# Patient Record
Sex: FEMALE | Race: WHITE | NOT HISPANIC OR LATINO | Employment: OTHER | URBAN - METROPOLITAN AREA
[De-identification: names, ages, dates, MRNs, and addresses within clinical notes are randomized per-mention and may not be internally consistent; named-entity substitution may affect disease eponyms.]

---

## 2023-04-04 LAB
ANION GAP IN SER/PLAS: 10 MMOL/L (ref 10–20)
CALCIUM (MG/DL) IN SER/PLAS: 9.8 MG/DL (ref 8.6–10.3)
CARBON DIOXIDE, TOTAL (MMOL/L) IN SER/PLAS: 32 MMOL/L (ref 21–32)
CHLORIDE (MMOL/L) IN SER/PLAS: 104 MMOL/L (ref 98–107)
CHOLESTEROL (MG/DL) IN SER/PLAS: 195 MG/DL (ref 0–199)
CHOLESTEROL IN HDL (MG/DL) IN SER/PLAS: 61.6 MG/DL
CHOLESTEROL/HDL RATIO: 3.2
CREATININE (MG/DL) IN SER/PLAS: 0.79 MG/DL (ref 0.5–1.05)
ERYTHROCYTE DISTRIBUTION WIDTH (RATIO) BY AUTOMATED COUNT: 13 % (ref 11.5–14.5)
ERYTHROCYTE MEAN CORPUSCULAR HEMOGLOBIN CONCENTRATION (G/DL) BY AUTOMATED: 31 G/DL (ref 32–36)
ERYTHROCYTE MEAN CORPUSCULAR VOLUME (FL) BY AUTOMATED COUNT: 95 FL (ref 80–100)
ERYTHROCYTES (10*6/UL) IN BLOOD BY AUTOMATED COUNT: 5.11 X10E12/L (ref 4–5.2)
GFR FEMALE: 76 ML/MIN/1.73M2
GLUCOSE (MG/DL) IN SER/PLAS: 99 MG/DL (ref 74–99)
HEMATOCRIT (%) IN BLOOD BY AUTOMATED COUNT: 48.7 % (ref 36–46)
HEMOGLOBIN (G/DL) IN BLOOD: 15.1 G/DL (ref 12–16)
LDL: 92 MG/DL (ref 0–99)
LEUKOCYTES (10*3/UL) IN BLOOD BY AUTOMATED COUNT: 7.5 X10E9/L (ref 4.4–11.3)
NON HDL CHOLESTEROL: 133 MG/DL
NRBC (PER 100 WBCS) BY AUTOMATED COUNT: 0 /100 WBC (ref 0–0)
PLATELETS (10*3/UL) IN BLOOD AUTOMATED COUNT: 209 X10E9/L (ref 150–450)
POTASSIUM (MMOL/L) IN SER/PLAS: 3.9 MMOL/L (ref 3.5–5.3)
SODIUM (MMOL/L) IN SER/PLAS: 142 MMOL/L (ref 136–145)
THYROTROPIN (MIU/L) IN SER/PLAS BY DETECTION LIMIT <= 0.05 MIU/L: 1.55 MIU/L (ref 0.44–3.98)
TRIGLYCERIDE (MG/DL) IN SER/PLAS: 206 MG/DL (ref 0–149)
UREA NITROGEN (MG/DL) IN SER/PLAS: 18 MG/DL (ref 6–23)
VLDL: 41 MG/DL (ref 0–40)

## 2024-02-29 ENCOUNTER — LAB (OUTPATIENT)
Dept: LAB | Facility: LAB | Age: 79
End: 2024-02-29
Payer: MEDICARE

## 2024-02-29 DIAGNOSIS — E04.9 NONTOXIC GOITER, UNSPECIFIED: Primary | ICD-10-CM

## 2024-02-29 DIAGNOSIS — I87.2 VENOUS INSUFFICIENCY (CHRONIC) (PERIPHERAL): ICD-10-CM

## 2024-02-29 DIAGNOSIS — E78.5 HYPERLIPIDEMIA, UNSPECIFIED: ICD-10-CM

## 2024-02-29 LAB
ANION GAP SERPL CALC-SCNC: 14 MMOL/L (ref 10–20)
BUN SERPL-MCNC: 16 MG/DL (ref 6–23)
CALCIUM SERPL-MCNC: 9.6 MG/DL (ref 8.6–10.3)
CHLORIDE SERPL-SCNC: 105 MMOL/L (ref 98–107)
CHOLEST SERPL-MCNC: 175 MG/DL (ref 0–199)
CHOLESTEROL/HDL RATIO: 3.1
CO2 SERPL-SCNC: 28 MMOL/L (ref 21–32)
CREAT SERPL-MCNC: 0.82 MG/DL (ref 0.5–1.05)
EGFRCR SERPLBLD CKD-EPI 2021: 73 ML/MIN/1.73M*2
GLUCOSE SERPL-MCNC: 104 MG/DL (ref 74–99)
HDLC SERPL-MCNC: 56.4 MG/DL
LDLC SERPL CALC-MCNC: 73 MG/DL
NON HDL CHOLESTEROL: 119 MG/DL (ref 0–149)
POTASSIUM SERPL-SCNC: 4.5 MMOL/L (ref 3.5–5.3)
SODIUM SERPL-SCNC: 142 MMOL/L (ref 136–145)
TRIGL SERPL-MCNC: 230 MG/DL (ref 0–149)
TSH SERPL-ACNC: 2.45 MIU/L (ref 0.44–3.98)
VLDL: 46 MG/DL (ref 0–40)

## 2024-02-29 PROCEDURE — 36415 COLL VENOUS BLD VENIPUNCTURE: CPT

## 2024-02-29 PROCEDURE — 80048 BASIC METABOLIC PNL TOTAL CA: CPT

## 2024-02-29 PROCEDURE — 84443 ASSAY THYROID STIM HORMONE: CPT

## 2024-02-29 PROCEDURE — 80061 LIPID PANEL: CPT

## 2024-03-14 DIAGNOSIS — M25.561 ACUTE PAIN OF BOTH KNEES: ICD-10-CM

## 2024-03-14 DIAGNOSIS — M25.562 ACUTE PAIN OF BOTH KNEES: ICD-10-CM

## 2024-03-18 ENCOUNTER — OFFICE VISIT (OUTPATIENT)
Dept: ORTHOPEDIC SURGERY | Facility: CLINIC | Age: 79
End: 2024-03-18
Payer: MEDICARE

## 2024-03-18 ENCOUNTER — HOSPITAL ENCOUNTER (OUTPATIENT)
Dept: RADIOLOGY | Facility: CLINIC | Age: 79
Discharge: HOME | End: 2024-03-18
Payer: MEDICARE

## 2024-03-18 DIAGNOSIS — M25.562 ACUTE PAIN OF BOTH KNEES: ICD-10-CM

## 2024-03-18 DIAGNOSIS — M25.562 LEFT KNEE PAIN, UNSPECIFIED CHRONICITY: ICD-10-CM

## 2024-03-18 DIAGNOSIS — M25.561 ACUTE PAIN OF BOTH KNEES: ICD-10-CM

## 2024-03-18 PROCEDURE — 73564 X-RAY EXAM KNEE 4 OR MORE: CPT | Mod: BILATERAL PROCEDURE | Performed by: RADIOLOGY

## 2024-03-18 PROCEDURE — 1159F MED LIST DOCD IN RCRD: CPT | Performed by: ORTHOPAEDIC SURGERY

## 2024-03-18 PROCEDURE — 73564 X-RAY EXAM KNEE 4 OR MORE: CPT | Mod: 50

## 2024-03-18 PROCEDURE — 99213 OFFICE O/P EST LOW 20 MIN: CPT | Performed by: ORTHOPAEDIC SURGERY

## 2024-03-18 PROCEDURE — 1157F ADVNC CARE PLAN IN RCRD: CPT | Performed by: ORTHOPAEDIC SURGERY

## 2024-03-18 RX ORDER — MELOXICAM 15 MG/1
15 TABLET ORAL DAILY
Qty: 30 TABLET | Refills: 11 | Status: SHIPPED | OUTPATIENT
Start: 2024-03-18 | End: 2025-03-18

## 2024-03-18 NOTE — PROGRESS NOTES
History of Present Illness:   Patient with bilateral knee pain status post total knee arthroplasty.  She is endorsing some anterior right knee pain.  Denies any hip groin pain or any numbness or tingling.  Denies any recent falls or infections.    Review of Systems   GENERAL: Negative for malaise, significant weight loss, fever  MUSCULOSKELETAL: see HPI  NEURO:  Negative    Physical Examination:  Right knee:  Full range of motion, no effusion  No laxity varus valgus stress  Mild tenderness over patellar and quadricep tendon  Normal neurovascular exam distally    Imaging:  Cemented total knee arthroplasty appropriate position alignment    Assessment:   Patient status post right TKA with some anterior knee pain    Plan:  We discussed concern for overload of the extensor mechanism which may be somewhat based on body habitus.  We discussed rest ice, anti-inflammatories, lidocaine patches recommended formal physical therapy.

## 2024-08-18 ENCOUNTER — LAB REQUISITION (OUTPATIENT)
Dept: LAB | Facility: HOSPITAL | Age: 79
End: 2024-08-18
Payer: MEDICARE

## 2024-08-19 LAB
LABORATORY COMMENT REPORT: NORMAL
PATH REPORT.GROSS SPEC: NORMAL
PATH REPORT.TOTAL CANCER: NORMAL

## 2024-09-03 NOTE — PROGRESS NOTES
Gynecologic Oncology Initial Consultation  [facility]    Patient ID: Rohini Beaver, 79 y.o.  Referring Physician: Harley Araujo MD ([facility])  Primary Care Provider: Jose Enrique Wooten MD      Reason for Consultation: Peritoneal carcinomatosis  Subjective    HPI  79 y.o. who presents as referral from Dr. Harley Araujo at [facility] for peritoneal carcinomatosis. She first developed abdominal pain and constipation in July. Associated symptoms include unintentional weight loss of 8 lb. Seen by GI and treated empirically for diverticulitis without improvement in symptoms. Presented to the ER where CT showed a possible 3 cm pelvic mass, endometrial thickening and moderate volume ascites and omental cake. Tumor markers were notable for CA-125 (8/16/24) elevated to 1625; CEA (8/16/24) 17.6. Paracentesis was completed with 1 L removed. 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 Referred for further management.     Here in a wheelchair today with significant and rapid decline in physical conditioning. Requires assistance with most ADL and currently resides in a assisted living. Denies nausea, emesis though with constant abdominal pain and bloating. Last paracentesis for about 1L was on 8/28/24. Treatment with percocet q4 PRN tried but having trouble getting the medicine when needed. Constipation ongoing with straining reported. She has been drinking prune juice and PRN colace tried. Denies vaginal bleeding.     A comprehensive review of systems was performed and otherwise negative.    Objective      Past Medical History:   Diagnosis Date    Bilateral primary osteoarthritis of knee     HLD (hyperlipidemia)     Lumbosacral radiculopathy     Meralgia paraesthetica     Physical deconditioning        Past Surgical History:   Procedure Laterality Date    ARTHROPLASTY Left     Left thumb carpometacarpal arthroplasty with ligament reconstruction and tendon interposition  "   BREAST BIOPSY  1999    Benign    CHOLECYSTECTOMY      COLONOSCOPY      CORNEAL TRANSPLANT      DILATION AND CURETTAGE OF UTERUS      LUMBAR FUSION      SHOULDER ARTHROSCOPY Left     SKIN LESION EXCISION      TOTAL KNEE ARTHROPLASTY Left 2019    TOTAL KNEE ARTHROPLASTY Right 2017    TRIGGER FINGER RELEASE Right        Family History   Problem Relation Name Age of Onset    Colon cancer Mother      Lung cancer Father      Other (Mesothelioma) Brother      Brain cancer Mother's Brother     Otherwise, denies history of endometrial, ovarian, breast, prostate, or colorectal cancers.    OBGYN Hx:  -   - Menopause in 50s; denies HRT use  - Has previously used OCPs for ~2 years    Screening:  - Last Pap: Many years ago, no history of abnormal paps  - Last Mammogram: Age 75, history of benign breast cyst, otherwise no abnormal findings  - Last Colonoscopy: 2019, no history of abnormal colonoscopies    Social Hx:  Rohini Beaver  reports that she has never smoked. She has never used smokeless tobacco.  She  reports that she does not currently use alcohol.   She  reports no history of drug use.  Lives at a SNF but had been at home with her sister. Here with sister and family friend, Pastor.  Retired, worked in food management.     Physical Exam  BSA: 2.18 meters squared  /82 (BP Location: Right arm, Patient Position: Sitting, BP Cuff Size: Adult)   Pulse 98   Temp 36 °C (96.8 °F) (Temporal)   Resp 16   Ht (S) 1.62 m (5' 3.78\")   Wt 106 kg (234 lb 9.1 oz)   SpO2 95%   BMI 40.54 kg/m²   General:   alert and oriented, in no acute distress, sitting in wheelchair   Heart: regular rate and rhythm, S1, S2 normal, no murmur, click, rub or gallop   Lungs: Coarse breath sounds bilaterally   Abdomen: Soft, mildly distended, diffuse tenderness to palpation   Vulva: normal   Vagina: normal mucosa, no blood in the vault   Cervix: no lesions, nulliparous appearance, and limited by patient comfort, but no active bleeding " appreciated   Uterus: normal size, anteverted, mobile   Adnexa: negative for mass   Rectal: normal tone, no masses or tenderness   Lymph Nodes:  Cervical, supraclavicular, and axillary nodes normal.   Extremities: warm, well-perfused without cyanosis, clubbing or edema   Skin: Normal     Pathology:  Ascites Cytology (8/15/2024 at [facility])  Positive for non-small cell carcinoma, consistent with adenocarcinoma. Positive for Castillo-ep4, p16, GATA3. Focal and weakly positive for estrogen receptor. Negative for calretinin, TTF-1    Imaging:  CT Abdomen/Pelvis WO Contrast (8/8/2024 at [facility], Images uploaded to PACS)  Stable subcentimeter hypodensity within left lateral segment of liver. Prior cholecystectomy. Unremarkable spleen, pancreas, adrenals. Punctate nonobstructing right nephrolithiasis. Moderate hiatal hernia. Diverticulosis. No evidence of bowel obstruction. Moderate volume abdominopelvic ascites. Extensive nodularity throughout the omentum consistent with carcinomatosis. Spinal fusion L4-S1. No suspicious lytic or blastic osseous abnormalities.       Performance Status:  Symptomatic; in bed <50% of the day    Assessment/Plan    79 y.o. with peritoneal carcinomatosis, elevated CA-125, and adenocarcinoma on ascitic fluid cytology concerning for gynecologic malignancy.     Oncology History Overview Note   8/15/24 paracentesis 50 mL   CA-125 (8/16/24) elevated to 1625; CEA 17.6   8/31/24 paracentesis cytology adenocarcinoma     Peritoneal carcinomatosis (Multi)   9/4/2024 Initial Diagnosis    Peritoneal carcinomatosis (Multi)       Diagnoses and all orders for this visit:  Peritoneal carcinomatosis (Multi)  - We reviewed her imaging and cytology results from [facility]  - We discussed concern for a malignancy from the ovary/fallopian tube/peritoneum, but tissue diagnosis is required to differentiate site of origin   - Reviewed abnormal tumor markers  and CEA  - IR referral for biopsy  of omental cake   - Virtual visit to follow to review results and discuss treatment   - Discussed anticipated treatment with neoadjuvant chemotherapy, consider KRISTY-7 regimen if GYN origin    Malignant ascites (CMS-HCC)  - Family to call if paracentesis is needed prior to 9/19    Constipation   - Senna PRN     Cancer related pain   - Continue percocet q4, consider transitioning to oxycodone if no relief on follow up     Medical frailty   - Encourage PT/OT at     Seen and discussed with Dr. Villalba.    Anh Cox MD  Gynecologic Oncology Fellow    I saw and evaluated the patient. I personally obtained the key and critical portions of the history and physical exam or was physically present for key and critical portions performed by the resident/fellow. I reviewed the resident/fellow's documentation and discussed the patient with the resident/fellow. I agree with the resident/fellow's medical decision making as documented in the note.    Sofia Villalba MD MPH

## 2024-09-04 ENCOUNTER — OFFICE VISIT (OUTPATIENT)
Dept: GYNECOLOGIC ONCOLOGY | Facility: CLINIC | Age: 79
End: 2024-09-04
Payer: MEDICARE

## 2024-09-04 VITALS
HEIGHT: 64 IN | SYSTOLIC BLOOD PRESSURE: 138 MMHG | OXYGEN SATURATION: 95 % | TEMPERATURE: 96.8 F | WEIGHT: 234.57 LBS | RESPIRATION RATE: 16 BRPM | DIASTOLIC BLOOD PRESSURE: 82 MMHG | HEART RATE: 98 BPM | BODY MASS INDEX: 40.05 KG/M2

## 2024-09-04 DIAGNOSIS — R18.0 MALIGNANT ASCITES (CMS-HCC): Primary | ICD-10-CM

## 2024-09-04 DIAGNOSIS — C56.9: Primary | ICD-10-CM

## 2024-09-04 DIAGNOSIS — R79.1 ABNORMAL COAGULATION PROFILE: ICD-10-CM

## 2024-09-04 DIAGNOSIS — G89.3 CANCER ASSOCIATED PAIN: ICD-10-CM

## 2024-09-04 DIAGNOSIS — C78.6 PERITONEAL CARCINOMATOSIS (MULTI): ICD-10-CM

## 2024-09-04 DIAGNOSIS — R54 FRAILTY: ICD-10-CM

## 2024-09-04 DIAGNOSIS — T40.2X5A CONSTIPATION DUE TO OPIOID THERAPY: ICD-10-CM

## 2024-09-04 DIAGNOSIS — R97.8 OTHER ABNORMAL TUMOR MARKERS: ICD-10-CM

## 2024-09-04 DIAGNOSIS — K59.03 CONSTIPATION DUE TO OPIOID THERAPY: ICD-10-CM

## 2024-09-04 PROBLEM — K44.9 HIATAL HERNIA: Status: ACTIVE | Noted: 2024-09-04

## 2024-09-04 PROBLEM — K21.9 GASTROESOPHAGEAL REFLUX DISEASE: Status: ACTIVE | Noted: 2024-09-04

## 2024-09-04 PROCEDURE — 99215 OFFICE O/P EST HI 40 MIN: CPT | Mod: GC | Performed by: STUDENT IN AN ORGANIZED HEALTH CARE EDUCATION/TRAINING PROGRAM

## 2024-09-04 RX ORDER — OXYCODONE AND ACETAMINOPHEN 5; 325 MG/1; MG/1
1 TABLET ORAL EVERY 4 HOURS PRN
COMMUNITY

## 2024-09-04 ASSESSMENT — PATIENT HEALTH QUESTIONNAIRE - PHQ9
SUM OF ALL RESPONSES TO PHQ9 QUESTIONS 1 AND 2: 0
1. LITTLE INTEREST OR PLEASURE IN DOING THINGS: NOT AT ALL
2. FEELING DOWN, DEPRESSED OR HOPELESS: NOT AT ALL

## 2024-09-04 ASSESSMENT — COLUMBIA-SUICIDE SEVERITY RATING SCALE - C-SSRS
2. HAVE YOU ACTUALLY HAD ANY THOUGHTS OF KILLING YOURSELF?: NO
1. IN THE PAST MONTH, HAVE YOU WISHED YOU WERE DEAD OR WISHED YOU COULD GO TO SLEEP AND NOT WAKE UP?: NO
6. HAVE YOU EVER DONE ANYTHING, STARTED TO DO ANYTHING, OR PREPARED TO DO ANYTHING TO END YOUR LIFE?: NO

## 2024-09-04 ASSESSMENT — ENCOUNTER SYMPTOMS
OCCASIONAL FEELINGS OF UNSTEADINESS: 0
DEPRESSION: 0
LOSS OF SENSATION IN FEET: 0

## 2024-09-04 ASSESSMENT — PAIN SCALES - GENERAL: PAINLEVEL: 0-NO PAIN

## 2024-09-04 NOTE — LETTER
No history of CKD  Creatinine baseline around 0.6-0.7, developed during admission likely secondary to bactrim use, increased to 2.4 and trended down to 1.0  US kidney showed renal US showed minimally dilated central renal collecting system on the left and mildly elevated resistive indices, findings which may be seen in setting of medical renal disease    - Currently resolved  - Daily Renal Function Panel  - Check Vanc levels before each dose  - Need strict I&Os  - Hold off on serological work up for now,  get basic pending labs today   - No Indication for RRT at this time, K+ acceptable, No Uremia symptoms.  - Avoid Hypotension.  - Renally dose all meds  - Please avoid nephrotoxins, including NSAIDs, aminoglycosides, IV contrast (unless absolutely necessary), gadolinium, fleets and other phosphorous-based laxatives. Caution with antibiotics   September 7, 2024     Harley Araujo MD  [address]  [facility] [address]    Patient: Rohini Beaver   YOB: 1945   Date of Visit: 9/4/2024       Dear Dr. Harley Araujo MD:    Thank you for referring Rohini Beaver to me for evaluation. Below are my notes for this consultation.  If you have questions, please do not hesitate to call me. I look forward to following your patient along with you.       Sincerely,     Sofia Villalba MD MPH      CC: No Recipients  ______________________________________________________________________________________      Gynecologic Oncology Initial Consultation  [facility]    Patient ID: Rohini Beaver, 79 y.o.  Referring Physician: Harley Araujo MD ([facility])  Primary Care Provider: Jose Enrique Wooten MD      Reason for Consultation: Peritoneal carcinomatosis  Subjective   HPI  79 y.o. who presents as referral from Dr. Harley Araujo at [facility] for peritoneal carcinomatosis. She first developed abdominal pain and constipation in July. Associated symptoms include unintentional weight loss of 8 lb. Seen by GI and treated empirically for diverticulitis without improvement in symptoms. Presented to the ER where CT showed a possible 3 cm pelvic mass, endometrial thickening and moderate volume ascites and omental cake. Tumor markers were notable for CA-125 (8/16/24) elevated to 1625; CEA (8/16/24) 17.6. Paracentesis was completed with 1 L removed. 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 Referred for further management.     Here in a wheelchair today with significant and rapid decline in physical conditioning. Requires assistance with most ADL and currently resides in a assisted living. Denies nausea, emesis though with constant abdominal pain and bloating. Last paracentesis for about 1L was on 8/28/24. Treatment with percocet q4 PRN tried but having trouble getting the  medicine when needed. Constipation ongoing with straining reported. She has been drinking prune juice and PRN colace tried. Denies vaginal bleeding.     A comprehensive review of systems was performed and otherwise negative.    Objective     Past Medical History:   Diagnosis Date   • Bilateral primary osteoarthritis of knee    • HLD (hyperlipidemia)    • Lumbosacral radiculopathy    • Meralgia paraesthetica    • Physical deconditioning        Past Surgical History:   Procedure Laterality Date   • ARTHROPLASTY Left     Left thumb carpometacarpal arthroplasty with ligament reconstruction and tendon interposition   • BREAST BIOPSY      Benign   • CHOLECYSTECTOMY     • COLONOSCOPY     • CORNEAL TRANSPLANT     • DILATION AND CURETTAGE OF UTERUS     • LUMBAR FUSION     • SHOULDER ARTHROSCOPY Left    • SKIN LESION EXCISION     • TOTAL KNEE ARTHROPLASTY Left 2019   • TOTAL KNEE ARTHROPLASTY Right 2017   • TRIGGER FINGER RELEASE Right        Family History   Problem Relation Name Age of Onset   • Colon cancer Mother     • Lung cancer Father     • Other (Mesothelioma) Brother     • Brain cancer Mother's Brother     Otherwise, denies history of endometrial, ovarian, breast, prostate, or colorectal cancers.    OBGYN Hx:  -   - Menopause in 50s; denies HRT use  - Has previously used OCPs for ~2 years    Screening:  - Last Pap: Many years ago, no history of abnormal paps  - Last Mammogram: Age 75, history of benign breast cyst, otherwise no abnormal findings  - Last Colonoscopy: 2019, no history of abnormal colonoscopies    Social Hx:  Rohini Beaver  reports that she has never smoked. She has never used smokeless tobacco.  She  reports that she does not currently use alcohol.   She  reports no history of drug use.  Lives at a SNF but had been at home with her sister. Here with sister and family friend, Pastor.  Retired, worked in food management.     Physical Exam  BSA: 2.18 meters squared  /82 (BP Location: Right  "arm, Patient Position: Sitting, BP Cuff Size: Adult)   Pulse 98   Temp 36 °C (96.8 °F) (Temporal)   Resp 16   Ht (S) 1.62 m (5' 3.78\")   Wt 106 kg (234 lb 9.1 oz)   SpO2 95%   BMI 40.54 kg/m²   General:   alert and oriented, in no acute distress, sitting in wheelchair   Heart: regular rate and rhythm, S1, S2 normal, no murmur, click, rub or gallop   Lungs: Coarse breath sounds bilaterally   Abdomen: Soft, mildly distended, diffuse tenderness to palpation   Vulva: normal   Vagina: normal mucosa, no blood in the vault   Cervix: no lesions, nulliparous appearance, and limited by patient comfort, but no active bleeding appreciated   Uterus: normal size, anteverted, mobile   Adnexa: negative for mass   Rectal: normal tone, no masses or tenderness   Lymph Nodes:  Cervical, supraclavicular, and axillary nodes normal.   Extremities: warm, well-perfused without cyanosis, clubbing or edema   Skin: Normal     Pathology:  Ascites Cytology (8/15/2024 at [facility])  Positive for non-small cell carcinoma, consistent with adenocarcinoma. Positive for Castillo-ep4, p16, GATA3. Focal and weakly positive for estrogen receptor. Negative for calretinin, TTF-1    Imaging:  CT Abdomen/Pelvis WO Contrast (8/8/2024 at [facility], Images uploaded to PACS)  Stable subcentimeter hypodensity within left lateral segment of liver. Prior cholecystectomy. Unremarkable spleen, pancreas, adrenals. Punctate nonobstructing right nephrolithiasis. Moderate hiatal hernia. Diverticulosis. No evidence of bowel obstruction. Moderate volume abdominopelvic ascites. Extensive nodularity throughout the omentum consistent with carcinomatosis. Spinal fusion L4-S1. No suspicious lytic or blastic osseous abnormalities.       Performance Status:  Symptomatic; in bed <50% of the day    Assessment/Plan   79 y.o. with peritoneal carcinomatosis, elevated CA-125, and adenocarcinoma on ascitic fluid cytology concerning for gynecologic malignancy. "     Oncology History Overview Note   8/15/24 paracentesis 50 mL   CA-125 (8/16/24) elevated to 1625; CEA 17.6   8/31/24 paracentesis cytology adenocarcinoma     Peritoneal carcinomatosis (Multi)   9/4/2024 Initial Diagnosis    Peritoneal carcinomatosis (Multi)       Diagnoses and all orders for this visit:  Peritoneal carcinomatosis (Multi)  - We reviewed her imaging and cytology results from [facility]  - We discussed concern for a malignancy from the ovary/fallopian tube/peritoneum, but tissue diagnosis is required to differentiate site of origin   - Reviewed abnormal tumor markers  and CEA  - IR referral for biopsy of omental cake   - Virtual visit to follow to review results and discuss treatment   - Discussed anticipated treatment with neoadjuvant chemotherapy, consider KRISTY-7 regimen if GYN origin    Malignant ascites (CMS-HCC)  - Family to call if paracentesis is needed prior to 9/19    Constipation   - Senna PRN     Cancer related pain   - Continue percocet q4, consider transitioning to oxycodone if no relief on follow up     Medical frailty   - Encourage PT/OT at     Seen and discussed with Dr. Villalba.    Anh Cox MD  Gynecologic Oncology Fellow    I saw and evaluated the patient. I personally obtained the key and critical portions of the history and physical exam or was physically present for key and critical portions performed by the resident/fellow. I reviewed the resident/fellow's documentation and discussed the patient with the resident/fellow. I agree with the resident/fellow's medical decision making as documented in the note.    Sofia Villalba MD MPH

## 2024-09-11 ENCOUNTER — APPOINTMENT (OUTPATIENT)
Dept: GYNECOLOGIC ONCOLOGY | Facility: CLINIC | Age: 79
End: 2024-09-11
Payer: MEDICARE

## 2024-09-11 DIAGNOSIS — C78.6 PERITONEAL CARCINOMATOSIS (MULTI): Primary | ICD-10-CM

## 2024-09-16 ENCOUNTER — LAB REQUISITION (OUTPATIENT)
Dept: LAB | Facility: HOSPITAL | Age: 79
End: 2024-09-16
Payer: MEDICARE

## 2024-09-16 LAB
LAB AP ASR DISCLAIMER: NORMAL
LABORATORY COMMENT REPORT: NORMAL
PATH REPORT.FINAL DX SPEC: NORMAL
PATH REPORT.GROSS SPEC: NORMAL
PATH REPORT.TOTAL CANCER: NORMAL

## 2024-09-16 PROCEDURE — 88321 CONSLTJ&REPRT SLD PREP ELSWR: CPT | Performed by: PATHOLOGY

## 2024-09-19 ENCOUNTER — HOSPITAL ENCOUNTER (OUTPATIENT)
Dept: RADIOLOGY | Facility: HOSPITAL | Age: 79
Discharge: HOME | End: 2024-09-19
Payer: MEDICARE

## 2024-09-19 VITALS
HEART RATE: 98 BPM | SYSTOLIC BLOOD PRESSURE: 126 MMHG | DIASTOLIC BLOOD PRESSURE: 55 MMHG | TEMPERATURE: 97.2 F | RESPIRATION RATE: 26 BRPM | OXYGEN SATURATION: 97 %

## 2024-09-19 VITALS
DIASTOLIC BLOOD PRESSURE: 45 MMHG | OXYGEN SATURATION: 94 % | RESPIRATION RATE: 18 BRPM | HEART RATE: 93 BPM | SYSTOLIC BLOOD PRESSURE: 114 MMHG

## 2024-09-19 DIAGNOSIS — R18.0 MALIGNANT ASCITES (CMS-HCC): ICD-10-CM

## 2024-09-19 DIAGNOSIS — C78.6 PERITONEAL CARCINOMATOSIS (MULTI): ICD-10-CM

## 2024-09-19 PROCEDURE — 76942 ECHO GUIDE FOR BIOPSY: CPT

## 2024-09-19 PROCEDURE — 7100000009 HC PHASE TWO TIME - INITIAL BASE CHARGE

## 2024-09-19 PROCEDURE — 7100000010 HC PHASE TWO TIME - EACH INCREMENTAL 1 MINUTE

## 2024-09-19 PROCEDURE — 49083 ABD PARACENTESIS W/IMAGING: CPT

## 2024-09-19 PROCEDURE — 2720000007 HC OR 272 NO HCPCS

## 2024-09-19 PROCEDURE — 2500000001 HC RX 250 WO HCPCS SELF ADMINISTERED DRUGS (ALT 637 FOR MEDICARE OP)

## 2024-09-19 RX ORDER — OXYCODONE AND ACETAMINOPHEN 5; 325 MG/1; MG/1
1 TABLET ORAL ONCE
Status: COMPLETED | OUTPATIENT
Start: 2024-09-19 | End: 2024-09-19

## 2024-09-19 ASSESSMENT — PAIN SCALES - GENERAL
PAINLEVEL_OUTOF10: 0 - NO PAIN
PAINLEVEL_OUTOF10: 8
PAINLEVEL_OUTOF10: 0 - NO PAIN
PAINLEVEL_OUTOF10: 0 - NO PAIN
PAINLEVEL_OUTOF10: 8

## 2024-09-19 ASSESSMENT — PAIN - FUNCTIONAL ASSESSMENT: PAIN_FUNCTIONAL_ASSESSMENT: 0-10

## 2024-09-19 NOTE — POST-PROCEDURE NOTE
Interventional Radiology Brief Postprocedure Note    Attending: Dr. Peri Lawrence MD    Assistant: Dr. Charley Lemon DO    Diagnosis: omental soft tissue mass c/f peritoneal carcinomatosis and ascites     Description of procedure:   Biopsy: A total of 1 pass were made into the _ under ultrasound guidance using a 16 Gauge BARD needle passed through a 15 gauge coaxial system. Scanning after each pass demonstrated no bleeding.  Gelfoam was inserted into the site. Please refer to radiology report for further details.     1 core specimen was sent to pathology.     Subsequently a paracentesis was performed under ultrasound guidance with removal of 1L of fluid.     Anesthesia: Conscious sedation    Complications:  No chest pain, no shortness of breath    Estimated Blood Loss: minimal    Medications (Filter: Administrations occurring from 1036 to 1114 on 09/19/24) As of 09/19/24 1114      None              See detailed result report with images in PACS.    The patient tolerated the procedure well without incident or complication and is in stable condition.

## 2024-09-19 NOTE — PRE-PROCEDURE NOTE
Interventional Radiology Preprocedure Note    Indication for procedure: omental soft tissue mass c/f peritoneal carcinomatosis and ascites     Relevant review of systems: No chest pain, no shortness of breath    Relevant Labs:   Lab Results   Component Value Date    CREATININE 0.82 02/29/2024    EGFR 73 02/29/2024       Planned Sedation/Anesthesia: Conscious sedation    Airway assessment: normal    Directed physical examination:    ..General: Patient is awake, alert, oriented and in no acute distress, resting comfortably in bed  Heart: regular rate   Lungs: normal respiratory effort  Psych: normal affect     Mallampati: I (soft palate, uvula, fauces, and tonsillar pillars visible)    ASA Score: ASA 2 - Patient with mild systemic disease with no functional limitations    Benefits, risks and alternatives of procedure and planned sedation have been discussed with the patient and/or their representative. All questions answered and they agree to proceed.

## 2024-09-19 NOTE — POST-PROCEDURE NOTE
INTERVENTIONAL RADIOLOGY ADVANCED PRACTICE PROCEDURE  [facility]    A time out was performed and Right Hemiabdomen was examined with US and appropriate entry point was confirmed and marked.   The patient was prepped and draped in a sterile manner, 1% lidocaine was used to anesthesize the skin and subcutaneous tissue.   A 5F Centesis needle was then introduced through the skin into the peritoneal space, the centesis catheter was then threaded without difficulty.   1950 ml of serosanguineous fluid was removed without difficulty. The catheter was then removed.   No immediate complications were noted during and immediately following the procedure.

## 2024-09-25 ENCOUNTER — TELEPHONE (OUTPATIENT)
Dept: GYNECOLOGIC ONCOLOGY | Facility: HOSPITAL | Age: 79
End: 2024-09-25
Payer: MEDICARE

## 2024-09-25 NOTE — TELEPHONE ENCOUNTER
Patients sister called to update that she took patient to [facility] ER today due to abdominal pain   Sister states patient has been admitted for observation stay.   Message routed to Dr. Villalba to update.

## 2024-09-26 LAB
LAB AP ASR DISCLAIMER: NORMAL
LAB AP BLOCK FOR ADDITIONAL STUDIES: NORMAL
LABORATORY COMMENT REPORT: NORMAL
PATH REPORT.COMMENTS IMP SPEC: NORMAL
PATH REPORT.FINAL DX SPEC: NORMAL
PATH REPORT.GROSS SPEC: NORMAL
PATH REPORT.RELEVANT HX SPEC: NORMAL
PATH REPORT.TOTAL CANCER: NORMAL

## 2024-09-30 ENCOUNTER — TELEPHONE (OUTPATIENT)
Dept: GYNECOLOGIC ONCOLOGY | Facility: HOSPITAL | Age: 79
End: 2024-09-30
Payer: MEDICARE

## 2024-09-30 DIAGNOSIS — C56.9 MALIGNANT NEOPLASM OF OVARY, UNSPECIFIED LATERALITY (MULTI): Primary | ICD-10-CM

## 2024-09-30 RX ORDER — OXYCODONE AND ACETAMINOPHEN 5; 325 MG/1; MG/1
1 TABLET ORAL EVERY 4 HOURS PRN
Qty: 20 TABLET | Refills: 0 | Status: ON HOLD | OUTPATIENT
Start: 2024-09-30

## 2024-09-30 NOTE — TELEPHONE ENCOUNTER
The patient called and said that she is having abdominal pain that is rating 8/10 with out pain medication. She is requesting a refill on the pain medication. I sent her request to her physician.

## 2024-10-01 ENCOUNTER — APPOINTMENT (OUTPATIENT)
Dept: RADIOLOGY | Facility: HOSPITAL | Age: 79
DRG: 754 | End: 2024-10-01
Payer: MEDICARE

## 2024-10-01 ENCOUNTER — APPOINTMENT (OUTPATIENT)
Dept: CARDIOLOGY | Facility: HOSPITAL | Age: 79
DRG: 754 | End: 2024-10-01
Payer: MEDICARE

## 2024-10-01 ENCOUNTER — HOSPITAL ENCOUNTER (INPATIENT)
Facility: HOSPITAL | Age: 79
End: 2024-10-01
Attending: EMERGENCY MEDICINE | Admitting: INTERNAL MEDICINE
Payer: MEDICARE

## 2024-10-01 DIAGNOSIS — E87.1 HYPONATREMIA: Primary | ICD-10-CM

## 2024-10-01 DIAGNOSIS — R18.0 MALIGNANT ASCITES (CMS-HCC): ICD-10-CM

## 2024-10-01 DIAGNOSIS — R18.8 ABDOMINAL FLUID COLLECTION: ICD-10-CM

## 2024-10-01 DIAGNOSIS — J90 PLEURAL EFFUSION: ICD-10-CM

## 2024-10-01 LAB
ALBUMIN SERPL BCP-MCNC: 2.4 G/DL (ref 3.4–5)
ALP SERPL-CCNC: 231 U/L (ref 33–136)
ALT SERPL W P-5'-P-CCNC: 8 U/L (ref 7–45)
ANION GAP SERPL CALC-SCNC: 13 MMOL/L (ref 10–20)
APPEARANCE UR: CLEAR
APTT PPP: 24 SECONDS (ref 27–38)
AST SERPL W P-5'-P-CCNC: 23 U/L (ref 9–39)
BASOPHILS # BLD AUTO: 0.09 X10*3/UL (ref 0–0.1)
BASOPHILS NFR BLD AUTO: 0.3 %
BILIRUB SERPL-MCNC: 0.3 MG/DL (ref 0–1.2)
BILIRUB UR STRIP.AUTO-MCNC: NEGATIVE MG/DL
BNP SERPL-MCNC: 74 PG/ML (ref 0–99)
BUN SERPL-MCNC: 18 MG/DL (ref 6–23)
CALCIUM SERPL-MCNC: 8.5 MG/DL (ref 8.6–10.3)
CARDIAC TROPONIN I PNL SERPL HS: 7 NG/L (ref 0–13)
CHLORIDE SERPL-SCNC: 94 MMOL/L (ref 98–107)
CO2 SERPL-SCNC: 24 MMOL/L (ref 21–32)
COLOR UR: ABNORMAL
CREAT SERPL-MCNC: 0.61 MG/DL (ref 0.5–1.05)
EGFRCR SERPLBLD CKD-EPI 2021: >90 ML/MIN/1.73M*2
EOSINOPHIL # BLD AUTO: 0.14 X10*3/UL (ref 0–0.4)
EOSINOPHIL NFR BLD AUTO: 0.5 %
ERYTHROCYTE [DISTWIDTH] IN BLOOD BY AUTOMATED COUNT: 15.7 % (ref 11.5–14.5)
GLUCOSE BLD MANUAL STRIP-MCNC: 102 MG/DL (ref 74–99)
GLUCOSE SERPL-MCNC: 107 MG/DL (ref 74–99)
GLUCOSE UR STRIP.AUTO-MCNC: NORMAL MG/DL
HCT VFR BLD AUTO: 34.1 % (ref 36–46)
HGB BLD-MCNC: 11 G/DL (ref 12–16)
HOLD SPECIMEN: NORMAL
IMM GRANULOCYTES # BLD AUTO: 0.3 X10*3/UL (ref 0–0.5)
IMM GRANULOCYTES NFR BLD AUTO: 1 % (ref 0–0.9)
INR PPP: 1.2 (ref 0.9–1.1)
KETONES UR STRIP.AUTO-MCNC: NEGATIVE MG/DL
LACTATE SERPL-SCNC: 1.4 MMOL/L (ref 0.4–2)
LEUKOCYTE ESTERASE UR QL STRIP.AUTO: NEGATIVE
LYMPHOCYTES # BLD AUTO: 1.23 X10*3/UL (ref 0.8–3)
LYMPHOCYTES NFR BLD AUTO: 4.3 %
MAGNESIUM SERPL-MCNC: 1.68 MG/DL (ref 1.6–2.4)
MCH RBC QN AUTO: 26.5 PG (ref 26–34)
MCHC RBC AUTO-ENTMCNC: 32.3 G/DL (ref 32–36)
MCV RBC AUTO: 82 FL (ref 80–100)
MONOCYTES # BLD AUTO: 1.38 X10*3/UL (ref 0.05–0.8)
MONOCYTES NFR BLD AUTO: 4.8 %
MUCOUS THREADS #/AREA URNS AUTO: NORMAL /LPF
NEUTROPHILS # BLD AUTO: 25.74 X10*3/UL (ref 1.6–5.5)
NEUTROPHILS NFR BLD AUTO: 89.1 %
NITRITE UR QL STRIP.AUTO: NEGATIVE
NRBC BLD-RTO: 0 /100 WBCS (ref 0–0)
OSMOLALITY SERPL: 264 MOSM/KG (ref 280–300)
PH UR STRIP.AUTO: 6 [PH]
PLATELET # BLD AUTO: 446 X10*3/UL (ref 150–450)
POTASSIUM SERPL-SCNC: 5.2 MMOL/L (ref 3.5–5.3)
PROT SERPL-MCNC: 5.7 G/DL (ref 6.4–8.2)
PROT UR STRIP.AUTO-MCNC: ABNORMAL MG/DL
PROTHROMBIN TIME: 13 SECONDS (ref 9.8–12.8)
RBC # BLD AUTO: 4.15 X10*6/UL (ref 4–5.2)
RBC # UR STRIP.AUTO: NEGATIVE /UL
RBC #/AREA URNS AUTO: NORMAL /HPF
SARS-COV-2 RNA RESP QL NAA+PROBE: NOT DETECTED
SODIUM SERPL-SCNC: 126 MMOL/L (ref 136–145)
SP GR UR STRIP.AUTO: >1.05
SQUAMOUS #/AREA URNS AUTO: NORMAL /HPF
UROBILINOGEN UR STRIP.AUTO-MCNC: NORMAL MG/DL
WBC # BLD AUTO: 28.9 X10*3/UL (ref 4.4–11.3)
WBC #/AREA URNS AUTO: NORMAL /HPF

## 2024-10-01 PROCEDURE — 1200000002 HC GENERAL ROOM WITH TELEMETRY DAILY

## 2024-10-01 PROCEDURE — 84484 ASSAY OF TROPONIN QUANT: CPT | Performed by: PHYSICIAN ASSISTANT

## 2024-10-01 PROCEDURE — 76942 ECHO GUIDE FOR BIOPSY: CPT | Performed by: RADIOLOGY

## 2024-10-01 PROCEDURE — 85730 THROMBOPLASTIN TIME PARTIAL: CPT | Performed by: PHYSICIAN ASSISTANT

## 2024-10-01 PROCEDURE — 83930 ASSAY OF BLOOD OSMOLALITY: CPT | Mod: PARLAB | Performed by: NURSE PRACTITIONER

## 2024-10-01 PROCEDURE — 36415 COLL VENOUS BLD VENIPUNCTURE: CPT | Performed by: PHYSICIAN ASSISTANT

## 2024-10-01 PROCEDURE — 85610 PROTHROMBIN TIME: CPT | Performed by: PHYSICIAN ASSISTANT

## 2024-10-01 PROCEDURE — 10160 PNXR ASPIR ABSC HMTMA BULLA: CPT | Performed by: RADIOLOGY

## 2024-10-01 PROCEDURE — 96365 THER/PROPH/DIAG IV INF INIT: CPT

## 2024-10-01 PROCEDURE — 83735 ASSAY OF MAGNESIUM: CPT | Performed by: PHYSICIAN ASSISTANT

## 2024-10-01 PROCEDURE — 82947 ASSAY GLUCOSE BLOOD QUANT: CPT

## 2024-10-01 PROCEDURE — 0F903ZZ DRAINAGE OF LIVER, PERCUTANEOUS APPROACH: ICD-10-PCS | Performed by: RADIOLOGY

## 2024-10-01 PROCEDURE — 2500000004 HC RX 250 GENERAL PHARMACY W/ HCPCS (ALT 636 FOR OP/ED): Performed by: RADIOLOGY

## 2024-10-01 PROCEDURE — 71260 CT THORAX DX C+: CPT | Performed by: RADIOLOGY

## 2024-10-01 PROCEDURE — 2500000004 HC RX 250 GENERAL PHARMACY W/ HCPCS (ALT 636 FOR OP/ED): Performed by: PHYSICIAN ASSISTANT

## 2024-10-01 PROCEDURE — C1729 CATH, DRAINAGE: HCPCS

## 2024-10-01 PROCEDURE — 87015 SPECIMEN INFECT AGNT CONCNTJ: CPT | Mod: PARLAB | Performed by: RADIOLOGY

## 2024-10-01 PROCEDURE — 83880 ASSAY OF NATRIURETIC PEPTIDE: CPT | Performed by: PHYSICIAN ASSISTANT

## 2024-10-01 PROCEDURE — 99285 EMERGENCY DEPT VISIT HI MDM: CPT | Mod: 25

## 2024-10-01 PROCEDURE — 74177 CT ABD & PELVIS W/CONTRAST: CPT

## 2024-10-01 PROCEDURE — 76942 ECHO GUIDE FOR BIOPSY: CPT

## 2024-10-01 PROCEDURE — 2500000001 HC RX 250 WO HCPCS SELF ADMINISTERED DRUGS (ALT 637 FOR MEDICARE OP): Performed by: NURSE PRACTITIONER

## 2024-10-01 PROCEDURE — 99223 1ST HOSP IP/OBS HIGH 75: CPT | Performed by: NURSE PRACTITIONER

## 2024-10-01 PROCEDURE — 93005 ELECTROCARDIOGRAM TRACING: CPT

## 2024-10-01 PROCEDURE — 87635 SARS-COV-2 COVID-19 AMP PRB: CPT | Performed by: PHYSICIAN ASSISTANT

## 2024-10-01 PROCEDURE — 83605 ASSAY OF LACTIC ACID: CPT | Performed by: PHYSICIAN ASSISTANT

## 2024-10-01 PROCEDURE — 2500000005 HC RX 250 GENERAL PHARMACY W/O HCPCS: Performed by: RADIOLOGY

## 2024-10-01 PROCEDURE — 85025 COMPLETE CBC W/AUTO DIFF WBC: CPT | Performed by: PHYSICIAN ASSISTANT

## 2024-10-01 PROCEDURE — 2720000007 HC OR 272 NO HCPCS

## 2024-10-01 PROCEDURE — 2500000004 HC RX 250 GENERAL PHARMACY W/ HCPCS (ALT 636 FOR OP/ED): Performed by: NURSE PRACTITIONER

## 2024-10-01 PROCEDURE — 71045 X-RAY EXAM CHEST 1 VIEW: CPT

## 2024-10-01 PROCEDURE — 87040 BLOOD CULTURE FOR BACTERIA: CPT | Mod: PARLAB | Performed by: PHYSICIAN ASSISTANT

## 2024-10-01 PROCEDURE — 87116 MYCOBACTERIA CULTURE: CPT | Mod: PARLAB | Performed by: RADIOLOGY

## 2024-10-01 PROCEDURE — 2500000004 HC RX 250 GENERAL PHARMACY W/ HCPCS (ALT 636 FOR OP/ED): Performed by: EMERGENCY MEDICINE

## 2024-10-01 PROCEDURE — 74177 CT ABD & PELVIS W/CONTRAST: CPT | Performed by: RADIOLOGY

## 2024-10-01 PROCEDURE — 71045 X-RAY EXAM CHEST 1 VIEW: CPT | Performed by: RADIOLOGY

## 2024-10-01 PROCEDURE — 81001 URINALYSIS AUTO W/SCOPE: CPT | Performed by: PHYSICIAN ASSISTANT

## 2024-10-01 PROCEDURE — 96375 TX/PRO/DX INJ NEW DRUG ADDON: CPT

## 2024-10-01 PROCEDURE — 2550000001 HC RX 255 CONTRASTS: Performed by: EMERGENCY MEDICINE

## 2024-10-01 PROCEDURE — 84075 ASSAY ALKALINE PHOSPHATASE: CPT | Performed by: PHYSICIAN ASSISTANT

## 2024-10-01 PROCEDURE — 2500000002 HC RX 250 W HCPCS SELF ADMINISTERED DRUGS (ALT 637 FOR MEDICARE OP, ALT 636 FOR OP/ED): Performed by: NURSE PRACTITIONER

## 2024-10-01 RX ORDER — OXYCODONE AND ACETAMINOPHEN 5; 325 MG/1; MG/1
1 TABLET ORAL EVERY 4 HOURS PRN
Status: DISCONTINUED | OUTPATIENT
Start: 2024-10-01 | End: 2024-10-01

## 2024-10-01 RX ORDER — PANTOPRAZOLE SODIUM 40 MG/1
40 TABLET, DELAYED RELEASE ORAL DAILY
Status: DISPENSED | OUTPATIENT
Start: 2024-10-01

## 2024-10-01 RX ORDER — DULOXETIN HYDROCHLORIDE 30 MG/1
30 CAPSULE, DELAYED RELEASE ORAL EVERY MORNING
Status: DISPENSED | OUTPATIENT
Start: 2024-10-02

## 2024-10-01 RX ORDER — PREDNISOLONE ACETATE 10 MG/ML
1 SUSPENSION/ DROPS OPHTHALMIC EVERY OTHER DAY
Status: DISPENSED | OUTPATIENT
Start: 2024-10-02

## 2024-10-01 RX ORDER — POLYETHYLENE GLYCOL 3350 17 G/17G
17 POWDER, FOR SOLUTION ORAL DAILY
Status: DISPENSED | OUTPATIENT
Start: 2024-10-01

## 2024-10-01 RX ORDER — CALCIUM CARBONATE/VITAMIN D3 600MG-5MCG
1 TABLET ORAL DAILY
Status: DISCONTINUED | OUTPATIENT
Start: 2024-10-01 | End: 2024-10-01 | Stop reason: RX

## 2024-10-01 RX ORDER — ASCORBIC ACID 250 MG
500 TABLET ORAL DAILY
Status: DISPENSED | OUTPATIENT
Start: 2024-10-01

## 2024-10-01 RX ORDER — ACETAMINOPHEN 325 MG/1
650 TABLET ORAL EVERY 6 HOURS PRN
Status: ACTIVE | OUTPATIENT
Start: 2024-10-01

## 2024-10-01 RX ORDER — CALCIUM CARBONATE/VITAMIN D3 600MG-5MCG
1 TABLET ORAL DAILY
Status: ON HOLD | COMMUNITY

## 2024-10-01 RX ORDER — MORPHINE SULFATE 4 MG/ML
4 INJECTION, SOLUTION INTRAMUSCULAR; INTRAVENOUS ONCE
Status: COMPLETED | OUTPATIENT
Start: 2024-10-01 | End: 2024-10-01

## 2024-10-01 RX ORDER — POLYETHYLENE GLYCOL 3350 17 G/17G
17 POWDER, FOR SOLUTION ORAL DAILY
Status: ON HOLD | COMMUNITY

## 2024-10-01 RX ORDER — ONDANSETRON HYDROCHLORIDE 2 MG/ML
4 INJECTION, SOLUTION INTRAVENOUS EVERY 6 HOURS PRN
Status: DISPENSED | OUTPATIENT
Start: 2024-10-01

## 2024-10-01 RX ORDER — SIMVASTATIN 20 MG/1
20 TABLET, FILM COATED ORAL NIGHTLY
Status: DISPENSED | OUTPATIENT
Start: 2024-10-01

## 2024-10-01 RX ORDER — OXYCODONE AND ACETAMINOPHEN 5; 325 MG/1; MG/1
2 TABLET ORAL EVERY 4 HOURS PRN
Status: DISCONTINUED | OUTPATIENT
Start: 2024-10-01 | End: 2024-10-01

## 2024-10-01 RX ORDER — ACETAMINOPHEN 650 MG/1
650 SUPPOSITORY RECTAL EVERY 6 HOURS PRN
Status: ACTIVE | OUTPATIENT
Start: 2024-10-01

## 2024-10-01 RX ORDER — SIMVASTATIN 20 MG/1
20 TABLET, FILM COATED ORAL NIGHTLY
Status: ON HOLD | COMMUNITY

## 2024-10-01 RX ORDER — SODIUM CHLORIDE 9 MG/ML
INJECTION, SOLUTION INTRAVENOUS CONTINUOUS PRN
Status: COMPLETED | OUTPATIENT
Start: 2024-10-01 | End: 2024-10-01

## 2024-10-01 RX ORDER — ONDANSETRON HYDROCHLORIDE 8 MG/1
8 TABLET, FILM COATED ORAL EVERY 8 HOURS PRN
Status: ON HOLD | COMMUNITY
Start: 2024-08-21

## 2024-10-01 RX ORDER — VANCOMYCIN HYDROCHLORIDE 1 G/20ML
INJECTION, POWDER, LYOPHILIZED, FOR SOLUTION INTRAVENOUS DAILY PRN
Status: DISPENSED | OUTPATIENT
Start: 2024-10-01

## 2024-10-01 RX ORDER — LIDOCAINE HYDROCHLORIDE 10 MG/ML
INJECTION, SOLUTION EPIDURAL; INFILTRATION; INTRACAUDAL; PERINEURAL
Status: COMPLETED | OUTPATIENT
Start: 2024-10-01 | End: 2024-10-01

## 2024-10-01 RX ORDER — ASCORBIC ACID 500 MG
500 TABLET ORAL DAILY
Status: ON HOLD | COMMUNITY

## 2024-10-01 RX ORDER — DOCUSATE SODIUM 100 MG/1
200 CAPSULE, LIQUID FILLED ORAL DAILY
Status: DISPENSED | OUTPATIENT
Start: 2024-10-01

## 2024-10-01 RX ORDER — PANTOPRAZOLE SODIUM 40 MG/1
40 TABLET, DELAYED RELEASE ORAL
Status: ON HOLD | COMMUNITY

## 2024-10-01 RX ORDER — PREDNISOLONE ACETATE 10 MG/ML
1 SUSPENSION/ DROPS OPHTHALMIC EVERY OTHER DAY
Status: ON HOLD | COMMUNITY

## 2024-10-01 RX ORDER — DULOXETIN HYDROCHLORIDE 30 MG/1
30 CAPSULE, DELAYED RELEASE ORAL EVERY MORNING
Status: ON HOLD | COMMUNITY

## 2024-10-01 RX ORDER — ONDANSETRON HYDROCHLORIDE 2 MG/ML
4 INJECTION, SOLUTION INTRAVENOUS ONCE
Status: COMPLETED | OUTPATIENT
Start: 2024-10-01 | End: 2024-10-01

## 2024-10-01 RX ORDER — FERROUS SULFATE, DRIED 160(50) MG
1 TABLET, EXTENDED RELEASE ORAL DAILY
Status: DISPENSED | OUTPATIENT
Start: 2024-10-01

## 2024-10-01 RX ORDER — MORPHINE SULFATE 4 MG/ML
4 INJECTION, SOLUTION INTRAMUSCULAR; INTRAVENOUS EVERY 4 HOURS PRN
Status: DISPENSED | OUTPATIENT
Start: 2024-10-01

## 2024-10-01 RX ORDER — DOCUSATE SODIUM 100 MG/1
200 CAPSULE, LIQUID FILLED ORAL DAILY
Status: ON HOLD | COMMUNITY

## 2024-10-01 RX ORDER — ENOXAPARIN SODIUM 100 MG/ML
40 INJECTION SUBCUTANEOUS 2 TIMES DAILY
Status: DISPENSED | OUTPATIENT
Start: 2024-10-01

## 2024-10-01 RX ORDER — MULTIVIT-MIN/IRON FUM/FOLIC AC 7.5 MG-4
1 TABLET ORAL DAILY
Status: ON HOLD | COMMUNITY

## 2024-10-01 RX ORDER — OXYCODONE AND ACETAMINOPHEN 5; 325 MG/1; MG/1
1 TABLET ORAL EVERY 4 HOURS PRN
Status: DISPENSED | OUTPATIENT
Start: 2024-10-01

## 2024-10-01 RX ORDER — ONDANSETRON HYDROCHLORIDE 4 MG/2ML
4 INJECTION, SOLUTION INTRAMUSCULAR; INTRAVENOUS EVERY 6 HOURS PRN
Status: DISCONTINUED | OUTPATIENT
Start: 2024-10-01 | End: 2024-10-01

## 2024-10-01 RX ORDER — ACETAMINOPHEN 160 MG/5ML
650 SOLUTION ORAL EVERY 6 HOURS PRN
Status: ACTIVE | OUTPATIENT
Start: 2024-10-01

## 2024-10-01 SDOH — SOCIAL STABILITY: SOCIAL INSECURITY
WITHIN THE LAST YEAR, HAVE YOU BEEN RAPED OR FORCED TO HAVE ANY KIND OF SEXUAL ACTIVITY BY YOUR PARTNER OR EX-PARTNER?: NO

## 2024-10-01 SDOH — SOCIAL STABILITY: SOCIAL INSECURITY: WITHIN THE LAST YEAR, HAVE YOU BEEN AFRAID OF YOUR PARTNER OR EX-PARTNER?: NO

## 2024-10-01 SDOH — ECONOMIC STABILITY: HOUSING INSECURITY: AT ANY TIME IN THE PAST 12 MONTHS, WERE YOU HOMELESS OR LIVING IN A SHELTER (INCLUDING NOW)?: NO

## 2024-10-01 SDOH — SOCIAL STABILITY: SOCIAL INSECURITY: WITHIN THE LAST YEAR, HAVE YOU BEEN HUMILIATED OR EMOTIONALLY ABUSED IN OTHER WAYS BY YOUR PARTNER OR EX-PARTNER?: NO

## 2024-10-01 SDOH — SOCIAL STABILITY: SOCIAL INSECURITY: DOES ANYONE TRY TO KEEP YOU FROM HAVING/CONTACTING OTHER FRIENDS OR DOING THINGS OUTSIDE YOUR HOME?: NO

## 2024-10-01 SDOH — SOCIAL STABILITY: SOCIAL INSECURITY: HAVE YOU HAD ANY THOUGHTS OF HARMING ANYONE ELSE?: NO

## 2024-10-01 SDOH — SOCIAL STABILITY: SOCIAL INSECURITY: DO YOU FEEL UNSAFE GOING BACK TO THE PLACE WHERE YOU ARE LIVING?: NO

## 2024-10-01 SDOH — HEALTH STABILITY: MENTAL HEALTH: HOW OFTEN DO YOU HAVE 6 OR MORE DRINKS ON ONE OCCASION?: NEVER

## 2024-10-01 SDOH — ECONOMIC STABILITY: INCOME INSECURITY: IN THE PAST 12 MONTHS HAS THE ELECTRIC, GAS, OIL, OR WATER COMPANY THREATENED TO SHUT OFF SERVICES IN YOUR HOME?: NO

## 2024-10-01 SDOH — SOCIAL STABILITY: SOCIAL INSECURITY
WITHIN THE LAST YEAR, HAVE YOU BEEN KICKED, HIT, SLAPPED, OR OTHERWISE PHYSICALLY HURT BY YOUR PARTNER OR EX-PARTNER?: NO

## 2024-10-01 SDOH — SOCIAL STABILITY: SOCIAL INSECURITY: ARE YOU OR HAVE YOU BEEN THREATENED OR ABUSED PHYSICALLY, EMOTIONALLY, OR SEXUALLY BY ANYONE?: NO

## 2024-10-01 SDOH — SOCIAL STABILITY: SOCIAL INSECURITY: ABUSE: ADULT

## 2024-10-01 SDOH — HEALTH STABILITY: PHYSICAL HEALTH: ON AVERAGE, HOW MANY DAYS PER WEEK DO YOU ENGAGE IN MODERATE TO STRENUOUS EXERCISE (LIKE A BRISK WALK)?: 0 DAYS

## 2024-10-01 SDOH — ECONOMIC STABILITY: INCOME INSECURITY: IN THE PAST 12 MONTHS, HAS THE ELECTRIC, GAS, OIL, OR WATER COMPANY THREATENED TO SHUT OFF SERVICE IN YOUR HOME?: NO

## 2024-10-01 SDOH — ECONOMIC STABILITY: FOOD INSECURITY: WITHIN THE PAST 12 MONTHS, THE FOOD YOU BOUGHT JUST DIDN'T LAST AND YOU DIDN'T HAVE MONEY TO GET MORE.: NEVER TRUE

## 2024-10-01 SDOH — HEALTH STABILITY: PHYSICAL HEALTH: ON AVERAGE, HOW MANY MINUTES DO YOU ENGAGE IN EXERCISE AT THIS LEVEL?: 0 MIN

## 2024-10-01 SDOH — ECONOMIC STABILITY: HOUSING INSECURITY: IN THE LAST 12 MONTHS, WAS THERE A TIME WHEN YOU WERE NOT ABLE TO PAY THE MORTGAGE OR RENT ON TIME?: NO

## 2024-10-01 SDOH — ECONOMIC STABILITY: TRANSPORTATION INSECURITY
IN THE PAST 12 MONTHS, HAS THE LACK OF TRANSPORTATION KEPT YOU FROM MEDICAL APPOINTMENTS OR FROM GETTING MEDICATIONS?: NO

## 2024-10-01 SDOH — SOCIAL STABILITY: SOCIAL INSECURITY
WITHIN THE LAST YEAR, HAVE TO BEEN RAPED OR FORCED TO HAVE ANY KIND OF SEXUAL ACTIVITY BY YOUR PARTNER OR EX-PARTNER?: NO

## 2024-10-01 SDOH — SOCIAL STABILITY: SOCIAL INSECURITY: WERE YOU ABLE TO COMPLETE ALL THE BEHAVIORAL HEALTH SCREENINGS?: YES

## 2024-10-01 SDOH — HEALTH STABILITY: MENTAL HEALTH: HOW OFTEN DO YOU HAVE A DRINK CONTAINING ALCOHOL?: NEVER

## 2024-10-01 SDOH — HEALTH STABILITY: MENTAL HEALTH: HOW OFTEN DO YOU HAVE SIX OR MORE DRINKS ON ONE OCCASION?: NEVER

## 2024-10-01 SDOH — ECONOMIC STABILITY: FOOD INSECURITY: WITHIN THE PAST 12 MONTHS, YOU WORRIED THAT YOUR FOOD WOULD RUN OUT BEFORE YOU GOT MONEY TO BUY MORE.: NEVER TRUE

## 2024-10-01 SDOH — ECONOMIC STABILITY: INCOME INSECURITY: IN THE LAST 12 MONTHS, WAS THERE A TIME WHEN YOU WERE NOT ABLE TO PAY THE MORTGAGE OR RENT ON TIME?: NO

## 2024-10-01 SDOH — ECONOMIC STABILITY: FOOD INSECURITY: WITHIN THE PAST 12 MONTHS, YOU WORRIED THAT YOUR FOOD WOULD RUN OUT BEFORE YOU GOT THE MONEY TO BUY MORE.: NEVER TRUE

## 2024-10-01 SDOH — SOCIAL STABILITY: SOCIAL INSECURITY: HAS ANYONE EVER THREATENED TO HURT YOUR FAMILY OR YOUR PETS?: NO

## 2024-10-01 SDOH — ECONOMIC STABILITY: INCOME INSECURITY: HOW HARD IS IT FOR YOU TO PAY FOR THE VERY BASICS LIKE FOOD, HOUSING, MEDICAL CARE, AND HEATING?: NOT HARD AT ALL

## 2024-10-01 SDOH — ECONOMIC STABILITY: TRANSPORTATION INSECURITY: IN THE PAST 12 MONTHS, HAS LACK OF TRANSPORTATION KEPT YOU FROM MEDICAL APPOINTMENTS OR FROM GETTING MEDICATIONS?: NO

## 2024-10-01 SDOH — ECONOMIC STABILITY: TRANSPORTATION INSECURITY
IN THE PAST 12 MONTHS, HAS LACK OF TRANSPORTATION KEPT YOU FROM MEETINGS, WORK, OR FROM GETTING THINGS NEEDED FOR DAILY LIVING?: NO

## 2024-10-01 SDOH — HEALTH STABILITY: MENTAL HEALTH: HOW MANY STANDARD DRINKS CONTAINING ALCOHOL DO YOU HAVE ON A TYPICAL DAY?: PATIENT DOES NOT DRINK

## 2024-10-01 SDOH — SOCIAL STABILITY: SOCIAL INSECURITY: HAVE YOU HAD THOUGHTS OF HARMING ANYONE ELSE?: NO

## 2024-10-01 SDOH — ECONOMIC STABILITY: HOUSING INSECURITY: IN THE PAST 12 MONTHS, HOW MANY TIMES HAVE YOU MOVED WHERE YOU WERE LIVING?: 0

## 2024-10-01 SDOH — ECONOMIC STABILITY: FOOD INSECURITY: HOW HARD IS IT FOR YOU TO PAY FOR THE VERY BASICS LIKE FOOD, HOUSING, MEDICAL CARE, AND HEATING?: NOT HARD AT ALL

## 2024-10-01 SDOH — SOCIAL STABILITY: SOCIAL INSECURITY: ARE THERE ANY APPARENT SIGNS OF INJURIES/BEHAVIORS THAT COULD BE RELATED TO ABUSE/NEGLECT?: NO

## 2024-10-01 SDOH — HEALTH STABILITY: MENTAL HEALTH: HOW MANY DRINKS CONTAINING ALCOHOL DO YOU HAVE ON A TYPICAL DAY WHEN YOU ARE DRINKING?: PATIENT DOES NOT DRINK

## 2024-10-01 SDOH — SOCIAL STABILITY: SOCIAL INSECURITY: DO YOU FEEL ANYONE HAS EXPLOITED OR TAKEN ADVANTAGE OF YOU FINANCIALLY OR OF YOUR PERSONAL PROPERTY?: NO

## 2024-10-01 ASSESSMENT — COGNITIVE AND FUNCTIONAL STATUS - GENERAL
DAILY ACTIVITIY SCORE: 12
PERSONAL GROOMING: A LOT
TOILETING: A LOT
WALKING IN HOSPITAL ROOM: A LOT
WALKING IN HOSPITAL ROOM: A LOT
MOBILITY SCORE: 12
HELP NEEDED FOR BATHING: A LOT
DAILY ACTIVITIY SCORE: 12
STANDING UP FROM CHAIR USING ARMS: A LOT
CLIMB 3 TO 5 STEPS WITH RAILING: A LOT
TOILETING: A LOT
MOVING TO AND FROM BED TO CHAIR: A LOT
TURNING FROM BACK TO SIDE WHILE IN FLAT BAD: A LOT
TURNING FROM BACK TO SIDE WHILE IN FLAT BAD: A LOT
CLIMB 3 TO 5 STEPS WITH RAILING: A LOT
MOVING TO AND FROM BED TO CHAIR: A LOT
MOVING FROM LYING ON BACK TO SITTING ON SIDE OF FLAT BED WITH BEDRAILS: A LOT
EATING MEALS: A LOT
PATIENT BASELINE BEDBOUND: NO
EATING MEALS: A LOT
DRESSING REGULAR UPPER BODY CLOTHING: A LOT
STANDING UP FROM CHAIR USING ARMS: A LOT
DRESSING REGULAR LOWER BODY CLOTHING: A LOT
PERSONAL GROOMING: A LOT
HELP NEEDED FOR BATHING: A LOT
MOVING FROM LYING ON BACK TO SITTING ON SIDE OF FLAT BED WITH BEDRAILS: A LOT
MOBILITY SCORE: 12
DRESSING REGULAR LOWER BODY CLOTHING: A LOT
DRESSING REGULAR UPPER BODY CLOTHING: A LOT

## 2024-10-01 ASSESSMENT — PAIN SCALES - GENERAL
PAINLEVEL_OUTOF10: 5 - MODERATE PAIN
PAINLEVEL_OUTOF10: 0 - NO PAIN
PAINLEVEL_OUTOF10: 8
PAINLEVEL_OUTOF10: 10 - WORST POSSIBLE PAIN
PAINLEVEL_OUTOF10: 3
PAINLEVEL_OUTOF10: 0 - NO PAIN

## 2024-10-01 ASSESSMENT — PATIENT HEALTH QUESTIONNAIRE - PHQ9
1. LITTLE INTEREST OR PLEASURE IN DOING THINGS: NOT AT ALL
SUM OF ALL RESPONSES TO PHQ9 QUESTIONS 1 & 2: 0
2. FEELING DOWN, DEPRESSED OR HOPELESS: NOT AT ALL

## 2024-10-01 ASSESSMENT — COLUMBIA-SUICIDE SEVERITY RATING SCALE - C-SSRS
1. IN THE PAST MONTH, HAVE YOU WISHED YOU WERE DEAD OR WISHED YOU COULD GO TO SLEEP AND NOT WAKE UP?: NO
2. HAVE YOU ACTUALLY HAD ANY THOUGHTS OF KILLING YOURSELF?: NO
6. HAVE YOU EVER DONE ANYTHING, STARTED TO DO ANYTHING, OR PREPARED TO DO ANYTHING TO END YOUR LIFE?: NO

## 2024-10-01 ASSESSMENT — ACTIVITIES OF DAILY LIVING (ADL)
GROOMING: NEEDS ASSISTANCE
ADEQUATE_TO_COMPLETE_ADL: YES
FEEDING YOURSELF: INDEPENDENT
PATIENT'S MEMORY ADEQUATE TO SAFELY COMPLETE DAILY ACTIVITIES?: YES
HEARING - RIGHT EAR: FUNCTIONAL
TOILETING: NEEDS ASSISTANCE
DRESSING YOURSELF: NEEDS ASSISTANCE
WALKS IN HOME: NEEDS ASSISTANCE
ASSISTIVE_DEVICE: EYEGLASSES
BATHING: NEEDS ASSISTANCE
HEARING - LEFT EAR: FUNCTIONAL
JUDGMENT_ADEQUATE_SAFELY_COMPLETE_DAILY_ACTIVITIES: YES
LACK_OF_TRANSPORTATION: NO

## 2024-10-01 ASSESSMENT — PAIN DESCRIPTION - LOCATION
LOCATION: GENERALIZED
LOCATION: GENERALIZED

## 2024-10-01 ASSESSMENT — LIFESTYLE VARIABLES
AUDIT-C TOTAL SCORE: 0
SKIP TO QUESTIONS 9-10: 1
HAVE PEOPLE ANNOYED YOU BY CRITICIZING YOUR DRINKING: NO
TOTAL SCORE: 0
EVER FELT BAD OR GUILTY ABOUT YOUR DRINKING: NO
HAVE YOU EVER FELT YOU SHOULD CUT DOWN ON YOUR DRINKING: NO
EVER HAD A DRINK FIRST THING IN THE MORNING TO STEADY YOUR NERVES TO GET RID OF A HANGOVER: NO

## 2024-10-01 ASSESSMENT — PAIN - FUNCTIONAL ASSESSMENT
PAIN_FUNCTIONAL_ASSESSMENT: 0-10
PAIN_FUNCTIONAL_ASSESSMENT: 0-10

## 2024-10-01 NOTE — ED PROVIDER NOTES
Limitations to History: none  External Records Reviewed  Independent Historians: self  Social determinants affecting care: none    HPI  Rohini Beaver is a 79 y.o. female since emergency department due to generalized weakness.  She reports that she was just recently diagnosed with ovarian cancer.  She reports that she was in a skilled nursing facility for rehab.  She recently got discharged to home.  She reports that she does have a walker at home as well as a lift chair but she still struggling at home.  She reports that she feels too weak to be at home and wants to go back to a skilled nursing facility if possible.  She is also had increased swelling to her abdomen and her lower extremities.  She has had paracentesis 3 times.  She has not had any fever or chills. She denies any urinary symptoms. Denies chest pains or shortness of breath.  Denies any upper or lower extremity numbness or tingling.  She has no further complaints.    PM  Past Medical History:   Diagnosis Date    Bilateral primary osteoarthritis of knee     HLD (hyperlipidemia)     Lumbosacral radiculopathy     Meralgia paraesthetica     Physical deconditioning     reviewed by myself.    Meds  Current Outpatient Medications   Medication Instructions    ascorbic acid (VITAMIN C) 500 mg, oral, Daily    calcium carbonate-vitamin D3 (Calcium 600 + D,3,) 600 mg-5 mcg (200 unit) tablet 1 tablet, oral, Daily    docusate sodium (COLACE) 200 mg, oral, Daily    DULoxetine (CYMBALTA) 30 mg, oral, Every morning    meloxicam (MOBIC) 15 mg, oral, Daily    multivitamin with minerals tablet 1 tablet, oral, Daily    ondansetron (ZOFRAN) 8 mg, oral, Every 8 hours PRN    oxyCODONE-acetaminophen (Percocet) 5-325 mg tablet 1 tablet, oral, Every 4 hours PRN    pantoprazole (PROTONIX) 40 mg, oral, Daily RT    polyethylene glycol (GLYCOLAX, MIRALAX) 17 g, oral, Daily    prednisoLONE acetate (Pred-Forte) 1 % ophthalmic suspension 1 drop, Both Eyes, Every other day    Zocor  20 mg, oral, Nightly       Allergies  No Known Allergies reviewed by myself.    SHx  Social History     Tobacco Use    Smoking status: Never    Smokeless tobacco: Never   Substance Use Topics    Alcohol use: Not Currently    Drug use: Never    reviewed by myself.      ------------------------------------------------------------------------------------------------------------------------------------------    /58   Pulse 100   Temp 36.2 °C (97.2 °F) (Temporal)   Resp (!) 22   Wt 104 kg (230 lb)   SpO2 94%   BMI 39.75 kg/m²     Physical Exam  Vitals and nursing note reviewed.   Constitutional:       General: She is not in acute distress.     Appearance: Normal appearance. She is obese. She is not ill-appearing or toxic-appearing.   HENT:      Head: Normocephalic.      Nose: Nose normal.      Mouth/Throat:      Mouth: Mucous membranes are moist.   Eyes:      Extraocular Movements: Extraocular movements intact.      Conjunctiva/sclera: Conjunctivae normal.   Cardiovascular:      Rate and Rhythm: Normal rate and regular rhythm.   Pulmonary:      Effort: Pulmonary effort is normal.      Breath sounds: Normal breath sounds.   Abdominal:      General: Abdomen is flat. There is distension.      Palpations: Abdomen is soft.      Tenderness: There is no abdominal tenderness.   Musculoskeletal:         General: Normal range of motion.      Cervical back: Neck supple.      Right lower leg: 3+ Pitting Edema present.      Left lower leg: 3+ Pitting Edema present.   Skin:     General: Skin is warm and dry.   Neurological:      General: No focal deficit present.      Mental Status: She is alert and oriented to person, place, and time.   Psychiatric:         Attention and Perception: Attention normal.         Mood and Affect: Mood normal.          ------------------------------------------------------------------------------------------------------------------------------------------  Labs  Labs Reviewed   CBC WITH AUTO  DIFFERENTIAL - Abnormal       Result Value    WBC 28.9 (*)     nRBC 0.0      RBC 4.15      Hemoglobin 11.0 (*)     Hematocrit 34.1 (*)     MCV 82      MCH 26.5      MCHC 32.3      RDW 15.7 (*)     Platelets 446      Neutrophils % 89.1      Immature Granulocytes %, Automated 1.0 (*)     Lymphocytes % 4.3      Monocytes % 4.8      Eosinophils % 0.5      Basophils % 0.3      Neutrophils Absolute 25.74 (*)     Immature Granulocytes Absolute, Automated 0.30      Lymphocytes Absolute 1.23      Monocytes Absolute 1.38 (*)     Eosinophils Absolute 0.14      Basophils Absolute 0.09     COMPREHENSIVE METABOLIC PANEL - Abnormal    Glucose 107 (*)     Sodium 126 (*)     Potassium 5.2      Chloride 94 (*)     Bicarbonate 24      Anion Gap 13      Urea Nitrogen 18      Creatinine 0.61      eGFR >90      Calcium 8.5 (*)     Albumin 2.4 (*)     Alkaline Phosphatase 231 (*)     Total Protein 5.7 (*)     AST 23      Bilirubin, Total 0.3      ALT 8     URINALYSIS WITH REFLEX CULTURE AND MICROSCOPIC - Abnormal    Color, Urine Light-Yellow      Appearance, Urine Clear      Specific Gravity, Urine >1.050 (*)     pH, Urine 6.0      Protein, Urine 20 (TRACE)      Glucose, Urine Normal      Blood, Urine NEGATIVE      Ketones, Urine NEGATIVE      Bilirubin, Urine NEGATIVE      Urobilinogen, Urine Normal      Nitrite, Urine NEGATIVE      Leukocyte Esterase, Urine NEGATIVE     MAGNESIUM - Normal    Magnesium 1.68     TROPONIN I, HIGH SENSITIVITY - Normal    Troponin I, High Sensitivity 7      Narrative:     Less than 99th percentile of normal range cutoff-  Female and children under 18 years old <14 ng/L; Male <21 ng/L: Negative  Repeat testing should be performed if clinically indicated.     Female and children under 18 years old 14-50 ng/L; Male 21-50 ng/L:  Consistent with possible cardiac damage and possible increased clinical   risk. Serial measurements may help to assess extent of myocardial damage.     >50 ng/L: Consistent with cardiac  damage, increased clinical risk and  myocardial infarction. Serial measurements may help assess extent of   myocardial damage.      NOTE: Children less than 1 year old may have higher baseline troponin   levels and results should be interpreted in conjunction with the overall   clinical context.     NOTE: Troponin I testing is performed using a different   testing methodology at [facility] than at other   [facility]. Direct result comparisons should only   be made within the same method.   B-TYPE NATRIURETIC PEPTIDE - Normal    BNP 74      Narrative:        <100 pg/mL - Heart failure unlikely  100-299 pg/mL - Intermediate probability of acute heart                  failure exacerbation. Correlate with clinical                  context and patient history.    >=300 pg/mL - Heart Failure likely. Correlate with clinical                  context and patient history.    BNP testing is performed using different testing methodology at [facility] than at other [facility]. Direct result comparisons should only be made within the same method.      SARS-COV-2 PCR - Normal    Coronavirus 2019, PCR Not Detected      Narrative:     This assay has received FDA Emergency Use Authorization (EUA) and is only authorized for the duration of time that circumstances exist to justify the authorization of the emergency use of in vitro diagnostic tests for the detection of SARS-CoV-2 virus and/or diagnosis of COVID-19 infection under section 564(b)(1) of the Act, 21 U.S.C. 360bbb-3(b)(1). This assay is an in vitro diagnostic nucleic acid amplification test for the qualitative detection of SARS-CoV-2 from nasopharyngeal specimens and has been validated for use at [facility]. Negative results do not preclude COVID-19 infections and should not be used as the sole basis for diagnosis, treatment, or other management decisions.     LACTATE - Normal    Lactate 1.4      Narrative:      Venipuncture immediately after or during the administration of Metamizole may lead to falsely low results. Testing should be performed immediately prior to Metamizole dosing.   BLOOD CULTURE   BLOOD CULTURE   URINALYSIS WITH REFLEX CULTURE AND MICROSCOPIC    Narrative:     The following orders were created for panel order Urinalysis with Reflex Culture and Microscopic.  Procedure                               Abnormality         Status                     ---------                               -----------         ------                     Urinalysis with Reflex C...[[id number]]  Abnormal            Final result               Extra Urine Gray Tube[[id number]]                            In process                   Please view results for these tests on the individual orders.   EXTRA URINE GRAY TUBE   PROTIME-INR   APTT   URINALYSIS MICROSCOPIC WITH REFLEX CULTURE    WBC, Urine 1-5      RBC, Urine 1-2      Squamous Epithelial Cells, Urine 1-9 (SPARSE)      Mucus, Urine FEW          Imaging  CT chest abdomen pelvis w IV contrast   Final Result   CHEST:   1.  Moderate to large bilateral pleural effusions with adjacent   presumed compressive atelectasis.   2. Prominent main pulmonary artery which may indicate pulmonary   hypertension.   3. Mildly prominent mediastinal lymph nodes measuring up to 10 mm in   short axis concerning for metastatic disease.   4. Moderate hiatal hernia with partial intrathoracic stomach. The   peritoneal fat adjacent to the stomach within the hernia demonstrates   evidence of probable carcinomatosis and ascites.        ABDOMEN-PELVIS:   1.  Redemonstration of large volume ascites with regions of   significant anterior omental caking and peritoneal carcinomatosis   commensurate with patient's provided diagnosis of ovarian cancer.   2. Regions of wall thickening of the colon extending from the splenic   flexure to the sigmoid which may indicate a nonspecific colitis.   3. Since the previous  examination on 08/08/2024, there are now   extensive regions of scalloping of the right hepatic lobe   peripherally with the appearance of a large subcapsular collection   along the right hepatic margin as above. The sterility of this   collection cannot be assessed via CT and clinical correlation is   advised for exclusion superimposed infection within this region.             Signed by: Oscar Aldrich 10/1/2024 12:14 PM   Dictation workstation:   [id number]      XR chest 1 view   Final Result   Limited study. Hiatal hernia. No acute cardiopulmonary disease.        Signed by: Samuel Jaramillo 10/1/2024 9:22 AM   Dictation workstation:   [id number]      Consult to Interventional Radiology    (Results Pending)        ED Course  Diagnoses as of 10/01/24 1318   Hyponatremia   Abdominal fluid collection   Malignant ascites (CMS-HCC)   Pleural effusion        Medical Decision Making: She did not appear ill or toxic.  Vital signs reviewed and stable.  Comprehensive workup was initiated.      Differential diagnoses considered: Fluid overload, ascites, EUSEBIO, UTI, electrolyte abnormalities, others    Medications given: IV vancomycin, IV Zosyn    EKG interpreted by myself and ED attending: Normal sinus rhythm.  Reticular rate 99 bpm.  No acute ST elevations or depressions.    I reviewed the labs from today.  She is a leukocytosis at 28.9.  Hemoglobin 11 with a Mattock at 34.1.  Platelets are normal.  The leukocytosis is new. Sepsis suspected at 0940. Lactic, blood cultures, and IV antibiotics ordered.  Glucose 107.  Sodium 126 which is new.  BUN and creatinine normal.  BNP normal.  Troponin negative.  Magnesium negative.  COVID-negative.  Lactic negative.  Urinalysis showing no evidence of UTI.  Chest x-ray showing limited due to body habitus.  CT of the chest showing moderate to large bilateral lateral pleural effusions with compressive atelectasis.  Pulmonary hypertension as well as lymphadenopathy noted.  CT of the abdomen  pelvis showing large volume ascites, nonspecific colitis, and new fluid collection at the liver.  Patient updated about workup and admission and she is agreeable.    Sepsis reperfusion performed at 1245.  She is mentating appropriately.  BP and heart rate stable.  Will continue to monitor.      I consulted her PCP.  I spoke with Dr. Rhodes who will admit.  House RANJITH notified. Consult placed to interventional radiology.  Discussed and evaluated with ED attending who is agreeable to patient plan of care.    Diagnosis: Liver abscess, ascites, hyponatremia, pleural effusions  Plan: admit     Andrew Lorenzo PA-C  10/01/24 1632

## 2024-10-01 NOTE — PROGRESS NOTES
Social work consult placed for SNF/rehab. SW reviewed pt's chart, no SW needs foreseen at this time. SW signing off; available upon request.

## 2024-10-01 NOTE — PROCEDURES
Interventional Radiology Brief Postprocedure Note    Attending: Lynette Vargas MD    Assistant:   Staff Role   Francisco Morales, MARIAH Radiology Nurse   Lynette Vargas MD Radiologist   Kaylin [facility] Technologist       Diagnosis:   1. Hyponatremia        2. Abdominal fluid collection        3. Malignant ascites (CMS-HCC)        4. Pleural effusion            Description of procedure: perihepatic fluid aspiration yielded think serous fluid. Sample sent to lab. No drain left in place.     Timeout:  Yes    Procedure Area: Procedure Area     Anesthesia:   none    Complications: None    Estimated Blood Loss: minimal    Medications (Filter: Administrations occurring from 1640 to 1640 on 10/01/24) As of 10/01/24 1640      None          No specimens collected      See detailed result report with images in PACS.    The patient tolerated the procedure well without incident or complication and is in stable condition.

## 2024-10-01 NOTE — PROGRESS NOTES
Pharmacy Medication History Review    Rohini Beaver is a 79 y.o. female admitted for No Principal Problem: There is no principal problem currently on the Problem List. Please update the Problem List and refresh.. Pharmacy reviewed the patient's [id number] medications and allergies for accuracy.    The list below reflectives the updated PTA list. Please review each medication in order reconciliation for additional clarification and justification.  Prior to Admission medications    Medication Sig Start Date End Date Authorizing Provider   ascorbic acid (Vitamin C) 500 mg tablet Take 1 tablet (500 mg) by mouth once daily.   Historical Provider, MD   calcium carbonate-vitamin D3 (Calcium 600 + D,3,) 600 mg-5 mcg (200 unit) tablet Take 1 tablet by mouth once daily.   Historical Provider, MD   docusate sodium (Colace) 100 mg capsule Take 2 capsules (200 mg) by mouth once daily.   Historical Provider, MD   DULoxetine (Cymbalta) 30 mg DR capsule Take 1 capsule (30 mg) by mouth once daily in the morning.   Historical Provider, MD   multivitamin with minerals tablet Take 1 tablet by mouth once daily.   Historical Provider, MD   ondansetron (Zofran) 8 mg tablet Take 1 tablet (8 mg) by mouth every 8 hours if needed for nausea or vomiting. 8/21/24  Historical Provider, MD   oxyCODONE-acetaminophen (Percocet) 5-325 mg tablet Take 1 tablet by mouth every 4 hours if needed for severe pain (7 - 10).   Sofia Villalba MD MPH   pantoprazole (ProtoNix) 40 mg EC tablet Take 1 tablet (40 mg) by mouth once daily.   Historical Provider, MD   polyethylene glycol (Glycolax, Miralax) 17 gram packet Take 17 g by mouth once daily.   Historical Provider, MD   prednisoLONE acetate (Pred-Forte) 1 % ophthalmic suspension Administer 1 drop into both eyes every other day.   Historical Provider, MD   Zocor 20 mg tablet Take 1 tablet (20 mg) by mouth once daily at bedtime.   Historical Provider, MD        The list below reflectives the  updated allergy list. Please review each documented allergy for additional clarification and justification.  Allergies  Reviewed by Nory Cantu RN on 9/19/2024   No Known Allergies         Below are additional concerns with the patient's PTA list.      Yamile Cohen

## 2024-10-01 NOTE — H&P
History Of Present Illness  Rohini Beaver is a 79-year-old female with past medical history of recently diagnosed ovarian cancer with peritoneal carcinomatosis s/p paracentesis x 4 (last one done 9/19/24), obesity, hyperlipidemia, osteoarthritis, and skin cancer s/p Mohs procedure.  Patient presents to the hospital with weakness and inability to care for herself.  She reports that she was at [facility] skilled nursing [facility], however had a brief hospitalization at [facility] and upon discharge decided to go home rather than go back to [facility].  She lives with her 86-year-old sister and has a couple friends who assist with appointments, however limited support system.  Sister unable to care for due to age.  Today she was in urgent reclining chair and was able to get up.  ROS additionally positive for cold sweats, distended and tender abdomen, edema, pressure injury to coccyx/gluteal fold, and decreased activity tolerance.  Denies history of heart disease.  She reports that she is scheduled for outpatient appointment with her oncologist tomorrow to determine ongoing plan.     ER course: Hemodynamically stable, afebrile, SpO2 90s on room air.  WBC 28.9, Hgb 11.0/Hct34.1 (Hgb 15.1 4/4/2023), platelets 446. Glucose 107, Sodium 126, Chloride 94, calcium 8.5, albumin 2.4, alkaline phosphatase 231.  Lactate 1.4.  BNP 74.  High-sensitivity troponin 7.  UA unremarkable. CT chest showed moderate to large bilateral pleural effusion with adjacent presumed compressive atelectasis, prominent main pulmonary artery which may indicate pulmonary hypertension, mildly prominent mediastinal lymph nodes measuring up to 10 mm in short axis concerning for metastatic disease.  Moderate hiatal hernia with partial intrathoracic stomach and the peritoneal fat adjacent to the stomach within the hernia demonstrates evidence of probable carcinomatosis and ascites.  Redemonstration of large volume ascites with regions of  significant anterior omental caking and peritoneal carcinomatosis commensurate with patient's provided diagnosis of ovarian cancer. Regions of wall thickening of the colon extending from the splenic flexure to the sigmoid.  Extensive region of scalloping of the right hepatic lobe peripherally with appearance of large subcapsular collection along the right hepatic margin. Blood culture in process    Past medical history: As above  Past surgical history: Cholecystectomy, lumbar laminectomy, left shoulder arthroscopy, right total knee replacement, corneal transplant  Social history: No history of smoking, alcohol abuse, illicit drug use.  Lives with her elderly sister who is 86 years old.  Limited support system.   Family history: Mother-cancer (unknown type)    Past Medical History  Past Medical History:   Diagnosis Date    Bilateral primary osteoarthritis of knee     HLD (hyperlipidemia)     Lumbosacral radiculopathy     Meralgia paraesthetica     Physical deconditioning        Surgical History  Past Surgical History:   Procedure Laterality Date    ARTHROPLASTY Left     Left thumb carpometacarpal arthroplasty with ligament reconstruction and tendon interposition    BREAST BIOPSY  1999    Benign    CHOLECYSTECTOMY      COLONOSCOPY      CORNEAL TRANSPLANT      DILATION AND CURETTAGE OF UTERUS      LUMBAR FUSION      SHOULDER ARTHROSCOPY Left     SKIN LESION EXCISION      TOTAL KNEE ARTHROPLASTY Left 2019    TOTAL KNEE ARTHROPLASTY Right 2017    TRIGGER FINGER RELEASE Right         Social History  She reports that she has never smoked. She has never used smokeless tobacco. She reports that she does not currently use alcohol. She reports that she does not use drugs.    Family History  Family History   Problem Relation Name Age of Onset    Colon cancer Mother      Lung cancer Father      Other (Mesothelioma) Brother      Brain cancer Mother's Brother          Allergies  Patient has no known allergies.    Review of  Systems    10 point ROS negative except as noted above in HPI     Physical Exam  Vitals reviewed.   Constitutional:       General: She is awake. She is not in acute distress.     Appearance: She is obese. She is ill-appearing. She is not toxic-appearing.   HENT:      Head: Normocephalic and atraumatic.      Nose: Nose normal.      Mouth/Throat:      Mouth: Mucous membranes are moist.      Pharynx: Oropharynx is clear.   Eyes:      Conjunctiva/sclera: Conjunctivae normal.   Cardiovascular:      Rate and Rhythm: Normal rate and regular rhythm.      Pulses: Normal pulses.      Heart sounds: No murmur heard.  Pulmonary:      Effort: Pulmonary effort is normal. No respiratory distress.      Breath sounds: Decreased air movement present. Decreased breath sounds present.      Comments: Posterior bilateral breath sounds are diminished  Abdominal:      General: There is distension.      Palpations: Abdomen is soft.      Tenderness: There is abdominal tenderness. There is no guarding.      Comments: Bowel sounds hypoactive, abdomen distended, tender to palpation, dullness noted to the sides.   Musculoskeletal:         General: No swelling, deformity or signs of injury. Normal range of motion.      Cervical back: Neck supple.      Right lower leg: Edema present.      Left lower leg: Edema present.      Comments: Generalized edema/anasarca   Skin:     General: Skin is warm and dry.      Capillary Refill: Capillary refill takes less than 2 seconds.      Findings: No ecchymosis or wound.      Comments: Wound on coccyx, exam deferred  due to patient discomfort    Neurological:      General: No focal deficit present.      Mental Status: She is alert and oriented to person, place, and time.   Psychiatric:         Mood and Affect: Mood normal.         Behavior: Behavior is cooperative.              Last Recorded Vitals  Blood pressure 117/58, pulse 100, temperature 36.2 °C (97.2 °F), temperature source Temporal, resp. rate (!) 22,  weight 104 kg (230 lb), SpO2 94%.    Relevant Results      Results for orders placed or performed during the hospital encounter of 10/01/24 (from the past 24 hour(s))   CBC and Auto Differential   Result Value Ref Range    WBC 28.9 (H) 4.4 - 11.3 x10*3/uL    nRBC 0.0 0.0 - 0.0 /100 WBCs    RBC 4.15 4.00 - 5.20 x10*6/uL    Hemoglobin 11.0 (L) 12.0 - 16.0 g/dL    Hematocrit 34.1 (L) 36.0 - 46.0 %    MCV 82 80 - 100 fL    MCH 26.5 26.0 - 34.0 pg    MCHC 32.3 32.0 - 36.0 g/dL    RDW 15.7 (H) 11.5 - 14.5 %    Platelets 446 150 - 450 x10*3/uL    Neutrophils % 89.1 40.0 - 80.0 %    Immature Granulocytes %, Automated 1.0 (H) 0.0 - 0.9 %    Lymphocytes % 4.3 13.0 - 44.0 %    Monocytes % 4.8 2.0 - 10.0 %    Eosinophils % 0.5 0.0 - 6.0 %    Basophils % 0.3 0.0 - 2.0 %    Neutrophils Absolute 25.74 (H) 1.60 - 5.50 x10*3/uL    Immature Granulocytes Absolute, Automated 0.30 0.00 - 0.50 x10*3/uL    Lymphocytes Absolute 1.23 0.80 - 3.00 x10*3/uL    Monocytes Absolute 1.38 (H) 0.05 - 0.80 x10*3/uL    Eosinophils Absolute 0.14 0.00 - 0.40 x10*3/uL    Basophils Absolute 0.09 0.00 - 0.10 x10*3/uL   Comprehensive metabolic panel   Result Value Ref Range    Glucose 107 (H) 74 - 99 mg/dL    Sodium 126 (L) 136 - 145 mmol/L    Potassium 5.2 3.5 - 5.3 mmol/L    Chloride 94 (L) 98 - 107 mmol/L    Bicarbonate 24 21 - 32 mmol/L    Anion Gap 13 10 - 20 mmol/L    Urea Nitrogen 18 6 - 23 mg/dL    Creatinine 0.61 0.50 - 1.05 mg/dL    eGFR >90 >60 mL/min/1.73m*2    Calcium 8.5 (L) 8.6 - 10.3 mg/dL    Albumin 2.4 (L) 3.4 - 5.0 g/dL    Alkaline Phosphatase 231 (H) 33 - 136 U/L    Total Protein 5.7 (L) 6.4 - 8.2 g/dL    AST 23 9 - 39 U/L    Bilirubin, Total 0.3 0.0 - 1.2 mg/dL    ALT 8 7 - 45 U/L   Magnesium   Result Value Ref Range    Magnesium 1.68 1.60 - 2.40 mg/dL   Troponin I, High Sensitivity   Result Value Ref Range    Troponin I, High Sensitivity 7 0 - 13 ng/L   B-Type Natriuretic Peptide   Result Value Ref Range    BNP 74 0 - 99 pg/mL    Sars-CoV-2 PCR   Result Value Ref Range    Coronavirus 2019, PCR Not Detected Not Detected   Lactate   Result Value Ref Range    Lactate 1.4 0.4 - 2.0 mmol/L   Urinalysis with Reflex Culture and Microscopic   Result Value Ref Range    Color, Urine Light-Yellow Light-Yellow, Yellow, Dark-Yellow    Appearance, Urine Clear Clear    Specific Gravity, Urine >1.050 (N) 1.005 - 1.035    pH, Urine 6.0 5.0, 5.5, 6.0, 6.5, 7.0, 7.5, 8.0    Protein, Urine 20 (TRACE) NEGATIVE, 10 (TRACE), 20 (TRACE) mg/dL    Glucose, Urine Normal Normal mg/dL    Blood, Urine NEGATIVE NEGATIVE    Ketones, Urine NEGATIVE NEGATIVE mg/dL    Bilirubin, Urine NEGATIVE NEGATIVE    Urobilinogen, Urine Normal Normal mg/dL    Nitrite, Urine NEGATIVE NEGATIVE    Leukocyte Esterase, Urine NEGATIVE NEGATIVE   Urinalysis Microscopic   Result Value Ref Range    WBC, Urine 1-5 1-5, NONE /HPF    RBC, Urine 1-2 NONE, 1-2, 3-5 /HPF    Squamous Epithelial Cells, Urine 1-9 (SPARSE) Reference range not established. /HPF    Mucus, Urine FEW Reference range not established. /LPF   Protime-INR   Result Value Ref Range    Protime 13.0 (H) 9.8 - 12.8 seconds    INR 1.2 (H) 0.9 - 1.1   APTT   Result Value Ref Range    aPTT 24 (L) 27 - 38 seconds     CT chest abdomen pelvis w IV contrast    Result Date: 10/1/2024  Interpreted By:  Oscar Aldrich, STUDY: CT CHEST ABDOMEN PELVIS W IV CONTRAST;  10/1/2024 11:13 am   INDICATION: Signs/Symptoms:leukocytosis, ascites, recent ovarian cancer diagnosis.   COMPARISON: CT abdomen pelvis 08/08/2024   ACCESSION NUMBER(S): [id number]   ORDERING CLINICIAN: ASHLEY FAIRBANKS   TECHNIQUE: CT of the chest, abdomen, and pelvis was performed.  Contiguous axial images were obtained at 3 mm slice thickness through the chest, abdomen and pelvis. Coronal and sagittal reconstructions at 3 mm slice thickness were performed. ml of contrast  were administered intravenously without immediate complication.   FINDINGS: CHEST:   LUNG/PLEURA/LARGE  AIRWAYS: No endobronchial lesion is seen.   Moderate to large bilateral pleural effusions with adjacent consolidative opacification of the bilateral lower lobes suggestive of compressive atelectasis. No pneumothorax. Mild asymmetric scattered reticular changes suggestive of chronic lung findings.     VESSELS: The thoracic aorta is unremarkable with respect course, caliber, and contour.   Prominent main pulmonary artery measuring up to 3.2 cm in anterior-posterior dimension measured on sagittal plane which may indicate sequela of pulmonary hypertension.   No significant coronary atherosclerotic calcifications are seen.   HEART: Heart size within normal limits. No pericardial effusion.   MEDIASTINUM AND OLIVE: Mildly prominent mediastinal lymph nodes measuring up to 10 mm in short axis   Moderate hiatal hernia with partial intrathoracic stomach and ascites and region of nodularity of the peritoneal fat within hiatal hernia suggestive carcinomatosis.   CHEST WALL AND LOWER NECK: No acute osseous abnormality. Multilevel presumed degenerative changes throughout the imaged spine. No acute soft tissue abnormality.   ABDOMEN:   LIVER: There is been interval scalloping of the right hepatic margins with new regions of irregularity along the right hepatic capsule diffusely which is new since comparison imaging from 08/08/2024 there is a new elongated subcapsular collection measuring up to approximately 11.4 x 2.1 cm, difficult to measure given curvilinear projection extending over the right hepatic lobe margin (series 202, images 40-70). Similar appearing subcentimeter left hepatic lobe hypodense lesion.   BILE DUCTS: No obvious new intrahepatic biliary dilatation. Mild prominence of the common bile duct, nonspecific in a cholecystectomy patient.   GALLBLADDER: Absent.   PANCREAS: Unremarkable.   SPLEEN: Unchanged.   ADRENAL GLANDS: Unchanged.   KIDNEYS AND URETERS: Similar appearing subcentimeter right renal lower pole  calculus. No hydronephrosis. No obstructing urolithiasis.   PELVIS:   BLADDER: Unremarkable.   REPRODUCTIVE ORGANS: Uterus appears grossly unchanged.   BOWEL: Moderate hiatal hernia with wall thickening of the stomach in the hernia. No new pathologic distention of bowel. Wall thickening of the colon within the splenic flexure descending colon and sigmoid colon, nonspecific.     VESSELS: No evidence of abdominal aortic aneurysm.   PERITONEUM/RETROPERITONEUM/LYMPH NODES: Large volume ascites with extensive regions of anterior omental caking and peritoneal carcinomatosis. No obvious free intraperitoneal gas. Given the presence of extensive omental caking and peritoneal carcinomatosis, superimposed peritoneal enhancement 4 other causes cannot be excluded.   BONE AND SOFT TISSUE: No acute osseous abnormality. Osseous structures appear stable. No acute abnormality of the abdominal wall soft tissues.       CHEST: 1.  Moderate to large bilateral pleural effusions with adjacent presumed compressive atelectasis. 2. Prominent main pulmonary artery which may indicate pulmonary hypertension. 3. Mildly prominent mediastinal lymph nodes measuring up to 10 mm in short axis concerning for metastatic disease. 4. Moderate hiatal hernia with partial intrathoracic stomach. The peritoneal fat adjacent to the stomach within the hernia demonstrates evidence of probable carcinomatosis and ascites.   ABDOMEN-PELVIS: 1.  Redemonstration of large volume ascites with regions of significant anterior omental caking and peritoneal carcinomatosis commensurate with patient's provided diagnosis of ovarian cancer. 2. Regions of wall thickening of the colon extending from the splenic flexure to the sigmoid which may indicate a nonspecific colitis. 3. Since the previous examination on 08/08/2024, there are now extensive regions of scalloping of the right hepatic lobe peripherally with the appearance of a large subcapsular collection along the right  hepatic margin as above. The sterility of this collection cannot be assessed via CT and clinical correlation is advised for exclusion superimposed infection within this region.     Signed by: Oscar Aldrich 10/1/2024 12:14 PM Dictation workstation:   [id number]    XR chest 1 view    Result Date: 10/1/2024  Interpreted By:  Samuel Jaramillo, STUDY: XR CHEST 1 VIEW; 10/1/2024 8:44 am   INDICATION: Signs/Symptoms:weakness, edema in legs and abdomen   COMPARISON: April 2023.   ACCESSION NUMBER(S): [id number]   ORDERING CLINICIAN: ASHLEY FAIRBANKS   FINDINGS: The study is limited due to rotation and poor inspiratory effort, with resultant crowding of the pulmonary vasculature. The cardiac silhouette is within normal limits for the technique. Moderate size hiatal hernia is again seen. There is no pneumothorax, confluent infiltrates or significant effusion. Degenerative changes involve the spine and shoulders; metallic anchors again noted over the left humeral head related to previous rotator cuff repair.       Limited study. Hiatal hernia. No acute cardiopulmonary disease.   Signed by: Samuel Jaramillo 10/1/2024 9:22 AM Dictation workstation:   [id number]    US guided abdominal paracentesis    Result Date: 9/20/2024  Interpreted By:  Peri Lawrence,  Vidal Whitehead STUDY: US GUIDED ABDOMINAL PARACENTESIS; US GUIDED PERCUTANEOUS ABDOMINAL RETROPERITONEUM BIOPSY;  9/19/2024 11:13 am   INDICATION: Signs/Symptoms:ascites; Signs/Symptoms:ascites, evaluate ovarian cancer.   COMPARISON: CT abdomen and pelvis 08/08/2024   ACCESSION NUMBER(S): [id number]; [id number]   ORDERING CLINICIAN: JEANETTE ARIAS   TECHNIQUE: INTERVENTIONALIST(S): Dr. Peri Lawrence MD   CONSENT: The patient was informed of the nature of the proposed procedure. The purposes, alternatives, risks, and benefits were explained and discussed. All questions were answered and consent was obtained.   SEDATION: 1% local lidocaine was used for anesthetic.    MEDICATION/CONTRAST: No additional.   TIME OUT:   A time out was performed immediately prior to procedure start with the interventional team, correctly identifying the patient name, date of birth, MRN, procedure, anatomy (including marking of site and side), patient position, procedure consent form, relevant laboratory and imaging test results, antibiotic administration, safety precautions, and procedure-specific equipment needs.   COMPLICATIONS: No immediate adverse events identified.   FINDINGS: The patient was placed in the supine position. Limited sonographic images of the lower abdominal wall were obtained for purposes of needle guidance, which demonstrated omental soft tissue mass which was seen on prior CT 08/08/2024. The area of concern was prepped and draped under sterile technique.   1% lidocaine was injected subcutaneously. Additional lidocaine was administered into deeper tissues surrounding the targeted area for biopsy using a 22G spinal needle. A small incision was made. Using the same access site a 18 gauge core biopsy needle was passed via a 17 gauge coaxial introducer needle to obtain a total of 1 core sample.   Postprocedure images demonstrate no evidence for hemorrhage. The patient tolerated the procedure well and there were no immediate complications. 1 core specimen was sent to pathology.     Subsequently the right lower quadrant was visualized under ultrasound which demonstrated moderate volume ascites seen on prior CT 08/08/2024. 1% lidocaine was injected subcutaneously at this site. A 19 gauge Yueh was used to target the fluid collection under ultrasound guidance. Once the site was accessed, the inner needle was removed from the catheter. The Yueh catheter was connected to drainage container. Estimated 1000 cc of serosanguineous colored fluid was removed. Post paracentesis imaging demonstrated decreased ascitic fluid. The Yueh catheter was removed. The access site was bandaged.       1.  Status post ultrasound guided core needle biopsy of omental mass seen on CT 08/08/2024. 1 core specimens sent to pathology. 2. Successful paracentesis under ultrasound guidance with removal of 1 L of serosanguineous fluid.     I was present for and/or performed the critical portions of the procedure and immediately available throughout the entire procedure.   I personally reviewed the image(s) / study and interpretation. I agree with the findings as stated.   Performed and dictated at [facility].   MACRO: None   Signed by: Peri Lawrence 9/20/2024 10:06 AM Dictation workstation:   [id number]    US guided percutaneous abdominal retroperitoneum biopsy    Result Date: 9/20/2024  Interpreted By:  Peri Lawrence and Kamau Nyokabi STUDY: US GUIDED ABDOMINAL PARACENTESIS; US GUIDED PERCUTANEOUS ABDOMINAL RETROPERITONEUM BIOPSY;  9/19/2024 11:13 am   INDICATION: Signs/Symptoms:ascites; Signs/Symptoms:ascites, evaluate ovarian cancer.   COMPARISON: CT abdomen and pelvis 08/08/2024   ACCESSION NUMBER(S): [id number]; [id number]   ORDERING CLINICIAN: JEANETTE ARIAS   TECHNIQUE: INTERVENTIONALIST(S): Dr. Peri Lawrence MD   CONSENT: The patient was informed of the nature of the proposed procedure. The purposes, alternatives, risks, and benefits were explained and discussed. All questions were answered and consent was obtained.   SEDATION: 1% local lidocaine was used for anesthetic.   MEDICATION/CONTRAST: No additional.   TIME OUT:   A time out was performed immediately prior to procedure start with the interventional team, correctly identifying the patient name, date of birth, MRN, procedure, anatomy (including marking of site and side), patient position, procedure consent form, relevant laboratory and imaging test results, antibiotic administration, safety precautions, and procedure-specific equipment needs.   COMPLICATIONS: No immediate adverse events identified.    FINDINGS: The patient was placed in the supine position. Limited sonographic images of the lower abdominal wall were obtained for purposes of needle guidance, which demonstrated omental soft tissue mass which was seen on prior CT 08/08/2024. The area of concern was prepped and draped under sterile technique.   1% lidocaine was injected subcutaneously. Additional lidocaine was administered into deeper tissues surrounding the targeted area for biopsy using a 22G spinal needle. A small incision was made. Using the same access site a 18 gauge core biopsy needle was passed via a 17 gauge coaxial introducer needle to obtain a total of 1 core sample.   Postprocedure images demonstrate no evidence for hemorrhage. The patient tolerated the procedure well and there were no immediate complications. 1 core specimen was sent to pathology.     Subsequently the right lower quadrant was visualized under ultrasound which demonstrated moderate volume ascites seen on prior CT 08/08/2024. 1% lidocaine was injected subcutaneously at this site. A 19 gauge Yueh was used to target the fluid collection under ultrasound guidance. Once the site was accessed, the inner needle was removed from the catheter. The Yueh catheter was connected to drainage container. Estimated 1000 cc of serosanguineous colored fluid was removed. Post paracentesis imaging demonstrated decreased ascitic fluid. The Yueh catheter was removed. The access site was bandaged.       1. Status post ultrasound guided core needle biopsy of omental mass seen on CT 08/08/2024. 1 core specimens sent to pathology. 2. Successful paracentesis under ultrasound guidance with removal of 1 L of serosanguineous fluid.     I was present for and/or performed the critical portions of the procedure and immediately available throughout the entire procedure.   I personally reviewed the image(s) / study and interpretation. I agree with the findings as stated.   Performed and dictated at  [facility].   MACRO: None   Signed by: Peri Lawrence 9/20/2024 10:06 AM Dictation workstation:   [id number]        Assessment/Plan   Assessment & Plan  Hyponatremia      79-year-old female with past medical history of recently diagnosed ovarian cancer with peritoneal carcinomatosis s/p paracentesis x 4 (last one done 9/19/24), obesity, hyperlipidemia, osteoarthritis, and skin cancer s/p Mohs procedure.  Patient presents to the hospital with weakness and inability to care for herself.  Patient found to be hyponatremic and with evidence on CT imaging of possible abscess of the liver and ascites secondary to malignancy.  Hospitalized for further evaluation management..    #Ovarian cancer with peritoneal carcinomatosis  #Ascites  #Bilateral pleural effusions  # Suspected liver abscess  #Abdominal pain    Telemetry monitoring  Consult to IR for paracentesis and possible drainage of liver abscess  Consult to pulmonology for bilateral pleural effusions  Antiemetics as needed  Analgesics as needed  Patient needs to be followed up with by her gynecological oncologist to determine optimal plan going forward for treatment of her ovarian cancer    #Hyponatremia  #Generalized edema/anasarca  #Hypoalbuminemia  Patient is fluid overloaded and has significant third spacing, suspect hypervolemia hyponatremia.  Will check urine electrolytes, urine osmolality, and serum osmolality  Fluid restriction of 1500 ml for the time being.  Consider starting diuretics, defer to attending  Repeat labs in a.m.    #debility  PT/OT  Social work for discharge planning    Chronic issues:  #Obesity  #Hyperlipidemia  #Osteoarthritis    Continue with patient's home medications as appropriate    #DVT prophylaxis  SCDs  Lovenox subcutaneous       I spent 75 minutes in the professional and overall care of this patient.      Miky Kumar, APRN-CNP

## 2024-10-01 NOTE — Clinical Note
Dr. Lawrence at bedside scanning, not enough fluid noted to safely perform paracentesis. Patient to return to 9th floor

## 2024-10-01 NOTE — PROGRESS NOTES
Vancomycin Dosing by Pharmacy- INITIAL    Rohini Beaver is a 79 y.o. year old female who Pharmacy has been consulted for vancomycin dosing for other intra abdominal infection with abscess present . Based on the patient's indication and renal status this patient will be dosed based on a goal AUC of 400-600.     Renal function is currently stable.    Visit Vitals  /58   Pulse 100   Temp 36.2 °C (97.2 °F) (Temporal)   Resp (!) 22        Lab Results   Component Value Date    CREATININE 0.61 10/01/2024    CREATININE 0.82 2024    CREATININE 0.79 2023    CREATININE 0.87 2022    CREATININE 0.84 2021    CREATININE 0.87 2019        Patient weight is as follows:   Vitals:    10/01/24 0828   Weight: 104 kg (230 lb)       Cultures:  No results found for the encounter in last 14 days.        No intake/output data recorded.  I/O during current shift:  I/O this shift:  In: 600 [IV Piggyback:600]  Out: -     Temp (24hrs), Av.2 °C (97.2 °F), Min:36.2 °C (97.2 °F), Max:36.2 °C (97.2 °F)         Assessment/Plan     Patient has already been given a loading dose of 1500 mg.  Will initiate vancomycin maintenance,  1250 mg every 12 hours to start tomorrow after initial lab following loading dose.     This dosing regimen is predicted by [employer] to result in the following pharmacokinetic parameters:  Loading dose: 1500 mg on 10/1/24 @1352  Regimen: 1250 mg IV every 12 hours.  Start time: 0800 on 10/01/2024  Exposure target: AUC24 (range)400-600 mg/L.hr   OFZ07-87: 474 mg/L.hr  AUC24,ss: 538 mg/L.hr  Probability of AUC24 > 400: 80 %  Ctrough,ss: 17.4 mg/L  Probability of Ctrough,ss > 20: 38 %    Follow-up level will be ordered on 10/2 at AM labs unless clinically indicated sooner.  Will continue to monitor renal function daily while on vancomycin and order serum creatinine at least every 48 hours if not already ordered.  Follow for continued vancomycin needs, clinical response, and signs/symptoms  of toxicity.       Sherri Greene, PharmD

## 2024-10-01 NOTE — Clinical Note
Catheter inserted, decision per MD darrius to aspirate instead of place drain due to drainage characteristic. Serosanguinous fluid aspiragted

## 2024-10-01 NOTE — CARE PLAN
The patient's goals for the shift include pain control and rest    The clinical goals for the shift include rest and pain control    Problem: Skin  Goal: Decreased wound size/increased tissue granulation at next dressing change  Outcome: Progressing  Goal: Participates in plan/prevention/treatment measures  Outcome: Progressing  Flowsheets (Taken 10/1/2024 1848)  Participates in plan/prevention/treatment measures:   Elevate heels   Discuss with provider PT/OT consult  Goal: Prevent/manage excess moisture  Outcome: Progressing  Flowsheets (Taken 10/1/2024 1848)  Prevent/manage excess moisture:   Cleanse incontinence/protect with barrier cream   Moisturize dry skin  Goal: Prevent/minimize sheer/friction injuries  Outcome: Progressing  Flowsheets (Taken 10/1/2024 1848)  Prevent/minimize sheer/friction injuries:   Turn/reposition every 2 hours/use positioning/transfer devices   Use pull sheet  Goal: Promote/optimize nutrition  Outcome: Progressing  Flowsheets (Taken 10/1/2024 1848)  Promote/optimize nutrition:   Offer water/supplements/favorite foods   Consume > 50% meals/supplements  Goal: Promote skin healing  Outcome: Progressing     Problem: Pain  Goal: Takes deep breaths with improved pain control throughout the shift  Outcome: Progressing  Goal: Turns in bed with improved pain control throughout the shift  Outcome: Progressing  Goal: Walks with improved pain control throughout the shift  Outcome: Progressing  Goal: Performs ADL's with improved pain control throughout shift  Outcome: Progressing  Goal: Participates in PT with improved pain control throughout the shift  Outcome: Progressing  Goal: Free from opioid side effects throughout the shift  Outcome: Progressing  Goal: Free from acute confusion related to pain meds throughout the shift  Outcome: Progressing

## 2024-10-01 NOTE — ED TRIAGE NOTES
Patient arrives from home via EMS, states she was recently dx with ovarian cancer in August. She returned home from [facility] one week ago and is living with her sister. Patient states she is getting weaker and unable to get up would like to return to SNF.

## 2024-10-02 ENCOUNTER — TELEPHONE (OUTPATIENT)
Dept: GYNECOLOGIC ONCOLOGY | Facility: HOSPITAL | Age: 79
End: 2024-10-02
Payer: MEDICARE

## 2024-10-02 ENCOUNTER — TELEMEDICINE (OUTPATIENT)
Dept: GYNECOLOGIC ONCOLOGY | Facility: CLINIC | Age: 79
End: 2024-10-02
Payer: MEDICARE

## 2024-10-02 DIAGNOSIS — C54.1 ENDOMETRIAL CANCER (MULTI): Primary | ICD-10-CM

## 2024-10-02 PROBLEM — C56.9 OVARIAN CANCER (MULTI): Status: ACTIVE | Noted: 2024-10-02

## 2024-10-02 PROBLEM — C78.6 PERITONEAL CARCINOMATOSIS (MULTI): Status: RESOLVED | Noted: 2024-09-04 | Resolved: 2024-10-02

## 2024-10-02 PROBLEM — C56.9 OVARIAN CANCER (MULTI): Status: RESOLVED | Noted: 2024-10-02 | Resolved: 2024-10-02

## 2024-10-02 LAB
ALBUMIN SERPL BCP-MCNC: 2.3 G/DL (ref 3.4–5)
ALP SERPL-CCNC: 202 U/L (ref 33–136)
ALT SERPL W P-5'-P-CCNC: 9 U/L (ref 7–45)
ANION GAP SERPL CALC-SCNC: 13 MMOL/L (ref 10–20)
AST SERPL W P-5'-P-CCNC: 25 U/L (ref 9–39)
BILIRUB SERPL-MCNC: 0.4 MG/DL (ref 0–1.2)
BUN SERPL-MCNC: 19 MG/DL (ref 6–23)
CALCIUM SERPL-MCNC: 8.3 MG/DL (ref 8.6–10.3)
CHLORIDE SERPL-SCNC: 94 MMOL/L (ref 98–107)
CO2 SERPL-SCNC: 25 MMOL/L (ref 21–32)
CREAT SERPL-MCNC: 0.73 MG/DL (ref 0.5–1.05)
EGFRCR SERPLBLD CKD-EPI 2021: 84 ML/MIN/1.73M*2
ERYTHROCYTE [DISTWIDTH] IN BLOOD BY AUTOMATED COUNT: 15.8 % (ref 11.5–14.5)
GLUCOSE SERPL-MCNC: 103 MG/DL (ref 74–99)
HCT VFR BLD AUTO: 33.6 % (ref 36–46)
HGB BLD-MCNC: 10.5 G/DL (ref 12–16)
HOLD SPECIMEN: NORMAL
MCH RBC QN AUTO: 26.4 PG (ref 26–34)
MCHC RBC AUTO-ENTMCNC: 31.3 G/DL (ref 32–36)
MCV RBC AUTO: 84 FL (ref 80–100)
NRBC BLD-RTO: 0 /100 WBCS (ref 0–0)
PLATELET # BLD AUTO: 419 X10*3/UL (ref 150–450)
POTASSIUM SERPL-SCNC: 4.8 MMOL/L (ref 3.5–5.3)
PROT SERPL-MCNC: 5.5 G/DL (ref 6.4–8.2)
RBC # BLD AUTO: 3.98 X10*6/UL (ref 4–5.2)
SODIUM SERPL-SCNC: 127 MMOL/L (ref 136–145)
VANCOMYCIN SERPL-MCNC: 8 UG/ML (ref 5–20)
WBC # BLD AUTO: 25.1 X10*3/UL (ref 4.4–11.3)

## 2024-10-02 PROCEDURE — 97530 THERAPEUTIC ACTIVITIES: CPT | Mod: GP

## 2024-10-02 PROCEDURE — 2500000005 HC RX 250 GENERAL PHARMACY W/O HCPCS: Performed by: NURSE PRACTITIONER

## 2024-10-02 PROCEDURE — 2500000004 HC RX 250 GENERAL PHARMACY W/ HCPCS (ALT 636 FOR OP/ED): Performed by: NURSE PRACTITIONER

## 2024-10-02 PROCEDURE — 2500000002 HC RX 250 W HCPCS SELF ADMINISTERED DRUGS (ALT 637 FOR MEDICARE OP, ALT 636 FOR OP/ED): Performed by: NURSE PRACTITIONER

## 2024-10-02 PROCEDURE — 97166 OT EVAL MOD COMPLEX 45 MIN: CPT | Mod: GO

## 2024-10-02 PROCEDURE — 1200000002 HC GENERAL ROOM WITH TELEMETRY DAILY

## 2024-10-02 PROCEDURE — 2500000001 HC RX 250 WO HCPCS SELF ADMINISTERED DRUGS (ALT 637 FOR MEDICARE OP): Performed by: NURSE PRACTITIONER

## 2024-10-02 PROCEDURE — 84075 ASSAY ALKALINE PHOSPHATASE: CPT | Performed by: NURSE PRACTITIONER

## 2024-10-02 PROCEDURE — 85027 COMPLETE CBC AUTOMATED: CPT | Performed by: NURSE PRACTITIONER

## 2024-10-02 PROCEDURE — 36415 COLL VENOUS BLD VENIPUNCTURE: CPT | Performed by: NURSE PRACTITIONER

## 2024-10-02 PROCEDURE — 80202 ASSAY OF VANCOMYCIN: CPT | Performed by: NURSE PRACTITIONER

## 2024-10-02 PROCEDURE — 97161 PT EVAL LOW COMPLEX 20 MIN: CPT | Mod: GP

## 2024-10-02 RX ORDER — VANCOMYCIN HYDROCHLORIDE 750 MG/150ML
750 INJECTION, SOLUTION INTRAVENOUS EVERY 12 HOURS
Status: DISPENSED | OUTPATIENT
Start: 2024-10-02

## 2024-10-02 ASSESSMENT — PAIN SCALES - GENERAL
PAINLEVEL_OUTOF10: 6
PAINLEVEL_OUTOF10: 2
PAINLEVEL_OUTOF10: 6
PAINLEVEL_OUTOF10: 8
PAINLEVEL_OUTOF10: 2
PAINLEVEL_OUTOF10: 0 - NO PAIN
PAINLEVEL_OUTOF10: 5 - MODERATE PAIN
PAINLEVEL_OUTOF10: 5 - MODERATE PAIN

## 2024-10-02 ASSESSMENT — PAIN - FUNCTIONAL ASSESSMENT
PAIN_FUNCTIONAL_ASSESSMENT: 0-10

## 2024-10-02 ASSESSMENT — COGNITIVE AND FUNCTIONAL STATUS - GENERAL
DRESSING REGULAR LOWER BODY CLOTHING: TOTAL
WALKING IN HOSPITAL ROOM: A LOT
HELP NEEDED FOR BATHING: A LOT
TURNING FROM BACK TO SIDE WHILE IN FLAT BAD: A LOT
HELP NEEDED FOR BATHING: A LOT
CLIMB 3 TO 5 STEPS WITH RAILING: A LOT
PERSONAL GROOMING: A LITTLE
TOILETING: A LOT
PERSONAL GROOMING: A LOT
WALKING IN HOSPITAL ROOM: A LOT
DRESSING REGULAR LOWER BODY CLOTHING: A LOT
DRESSING REGULAR UPPER BODY CLOTHING: A LOT
STANDING UP FROM CHAIR USING ARMS: A LOT
TOILETING: A LOT
STANDING UP FROM CHAIR USING ARMS: A LOT
MOBILITY SCORE: 14
TURNING FROM BACK TO SIDE WHILE IN FLAT BAD: A LOT
MOBILITY SCORE: 12
TOILETING: TOTAL
HELP NEEDED FOR BATHING: A LOT
MOVING FROM LYING ON BACK TO SITTING ON SIDE OF FLAT BED WITH BEDRAILS: A LITTLE
DAILY ACTIVITIY SCORE: 13
EATING MEALS: A LOT
MOVING TO AND FROM BED TO CHAIR: A LOT
DAILY ACTIVITIY SCORE: 12
EATING MEALS: A LOT
DRESSING REGULAR UPPER BODY CLOTHING: A LOT
DAILY ACTIVITIY SCORE: 12
TURNING FROM BACK TO SIDE WHILE IN FLAT BAD: A LITTLE
WALKING IN HOSPITAL ROOM: A LOT
DRESSING REGULAR UPPER BODY CLOTHING: A LOT
DRESSING REGULAR LOWER BODY CLOTHING: A LOT
MOVING FROM LYING ON BACK TO SITTING ON SIDE OF FLAT BED WITH BEDRAILS: A LOT
MOVING TO AND FROM BED TO CHAIR: A LOT
CLIMB 3 TO 5 STEPS WITH RAILING: A LOT
PERSONAL GROOMING: A LOT
STANDING UP FROM CHAIR USING ARMS: A LOT
MOBILITY SCORE: 14
MOVING TO AND FROM BED TO CHAIR: A LOT
CLIMB 3 TO 5 STEPS WITH RAILING: A LOT

## 2024-10-02 ASSESSMENT — PAIN DESCRIPTION - DESCRIPTORS: DESCRIPTORS: ACHING

## 2024-10-02 ASSESSMENT — PAIN DESCRIPTION - LOCATION
LOCATION: GENERALIZED
LOCATION: GENERALIZED

## 2024-10-02 NOTE — PROGRESS NOTES
Occupational Therapy    Evaluation    Patient Name: Rohini Beaver  MRN: [id number]  Today's Date: 10/2/2024  Time Calculation  Start Time: 1051  Stop Time: 1122  Time Calculation (min): 31 min  832/832-A    Assessment  IP OT Assessment  OT Assessment: This hospitalization 10/1/1024 due to #Ovarian cancer with peritoneal carcinomatosis  #Ascites  #Bilateral pleural effusions  # Suspected liver abscess  #Abdominal pain     #Hyponatremia  #Generalized edema/anasarca  #Hypoalbuminemia.  Patient would benefit from further OT to address ADL's and functional transfers/mobility due to high risk for fall and to regain optimal function.  Prognosis: Good  End of Session Communication: Bedside nurse  End of Session Patient Position: Alarm on, Bed, 3 rail up; call-light within reach    Plan:  Treatment Interventions: ADL retraining, Functional transfer training, Patient/family training, Equipment evaluation/education, Compensatory technique education, Endurance training (energy conservation / diaphragmatic breathing tehcniques training)  OT Frequency: 3 times per week  OT Discharge Recommendations: Moderate intensity level of continued care  OT - OK to Discharge: Yes (to next level of care when medically cleared by physician/medical team)    Subjective   Current Problem:  1. Hyponatremia  AFB Culture/Smear    AFB Culture/Smear      2. Abdominal fluid collection  AFB Culture/Smear    AFB Culture/Smear      3. Malignant ascites (CMS-HCC)  AFB Culture/Smear    AFB Culture/Smear      4. Pleural effusion  AFB Culture/Smear    AFB Culture/Smear        General:  General  Reason for Referral: ADL, safety assessment  Referred By: Miky Kumar, APRN-CNP  Past Medical History Relevant to Rehab: ecently diagnosed ovarian cancer with peritoneal carcinomatosis s/p paracentesis x 4 (last one done 9/19/24), obesity, hyperlipidemia, osteoarthritis, skin cancer s/p Mohs procedure  Co-Treatment: PT (co-eval to maximize safety when assessing  mobility)  Prior to Session Communication: Bedside nurse who confirmed that patient is medically stable to participate in this OT session  Patient Position Received: Bed, 3 rail up, Alarm on  General Comment: Patient seen bedside; cooperative, tearful at beginning of session thus provided emotional support    Precautions:  Medical Precautions: Fall precautions  Precautions Comment: UE IV    Pain:  Pain Assessment  Pain Assessment: 0-10  0-10 (Numeric) Pain Score: 5 - Moderate pain  Pain Type: Acute pain  Pain Location: Abdomen    Objective   Cognition:  Overall Cognitive Status: Within Functional Limits (oriented x3 except year)     Home Living:  Type of Home: Apartment  Lives With:  (sister)  Home Adaptive Equipment: Cane, Walker rolling or standard, Wheelchair-manual  Home Layout: Two level (however first floor set-up)  Home Access: Level entry  Bathroom Shower/Tub: Tub/shower unit  Bathroom Toilet: Standard  Bathroom Equipment: Tub transfer bench, Raised toilet seat with rails, Bedside commode  Home Living Comments: sleeps in regular bed with use of 'strap' to assist     Prior Function:  Level of [place]: Needs assistance with ADLs, Needs assistance with homemaking (sister assist since June/July)  Ambulatory Assistance: Independent (wheeled walker)  Hand Dominance: Right  Prior Function Comments: sister drives, shops; 2  history of falls    Current ADL:  LE Dressing Assistance: Total (anticipate)  ADL Comments: To further address in OT sessions using adaptive equipment/assistive techniques to regain optimal level of function    Activity Tolerance:  Endurance:  (easily fatigues)    Bed Mobility/Transfers:   Bed Mobility  Bed Mobility: Yes (expressed fear of falling thus provided encouragement/emotional support)  Bed Mobility 1  Bed Mobility 1: Supine to sitting  Level of Assistance 1: Minimum assistance, Moderate verbal cues  Bed Mobility Comments 1: HOB elevated  Bed Mobility 2  Bed Mobility  2: Sitting to  supine  Level of Assistance 2: Moderate assistance, +2, Moderate verbal cues  Transfers  Transfer: Yes  Transfer 1  Transfer From 1: Sit to, Stand to  Transfer to 1: Bed  Transfer Device 1: Walker  Transfer Level of Assistance 1: Moderate assistance, +2, Maximum verbal cues  Trials/Comments 1: height elevated    Ambulation/Gait Training:  Functional Mobility  Functional Mobility Performed: Yes  Functional Mobility 1  Device 1: Rolling walker  Assistance 1: Moderate assistance, Maximum verbal cues (of 2 staff)  Comments 1: to perform 3 sidesteps along hospital bed    Sitting Balance:  Static Sitting Balance  Static Sitting-Level of Assistance: Contact guard    Standing Balance:  Static Standing Balance  Static Standing-Level of Assistance: Moderate assistance (fair)  Static Standing-Comment/Number of Minutes: fair (-)    Sensation:  Sensation Comment: reported intermittent numbness/tingling at right 5th digit    Extremities: RUE   RUE : Within Functional Limits (AROM except shoulder flex ~85 degrees.  Strength:  shoulder 3+/5 at limited range, elbow 4/5, hand  3+/5.) and LUE   LUE: Within Functional Limits (AROM throughout. Strength: shoulder 4/5, elbow 4/5, hand  3+/5)    Outcome Measures: [facility] Daily Activity  Putting on and taking off regular lower body clothing: Total  Bathing (including washing, rinsing, drying): A lot  Putting on and taking off regular upper body clothing: A lot  Toileting, which includes using toilet, bedpan or urinal: Total  Taking care of personal grooming such as brushing teeth: A little  Eating Meals: None  Daily Activity - Total Score: 13     EDUCATION:  Education Documentation  Body Mechanics, taught by Jimmy Mcallister OT at 10/2/2024  4:22 PM.  Learner: Patient  Readiness: Acceptance  Method: Explanation  Response: Demonstrated Understanding, Needs Reinforcement  Comment: required step-by-step instructions with cues needed to promote safety    Mobility Training, taught by  Jimmy Mcallister, OT at 10/2/2024  4:22 PM.  Learner: Patient  Readiness: Acceptance  Method: Explanation  Response: Demonstrated Understanding, Needs Reinforcement  Comment: required step-by-step instructions with cues needed to promote safety    Goals:   Encounter Problems       Encounter Problems (Active)       OT Goals       Patient will complete upper and lower body bathing/dressing; toileting with minimal assist using adaptive equipment as needed  (Progressing)       Start:  10/02/24    Expected End:  10/16/24            Patient will perform bed mobility and functional transfers safely with minimal assist : bed, chair, commode using DME as needed  (Progressing)       Start:  10/02/24    Expected End:  10/16/24            Patient will tolerate standing for 5 mins. and show overall fair (+) standing balance during ADL's and functional transfers/mobility  (Progressing)       Start:  10/02/24    Expected End:  10/16/24            Patient will apply energy conservation/diaphragmatic breathing techniques to ADL's and functional transfers with minimal cues  (Progressing)       Start:  10/02/24    Expected End:  10/16/24

## 2024-10-02 NOTE — CARE PLAN
The patient's goals for the shift include rest and pain control    The clinical goals for the shift include remain hemodynamically stable      Problem: Skin  Goal: Decreased wound size/increased tissue granulation at next dressing change  Outcome: Progressing  Flowsheets (Taken 10/2/2024 1555)  Decreased wound size/increased tissue granulation at next dressing change: Promote sleep for wound healing  Goal: Participates in plan/prevention/treatment measures  Outcome: Progressing  Flowsheets (Taken 10/2/2024 1555)  Participates in plan/prevention/treatment measures: Elevate heels  Goal: Prevent/manage excess moisture  Outcome: Progressing  Flowsheets (Taken 10/2/2024 1555)  Prevent/manage excess moisture:   Cleanse incontinence/protect with barrier cream   Moisturize dry skin  Goal: Prevent/minimize sheer/friction injuries  Outcome: Progressing  Flowsheets (Taken 10/2/2024 1555)  Prevent/minimize sheer/friction injuries: Turn/reposition every 2 hours/use positioning/transfer devices  Goal: Promote/optimize nutrition  Outcome: Progressing  Flowsheets (Taken 10/2/2024 1555)  Promote/optimize nutrition:   Offer water/supplements/favorite foods   Consume > 50% meals/supplements  Goal: Promote skin healing  Outcome: Progressing     Problem: Pain  Goal: Takes deep breaths with improved pain control throughout the shift  Outcome: Progressing  Goal: Turns in bed with improved pain control throughout the shift  Outcome: Progressing  Goal: Walks with improved pain control throughout the shift  Outcome: Progressing  Goal: Performs ADL's with improved pain control throughout shift  Outcome: Progressing  Goal: Participates in PT with improved pain control throughout the shift  Outcome: Progressing  Goal: Free from opioid side effects throughout the shift  Outcome: Progressing  Goal: Free from acute confusion related to pain meds throughout the shift  Outcome: Progressing

## 2024-10-02 NOTE — PROGRESS NOTES
Physical Therapy    Physical Therapy Evaluation    Patient Name: Rohini Beaver  MRN: [id number]  Today's Date: 10/2/2024   Time Calculation  Start Time: 1052  Stop Time: 1124  Time Calculation (min): 32 min  832/832-A    Assessment/Plan   PT Assessment  PT Assessment Results: Decreased strength, Decreased endurance, Impaired balance, Decreased mobility  End of Session Communication: Bedside nurse  End of Session Patient Position: Bed, 2 rail up, Alarm on  IP OR SWING BED PT PLAN  Inpatient or Swing Bed: Inpatient  PT Plan  Treatment/Interventions: Bed mobility, Transfer training, Gait training  PT Plan: Ongoing PT  PT Frequency: 4 times per week  PT Discharge Recommendations: Moderate intensity level of continued care  PT - OK to Discharge: Yes    Subjective     Current Problem:  1. Hyponatremia  AFB Culture/Smear    AFB Culture/Smear      2. Abdominal fluid collection  AFB Culture/Smear    AFB Culture/Smear      3. Malignant ascites (CMS-HCC)  AFB Culture/Smear    AFB Culture/Smear      4. Pleural effusion  AFB Culture/Smear    AFB Culture/Smear        Patient Active Problem List   Diagnosis    BMI 40.0-44.9, adult (Multi)    Hiatal hernia    Gastroesophageal reflux disease    Malignant ascites (CMS-HCC)    Hyponatremia    Endometrial cancer (Multi)     General Visit Information:  General  Reason for Referral: PT Eval and Treat  Referred By: Miky Kumar, APRN-CNP  Past Medical History Relevant to Rehab: 79-year-old female with past medical history of recently diagnosed ovarian cancer with peritoneal carcinomatosis s/p paracentesis x 4 (last one done 9/19/24), obesity, hyperlipidemia, osteoarthritis, and skin cancer s/p Mohs procedure.  Patient presents to the hospital with weakness and inability to care for herself.  She reports that she was at [facility] nursing [facility] and [facility], however had a brief hospitalization at [facility] and upon discharge decided to go home rather than go back to [facility]  [facility].  Co-Treatment: OT  Co-Treatment Reason: maximize safety and functional mobility  Prior to Session Communication: Bedside nurse  Patient Position Received: Alarm on, Bed, 3 rail up (Agreeable to PT)    Home Living:  Home Living  Home Living Comments: Pt lives with her 86 year old sister in a 1st floor apt with 0 ESTEFANY. Bathroom has a tub/shower with transfer bench and a raised toilet seat. Pt also has a bedside commode next to her bed. Laundry is on the same floor.    Prior Level of Function:  Prior Function Per Pt/Caregiver Report  Level of [place]: Independent with ADLs and functional transfers, Independent with homemaking with ambulation (Pt amb intermittently with RW, owns a SPC and transport WC, sister does IADLs and driving)  Prior Function Comments: Pt reports having a fall 6 weeks ago when losing her balance while ambulating at home.    Precautions:  Precautions  Precautions Comment: Fall precautions, fear of falling    Objective     Pain:  Pain Assessment  Pain Assessment:  (5/10 abdominal pain, premedicated, repositioned for comfort s/p session)    Cognition:  Cognition  Overall Cognitive Status: Within Functional Limits (Pt did not know the year)    General Assessments:  Sensation  Light Touch: No apparent deficits  Strength  Strength Comments: B LE ROM WFL, B LE strength 3-/5 to 3+/5  Dynamic Standing Balance  Dynamic Standing-Comments: Poor with RW and mod A x 2    Functional Assessments:  Bed Mobility  Bed Mobility:  (supine to sitting: min A x 1 to raise trunk to upright position, sit to supine: mod A x 2)  Transfers  Transfer:  (STS from EOB: mod A x 2, with max VCs for technique and encouragement)  Ambulation/Gait Training  Ambulation/Gait Training Performed:  (Pt was able to side step x 3' using RW with mod A x 2 with assist for balance, RW management, and required encouragement due to increased fear of falling)    Outcome Measures:  [facility] Basic Mobility  Turning from your back to your  side while in a flat bed without using bedrails: A little  Moving from lying on your back to sitting on the side of a flat bed without using bedrails: A little  Moving to and from bed to chair (including a wheelchair): A lot  Standing up from a chair using your arms (e.g. wheelchair or bedside chair): A lot  To walk in hospital room: A lot  Climbing 3-5 steps with railing: A lot  Basic Mobility - Total Score: 14    Goals:  Encounter Problems       Encounter Problems (Active)       PT Problem       STG - Pt will transition supine <> sitting with min A x 1  (Progressing)       Start:  10/02/24    Expected End:  10/16/24            STG - Pt will transfer STS with mod A x 1  (Progressing)       Start:  10/02/24    Expected End:  10/16/24            STG - Pt will amb 25' using RW with mod A x 1  (Progressing)       Start:  10/02/24    Expected End:  10/16/24                 Education Documentation  No documentation found.  Education Comments  No comments found.

## 2024-10-02 NOTE — PROGRESS NOTES
10/02/24 1107   Discharge Planning   Living Arrangements Family members   Support Systems Family members   Assistance Needed Independent with increased assistance at home, frequent falls   Type of Residence Private residence   Do you have animals or pets at home? Yes   Type of Animals or Pets dog   Home or Post Acute Services Post acute facilities (Rehab/SNF/etc)   Type of Post Acute Facility Services Skilled nursing   Expected Discharge Disposition SNF   Does the patient need discharge transport arranged? Yes   RoundTrip coordination needed? Yes   Has discharge transport been arranged? No   Patient Choice   Provider Choice list and CMS website (https://medicare.gov/care-compare#search) for post-acute Quality and Resource Measure Data were provided and reviewed with: Patient   Patient / Family choosing to utilize agency / facility established prior to hospitalization Yes     Care transitions assessment completed via bedside with patient and family. TCC introduced self and explained role. Demographics verified. Patient from home with sister. Sister's home address [address] [place] [address] [address] [place]  Independent PTA, recent increase in assistance at home. Denies use of assistive devices. Denies SW needs at this time. Patient anticipates SNF at discharge and was looking at a few facilities in [facility]. Will provided SNF list. Patient states was supposed to have an appointment with Oncologist today to talk about potential treatment options. Dispo pending hospital course, await PT/OT evals. TCC to continue to follow for discharge planning needs.      PCP: Dr. Jose Enrique Wooten  Last seen: May 2024  Pharmacy: [facility]   Insurance: Medicare, [facility]  Dispo: PT/OT pending, anticipate SNF. Pt active w [facility], ref sent to update.     PRISCILA PLAESNCIA RN TCC

## 2024-10-02 NOTE — PROGRESS NOTES
Vancomycin Dosing by Pharmacy- FOLLOW UP    Rohini Beaver is a 79 y.o. year old female who Pharmacy has been consulted for vancomycin dosing for other abdominal infection . Based on the patient's indication and renal status this patient is being dosed based on a goal AUC of 400-600.     Renal function is currently stable.    Current vancomycin dose: 1250 mg given every 12 hours    Estimated vancomycin AUC on current dose: 703 mg/L.hr     Visit Vitals  /60 (BP Location: Left arm, Patient Position: Sitting)   Pulse 82   Temp 36 °C (96.8 °F) (Temporal)   Resp 16        Lab Results   Component Value Date    CREATININE 0.73 10/02/2024    CREATININE 0.61 10/01/2024    CREATININE 0.82 2024    CREATININE 0.79 2023    CREATININE 0.87 2022    CREATININE 0.84 2021    CREATININE 0.87 2019        Patient weight is as follows:   Vitals:    10/01/24 1805   Weight: 104 kg (230 lb)       Cultures:  No results found for the encounter in last 14 days.       I/O last 3 completed shifts:  In: 913.2 (8.8 mL/kg) [P.O.:240; I.V.:23.2 (0.2 mL/kg); IV Piggyback:650]  Out: - (0 mL/kg)   Weight: 104.3 kg   I/O during current shift:  No intake/output data recorded.    Temp (24hrs), Av.6 °C (96.1 °F), Min:34.8 °C (94.6 °F), Max:36.3 °C (97.3 °F)      Assessment/Plan    Day #2 of vancomycin therapy for abdominal infection. Above goal AUC. Orders placed for new vancomcyin regimen of 750mg every 12 hours to begin at .    This dosing regimen is predicted by [email or web address] to result in the following pharmacokinetic parameters:  Regimen: 750 mg IV every 12 hours.  Exposure target: AUC24 (range)400-600 mg/L.hr   PYO09-25: 386 mg/L.hr  AUC24,ss: 428 mg/L.hr  Probability of AUC24 > 400: 64 %  Ctrough,ss: 14.2 mg/L  Probability of Ctrough,ss > 20: 6 %    The next level will be obtained on 10/4/24 with morning labs. May be obtained sooner if clinically indicated.   Will continue to monitor renal function daily  while on vancomycin and order serum creatinine at least every 48 hours if not already ordered.  Follow for continued vancomycin needs, clinical response, and signs/symptoms of toxicity.     Marta Rockwell, PharmD, BCPS   10/2/2024 9:17 AM

## 2024-10-02 NOTE — CARE PLAN
The patient's goals for the shift include pain control and rest    The clinical goals for the shift include rest and pain control      Problem: Skin  Goal: Decreased wound size/increased tissue granulation at next dressing change  Outcome: Progressing  Flowsheets (Taken 10/2/2024 0406)  Decreased wound size/increased tissue granulation at next dressing change: Promote sleep for wound healing  Goal: Participates in plan/prevention/treatment measures  Outcome: Progressing  Goal: Prevent/manage excess moisture  Outcome: Progressing  Goal: Prevent/minimize sheer/friction injuries  Outcome: Progressing  Goal: Promote/optimize nutrition  Outcome: Progressing  Goal: Promote skin healing  Outcome: Progressing     Problem: Pain  Goal: Takes deep breaths with improved pain control throughout the shift  Outcome: Progressing  Goal: Turns in bed with improved pain control throughout the shift  Outcome: Progressing  Goal: Walks with improved pain control throughout the shift  Outcome: Progressing  Goal: Performs ADL's with improved pain control throughout shift  Outcome: Progressing  Goal: Participates in PT with improved pain control throughout the shift  Outcome: Progressing  Goal: Free from opioid side effects throughout the shift  Outcome: Progressing  Goal: Free from acute confusion related to pain meds throughout the shift  Outcome: Progressing     Over the shift, the patient did not make progress toward the following goals. Barriers to progression include

## 2024-10-02 NOTE — PROGRESS NOTES
Patient ID: Rohini Beaver is a 79 y.o. female.  Referring Physician: No referring provider defined for this encounter.  Primary Care Provider: Jose Enrique Wooten MD    Virtual or Telephone Consent    An interactive audio and video telecommunication system which permits real time communications between the patient (at the originating site) and provider (at the distant site) was utilized to provide this telehealth service.   Verbal consent was requested and obtained from Rohini Beaver designated proxy on this date, 10/02/24 for a telehealth visit.     Subjective    She is here for results review. Family friend Pastor and sister randy are present. Rohini is not present for the call. She has requested that visit be carried out with Pastor.  Recently underwent an IR guided omental biopsy. Pathology consistent with high grade carcinoma, stain suggest endometrial primary. She is currently admitted to [facility] for weakness and is unable to care for herself. She was at [facility] but returned home with her sister who is [address] Ad[address] CT with concern for metastatic disease with chest lymphadenopathy, loculated fluid cephalad to the liver concern for abscess. She is s/p IR guided aspiration with culture pending. She is continued on broad spectrum antibiotics. Her pain is fairly well controlled. Difficulty with constipation on narcotic pain medication. Miralax PRN tried with minimal relief.     Objective    BSA: There is no height or weight on file to calculate BSA.  There were no vitals taken for this visit.     Physical Exam    Imaging  IMPRESSION:  CHEST:  1. Moderate to large bilateral pleural effusions with adjacent  presumed compressive atelectasis.  2. Prominent main pulmonary artery which may indicate pulmonary  hypertension.  3. Mildly prominent mediastinal lymph nodes measuring up to 10 mm in  short axis concerning for metastatic disease.  4. Moderate hiatal hernia with partial  intrathoracic stomach. The  peritoneal fat adjacent to the stomach within the hernia demonstrates  evidence of probable carcinomatosis and ascites.    ABDOMEN-PELVIS:  1. Redemonstration of large volume ascites with regions of  significant anterior omental caking and peritoneal carcinomatosis  commensurate with patient's provided diagnosis of ovarian cancer.  2. Regions of wall thickening of the colon extending from the splenic  flexure to the sigmoid which may indicate a nonspecific colitis.  3. Since the previous examination on 08/08/2024, there are now  extensive regions of scalloping of the right hepatic lobe  peripherally with the appearance of a large subcapsular collection  along the right hepatic margin as above. The sterility of this  collection cannot be assessed via CT and clinical correlation is  advised for exclusion superimposed infection within this region.    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   Co morbidities: HLD, obesity, OA, skin cancer    Oncology History Overview Note   8/15/24 paracentesis 50 mL   CA-125 (8/16/24) elevated to 1625; CEA 17.6   8/31/24 paracentesis cytology adenocarcinoma + ER, GATA3 and negative TTF-1 and calretinin nonspecific for primary site   9/19/24 Omental biopsy high grade carcinoma, consistent with mullerian origin PAX8, ER and p16 positive negative WT1 suggest endometrial primary      Endometrial cancer (Multi)   10/2/2024 Initial Diagnosis    Endometrial cancer (Multi)          Diagnoses and all orders for this visit:  Endometrial cancer (Multi)  - Discussed the significance of histologic subtype, grade and stage  - Discussed management options including medical, non-surgical, and surgical options.   - Discussed recommendation for chemotherapy with carboplatin, paclitaxel and pembrolizumab; will start with carboplatin and pembrolizumab  and add paclitaxel if tolerated well   - Discussed side effect profiles of carbo/taxol and immunotherapies.   - Consent to be signed on follow up  - Discussed goals of treatment and concern regarding performance status, will revisit on follow up   - MMR requested    Sofia Villalba MD MPH    I spent 30 minutes virtually or in a telephone with this patient and/or family. More than 50% of the time was spent in counseling and/or coordination of care.

## 2024-10-02 NOTE — TELEPHONE ENCOUNTER
Patients friend Pastor called to update that patient is currently admitted to [facility]   Patient scheduled for appointment with Dr. Villalba today.       Dr. Villalba updated and states she will change todays appointment to a phone appointment.

## 2024-10-02 NOTE — CONSULTS
Inpatient consult to Pulmonology  Consult performed by: Elsy Ortiz MD  Consult ordered by: Miky Kumar, APRN-CNP  Reason for consult: Bilateral pleural effusions        History Of Present Illness  Rohini Beaver is a 79 y.o. 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  Her weakness has been gradually progressive over the past few weeks.  Yesterday, she endorsed feeling warm and as well as some right lower quadrant pain.  She reports some trouble breathing which improves after paracenteses.  Her first paracentesis was in early August, shortly after her ovarian cancer diagnosis.  Day of consult, patient is breathing on room air.  Vital stable.  CT chest showed bilateral large pleural effusions.     Past Medical History  She has a past medical history of Bilateral primary osteoarthritis of knee, HLD (hyperlipidemia), Lumbosacral radiculopathy, Meralgia paraesthetica, Obesity, and Physical deconditioning.    Surgical History  She has a past surgical history that includes Total knee arthroplasty (Left, 2019); Cholecystectomy; Corneal transplant; Lumbar fusion; Dilation and curettage of uterus; Shoulder arthroscopy (Left); Colonoscopy; Skin lesion excision; Trigger finger release (Right); Arthroplasty (Left); Breast biopsy (1999); and Total knee arthroplasty (Right, 2017).     Social History  She reports that she has never smoked. She has never used smokeless tobacco. She reports that she does not currently use alcohol. She reports that she does not use drugs.    Family History  Family History   Problem Relation Name Age of Onset    Colon cancer Mother      Lung cancer Father      Other (Mesothelioma) Brother      Brain cancer Mother's Brother          Allergies  Patient has no known allergies.    Review of Systems  A complete 10 point review of systems was completed and is otherwise negative unless stated.         Physical Exam  Physical Exam:  General:  Pleasant and cooperative. No apparent distress.  HEENT:  Normocephalic, atraumatic, mucus membranes moist.   Neck:  Trachea midline.  Chest:  Clear to auscultation bilaterally.   CV:  Regular rate and rhythm.  Positive S1/S2.   Abdomen: Abdomen grossly distended, slight pain to palpation  Extremities: Bilateral lower extremity edema  Neurological:  AAOx3. No focal deficits.  Skin:  Warm and dry.         Last Recorded Vitals  /64   Pulse 92   Temp 36.8 °C (98.2 °F)   Resp 18   Wt 104 kg (230 lb)   SpO2 91%        Assessment/Plan         Bilateral pleural effusion  Hepatic abscess versus cyst  Abdominal ascites  Ovarian carcinoma w/ peritoneal carcinomatosis carcinomatosis  HLD  Plan:  Patient has bilateral pleural effusion in the context of malignant ascites/ovarian cancer  I explained to the patient the pleural effusion most likely related to recurrent ascites, patient is requiring so far 5 steps malignant ascites in the last month most likely beneficial approaches intra-abdominal Pleurx catheter  Abdominal Pleurx catheter would be the most effective way of preventing pleural effusions and ascites.  Pleural effusions are secondary to ascites, both of which are due to patient's underlying cancer  Continue with goals of care discussions prior to interventional radiology consult  Follow hepatic fluid analysis  Continue antibiotics    This is a preliminary note, please await attending attestation for a finalized plan.    Dr. Elsy Ortiz  Internal Medicine PGY-3  Please message me with any questions

## 2024-10-02 NOTE — DISCHARGE SUMMARY
Discharge Diagnosis  Hyponatremia    Issues Requiring Follow-Up  Patient fully evaluated 10/2, awake, resting in bed. Patient with Moderate hiatal hernia with partial intrathoracic stomach and the peritoneal fat adjacent to the stomach within the hernia demonstrates evidence of probable carcinomatosis and ascites, Patient tolerated paracentesis on 10/01, awaiting culture results.No s/s or c/o acute difficulties at this time. Medications and labs reviewed.  Plan discussed with interdisciplinary team, per pulmonology - Plan:  Patient has bilateral pleural effusion in the context of malignant ascites/ovarian cancer I explained to the patient the pleural effusion most likely related to recurrent ascites, patient is requiring so far 5 steps malignant ascites in the last month most likely beneficial approaches intra-abdominal Pleurx catheter Abdominal Pleurx catheter would be the most effective way of preventing pleural effusions and ascites.  Pleural effusions are secondary to ascites, both of which are due to patient's underlying cancer Continue with goals of care discussions prior to interventional radiology consult Follow hepatic fluid analysis Continue IV antibiotics Zosyn, continue current and repeat labs in the AM. Patient still requiring frequent cardiac and SPO2 monitoring. Discharge planning discussed with patient and care team. Therapy evaluations ordered-  [facility], anticipate SNF/HHC at discharge. Patient aware and agreeable to current plan, continue plan as above. I spent 50 minutes in the professional and overall care of this patient.    Discharge Meds     Medication List      ASK your doctor about these medications     ascorbic acid 500 mg tablet; Commonly known as: Vitamin C   Calcium 600 + D(3) 600 mg-5 mcg (200 unit) tablet; Generic drug: calcium   carbonate-vitamin D3   docusate sodium 100 mg capsule; Commonly known as: Colace; Ask about:   Which instructions should I use?   DULoxetine 30 mg   capsule; Commonly known as: Cymbalta   multivitamin with minerals tablet   ondansetron 8 mg tablet; Commonly known as: Zofran   oxyCODONE-acetaminophen 5-325 mg tablet; Commonly known as: Percocet;   Take 1 tablet by mouth every 4 hours if needed for severe pain (7 - 10).   pantoprazole 40 mg EC tablet; Commonly known as: ProtoNix   polyethylene glycol 17 gram packet; Commonly known as: Glycolax, Miralax   prednisoLONE acetate 1 % ophthalmic suspension; Commonly known as:   Pred-Forte   Zocor 20 mg tablet; Generic drug: simvastatin       Test Results Pending At Discharge  Pending Labs       Order Current Status    AFB Culture/Smear In process    Blood Culture Preliminary result    Blood Culture Preliminary result            Hospital Course         Jd Rhodes MD   Physician  Internal Medicine     H&P      Addendum     Date of Service: 10/1/2024  2:54 PM     Addendum       Expand All Collapse All    History Of Present Illness  Rohini Beaver is a 79-year-old female with past medical history of recently diagnosed ovarian cancer with peritoneal carcinomatosis s/p paracentesis x 4 (last one done 9/19/24), obesity, hyperlipidemia, osteoarthritis, and skin cancer s/p Mohs procedure.  Patient presents to the hospital with weakness and inability to care for herself.  She reports that she was at [facility] skilled nursing facility, however had a brief hospitalization at [facility] and upon discharge decided to go home rather than go back to [facility].  She lives with her 86-year-old sister and has a couple friends who assist with appointments, however limited support system.  Sister unable to care for due to age.  Today she was in urgent reclining chair and was able to get up.  ROS additionally positive for cold sweats, distended and tender abdomen, edema, pressure injury to coccyx/gluteal fold, and decreased activity tolerance.  Denies history of heart disease.  She reports that she is scheduled for  outpatient appointment with her oncologist tomorrow to determine ongoing plan.      ER course: Hemodynamically stable, afebrile, SpO2 90s on room air.  WBC 28.9, Hgb 11.0/Hct34.1 (Hgb 15.1 4/4/2023), platelets 446. Glucose 107, Sodium 126, Chloride 94, calcium 8.5, albumin 2.4, alkaline phosphatase 231.  Lactate 1.4.  BNP 74.  High-sensitivity troponin 7.  UA unremarkable. CT chest showed moderate to large bilateral pleural effusion with adjacent presumed compressive atelectasis, prominent main pulmonary artery which may indicate pulmonary hypertension, mildly prominent mediastinal lymph nodes measuring up to 10 mm in short axis concerning for metastatic disease.  Moderate hiatal hernia with partial intrathoracic stomach and the peritoneal fat adjacent to the stomach within the hernia demonstrates evidence of probable carcinomatosis and ascites.  Redemonstration of large volume ascites with regions of significant anterior omental caking and peritoneal carcinomatosis commensurate with patient's provided diagnosis of ovarian cancer. Regions of wall thickening of the colon extending from the splenic flexure to the sigmoid.  Extensive region of scalloping of the right hepatic lobe peripherally with appearance of large subcapsular collection along the right hepatic margin. Blood culture in process     Past medical history: As above  Past surgical history: Cholecystectomy, lumbar laminectomy, left shoulder arthroscopy, right total knee replacement, corneal transplant  Social history: No history of smoking, alcohol abuse, illicit drug use.  Lives with her elderly sister who is 86 years old.  Limited support system.   Family history: Mother-cancer (unknown type)     Past Medical History  Medical History        Past Medical History:   Diagnosis Date    Bilateral primary osteoarthritis of knee      HLD (hyperlipidemia)      Lumbosacral radiculopathy      Meralgia paraesthetica      Physical deconditioning               Surgical History  Surgical History         Past Surgical History:   Procedure Laterality Date    ARTHROPLASTY Left       Left thumb carpometacarpal arthroplasty with ligament reconstruction and tendon interposition    BREAST BIOPSY   1999     Benign    CHOLECYSTECTOMY        COLONOSCOPY        CORNEAL TRANSPLANT        DILATION AND CURETTAGE OF UTERUS        LUMBAR FUSION        SHOULDER ARTHROSCOPY Left      SKIN LESION EXCISION        TOTAL KNEE ARTHROPLASTY Left 2019    TOTAL KNEE ARTHROPLASTY Right 2017    TRIGGER FINGER RELEASE Right              Social History  She reports that she has never smoked. She has never used smokeless tobacco. She reports that she does not currently use alcohol. She reports that she does not use drugs.     Family History  Family History          Family History   Problem Relation Name Age of Onset    Colon cancer Mother        Lung cancer Father        Other (Mesothelioma) Brother        Brain cancer Mother's Brother                Allergies  Patient has no known allergies.     Review of Systems     10 point ROS negative except as noted above in HPI      Physical Exam  Vitals reviewed.   Constitutional:       General: She is awake. She is not in acute distress.     Appearance: She is obese. She is ill-appearing. She is not toxic-appearing.   HENT:      Head: Normocephalic and atraumatic.      Nose: Nose normal.      Mouth/Throat:      Mouth: Mucous membranes are moist.      Pharynx: Oropharynx is clear.   Eyes:      Conjunctiva/sclera: Conjunctivae normal.   Cardiovascular:      Rate and Rhythm: Normal rate and regular rhythm.      Pulses: Normal pulses.      Heart sounds: No murmur heard.  Pulmonary:      Effort: Pulmonary effort is normal. No respiratory distress.      Breath sounds: Decreased air movement present. Decreased breath sounds present.      Comments: Posterior bilateral breath sounds are diminished  Abdominal:      General: There is distension.      Palpations: Abdomen  is soft.      Tenderness: There is abdominal tenderness. There is no guarding.      Comments: Bowel sounds hypoactive, abdomen distended, tender to palpation, dullness noted to the sides.   Musculoskeletal:         General: No swelling, deformity or signs of injury. Normal range of motion.      Cervical back: Neck supple.      Right lower leg: Edema present.      Left lower leg: Edema present.      Comments: Generalized edema/anasarca   Skin:     General: Skin is warm and dry.      Capillary Refill: Capillary refill takes less than 2 seconds.      Findings: No ecchymosis or wound.      Comments: Wound on coccyx, exam deferred  due to patient discomfort    Neurological:      General: No focal deficit present.      Mental Status: She is alert and oriented to person, place, and time.   Psychiatric:         Mood and Affect: Mood normal.         Behavior: Behavior is cooperative.                  Last Recorded Vitals  Blood pressure 117/58, pulse 100, temperature 36.2 °C (97.2 °F), temperature source Temporal, resp. rate (!) 22, weight 104 kg (230 lb), SpO2 94%.     Relevant Results              Results for orders placed or performed during the hospital encounter of 10/01/24 (from the past 24 hour(s))   CBC and Auto Differential   Result Value Ref Range     WBC 28.9 (H) 4.4 - 11.3 x10*3/uL     nRBC 0.0 0.0 - 0.0 /100 WBCs     RBC 4.15 4.00 - 5.20 x10*6/uL     Hemoglobin 11.0 (L) 12.0 - 16.0 g/dL     Hematocrit 34.1 (L) 36.0 - 46.0 %     MCV 82 80 - 100 fL     MCH 26.5 26.0 - 34.0 pg     MCHC 32.3 32.0 - 36.0 g/dL     RDW 15.7 (H) 11.5 - 14.5 %     Platelets 446 150 - 450 x10*3/uL     Neutrophils % 89.1 40.0 - 80.0 %     Immature Granulocytes %, Automated 1.0 (H) 0.0 - 0.9 %     Lymphocytes % 4.3 13.0 - 44.0 %     Monocytes % 4.8 2.0 - 10.0 %     Eosinophils % 0.5 0.0 - 6.0 %     Basophils % 0.3 0.0 - 2.0 %     Neutrophils Absolute 25.74 (H) 1.60 - 5.50 x10*3/uL     Immature Granulocytes Absolute, Automated 0.30 0.00 -  0.50 x10*3/uL     Lymphocytes Absolute 1.23 0.80 - 3.00 x10*3/uL     Monocytes Absolute 1.38 (H) 0.05 - 0.80 x10*3/uL     Eosinophils Absolute 0.14 0.00 - 0.40 x10*3/uL     Basophils Absolute 0.09 0.00 - 0.10 x10*3/uL   Comprehensive metabolic panel   Result Value Ref Range     Glucose 107 (H) 74 - 99 mg/dL     Sodium 126 (L) 136 - 145 mmol/L     Potassium 5.2 3.5 - 5.3 mmol/L     Chloride 94 (L) 98 - 107 mmol/L     Bicarbonate 24 21 - 32 mmol/L     Anion Gap 13 10 - 20 mmol/L     Urea Nitrogen 18 6 - 23 mg/dL     Creatinine 0.61 0.50 - 1.05 mg/dL     eGFR >90 >60 mL/min/1.73m*2     Calcium 8.5 (L) 8.6 - 10.3 mg/dL     Albumin 2.4 (L) 3.4 - 5.0 g/dL     Alkaline Phosphatase 231 (H) 33 - 136 U/L     Total Protein 5.7 (L) 6.4 - 8.2 g/dL     AST 23 9 - 39 U/L     Bilirubin, Total 0.3 0.0 - 1.2 mg/dL     ALT 8 7 - 45 U/L   Magnesium   Result Value Ref Range     Magnesium 1.68 1.60 - 2.40 mg/dL   Troponin I, High Sensitivity   Result Value Ref Range     Troponin I, High Sensitivity 7 0 - 13 ng/L   B-Type Natriuretic Peptide   Result Value Ref Range     BNP 74 0 - 99 pg/mL   Sars-CoV-2 PCR   Result Value Ref Range     Coronavirus 2019, PCR Not Detected Not Detected   Lactate   Result Value Ref Range     Lactate 1.4 0.4 - 2.0 mmol/L   Urinalysis with Reflex Culture and Microscopic   Result Value Ref Range     Color, Urine Light-Yellow Light-Yellow, Yellow, Dark-Yellow     Appearance, Urine Clear Clear     Specific Gravity, Urine >1.050 (N) 1.005 - 1.035     pH, Urine 6.0 5.0, 5.5, 6.0, 6.5, 7.0, 7.5, 8.0     Protein, Urine 20 (TRACE) NEGATIVE, 10 (TRACE), 20 (TRACE) mg/dL     Glucose, Urine Normal Normal mg/dL     Blood, Urine NEGATIVE NEGATIVE     Ketones, Urine NEGATIVE NEGATIVE mg/dL     Bilirubin, Urine NEGATIVE NEGATIVE     Urobilinogen, Urine Normal Normal mg/dL     Nitrite, Urine NEGATIVE NEGATIVE     Leukocyte Esterase, Urine NEGATIVE NEGATIVE   Urinalysis Microscopic   Result Value Ref Range     WBC, Urine 1-5  1-5, NONE /HPF     RBC, Urine 1-2 NONE, 1-2, 3-5 /HPF     Squamous Epithelial Cells, Urine 1-9 (SPARSE) Reference range not established. /HPF     Mucus, Urine FEW Reference range not established. /LPF   Protime-INR   Result Value Ref Range     Protime 13.0 (H) 9.8 - 12.8 seconds     INR 1.2 (H) 0.9 - 1.1   APTT   Result Value Ref Range     aPTT 24 (L) 27 - 38 seconds      CT chest abdomen pelvis w IV contrast     Result Date: 10/1/2024  Interpreted By:  Oscar Aldrich, STUDY: CT CHEST ABDOMEN PELVIS W IV CONTRAST;  10/1/2024 11:13 am   INDICATION: Signs/Symptoms:leukocytosis, ascites, recent ovarian cancer diagnosis.   COMPARISON: CT abdomen pelvis 08/08/2024   ACCESSION NUMBER(S): [id number]   ORDERING CLINICIAN: ASHLEY FAIRBANKS   TECHNIQUE: CT of the chest, abdomen, and pelvis was performed.  Contiguous axial images were obtained at 3 mm slice thickness through the chest, abdomen and pelvis. Coronal and sagittal reconstructions at 3 mm slice thickness were performed. ml of contrast  were administered intravenously without immediate complication.   FINDINGS: CHEST:   LUNG/PLEURA/LARGE AIRWAYS: No endobronchial lesion is seen.   Moderate to large bilateral pleural effusions with adjacent consolidative opacification of the bilateral lower lobes suggestive of compressive atelectasis. No pneumothorax. Mild asymmetric scattered reticular changes suggestive of chronic lung findings.     VESSELS: The thoracic aorta is unremarkable with respect course, caliber, and contour.   Prominent main pulmonary artery measuring up to 3.2 cm in anterior-posterior dimension measured on sagittal plane which may indicate sequela of pulmonary hypertension.   No significant coronary atherosclerotic calcifications are seen.   HEART: Heart size within normal limits. No pericardial effusion.   MEDIASTINUM AND OLIVE: Mildly prominent mediastinal lymph nodes measuring up to 10 mm in short axis   Moderate hiatal hernia with partial intrathoracic  stomach and ascites and region of nodularity of the peritoneal fat within hiatal hernia suggestive carcinomatosis.   CHEST WALL AND LOWER NECK: No acute osseous abnormality. Multilevel presumed degenerative changes throughout the imaged spine. No acute soft tissue abnormality.   ABDOMEN:   LIVER: There is been interval scalloping of the right hepatic margins with new regions of irregularity along the right hepatic capsule diffusely which is new since comparison imaging from 08/08/2024 there is a new elongated subcapsular collection measuring up to approximately 11.4 x 2.1 cm, difficult to measure given curvilinear projection extending over the right hepatic lobe margin (series 202, images 40-70). Similar appearing subcentimeter left hepatic lobe hypodense lesion.   BILE DUCTS: No obvious new intrahepatic biliary dilatation. Mild prominence of the common bile duct, nonspecific in a cholecystectomy patient.   GALLBLADDER: Absent.   PANCREAS: Unremarkable.   SPLEEN: Unchanged.   ADRENAL GLANDS: Unchanged.   KIDNEYS AND URETERS: Similar appearing subcentimeter right renal lower pole calculus. No hydronephrosis. No obstructing urolithiasis.   PELVIS:   BLADDER: Unremarkable.   REPRODUCTIVE ORGANS: Uterus appears grossly unchanged.   BOWEL: Moderate hiatal hernia with wall thickening of the stomach in the hernia. No new pathologic distention of bowel. Wall thickening of the colon within the splenic flexure descending colon and sigmoid colon, nonspecific.     VESSELS: No evidence of abdominal aortic aneurysm.   PERITONEUM/RETROPERITONEUM/LYMPH NODES: Large volume ascites with extensive regions of anterior omental caking and peritoneal carcinomatosis. No obvious free intraperitoneal gas. Given the presence of extensive omental caking and peritoneal carcinomatosis, superimposed peritoneal enhancement 4 other causes cannot be excluded.   BONE AND SOFT TISSUE: No acute osseous abnormality. Osseous structures appear stable. No  acute abnormality of the abdominal wall soft tissues.        CHEST: 1.  Moderate to large bilateral pleural effusions with adjacent presumed compressive atelectasis. 2. Prominent main pulmonary artery which may indicate pulmonary hypertension. 3. Mildly prominent mediastinal lymph nodes measuring up to 10 mm in short axis concerning for metastatic disease. 4. Moderate hiatal hernia with partial intrathoracic stomach. The peritoneal fat adjacent to the stomach within the hernia demonstrates evidence of probable carcinomatosis and ascites.   ABDOMEN-PELVIS: 1.  Redemonstration of large volume ascites with regions of significant anterior omental caking and peritoneal carcinomatosis commensurate with patient's provided diagnosis of ovarian cancer. 2. Regions of wall thickening of the colon extending from the splenic flexure to the sigmoid which may indicate a nonspecific colitis. 3. Since the previous examination on 08/08/2024, there are now extensive regions of scalloping of the right hepatic lobe peripherally with the appearance of a large subcapsular collection along the right hepatic margin as above. The sterility of this collection cannot be assessed via CT and clinical correlation is advised for exclusion superimposed infection within this region.     Signed by: Oscar Aldrich 10/1/2024 12:14 PM Dictation workstation:   [id number]     XR chest 1 view     Result Date: 10/1/2024  Interpreted By:  Samuel Jaramillo, STUDY: XR CHEST 1 VIEW; 10/1/2024 8:44 am   INDICATION: Signs/Symptoms:weakness, edema in legs and abdomen   COMPARISON: April 2023.   ACCESSION NUMBER(S): [id number]   ORDERING CLINICIAN: ASHLEY FAIRBANKS   FINDINGS: The study is limited due to rotation and poor inspiratory effort, with resultant crowding of the pulmonary vasculature. The cardiac silhouette is within normal limits for the technique. Moderate size hiatal hernia is again seen. There is no pneumothorax, confluent infiltrates or significant  effusion. Degenerative changes involve the spine and shoulders; metallic anchors again noted over the left humeral head related to previous rotator cuff repair.        Limited study. Hiatal hernia. No acute cardiopulmonary disease.   Signed by: Samuel Jaramillo 10/1/2024 9:22 AM Dictation workstation:   [id number]     US guided abdominal paracentesis     Result Date: 9/20/2024  Interpreted By:  Peri Lawrence  and Ion Whitehead STUDY: US GUIDED ABDOMINAL PARACENTESIS; US GUIDED PERCUTANEOUS ABDOMINAL RETROPERITONEUM BIOPSY;  9/19/2024 11:13 am   INDICATION: Signs/Symptoms:ascites; Signs/Symptoms:ascites, evaluate ovarian cancer.   COMPARISON: CT abdomen and pelvis 08/08/2024   ACCESSION NUMBER(S): [id number]; [id number]   ORDERING CLINICIAN: JEANETTE ARIAS   TECHNIQUE: INTERVENTIONALIST(S): Dr. Peri Lawrence MD   CONSENT: The patient was informed of the nature of the proposed procedure. The purposes, alternatives, risks, and benefits were explained and discussed. All questions were answered and consent was obtained.   SEDATION: 1% local lidocaine was used for anesthetic.   MEDICATION/CONTRAST: No additional.   TIME OUT:   A time out was performed immediately prior to procedure start with the interventional team, correctly identifying the patient name, date of birth, MRN, procedure, anatomy (including marking of site and side), patient position, procedure consent form, relevant laboratory and imaging test results, antibiotic administration, safety precautions, and procedure-specific equipment needs.   COMPLICATIONS: No immediate adverse events identified.   FINDINGS: The patient was placed in the supine position. Limited sonographic images of the lower abdominal wall were obtained for purposes of needle guidance, which demonstrated omental soft tissue mass which was seen on prior CT 08/08/2024. The area of concern was prepped and draped under sterile technique.   1% lidocaine was injected subcutaneously.  Additional lidocaine was administered into deeper tissues surrounding the targeted area for biopsy using a 22G spinal needle. A small incision was made. Using the same access site a 18 gauge core biopsy needle was passed via a 17 gauge coaxial introducer needle to obtain a total of 1 core sample.   Postprocedure images demonstrate no evidence for hemorrhage. The patient tolerated the procedure well and there were no immediate complications. 1 core specimen was sent to pathology.     Subsequently the right lower quadrant was visualized under ultrasound which demonstrated moderate volume ascites seen on prior CT 08/08/2024. 1% lidocaine was injected subcutaneously at this site. A 19 gauge Yueh was used to target the fluid collection under ultrasound guidance. Once the site was accessed, the inner needle was removed from the catheter. The Yueh catheter was connected to drainage container. Estimated 1000 cc of serosanguineous colored fluid was removed. Post paracentesis imaging demonstrated decreased ascitic fluid. The Yueh catheter was removed. The access site was bandaged.        1. Status post ultrasound guided core needle biopsy of omental mass seen on CT 08/08/2024. 1 core specimens sent to pathology. 2. Successful paracentesis under ultrasound guidance with removal of 1 L of serosanguineous fluid.     I was present for and/or performed the critical portions of the procedure and immediately available throughout the entire procedure.   I personally reviewed the image(s) / study and interpretation. I agree with the findings as stated.   Performed and dictated at [facility].   MACRO: None   Signed by: Peri Lawrence 9/20/2024 10:06 AM Dictation workstation:   [id number]     US guided percutaneous abdominal retroperitoneum biopsy     Result Date: 9/20/2024  Interpreted By:  Peri Lawrence and Kamau Nyokabi STUDY: US GUIDED ABDOMINAL PARACENTESIS; US GUIDED PERCUTANEOUS  ABDOMINAL RETROPERITONEUM BIOPSY;  9/19/2024 11:13 am   INDICATION: Signs/Symptoms:ascites; Signs/Symptoms:ascites, evaluate ovarian cancer.   COMPARISON: CT abdomen and pelvis 08/08/2024   ACCESSION NUMBER(S): [id number]; [id number]   ORDERING CLINICIAN: JEANETTE ARIAS   TECHNIQUE: INTERVENTIONALIST(S): Dr. Peri Lawrence MD   CONSENT: The patient was informed of the nature of the proposed procedure. The purposes, alternatives, risks, and benefits were explained and discussed. All questions were answered and consent was obtained.   SEDATION: 1% local lidocaine was used for anesthetic.   MEDICATION/CONTRAST: No additional.   TIME OUT:   A time out was performed immediately prior to procedure start with the interventional team, correctly identifying the patient name, date of birth, MRN, procedure, anatomy (including marking of site and side), patient position, procedure consent form, relevant laboratory and imaging test results, antibiotic administration, safety precautions, and procedure-specific equipment needs.   COMPLICATIONS: No immediate adverse events identified.   FINDINGS: The patient was placed in the supine position. Limited sonographic images of the lower abdominal wall were obtained for purposes of needle guidance, which demonstrated omental soft tissue mass which was seen on prior CT 08/08/2024. The area of concern was prepped and draped under sterile technique.   1% lidocaine was injected subcutaneously. Additional lidocaine was administered into deeper tissues surrounding the targeted area for biopsy using a 22G spinal needle. A small incision was made. Using the same access site a 18 gauge core biopsy needle was passed via a 17 gauge coaxial introducer needle to obtain a total of 1 core sample.   Postprocedure images demonstrate no evidence for hemorrhage. The patient tolerated the procedure well and there were no immediate complications. 1 core specimen was sent to pathology.     Subsequently  the right lower quadrant was visualized under ultrasound which demonstrated moderate volume ascites seen on prior CT 08/08/2024. 1% lidocaine was injected subcutaneously at this site. A 19 gauge Yueh was used to target the fluid collection under ultrasound guidance. Once the site was accessed, the inner needle was removed from the catheter. The Yueh catheter was connected to drainage container. Estimated 1000 cc of serosanguineous colored fluid was removed. Post paracentesis imaging demonstrated decreased ascitic fluid. The Yueh catheter was removed. The access site was bandaged.        1. Status post ultrasound guided core needle biopsy of omental mass seen on CT 08/08/2024. 1 core specimens sent to pathology. 2. Successful paracentesis under ultrasound guidance with removal of 1 L of serosanguineous fluid.     I was present for and/or performed the critical portions of the procedure and immediately available throughout the entire procedure.   I personally reviewed the image(s) / study and interpretation. I agree with the findings as stated.   Performed and dictated at [facility].   MACRO: None   Signed by: Peri Lawrence 9/20/2024 10:06 AM Dictation workstation:   [id number]            Assessment/Plan        Assessment & Plan  Hyponatremia        79-year-old female with past medical history of recently diagnosed ovarian cancer with peritoneal carcinomatosis s/p paracentesis x 4 (last one done 9/19/24), obesity, hyperlipidemia, osteoarthritis, and skin cancer s/p Mohs procedure.  Patient presents to the hospital with weakness and inability to care for herself.  Patient found to be hyponatremic and with evidence on CT imaging of possible abscess of the liver and ascites secondary to malignancy.  Hospitalized for further evaluation management..     #Ovarian cancer with peritoneal carcinomatosis  #Ascites  #Bilateral pleural effusions  # Suspected liver abscess  #Abdominal  pain     Telemetry monitoring  Consult to IR for paracentesis and possible drainage of liver abscess  Consult to pulmonology for bilateral pleural effusions  Antiemetics as needed  Analgesics as needed  Patient needs to be followed up with by her gynecological oncologist to determine optimal plan going forward for treatment of her ovarian cancer     #Hyponatremia  #Generalized edema/anasarca  #Hypoalbuminemia  Patient is fluid overloaded and has significant third spacing, suspect hypervolemia hyponatremia.  Will check urine electrolytes, urine osmolality, and serum osmolality  Fluid restriction of 1500 ml for the time being.  Consider starting diuretics, defer to attending  Repeat labs in a.m.     #debility  PT/OT  Social work for discharge planning     Chronic issues:  #Obesity  #Hyperlipidemia  #Osteoarthritis     Continue with patient's home medications as appropriate     #DVT prophylaxis  SCDs  Lovenox subcutaneous     I spent 75 minutes in the professional and overall care of this patient.                Revision History         Pertinent Physical Exam At Time of Discharge  Physical Exam  Patient fully evaluated 10/2, awake, resting in bed. Patient with Moderate hiatal hernia with partial intrathoracic stomach and the peritoneal fat adjacent to the stomach within the hernia demonstrates evidence of probable carcinomatosis and ascites, Patient tolerated paracentesis on 10/01, awaiting culture results.No s/s or c/o acute difficulties at this time. Medications and labs reviewed.  Plan discussed with interdisciplinary team, per pulmonology - Plan:  Patient has bilateral pleural effusion in the context of malignant ascites/ovarian cancer I explained to the patient the pleural effusion most likely related to recurrent ascites, patient is requiring so far 5 steps malignant ascites in the last month most likely beneficial approaches intra-abdominal Pleurx catheter Abdominal Pleurx catheter would be the most effective  way of preventing pleural effusions and ascites.  Pleural effusions are secondary to ascites, both of which are due to patient's underlying cancer Continue with goals of care discussions prior to interventional radiology consult Follow hepatic fluid analysis Continue IV antibiotics Zosyn, continue current and repeat labs in the AM. Patient still requiring frequent cardiac and SPO2 monitoring. Discharge planning discussed with patient and care team. Therapy evaluations ordered-  [facility], anticipate SNF/HHC at discharge. Patient aware and agreeable to current plan, continue plan as above. I spent 50 minutes in the professional and overall care of this patient.  Outpatient Follow-Up  No future appointments.      Kim Angulo

## 2024-10-03 ENCOUNTER — APPOINTMENT (OUTPATIENT)
Dept: RADIOLOGY | Facility: HOSPITAL | Age: 79
DRG: 754 | End: 2024-10-03
Payer: MEDICARE

## 2024-10-03 LAB
ACID FAST STN SPEC: NORMAL
ALBUMIN SERPL BCP-MCNC: 2.2 G/DL (ref 3.4–5)
ALBUMIN SERPL BCP-MCNC: 2.3 G/DL (ref 3.4–5)
ALP SERPL-CCNC: 169 U/L (ref 33–136)
ALP SERPL-CCNC: 182 U/L (ref 33–136)
ALT SERPL W P-5'-P-CCNC: 10 U/L (ref 7–45)
ALT SERPL W P-5'-P-CCNC: 8 U/L (ref 7–45)
ANION GAP SERPL CALC-SCNC: 13 MMOL/L (ref 10–20)
ANION GAP SERPL CALC-SCNC: 13 MMOL/L (ref 10–20)
AST SERPL W P-5'-P-CCNC: 23 U/L (ref 9–39)
AST SERPL W P-5'-P-CCNC: 23 U/L (ref 9–39)
ATRIAL RATE: 99 BPM
BILIRUB SERPL-MCNC: 0.3 MG/DL (ref 0–1.2)
BILIRUB SERPL-MCNC: 0.3 MG/DL (ref 0–1.2)
BUN SERPL-MCNC: 18 MG/DL (ref 6–23)
BUN SERPL-MCNC: 20 MG/DL (ref 6–23)
CALCIUM SERPL-MCNC: 7.9 MG/DL (ref 8.6–10.3)
CALCIUM SERPL-MCNC: 8.1 MG/DL (ref 8.6–10.3)
CHLORIDE SERPL-SCNC: 94 MMOL/L (ref 98–107)
CHLORIDE SERPL-SCNC: 94 MMOL/L (ref 98–107)
CO2 SERPL-SCNC: 24 MMOL/L (ref 21–32)
CO2 SERPL-SCNC: 24 MMOL/L (ref 21–32)
CREAT SERPL-MCNC: 0.7 MG/DL (ref 0.5–1.05)
CREAT SERPL-MCNC: 0.72 MG/DL (ref 0.5–1.05)
EGFRCR SERPLBLD CKD-EPI 2021: 85 ML/MIN/1.73M*2
EGFRCR SERPLBLD CKD-EPI 2021: 88 ML/MIN/1.73M*2
ERYTHROCYTE [DISTWIDTH] IN BLOOD BY AUTOMATED COUNT: 15.9 % (ref 11.5–14.5)
GLUCOSE SERPL-MCNC: 102 MG/DL (ref 74–99)
GLUCOSE SERPL-MCNC: 125 MG/DL (ref 74–99)
HCT VFR BLD AUTO: 32.8 % (ref 36–46)
HGB BLD-MCNC: 9.8 G/DL (ref 12–16)
MCH RBC QN AUTO: 25.1 PG (ref 26–34)
MCHC RBC AUTO-ENTMCNC: 29.9 G/DL (ref 32–36)
MCV RBC AUTO: 84 FL (ref 80–100)
MYCOBACTERIUM SPEC CULT: NORMAL
NRBC BLD-RTO: 0 /100 WBCS (ref 0–0)
P AXIS: 6 DEGREES
P OFFSET: 205 MS
P ONSET: 153 MS
PLATELET # BLD AUTO: 392 X10*3/UL (ref 150–450)
POTASSIUM SERPL-SCNC: 4.6 MMOL/L (ref 3.5–5.3)
POTASSIUM SERPL-SCNC: 4.8 MMOL/L (ref 3.5–5.3)
PR INTERVAL: 132 MS
PROT SERPL-MCNC: 5.2 G/DL (ref 6.4–8.2)
PROT SERPL-MCNC: 5.7 G/DL (ref 6.4–8.2)
Q ONSET: 219 MS
QRS COUNT: 16 BEATS
QRS DURATION: 64 MS
QT INTERVAL: 344 MS
QTC CALCULATION(BAZETT): 441 MS
QTC FREDERICIA: 406 MS
R AXIS: 7 DEGREES
RBC # BLD AUTO: 3.9 X10*6/UL (ref 4–5.2)
SODIUM SERPL-SCNC: 126 MMOL/L (ref 136–145)
SODIUM SERPL-SCNC: 126 MMOL/L (ref 136–145)
T AXIS: 21 DEGREES
T OFFSET: 391 MS
TEST COMMENT - SURGICAL SENDOUT REQUEST: NORMAL
VENTRICULAR RATE: 99 BPM
WBC # BLD AUTO: 22.8 X10*3/UL (ref 4.4–11.3)

## 2024-10-03 PROCEDURE — 71045 X-RAY EXAM CHEST 1 VIEW: CPT | Performed by: RADIOLOGY

## 2024-10-03 PROCEDURE — 36415 COLL VENOUS BLD VENIPUNCTURE: CPT | Performed by: INTERNAL MEDICINE

## 2024-10-03 PROCEDURE — 80053 COMPREHEN METABOLIC PANEL: CPT | Performed by: INTERNAL MEDICINE

## 2024-10-03 PROCEDURE — 84132 ASSAY OF SERUM POTASSIUM: CPT | Performed by: INTERNAL MEDICINE

## 2024-10-03 PROCEDURE — 84075 ASSAY ALKALINE PHOSPHATASE: CPT | Performed by: INTERNAL MEDICINE

## 2024-10-03 PROCEDURE — 71045 X-RAY EXAM CHEST 1 VIEW: CPT

## 2024-10-03 PROCEDURE — 85027 COMPLETE CBC AUTOMATED: CPT | Performed by: INTERNAL MEDICINE

## 2024-10-03 PROCEDURE — 1200000002 HC GENERAL ROOM WITH TELEMETRY DAILY

## 2024-10-03 PROCEDURE — 2500000004 HC RX 250 GENERAL PHARMACY W/ HCPCS (ALT 636 FOR OP/ED): Performed by: NURSE PRACTITIONER

## 2024-10-03 PROCEDURE — 2500000002 HC RX 250 W HCPCS SELF ADMINISTERED DRUGS (ALT 637 FOR MEDICARE OP, ALT 636 FOR OP/ED): Performed by: NURSE PRACTITIONER

## 2024-10-03 PROCEDURE — 99223 1ST HOSP IP/OBS HIGH 75: CPT | Performed by: INTERNAL MEDICINE

## 2024-10-03 PROCEDURE — 2500000001 HC RX 250 WO HCPCS SELF ADMINISTERED DRUGS (ALT 637 FOR MEDICARE OP): Performed by: NURSE PRACTITIONER

## 2024-10-03 ASSESSMENT — PAIN DESCRIPTION - LOCATION
LOCATION: ABDOMEN

## 2024-10-03 ASSESSMENT — COGNITIVE AND FUNCTIONAL STATUS - GENERAL
PERSONAL GROOMING: A LOT
WALKING IN HOSPITAL ROOM: A LOT
MOVING TO AND FROM BED TO CHAIR: A LOT
TURNING FROM BACK TO SIDE WHILE IN FLAT BAD: A LOT
HELP NEEDED FOR BATHING: A LOT
TOILETING: A LOT
DRESSING REGULAR LOWER BODY CLOTHING: A LOT
DRESSING REGULAR UPPER BODY CLOTHING: A LOT
EATING MEALS: A LOT
CLIMB 3 TO 5 STEPS WITH RAILING: A LOT
MOBILITY SCORE: 14
STANDING UP FROM CHAIR USING ARMS: A LOT
DAILY ACTIVITIY SCORE: 12

## 2024-10-03 ASSESSMENT — PAIN - FUNCTIONAL ASSESSMENT
PAIN_FUNCTIONAL_ASSESSMENT: 0-10

## 2024-10-03 ASSESSMENT — PAIN SCALES - GENERAL
PAINLEVEL_OUTOF10: 6
PAINLEVEL_OUTOF10: 4
PAINLEVEL_OUTOF10: 5 - MODERATE PAIN
PAINLEVEL_OUTOF10: 4
PAINLEVEL_OUTOF10: 0 - NO PAIN
PAINLEVEL_OUTOF10: 6
PAINLEVEL_OUTOF10: 7
PAINLEVEL_OUTOF10: 10 - WORST POSSIBLE PAIN

## 2024-10-03 NOTE — PROGRESS NOTES
Rohini Beaver is a 79 y.o. female on day 2 of admission presenting with Hyponatremia.      Subjective    Patient fully evaluated 10/3, awake, resting in bed. Patient with Moderate hiatal hernia with partial intrathoracic stomach and the peritoneal fat adjacent to the stomach within the hernia demonstrates evidence of probable carcinomatosis and ascites, Patient tolerated paracentesis on 10/01 well,order placed for repeat paracentesis therapeutic non diagnostic with IR.continue current and repeat labs in the AM. Patient aware and agreeable to current plan, continue plan as above. I spent 50 minutes in the professional and overall care of this patient.   Objective     Last Recorded Vitals  BP 99/50 (BP Location: Left arm, Patient Position: Lying)   Pulse 94   Temp 36.7 °C (98.1 °F) (Temporal)   Resp 18   Wt 104 kg (230 lb)   SpO2 93%   Intake/Output last 3 Shifts:    Intake/Output Summary (Last 24 hours) at 10/3/2024 1636  Last data filed at 10/3/2024 1545  Gross per 24 hour   Intake 980 ml   Output 1730 ml   Net -750 ml       Admission Weight  Weight: 104 kg (230 lb) (10/01/24 0828)    Daily Weight  10/01/24 : 104 kg (230 lb)    Image Results  ECG 12 lead  Normal sinus rhythm  Low voltage QRS  Cannot rule out Anterior infarct , age undetermined  Abnormal ECG  No previous ECGs available      Physical Exam    Relevant Results               Assessment/Plan   This patient currently has cardiac telemetry ordered; if you would like to modify or discontinue the telemetry order, click here to go to the orders activity to modify/discontinue the order.          Jd Rhodes MD   Physician  Internal Medicine     H&P      Addendum     Date of Service: 10/1/2024  2:54 PM     Addendum       Expand All Collapse All    History Of Present Illness  Rohini Beaver is a 79-year-old female with past medical history of recently diagnosed ovarian cancer with peritoneal carcinomatosis s/p paracentesis x 4 (last one done  9/19/24), obesity, hyperlipidemia, osteoarthritis, and skin cancer s/p Mohs procedure.  Patient presents to the hospital with weakness and inability to care for herself.  She reports that she was at [facility] skilled nursing facility, however had a brief hospitalization at [facility] and upon discharge decided to go home rather than go back to [facility].  She lives with her 86-year-old sister and has a couple friends who assist with appointments, however limited support system.  Sister unable to care for due to age.  Today she was in urgent reclining chair and was able to get up.  ROS additionally positive for cold sweats, distended and tender abdomen, edema, pressure injury to coccyx/gluteal fold, and decreased activity tolerance.  Denies history of heart disease.  She reports that she is scheduled for outpatient appointment with her oncologist tomorrow to determine ongoing plan.      ER course: Hemodynamically stable, afebrile, SpO2 90s on room air.  WBC 28.9, Hgb 11.0/Hct34.1 (Hgb 15.1 4/4/2023), platelets 446. Glucose 107, Sodium 126, Chloride 94, calcium 8.5, albumin 2.4, alkaline phosphatase 231.  Lactate 1.4.  BNP 74.  High-sensitivity troponin 7.  UA unremarkable. CT chest showed moderate to large bilateral pleural effusion with adjacent presumed compressive atelectasis, prominent main pulmonary artery which may indicate pulmonary hypertension, mildly prominent mediastinal lymph nodes measuring up to 10 mm in short axis concerning for metastatic disease.  Moderate hiatal hernia with partial intrathoracic stomach and the peritoneal fat adjacent to the stomach within the hernia demonstrates evidence of probable carcinomatosis and ascites.  Redemonstration of large volume ascites with regions of significant anterior omental caking and peritoneal carcinomatosis commensurate with patient's provided diagnosis of ovarian cancer. Regions of wall thickening of the colon extending from the splenic  flexure to the sigmoid.  Extensive region of scalloping of the right hepatic lobe peripherally with appearance of large subcapsular collection along the right hepatic margin. Blood culture in process     Past medical history: As above  Past surgical history: Cholecystectomy, lumbar laminectomy, left shoulder arthroscopy, right total knee replacement, corneal transplant  Social history: No history of smoking, alcohol abuse, illicit drug use.  Lives with her elderly sister who is 86 years old.  Limited support system.   Family history: Mother-cancer (unknown type)     Past Medical History  Medical History        Past Medical History:   Diagnosis Date    Bilateral primary osteoarthritis of knee      HLD (hyperlipidemia)      Lumbosacral radiculopathy      Meralgia paraesthetica      Physical deconditioning              Surgical History  Surgical History         Past Surgical History:   Procedure Laterality Date    ARTHROPLASTY Left       Left thumb carpometacarpal arthroplasty with ligament reconstruction and tendon interposition    BREAST BIOPSY   1999     Benign    CHOLECYSTECTOMY        COLONOSCOPY        CORNEAL TRANSPLANT        DILATION AND CURETTAGE OF UTERUS        LUMBAR FUSION        SHOULDER ARTHROSCOPY Left      SKIN LESION EXCISION        TOTAL KNEE ARTHROPLASTY Left 2019    TOTAL KNEE ARTHROPLASTY Right 2017    TRIGGER FINGER RELEASE Right              Social History  She reports that she has never smoked. She has never used smokeless tobacco. She reports that she does not currently use alcohol. She reports that she does not use drugs.     Family History  Family History          Family History   Problem Relation Name Age of Onset    Colon cancer Mother        Lung cancer Father        Other (Mesothelioma) Brother        Brain cancer Mother's Brother                Allergies  Patient has no known allergies.     Review of Systems     10 point ROS negative except as noted above in HPI      Physical  Exam  Vitals reviewed.   Constitutional:       General: She is awake. She is not in acute distress.     Appearance: She is obese. She is ill-appearing. She is not toxic-appearing.   HENT:      Head: Normocephalic and atraumatic.      Nose: Nose normal.      Mouth/Throat:      Mouth: Mucous membranes are moist.      Pharynx: Oropharynx is clear.   Eyes:      Conjunctiva/sclera: Conjunctivae normal.   Cardiovascular:      Rate and Rhythm: Normal rate and regular rhythm.      Pulses: Normal pulses.      Heart sounds: No murmur heard.  Pulmonary:      Effort: Pulmonary effort is normal. No respiratory distress.      Breath sounds: Decreased air movement present. Decreased breath sounds present.      Comments: Posterior bilateral breath sounds are diminished  Abdominal:      General: There is distension.      Palpations: Abdomen is soft.      Tenderness: There is abdominal tenderness. There is no guarding.      Comments: Bowel sounds hypoactive, abdomen distended, tender to palpation, dullness noted to the sides.   Musculoskeletal:         General: No swelling, deformity or signs of injury. Normal range of motion.      Cervical back: Neck supple.      Right lower leg: Edema present.      Left lower leg: Edema present.      Comments: Generalized edema/anasarca   Skin:     General: Skin is warm and dry.      Capillary Refill: Capillary refill takes less than 2 seconds.      Findings: No ecchymosis or wound.      Comments: Wound on coccyx, exam deferred  due to patient discomfort    Neurological:      General: No focal deficit present.      Mental Status: She is alert and oriented to person, place, and time.   Psychiatric:         Mood and Affect: Mood normal.         Behavior: Behavior is cooperative.                  Last Recorded Vitals  Blood pressure 117/58, pulse 100, temperature 36.2 °C (97.2 °F), temperature source Temporal, resp. rate (!) 22, weight 104 kg (230 lb), SpO2 94%.     Relevant Results               Results for orders placed or performed during the hospital encounter of 10/01/24 (from the past 24 hour(s))   CBC and Auto Differential   Result Value Ref Range     WBC 28.9 (H) 4.4 - 11.3 x10*3/uL     nRBC 0.0 0.0 - 0.0 /100 WBCs     RBC 4.15 4.00 - 5.20 x10*6/uL     Hemoglobin 11.0 (L) 12.0 - 16.0 g/dL     Hematocrit 34.1 (L) 36.0 - 46.0 %     MCV 82 80 - 100 fL     MCH 26.5 26.0 - 34.0 pg     MCHC 32.3 32.0 - 36.0 g/dL     RDW 15.7 (H) 11.5 - 14.5 %     Platelets 446 150 - 450 x10*3/uL     Neutrophils % 89.1 40.0 - 80.0 %     Immature Granulocytes %, Automated 1.0 (H) 0.0 - 0.9 %     Lymphocytes % 4.3 13.0 - 44.0 %     Monocytes % 4.8 2.0 - 10.0 %     Eosinophils % 0.5 0.0 - 6.0 %     Basophils % 0.3 0.0 - 2.0 %     Neutrophils Absolute 25.74 (H) 1.60 - 5.50 x10*3/uL     Immature Granulocytes Absolute, Automated 0.30 0.00 - 0.50 x10*3/uL     Lymphocytes Absolute 1.23 0.80 - 3.00 x10*3/uL     Monocytes Absolute 1.38 (H) 0.05 - 0.80 x10*3/uL     Eosinophils Absolute 0.14 0.00 - 0.40 x10*3/uL     Basophils Absolute 0.09 0.00 - 0.10 x10*3/uL   Comprehensive metabolic panel   Result Value Ref Range     Glucose 107 (H) 74 - 99 mg/dL     Sodium 126 (L) 136 - 145 mmol/L     Potassium 5.2 3.5 - 5.3 mmol/L     Chloride 94 (L) 98 - 107 mmol/L     Bicarbonate 24 21 - 32 mmol/L     Anion Gap 13 10 - 20 mmol/L     Urea Nitrogen 18 6 - 23 mg/dL     Creatinine 0.61 0.50 - 1.05 mg/dL     eGFR >90 >60 mL/min/1.73m*2     Calcium 8.5 (L) 8.6 - 10.3 mg/dL     Albumin 2.4 (L) 3.4 - 5.0 g/dL     Alkaline Phosphatase 231 (H) 33 - 136 U/L     Total Protein 5.7 (L) 6.4 - 8.2 g/dL     AST 23 9 - 39 U/L     Bilirubin, Total 0.3 0.0 - 1.2 mg/dL     ALT 8 7 - 45 U/L   Magnesium   Result Value Ref Range     Magnesium 1.68 1.60 - 2.40 mg/dL   Troponin I, High Sensitivity   Result Value Ref Range     Troponin I, High Sensitivity 7 0 - 13 ng/L   B-Type Natriuretic Peptide   Result Value Ref Range     BNP 74 0 - 99 pg/mL   Sars-CoV-2 PCR    Result Value Ref Range     Coronavirus 2019, PCR Not Detected Not Detected   Lactate   Result Value Ref Range     Lactate 1.4 0.4 - 2.0 mmol/L   Urinalysis with Reflex Culture and Microscopic   Result Value Ref Range     Color, Urine Light-Yellow Light-Yellow, Yellow, Dark-Yellow     Appearance, Urine Clear Clear     Specific Gravity, Urine >1.050 (N) 1.005 - 1.035     pH, Urine 6.0 5.0, 5.5, 6.0, 6.5, 7.0, 7.5, 8.0     Protein, Urine 20 (TRACE) NEGATIVE, 10 (TRACE), 20 (TRACE) mg/dL     Glucose, Urine Normal Normal mg/dL     Blood, Urine NEGATIVE NEGATIVE     Ketones, Urine NEGATIVE NEGATIVE mg/dL     Bilirubin, Urine NEGATIVE NEGATIVE     Urobilinogen, Urine Normal Normal mg/dL     Nitrite, Urine NEGATIVE NEGATIVE     Leukocyte Esterase, Urine NEGATIVE NEGATIVE   Urinalysis Microscopic   Result Value Ref Range     WBC, Urine 1-5 1-5, NONE /HPF     RBC, Urine 1-2 NONE, 1-2, 3-5 /HPF     Squamous Epithelial Cells, Urine 1-9 (SPARSE) Reference range not established. /HPF     Mucus, Urine FEW Reference range not established. /LPF   Protime-INR   Result Value Ref Range     Protime 13.0 (H) 9.8 - 12.8 seconds     INR 1.2 (H) 0.9 - 1.1   APTT   Result Value Ref Range     aPTT 24 (L) 27 - 38 seconds      CT chest abdomen pelvis w IV contrast     Result Date: 10/1/2024  Interpreted By:  Oscar Aldrich, STUDY: CT CHEST ABDOMEN PELVIS W IV CONTRAST;  10/1/2024 11:13 am   INDICATION: Signs/Symptoms:leukocytosis, ascites, recent ovarian cancer diagnosis.   COMPARISON: CT abdomen pelvis 08/08/2024   ACCESSION NUMBER(S): [id number]   ORDERING CLINICIAN: ASHLEY FAIRBANKS   TECHNIQUE: CT of the chest, abdomen, and pelvis was performed.  Contiguous axial images were obtained at 3 mm slice thickness through the chest, abdomen and pelvis. Coronal and sagittal reconstructions at 3 mm slice thickness were performed. ml of contrast  were administered intravenously without immediate complication.   FINDINGS: CHEST:   LUNG/PLEURA/LARGE  AIRWAYS: No endobronchial lesion is seen.   Moderate to large bilateral pleural effusions with adjacent consolidative opacification of the bilateral lower lobes suggestive of compressive atelectasis. No pneumothorax. Mild asymmetric scattered reticular changes suggestive of chronic lung findings.     VESSELS: The thoracic aorta is unremarkable with respect course, caliber, and contour.   Prominent main pulmonary artery measuring up to 3.2 cm in anterior-posterior dimension measured on sagittal plane which may indicate sequela of pulmonary hypertension.   No significant coronary atherosclerotic calcifications are seen.   HEART: Heart size within normal limits. No pericardial effusion.   MEDIASTINUM AND OLIVE: Mildly prominent mediastinal lymph nodes measuring up to 10 mm in short axis   Moderate hiatal hernia with partial intrathoracic stomach and ascites and region of nodularity of the peritoneal fat within hiatal hernia suggestive carcinomatosis.   CHEST WALL AND LOWER NECK: No acute osseous abnormality. Multilevel presumed degenerative changes throughout the imaged spine. No acute soft tissue abnormality.   ABDOMEN:   LIVER: There is been interval scalloping of the right hepatic margins with new regions of irregularity along the right hepatic capsule diffusely which is new since comparison imaging from 08/08/2024 there is a new elongated subcapsular collection measuring up to approximately 11.4 x 2.1 cm, difficult to measure given curvilinear projection extending over the right hepatic lobe margin (series 202, images 40-70). Similar appearing subcentimeter left hepatic lobe hypodense lesion.   BILE DUCTS: No obvious new intrahepatic biliary dilatation. Mild prominence of the common bile duct, nonspecific in a cholecystectomy patient.   GALLBLADDER: Absent.   PANCREAS: Unremarkable.   SPLEEN: Unchanged.   ADRENAL GLANDS: Unchanged.   KIDNEYS AND URETERS: Similar appearing subcentimeter right renal lower pole  calculus. No hydronephrosis. No obstructing urolithiasis.   PELVIS:   BLADDER: Unremarkable.   REPRODUCTIVE ORGANS: Uterus appears grossly unchanged.   BOWEL: Moderate hiatal hernia with wall thickening of the stomach in the hernia. No new pathologic distention of bowel. Wall thickening of the colon within the splenic flexure descending colon and sigmoid colon, nonspecific.     VESSELS: No evidence of abdominal aortic aneurysm.   PERITONEUM/RETROPERITONEUM/LYMPH NODES: Large volume ascites with extensive regions of anterior omental caking and peritoneal carcinomatosis. No obvious free intraperitoneal gas. Given the presence of extensive omental caking and peritoneal carcinomatosis, superimposed peritoneal enhancement 4 other causes cannot be excluded.   BONE AND SOFT TISSUE: No acute osseous abnormality. Osseous structures appear stable. No acute abnormality of the abdominal wall soft tissues.        CHEST: 1.  Moderate to large bilateral pleural effusions with adjacent presumed compressive atelectasis. 2. Prominent main pulmonary artery which may indicate pulmonary hypertension. 3. Mildly prominent mediastinal lymph nodes measuring up to 10 mm in short axis concerning for metastatic disease. 4. Moderate hiatal hernia with partial intrathoracic stomach. The peritoneal fat adjacent to the stomach within the hernia demonstrates evidence of probable carcinomatosis and ascites.   ABDOMEN-PELVIS: 1.  Redemonstration of large volume ascites with regions of significant anterior omental caking and peritoneal carcinomatosis commensurate with patient's provided diagnosis of ovarian cancer. 2. Regions of wall thickening of the colon extending from the splenic flexure to the sigmoid which may indicate a nonspecific colitis. 3. Since the previous examination on 08/08/2024, there are now extensive regions of scalloping of the right hepatic lobe peripherally with the appearance of a large subcapsular collection along the right  hepatic margin as above. The sterility of this collection cannot be assessed via CT and clinical correlation is advised for exclusion superimposed infection within this region.     Signed by: Oscar Aldrich 10/1/2024 12:14 PM Dictation workstation:   [id number]     XR chest 1 view     Result Date: 10/1/2024  Interpreted By:  Samuel Jaramillo, STUDY: XR CHEST 1 VIEW; 10/1/2024 8:44 am   INDICATION: Signs/Symptoms:weakness, edema in legs and abdomen   COMPARISON: April 2023.   ACCESSION NUMBER(S): [id number]   ORDERING CLINICIAN: ASHLEY FAIRBANKS   FINDINGS: The study is limited due to rotation and poor inspiratory effort, with resultant crowding of the pulmonary vasculature. The cardiac silhouette is within normal limits for the technique. Moderate size hiatal hernia is again seen. There is no pneumothorax, confluent infiltrates or significant effusion. Degenerative changes involve the spine and shoulders; metallic anchors again noted over the left humeral head related to previous rotator cuff repair.        Limited study. Hiatal hernia. No acute cardiopulmonary disease.   Signed by: Samuel Jaramillo 10/1/2024 9:22 AM Dictation workstation:   [id number]     US guided abdominal paracentesis     Result Date: 9/20/2024  Interpreted By:  Peri Lawrence,  Vidal Whitehead STUDY: US GUIDED ABDOMINAL PARACENTESIS; US GUIDED PERCUTANEOUS ABDOMINAL RETROPERITONEUM BIOPSY;  9/19/2024 11:13 am   INDICATION: Signs/Symptoms:ascites; Signs/Symptoms:ascites, evaluate ovarian cancer.   COMPARISON: CT abdomen and pelvis 08/08/2024   ACCESSION NUMBER(S): [id number]; [id number]   ORDERING CLINICIAN: JEANETTE ARIAS   TECHNIQUE: INTERVENTIONALIST(S): Dr. Peri Lawrence MD   CONSENT: The patient was informed of the nature of the proposed procedure. The purposes, alternatives, risks, and benefits were explained and discussed. All questions were answered and consent was obtained.   SEDATION: 1% local lidocaine was used for  anesthetic.   MEDICATION/CONTRAST: No additional.   TIME OUT:   A time out was performed immediately prior to procedure start with the interventional team, correctly identifying the patient name, date of birth, MRN, procedure, anatomy (including marking of site and side), patient position, procedure consent form, relevant laboratory and imaging test results, antibiotic administration, safety precautions, and procedure-specific equipment needs.   COMPLICATIONS: No immediate adverse events identified.   FINDINGS: The patient was placed in the supine position. Limited sonographic images of the lower abdominal wall were obtained for purposes of needle guidance, which demonstrated omental soft tissue mass which was seen on prior CT 08/08/2024. The area of concern was prepped and draped under sterile technique.   1% lidocaine was injected subcutaneously. Additional lidocaine was administered into deeper tissues surrounding the targeted area for biopsy using a 22G spinal needle. A small incision was made. Using the same access site a 18 gauge core biopsy needle was passed via a 17 gauge coaxial introducer needle to obtain a total of 1 core sample.   Postprocedure images demonstrate no evidence for hemorrhage. The patient tolerated the procedure well and there were no immediate complications. 1 core specimen was sent to pathology.     Subsequently the right lower quadrant was visualized under ultrasound which demonstrated moderate volume ascites seen on prior CT 08/08/2024. 1% lidocaine was injected subcutaneously at this site. A 19 gauge Yueh was used to target the fluid collection under ultrasound guidance. Once the site was accessed, the inner needle was removed from the catheter. The Yueh catheter was connected to drainage container. Estimated 1000 cc of serosanguineous colored fluid was removed. Post paracentesis imaging demonstrated decreased ascitic fluid. The Yueh catheter was removed. The access site was bandaged.         1. Status post ultrasound guided core needle biopsy of omental mass seen on CT 08/08/2024. 1 core specimens sent to pathology. 2. Successful paracentesis under ultrasound guidance with removal of 1 L of serosanguineous fluid.     I was present for and/or performed the critical portions of the procedure and immediately available throughout the entire procedure.   I personally reviewed the image(s) / study and interpretation. I agree with the findings as stated.   Performed and dictated at [facility].   MACRO: None   Signed by: Peri Lawrence 9/20/2024 10:06 AM Dictation workstation:   [id number]     US guided percutaneous abdominal retroperitoneum biopsy     Result Date: 9/20/2024  Interpreted By:  Peri Lawrence and Kamau Nyokabi STUDY: US GUIDED ABDOMINAL PARACENTESIS; US GUIDED PERCUTANEOUS ABDOMINAL RETROPERITONEUM BIOPSY;  9/19/2024 11:13 am   INDICATION: Signs/Symptoms:ascites; Signs/Symptoms:ascites, evaluate ovarian cancer.   COMPARISON: CT abdomen and pelvis 08/08/2024   ACCESSION NUMBER(S): [id number]; [id number]   ORDERING CLINICIAN: JEANETTE ARIAS   TECHNIQUE: INTERVENTIONALIST(S): Dr. Peri Lawrence MD   CONSENT: The patient was informed of the nature of the proposed procedure. The purposes, alternatives, risks, and benefits were explained and discussed. All questions were answered and consent was obtained.   SEDATION: 1% local lidocaine was used for anesthetic.   MEDICATION/CONTRAST: No additional.   TIME OUT:   A time out was performed immediately prior to procedure start with the interventional team, correctly identifying the patient name, date of birth, MRN, procedure, anatomy (including marking of site and side), patient position, procedure consent form, relevant laboratory and imaging test results, antibiotic administration, safety precautions, and procedure-specific equipment needs.   COMPLICATIONS: No immediate adverse events  identified.   FINDINGS: The patient was placed in the supine position. Limited sonographic images of the lower abdominal wall were obtained for purposes of needle guidance, which demonstrated omental soft tissue mass which was seen on prior CT 08/08/2024. The area of concern was prepped and draped under sterile technique.   1% lidocaine was injected subcutaneously. Additional lidocaine was administered into deeper tissues surrounding the targeted area for biopsy using a 22G spinal needle. A small incision was made. Using the same access site a 18 gauge core biopsy needle was passed via a 17 gauge coaxial introducer needle to obtain a total of 1 core sample.   Postprocedure images demonstrate no evidence for hemorrhage. The patient tolerated the procedure well and there were no immediate complications. 1 core specimen was sent to pathology.     Subsequently the right lower quadrant was visualized under ultrasound which demonstrated moderate volume ascites seen on prior CT 08/08/2024. 1% lidocaine was injected subcutaneously at this site. A 19 gauge Yueh was used to target the fluid collection under ultrasound guidance. Once the site was accessed, the inner needle was removed from the catheter. The Yueh catheter was connected to drainage container. Estimated 1000 cc of serosanguineous colored fluid was removed. Post paracentesis imaging demonstrated decreased ascitic fluid. The Yueh catheter was removed. The access site was bandaged.        1. Status post ultrasound guided core needle biopsy of omental mass seen on CT 08/08/2024. 1 core specimens sent to pathology. 2. Successful paracentesis under ultrasound guidance with removal of 1 L of serosanguineous fluid.     I was present for and/or performed the critical portions of the procedure and immediately available throughout the entire procedure.   I personally reviewed the image(s) / study and interpretation. I agree with the findings as stated.   Performed and  dictated at [facility].   MACRO: None   Signed by: Peri Lawrence 9/20/2024 10:06 AM Dictation workstation:   [id number]            Assessment/Plan        Assessment & Plan  Hyponatremia        79-year-old female with past medical history of recently diagnosed ovarian cancer with peritoneal carcinomatosis s/p paracentesis x 4 (last one done 9/19/24), obesity, hyperlipidemia, osteoarthritis, and skin cancer s/p Mohs procedure.  Patient presents to the hospital with weakness and inability to care for herself.  Patient found to be hyponatremic and with evidence on CT imaging of possible abscess of the liver and ascites secondary to malignancy.  Hospitalized for further evaluation management..     #Ovarian cancer with peritoneal carcinomatosis  #Ascites  #Bilateral pleural effusions  # Suspected liver abscess  #Abdominal pain     Telemetry monitoring  Consult to IR for paracentesis and possible drainage of liver abscess  Consult to pulmonology for bilateral pleural effusions  Antiemetics as needed  Analgesics as needed  Patient needs to be followed up with by her gynecological oncologist to determine optimal plan going forward for treatment of her ovarian cancer     #Hyponatremia  #Generalized edema/anasarca  #Hypoalbuminemia  Patient is fluid overloaded and has significant third spacing, suspect hypervolemia hyponatremia.  Will check urine electrolytes, urine osmolality, and serum osmolality  Fluid restriction of 1500 ml for the time being.  Consider starting diuretics, defer to attending  Repeat labs in a.m.     #debility  PT/OT  Social work for discharge planning     Chronic issues:  #Obesity  #Hyperlipidemia  #Osteoarthritis     Continue with patient's home medications as appropriate     #DVT prophylaxis  SCDs  Lovenox subcutaneous     I spent 75 minutes in the professional and overall care of this patient.        Miky Kumar, APRN-CNP                 Revision  History         Patient fully evaluated 10/2, awake, resting in bed. Patient with Moderate hiatal hernia with partial intrathoracic stomach and the peritoneal fat adjacent to the stomach within the hernia demonstrates evidence of probable carcinomatosis and ascites, Patient tolerated paracentesis on 10/01, awaiting culture results.No s/s or c/o acute difficulties at this time. Medications and labs reviewed.  Plan discussed with interdisciplinary team, per pulmonology - Plan:  Patient has bilateral pleural effusion in the context of malignant ascites/ovarian cancer I explained to the patient the pleural effusion most likely related to recurrent ascites, patient is requiring so far 5 steps malignant ascites in the last month most likely beneficial approaches intra-abdominal Pleurx catheter Abdominal Pleurx catheter would be the most effective way of preventing pleural effusions and ascites.  Pleural effusions are secondary to ascites, both of which are due to patient's underlying cancer Continue with goals of care discussions prior to interventional radiology consult Follow hepatic fluid analysis Continue IV antibiotics Zosyn, continue current and repeat labs in the AM. Patient still requiring frequent cardiac and SPO2 monitoring. Discharge planning discussed with patient and care team. Therapy evaluations ordered-  [facility], anticipate SNF/HHC at discharge. Patient aware and agreeable to current plan, continue plan as above. I spent 50 minutes in the professional and overall care of this patient.      Assessment & Plan  Hyponatremia    Patient fully evaluated 10/3, awake, resting in bed. Patient with Moderate hiatal hernia with partial intrathoracic stomach and the peritoneal fat adjacent to the stomach within the hernia demonstrates evidence of probable carcinomatosis and ascites, Patient tolerated paracentesis on 10/01 well,order placed for repeat paracentesis therapeutic non diagnostic with IR.continue current and  repeat labs in the AM. Patient aware and agreeable to current plan, continue plan as above. I spent 50 minutes in the professional and overall care of this patient.               Kim Angulo

## 2024-10-03 NOTE — CONSULTS
"Nutrition Initial Assessment:   Nutrition Assessment    Reason for Assessment: Provider consult order    Patient is a 79 y.o. female presenting with abd pain; weakness      Nutrition History:  Food and Nutrient History: Pt was not available when visitied.  She is eating about 75% of her meals at this time.  She was admitted with weakness and has been unable to care for herself.  She was recently diagnosed with ovarian cancer with peritoneal carcinomatosis  Food Allergies/Intolerances:  None  GI Symptoms: None  Oral Problems: None       Anthropometrics:  Height: 162.6 cm (5' 4\")   Weight: 104 kg (230 lb)   BMI (Calculated): 39.46  IBW/kg (Dietitian Calculated): 54 kg  Percent of IBW: 191 %       Weight History:   Wt Readings from Last 10 Encounters:   10/01/24 104 kg (230 lb)   09/04/24 106 kg (234 lb 9.1 oz)   11/21/22 112 kg (247 lb)   08/07/21 112 kg (247 lb)   09/18/19 110 kg (242 lb 8.1 oz)   07/17/19 107 kg (236 lb 15.9 oz)         Weight Change %:  Weight History / % Weight Change: Records indicate the patient has been 104-112 kg since 2019.  She is down 4 lbs over the past month.  Significant Weight Loss: No    Nutrition Focused Physical Exam Findings:    Subcutaneous Fat Loss:   Orbital Fat Pads:  (pt was not available)  Muscle Wasting:     Edema:  Edema Location: anasarca; recent paracentesis X 4  Physical Findings:  Skin: Positive (wounds on coccyx and gluteal fold)    Nutrition Significant Labs:  BMP Trend:   Results from last 7 days   Lab Units 10/02/24  0607 10/01/24  0837   GLUCOSE mg/dL 103* 107*   CALCIUM mg/dL 8.3* 8.5*   SODIUM mmol/L 127* 126*   POTASSIUM mmol/L 4.8 5.2   CO2 mmol/L 25 24   CHLORIDE mmol/L 94* 94*   BUN mg/dL 19 18   CREATININE mg/dL 0.73 0.61        Nutrition Specific Medications:  Vit C; D3; colace; lovenox; protonix; miralax; zocor; zofran    I/O:    ;      Dietary Orders (From admission, onward)       Start     Ordered    10/01/24 1445  Adult diet Regular; 1500 mL fluid  Diet " effective now        Question Answer Comment   Diet type Regular    Dietary fluid restriction / 24h: 1500 mL fluid        10/01/24 1444                     Estimated Needs:      Method for Estimating Needs: 3966-4300   30-35 charis kg of IBW with cancer Dx     Method for Estimating Needs: 65-81  1.2-1.5 gm lg if IBW as medically indicated     Method for Estimating Needs: 0683-4487  20-30 ml kg of IBW as medically indicated        Nutrition Diagnosis        Nutrition Diagnosis  Patient has Nutrition Diagnosis: Yes  Diagnosis Status (1): New  Nutrition Diagnosis 1: Increased nutrient needs  Related to (1): physiological causes  As Evidenced by (1): new cancer Dx   wound healing needs       Nutrition Interventions/Recommendations         Nutrition Prescription:  Individualized Nutrition Prescription Provided for : Continue regular diet to encourage intake,  continue fluid restriction as needed,  sending magic cup at lunch and dinner        Nutrition Interventions:   Interventions: Meals and snacks, Medical food supplement  Goal: >75% of meals  Medical Food Supplement: Commercial beverage  Goal: 100% of supplement    Collaboration and Referral of Nutrition Care: Collaboration by nutrition professional with other providers    Nutrition Education:      Not at this time    Nutrition Monitoring and Evaluation   Food/Nutrient Related History Monitoring  Monitoring and Evaluation Plan: Energy intake, Fluid intake, Amount of food  Criteria: >75% of energy needs  Criteria: adequate fluid intake without fluid overload  Criteria: >75% of meals    Body Composition/Growth/Weight History  Monitoring and Evaluation Plan: Weight    Biochemical Data, Medical Tests and Procedures  Monitoring and Evaluation Plan: Electrolyte/renal panel, Glucose/endocrine profile  Criteria: improved sodium  Criteria: glucose within desired range              Time Spent (min): 45 minutes

## 2024-10-03 NOTE — CONSULTS
"Nutrition Follow Up Assessment:   Nutrition Assessment    Reason for Assessment: Provider consult order    Patient is a 79 y.o. female presenting with weakness      Nutrition History:  Food and Nutrient History: Pt feels she is doing well with her meals and likes the food.  She likes her magic cup too.  She realizes she needs to eat and has been doing well  Food Allergies/Intolerances:  None  GI Symptoms: None  Oral Problems: None       Anthropometrics:  Height: 162.6 cm (5' 4\")   Weight: 104 kg (230 lb)   BMI (Calculated): 39.46  IBW/kg (Dietitian Calculated): 54 kg  Percent of IBW: 191 %       Weight History:     Weight Change %:  Weight History / % Weight Change: Records indicate the patient has been 104-112 kg since 2019.  She is down 4 lbs over the past month.  Significant Weight Loss: No    Nutrition Focused Physical Exam Findings:    Subcutaneous Fat Loss:   Orbital Fat Pads:  (pt was not available)  Muscle Wasting:     Edema:  Edema Location: anasarca; recent paracentesis X 4  Physical Findings:  Skin: Positive (wounds on coccyx and gluteal fold)    Nutrition Significant Labs:  BMP Trend:   Results from last 7 days   Lab Units 10/03/24  0534 10/02/24  0607 10/01/24  0837   GLUCOSE mg/dL 102* 103* 107*   CALCIUM mg/dL 7.9* 8.3* 8.5*   SODIUM mmol/L 126* 127* 126*   POTASSIUM mmol/L 4.8 4.8 5.2   CO2 mmol/L 24 25 24   CHLORIDE mmol/L 94* 94* 94*   BUN mg/dL 20 19 18   CREATININE mg/dL 0.72 0.73 0.61        Nutrition Specific Medications:  Vit C; D3; colace; lovenox; protonix; miralax; zocor; zofran    I/O:   Last BM Date: 10/01/24; Stool Appearance: Loose, Watery (10/03/24 1449)    Dietary Orders (From admission, onward)       Start     Ordered    10/02/24 2113  Oral nutritional supplements  Until discontinued        Question Answer Comment   Deliver with Lunch    Deliver with Dinner    Select supplement: Magic Cup        10/02/24 2112    10/01/24 1445  Adult diet Regular; 1500 mL fluid  Diet effective now     "    Question Answer Comment   Diet type Regular    Dietary fluid restriction / 24h: 1500 mL fluid        10/01/24 1444                     Estimated Needs:      Method for Estimating Needs: 6228-4044   30-35 charis kg of IBW with cancer Dx     Method for Estimating Needs: 65-81  1.2-1.5 gm lg if IBW as medically indicated     Method for Estimating Needs: 6359-7848  20-30 ml kg of IBW as medically indicated        Nutrition Diagnosis        Nutrition Diagnosis  Patient has Nutrition Diagnosis: Yes  Diagnosis Status (1): Ongoing  Nutrition Diagnosis 1: Increased nutrient needs  Related to (1): physiological causes  As Evidenced by (1): new cancer Dx   wound healing needs       Nutrition Interventions/Recommendations         Nutrition Prescription:  Individualized Nutrition Prescription Provided for : Continue regular diet to encourage intake,  continue fluid restriction as needed,  continue  magic cup at lunch and dinner        Nutrition Interventions:   Interventions: Meals and snacks, Medical food supplement  Goal: >75% of meals  Medical Food Supplement: Commercial food  Goal: 100% of supplement    Collaboration and Referral of Nutrition Care: Collaboration by nutrition professional with other providers    Nutrition Education:      Pt knows that she needs to eat.  She has some good family support    Nutrition Monitoring and Evaluation   Food/Nutrient Related History Monitoring  Monitoring and Evaluation Plan: Energy intake, Fluid intake, Amount of food  Criteria: >75% of energy needs  Criteria: adequate fluid intake without fluid overload  Criteria: >75% of meals    Body Composition/Growth/Weight History  Monitoring and Evaluation Plan: Weight    Biochemical Data, Medical Tests and Procedures  Monitoring and Evaluation Plan: Electrolyte/renal panel, Glucose/endocrine profile  Criteria: improved sodium  Criteria: glucose within desired range              Time Spent (min): 30 minutes

## 2024-10-04 ENCOUNTER — APPOINTMENT (OUTPATIENT)
Dept: RADIOLOGY | Facility: HOSPITAL | Age: 79
DRG: 754 | End: 2024-10-04
Payer: MEDICARE

## 2024-10-04 LAB
ALBUMIN SERPL BCP-MCNC: 2.1 G/DL (ref 3.4–5)
ALP SERPL-CCNC: 187 U/L (ref 33–136)
ALT SERPL W P-5'-P-CCNC: 9 U/L (ref 7–45)
ANION GAP SERPL CALC-SCNC: 14 MMOL/L (ref 10–20)
AST SERPL W P-5'-P-CCNC: 21 U/L (ref 9–39)
BASOPHILS # BLD AUTO: 0.09 X10*3/UL (ref 0–0.1)
BASOPHILS NFR BLD AUTO: 0.4 %
BILIRUB SERPL-MCNC: 0.3 MG/DL (ref 0–1.2)
BUN SERPL-MCNC: 18 MG/DL (ref 6–23)
CALCIUM SERPL-MCNC: 8 MG/DL (ref 8.6–10.3)
CHLORIDE SERPL-SCNC: 94 MMOL/L (ref 98–107)
CO2 SERPL-SCNC: 24 MMOL/L (ref 21–32)
CREAT SERPL-MCNC: 0.68 MG/DL (ref 0.5–1.05)
EGFRCR SERPLBLD CKD-EPI 2021: 89 ML/MIN/1.73M*2
EOSINOPHIL # BLD AUTO: 0.21 X10*3/UL (ref 0–0.4)
EOSINOPHIL NFR BLD AUTO: 0.9 %
ERYTHROCYTE [DISTWIDTH] IN BLOOD BY AUTOMATED COUNT: 15.7 % (ref 11.5–14.5)
GLUCOSE SERPL-MCNC: 101 MG/DL (ref 74–99)
HCT VFR BLD AUTO: 31.3 % (ref 36–46)
HGB BLD-MCNC: 9.7 G/DL (ref 12–16)
IMM GRANULOCYTES # BLD AUTO: 0.33 X10*3/UL (ref 0–0.5)
IMM GRANULOCYTES NFR BLD AUTO: 1.4 % (ref 0–0.9)
LAB AP ASR DISCLAIMER: NORMAL
LAB AP BLOCK FOR ADDITIONAL STUDIES: NORMAL
LABORATORY COMMENT REPORT: NORMAL
LYMPHOCYTES # BLD AUTO: 1.05 X10*3/UL (ref 0.8–3)
LYMPHOCYTES NFR BLD AUTO: 4.6 %
MCH RBC QN AUTO: 25.9 PG (ref 26–34)
MCHC RBC AUTO-ENTMCNC: 31 G/DL (ref 32–36)
MCV RBC AUTO: 84 FL (ref 80–100)
MONOCYTES # BLD AUTO: 1.26 X10*3/UL (ref 0.05–0.8)
MONOCYTES NFR BLD AUTO: 5.5 %
NEUTROPHILS # BLD AUTO: 20.03 X10*3/UL (ref 1.6–5.5)
NEUTROPHILS NFR BLD AUTO: 87.2 %
NRBC BLD-RTO: 0 /100 WBCS (ref 0–0)
PATH REPORT.ADDENDUM SPEC: NORMAL
PATH REPORT.COMMENTS IMP SPEC: NORMAL
PATH REPORT.FINAL DX SPEC: NORMAL
PATH REPORT.GROSS SPEC: NORMAL
PATH REPORT.RELEVANT HX SPEC: NORMAL
PATH REPORT.TOTAL CANCER: NORMAL
PLATELET # BLD AUTO: 401 X10*3/UL (ref 150–450)
POTASSIUM SERPL-SCNC: 4.6 MMOL/L (ref 3.5–5.3)
PROT SERPL-MCNC: 4.9 G/DL (ref 6.4–8.2)
RBC # BLD AUTO: 3.74 X10*6/UL (ref 4–5.2)
SODIUM SERPL-SCNC: 127 MMOL/L (ref 136–145)
VANCOMYCIN SERPL-MCNC: 17.4 UG/ML (ref 5–20)
WBC # BLD AUTO: 23 X10*3/UL (ref 4.4–11.3)

## 2024-10-04 PROCEDURE — 85025 COMPLETE CBC W/AUTO DIFF WBC: CPT | Performed by: INTERNAL MEDICINE

## 2024-10-04 PROCEDURE — 49083 ABD PARACENTESIS W/IMAGING: CPT | Mod: 52

## 2024-10-04 PROCEDURE — 2500000005 HC RX 250 GENERAL PHARMACY W/O HCPCS: Performed by: RADIOLOGY

## 2024-10-04 PROCEDURE — 88360 TUMOR IMMUNOHISTOCHEM/MANUAL: CPT | Mod: TC,SUR

## 2024-10-04 PROCEDURE — 1200000002 HC GENERAL ROOM WITH TELEMETRY DAILY

## 2024-10-04 PROCEDURE — 2500000002 HC RX 250 W HCPCS SELF ADMINISTERED DRUGS (ALT 637 FOR MEDICARE OP, ALT 636 FOR OP/ED): Performed by: NURSE PRACTITIONER

## 2024-10-04 PROCEDURE — 0WJG3ZZ INSPECTION OF PERITONEAL CAVITY, PERCUTANEOUS APPROACH: ICD-10-PCS | Performed by: STUDENT IN AN ORGANIZED HEALTH CARE EDUCATION/TRAINING PROGRAM

## 2024-10-04 PROCEDURE — 36415 COLL VENOUS BLD VENIPUNCTURE: CPT | Performed by: INTERNAL MEDICINE

## 2024-10-04 PROCEDURE — 2500000001 HC RX 250 WO HCPCS SELF ADMINISTERED DRUGS (ALT 637 FOR MEDICARE OP): Performed by: NURSE PRACTITIONER

## 2024-10-04 PROCEDURE — 80202 ASSAY OF VANCOMYCIN: CPT | Performed by: NURSE PRACTITIONER

## 2024-10-04 PROCEDURE — 2500000004 HC RX 250 GENERAL PHARMACY W/ HCPCS (ALT 636 FOR OP/ED): Performed by: NURSE PRACTITIONER

## 2024-10-04 PROCEDURE — 80053 COMPREHEN METABOLIC PANEL: CPT | Performed by: INTERNAL MEDICINE

## 2024-10-04 ASSESSMENT — PAIN - FUNCTIONAL ASSESSMENT
PAIN_FUNCTIONAL_ASSESSMENT: 0-10

## 2024-10-04 ASSESSMENT — PAIN SCALES - GENERAL
PAINLEVEL_OUTOF10: 0 - NO PAIN
PAINLEVEL_OUTOF10: 6
PAINLEVEL_OUTOF10: 6
PAINLEVEL_OUTOF10: 9
PAINLEVEL_OUTOF10: 5 - MODERATE PAIN
PAINLEVEL_OUTOF10: 0 - NO PAIN
PAINLEVEL_OUTOF10: 3
PAINLEVEL_OUTOF10: 8
PAINLEVEL_OUTOF10: 2

## 2024-10-04 ASSESSMENT — PAIN DESCRIPTION - DESCRIPTORS
DESCRIPTORS: ACHING

## 2024-10-04 ASSESSMENT — PAIN DESCRIPTION - LOCATION: LOCATION: BACK

## 2024-10-04 NOTE — CONSULTS
Patient ID: : Rohini Beaver            Primary Care Provider: Jose Enrique Wooten MD    REFERRING PHYSICIAN:  No referring provider defined for this encounter.    REASON FOR CONSULT:    HISTORY OF PRESENT ILLNESS:  Rohini Beaver is a 79 y.o. 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  She first developed abdominal pain and constipation in July. Associated symptoms include unintentional weight loss of 8 lb. Seen by GI and treated empirically for diverticulitis without improvement in symptoms. Presented to the ER where CT showed a possible 3 cm pelvic mass, endometrial thickening and moderate volume ascites and omental cake. Tumor markers were notable for CA-125 (8/16/24) elevated to 1625; CEA (8/16/24) 17.6. Paracentesis was completed with 1 L removed. 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     INTERVAL HISTORY:  She is currently admitted to the hospital with complaint of worsening weakness, fatigue and inability to take care of herself.  She was recently at [facility] and thereafter had a brief hospitalization at [facility].  As stated above she is having problem with recurrent ascites and had paracentesis x 4 done so far.  Workup done during hospitalization revealed a recurrent ascites and also pleural effusion.  She is s/p palliative paracentesis and there are also plans for her to have pleural tap.    She is being followed by Dr. Sofia Villalba in GYN oncology clinic at [facility] [place].  A recent needle biopsy of an omental lesion was confirmatory of high-grade carcinoma consistent with mllerian origin on 9/19/2024.    PAST MEDICAL HISTORY:  1.  Ovarian cancer as detailed above.  2.  GERD  3.  Adjustment disorder with depressed mood  4.  Chronic neuropathy  5.  Hyperlipidemia  6.  Degenerative joint disease  6.  Obesity  7.  Corneal dystrophy status post corneal  "procedures  8.  History of skin cancer involving nose.  S/p Mohs procedure.    SOCIAL HISTORY:  She lives with her sister in [place].  Non-smoker.  Rare social alcohol intake.  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  Born and raised in [place].    FAMILY HISTORY:  Father  at age 77 from lung cancer.  Mother  from liver cancer related complication at age 80.  3 siblings.  1 brother  from mesothelioma and another brother  from COVID complications.  1 sister alive and well.  She does not have any children.  No other family history of bleeding, clotting or malignant disorders in the family history.    REVIEW OF SYSTEMS:   Pertinent finding as per the history above.  All other systems have been reviewed and generally negative and noncontributory.    VITALS:  BSA: 2.17 meters squared  /57   Pulse 100   Temp 36.5 °C (97.7 °F)   Resp 18   Ht 1.626 m (5' 4\")   Wt 104 kg (230 lb)   SpO2 94%   BMI 39.48 kg/m²     PHYSICAL EXAMINATION:  Detailed examination not done.    RELEVANT RESULTS:  CBC from today showed a hemoglobin of 9.8 with WBC of 22.8.  Platelets are 392.  Metabolic profile is unremarkable other than elevated alkaline phosphatase of 169 and low albumin of 2.2.  Sodium is 126.    CT chest abdomen pelvis 10/1/2024  IMPRESSION:  CHEST:  1.  Moderate to large bilateral pleural effusions with adjacent  presumed compressive atelectasis.  2. Prominent main pulmonary artery which may indicate pulmonary  hypertension.  3. Mildly prominent mediastinal lymph nodes measuring up to 10 mm in  short axis concerning for metastatic disease.  4. Moderate hiatal hernia with partial intrathoracic stomach. The  peritoneal fat adjacent to the stomach within the hernia demonstrates  evidence of probable carcinomatosis and ascites.      ABDOMEN-PELVIS:  1.  Redemonstration of large volume ascites with regions of significant anterior omental caking and peritoneal carcinomatosis " commensurate with patient's provided diagnosis of ovarian cancer.  2. Regions of wall thickening of the colon extending from the splenic flexure to the sigmoid which may indicate a nonspecific colitis.  3. Since the previous examination on 08/08/2024, there are now extensive regions of scalloping of the right hepatic lobe peripherally with the appearance of a large subcapsular collection along the right hepatic margin as above. The sterility of this collection cannot be assessed via CT and clinical correlation is advised for exclusion superimposed infection within this region.    ASSESSMENT / PLAN:  1.  Advanced ovarian carcinoma with peritoneal carcinomatosis and recurrent ascites.  Please refer the details of initial presentation and workup done so far as outlined above.    Overall oncological plan is for her to be tried on chemotherapy once all the initial workup is completed and clinically, once she is more stable.    For now we will recommend continuing with the aggressive supportive care as already initiated.  As expected her overall prognosis is extremely guarded.  May have to consider Pleurx catheter placement but I will leave the decision to GYN oncology team.    We will follow her along intermittently.    A total of 60 minutes were spent in evaluation - performing physical examination, history taking, review of the previous extensive records/information and formulating current and future management plan. Majority of the time was spend in consultation and discussion.    This note has been transcribed using Dragon voice recognition system and there is a possibility of unintentional typing misprints.

## 2024-10-04 NOTE — PROGRESS NOTES
Physical Therapy                 Therapy Communication Note    Patient Name: Rohini Beaver  MRN: [id number]  Department: [facility]  Room: [address] [place] [address]  Today's Date: 10/4/2024     Discipline: Physical Therapy    Missed Visit Reason: Missed Visit Reason:  (going for paracenthesis)    Missed Time: Cancel    Comment:

## 2024-10-04 NOTE — PROGRESS NOTES
Occupational Therapy                 Therapy Communication Note    Patient Name: Rohini Beaver  MRN: [id number]  Department: [facility]  Room: [address] [place] [address]  Today's Date: 10/4/2024     Discipline: Occupational Therapy    Missed Visit Reason: Missed Visit Reason: Patient in a medical procedure (Patient going for Paracentesis)    Missed Time: Attempt

## 2024-10-04 NOTE — PROGRESS NOTES
Following up for ECF preference, pt not sure, tells me that it is in [place], pt can not find her list , I reprinted her the list from [facility].  Pt not medically stable today for dc, Sodium level is 126.  Doreen Johns RN [facility]

## 2024-10-04 NOTE — PROGRESS NOTES
Vancomycin Dosing by Pharmacy- FOLLOW UP    Rohini Beaver is a 79 y.o. year old female who Pharmacy has been consulted for vancomycin dosing for abdominal infection. Based on the patient's indication and renal status this patient is being dosed based on a goal AUC of 400-600.     Renal function is currently stable.    Current vancomycin dose: 750 mg given every 12 hours    Estimated vancomycin AUC on current dose: 459 mg/L.hr     Visit Vitals  /62   Pulse 84   Temp 35.4 °C (95.7 °F)   Resp 18        Lab Results   Component Value Date    CREATININE 0.70 10/03/2024    CREATININE 0.72 10/03/2024    CREATININE 0.73 10/02/2024    CREATININE 0.61 10/01/2024        Patient weight is as follows:   Vitals:    10/01/24 1805   Weight: 104 kg (230 lb)       Temp (24hrs), Av.3 °C (97.4 °F), Min:35.4 °C (95.7 °F), Max:36.8 °C (98.2 °F)      Assessment/Plan    Day #4 of vancomycin therapy. Within goal AUC range. Continue current vancomycin regimen.    This dosing regimen is predicted by [employer] to result in the following pharmacokinetic parameters:    ASB56-03: 452 mg/L.hr  AUC24,ss: 459 mg/L.hr  Probability of AUC24 > 400: 86 %  Ctrough,ss: 15.5 mg/L  Probability of Ctrough,ss > 20: 7 %    The next level will be obtained on 10/8/24 with am labs. May be obtained sooner if clinically indicated.   Will continue to monitor renal function daily while on vancomycin and order serum creatinine at least every 48 hours if not already ordered.  Follow for continued vancomycin needs, clinical response, and signs/symptoms of toxicity.       Juan Jose Ovalle, PharmD

## 2024-10-04 NOTE — PROGRESS NOTES
Rohini Beaver is a 79 y.o. female on day 3 of admission presenting with Hyponatremia.      Subjective    Patient fully evaluated 10/3, awake, resting in bed. Patient with Moderate hiatal hernia with partial intrathoracic stomach and the peritoneal fat adjacent to the stomach within the hernia demonstrates evidence of probable carcinomatosis and ascites, Patient tolerated paracentesis on 10/01 well,order placed for repeat paracentesis therapeutic non diagnostic with IR.continue current and repeat labs in the AM. Patient aware and agreeable to current plan, continue plan as above. I spent 50 minutes in the professional and overall care of this patient.   Objective     Last Recorded Vitals  /55 (BP Location: Left arm, Patient Position: Lying)   Pulse 89   Temp 35.6 °C (96.1 °F) (Temporal)   Resp 18   Wt 104 kg (230 lb)   SpO2 93%   Intake/Output last 3 Shifts:    Intake/Output Summary (Last 24 hours) at 10/4/2024 1445  Last data filed at 10/4/2024 1047  Gross per 24 hour   Intake 640 ml   Output 650 ml   Net -10 ml       Admission Weight  Weight: 104 kg (230 lb) (10/01/24 0828)    Daily Weight  10/01/24 : 104 kg (230 lb)    Image Results  XR chest 1 view  Narrative: Interpreted By:  Devin Garsia,   STUDY:  XR CHEST 1 VIEW;  10/3/2024 6:58 pm      INDICATION:  Signs/Symptoms:sob.      COMPARISON:  10/01/2024      ACCESSION NUMBER(S):  [id number]      ORDERING CLINICIAN:  SHAKIRA PAEZ      FINDINGS:  CARDIOMEDIASTINAL SILHOUETTE AND VASCULATURE:      Cardiac size:  Within normal limits considering a shallow inspiration.  Aortic shadow:  Within normal limits.      Mediastinal contours: Within normal limits.      Pulmonary vasculature:  The central vasculature is unremarkable      LUNGS:  There is a retrocardiac lucency indicating small to moderate hiatal  hernia. Probable left basilar atelectasis, increased.  The lungs otherwise are clear.      ABDOMEN AND OTHER FINDINGS:  No remarkable upper abdominal  findings.      BONES:  No acute osseous changes.      Impression: 1.  Left basilar atelectasis.      Signed by: Devin Garsia 10/4/2024 8:41 AM  Dictation workstation:   [id number]      Physical Exam    Relevant Results               Assessment/Plan   This patient currently has cardiac telemetry ordered; if you would like to modify or discontinue the telemetry order, click here to go to the orders activity to modify/discontinue the order.          Jd Rhodes MD   Physician  Internal Medicine     H&P      Addendum     Date of Service: 10/1/2024  2:54 PM     Addendum       Expand All Collapse All    History Of Present Illness  Rohini Beaver is a 79-year-old female with past medical history of recently diagnosed ovarian cancer with peritoneal carcinomatosis s/p paracentesis x 4 (last one done 9/19/24), obesity, hyperlipidemia, osteoarthritis, and skin cancer s/p Mohs procedure.  Patient presents to the hospital with weakness and inability to care for herself.  She reports that she was at [facility] skilled nursing facility, however had a brief hospitalization at [facility] and upon discharge decided to go home rather than go back to [facility].  She lives with her 86-year-old sister and has a couple friends who assist with appointments, however limited support system.  Sister unable to care for due to age.  Today she was in urgent reclining chair and was able to get up.  ROS additionally positive for cold sweats, distended and tender abdomen, edema, pressure injury to coccyx/gluteal fold, and decreased activity tolerance.  Denies history of heart disease.  She reports that she is scheduled for outpatient appointment with her oncologist tomorrow to determine ongoing plan.      ER course: Hemodynamically stable, afebrile, SpO2 90s on room air.  WBC 28.9, Hgb 11.0/Hct34.1 (Hgb 15.1 4/4/2023), platelets 446. Glucose 107, Sodium 126, Chloride 94, calcium 8.5, albumin 2.4, alkaline phosphatase 231.   Lactate 1.4.  BNP 74.  High-sensitivity troponin 7.  UA unremarkable. CT chest showed moderate to large bilateral pleural effusion with adjacent presumed compressive atelectasis, prominent main pulmonary artery which may indicate pulmonary hypertension, mildly prominent mediastinal lymph nodes measuring up to 10 mm in short axis concerning for metastatic disease.  Moderate hiatal hernia with partial intrathoracic stomach and the peritoneal fat adjacent to the stomach within the hernia demonstrates evidence of probable carcinomatosis and ascites.  Redemonstration of large volume ascites with regions of significant anterior omental caking and peritoneal carcinomatosis commensurate with patient's provided diagnosis of ovarian cancer. Regions of wall thickening of the colon extending from the splenic flexure to the sigmoid.  Extensive region of scalloping of the right hepatic lobe peripherally with appearance of large subcapsular collection along the right hepatic margin. Blood culture in process     Past medical history: As above  Past surgical history: Cholecystectomy, lumbar laminectomy, left shoulder arthroscopy, right total knee replacement, corneal transplant  Social history: No history of smoking, alcohol abuse, illicit drug use.  Lives with her elderly sister who is 86 years old.  Limited support system.   Family history: Mother-cancer (unknown type)     Past Medical History  Medical History        Past Medical History:   Diagnosis Date    Bilateral primary osteoarthritis of knee      HLD (hyperlipidemia)      Lumbosacral radiculopathy      Meralgia paraesthetica      Physical deconditioning              Surgical History  Surgical History         Past Surgical History:   Procedure Laterality Date    ARTHROPLASTY Left       Left thumb carpometacarpal arthroplasty with ligament reconstruction and tendon interposition    BREAST BIOPSY   1999     Benign    CHOLECYSTECTOMY        COLONOSCOPY        CORNEAL TRANSPLANT         DILATION AND CURETTAGE OF UTERUS        LUMBAR FUSION        SHOULDER ARTHROSCOPY Left      SKIN LESION EXCISION        TOTAL KNEE ARTHROPLASTY Left 2019    TOTAL KNEE ARTHROPLASTY Right 2017    TRIGGER FINGER RELEASE Right              Social History  She reports that she has never smoked. She has never used smokeless tobacco. She reports that she does not currently use alcohol. She reports that she does not use drugs.     Family History  Family History          Family History   Problem Relation Name Age of Onset    Colon cancer Mother        Lung cancer Father        Other (Mesothelioma) Brother        Brain cancer Mother's Brother                Allergies  Patient has no known allergies.     Review of Systems     10 point ROS negative except as noted above in HPI      Physical Exam  Vitals reviewed.   Constitutional:       General: She is awake. She is not in acute distress.     Appearance: She is obese. She is ill-appearing. She is not toxic-appearing.   HENT:      Head: Normocephalic and atraumatic.      Nose: Nose normal.      Mouth/Throat:      Mouth: Mucous membranes are moist.      Pharynx: Oropharynx is clear.   Eyes:      Conjunctiva/sclera: Conjunctivae normal.   Cardiovascular:      Rate and Rhythm: Normal rate and regular rhythm.      Pulses: Normal pulses.      Heart sounds: No murmur heard.  Pulmonary:      Effort: Pulmonary effort is normal. No respiratory distress.      Breath sounds: Decreased air movement present. Decreased breath sounds present.      Comments: Posterior bilateral breath sounds are diminished  Abdominal:      General: There is distension.      Palpations: Abdomen is soft.      Tenderness: There is abdominal tenderness. There is no guarding.      Comments: Bowel sounds hypoactive, abdomen distended, tender to palpation, dullness noted to the sides.   Musculoskeletal:         General: No swelling, deformity or signs of injury. Normal range of motion.      Cervical back: Neck  supple.      Right lower leg: Edema present.      Left lower leg: Edema present.      Comments: Generalized edema/anasarca   Skin:     General: Skin is warm and dry.      Capillary Refill: Capillary refill takes less than 2 seconds.      Findings: No ecchymosis or wound.      Comments: Wound on coccyx, exam deferred  due to patient discomfort    Neurological:      General: No focal deficit present.      Mental Status: She is alert and oriented to person, place, and time.   Psychiatric:         Mood and Affect: Mood normal.         Behavior: Behavior is cooperative.                  Last Recorded Vitals  Blood pressure 117/58, pulse 100, temperature 36.2 °C (97.2 °F), temperature source Temporal, resp. rate (!) 22, weight 104 kg (230 lb), SpO2 94%.     Relevant Results              Results for orders placed or performed during the hospital encounter of 10/01/24 (from the past 24 hour(s))   CBC and Auto Differential   Result Value Ref Range     WBC 28.9 (H) 4.4 - 11.3 x10*3/uL     nRBC 0.0 0.0 - 0.0 /100 WBCs     RBC 4.15 4.00 - 5.20 x10*6/uL     Hemoglobin 11.0 (L) 12.0 - 16.0 g/dL     Hematocrit 34.1 (L) 36.0 - 46.0 %     MCV 82 80 - 100 fL     MCH 26.5 26.0 - 34.0 pg     MCHC 32.3 32.0 - 36.0 g/dL     RDW 15.7 (H) 11.5 - 14.5 %     Platelets 446 150 - 450 x10*3/uL     Neutrophils % 89.1 40.0 - 80.0 %     Immature Granulocytes %, Automated 1.0 (H) 0.0 - 0.9 %     Lymphocytes % 4.3 13.0 - 44.0 %     Monocytes % 4.8 2.0 - 10.0 %     Eosinophils % 0.5 0.0 - 6.0 %     Basophils % 0.3 0.0 - 2.0 %     Neutrophils Absolute 25.74 (H) 1.60 - 5.50 x10*3/uL     Immature Granulocytes Absolute, Automated 0.30 0.00 - 0.50 x10*3/uL     Lymphocytes Absolute 1.23 0.80 - 3.00 x10*3/uL     Monocytes Absolute 1.38 (H) 0.05 - 0.80 x10*3/uL     Eosinophils Absolute 0.14 0.00 - 0.40 x10*3/uL     Basophils Absolute 0.09 0.00 - 0.10 x10*3/uL   Comprehensive metabolic panel   Result Value Ref Range     Glucose 107 (H) 74 - 99 mg/dL      Sodium 126 (L) 136 - 145 mmol/L     Potassium 5.2 3.5 - 5.3 mmol/L     Chloride 94 (L) 98 - 107 mmol/L     Bicarbonate 24 21 - 32 mmol/L     Anion Gap 13 10 - 20 mmol/L     Urea Nitrogen 18 6 - 23 mg/dL     Creatinine 0.61 0.50 - 1.05 mg/dL     eGFR >90 >60 mL/min/1.73m*2     Calcium 8.5 (L) 8.6 - 10.3 mg/dL     Albumin 2.4 (L) 3.4 - 5.0 g/dL     Alkaline Phosphatase 231 (H) 33 - 136 U/L     Total Protein 5.7 (L) 6.4 - 8.2 g/dL     AST 23 9 - 39 U/L     Bilirubin, Total 0.3 0.0 - 1.2 mg/dL     ALT 8 7 - 45 U/L   Magnesium   Result Value Ref Range     Magnesium 1.68 1.60 - 2.40 mg/dL   Troponin I, High Sensitivity   Result Value Ref Range     Troponin I, High Sensitivity 7 0 - 13 ng/L   B-Type Natriuretic Peptide   Result Value Ref Range     BNP 74 0 - 99 pg/mL   Sars-CoV-2 PCR   Result Value Ref Range     Coronavirus 2019, PCR Not Detected Not Detected   Lactate   Result Value Ref Range     Lactate 1.4 0.4 - 2.0 mmol/L   Urinalysis with Reflex Culture and Microscopic   Result Value Ref Range     Color, Urine Light-Yellow Light-Yellow, Yellow, Dark-Yellow     Appearance, Urine Clear Clear     Specific Gravity, Urine >1.050 (N) 1.005 - 1.035     pH, Urine 6.0 5.0, 5.5, 6.0, 6.5, 7.0, 7.5, 8.0     Protein, Urine 20 (TRACE) NEGATIVE, 10 (TRACE), 20 (TRACE) mg/dL     Glucose, Urine Normal Normal mg/dL     Blood, Urine NEGATIVE NEGATIVE     Ketones, Urine NEGATIVE NEGATIVE mg/dL     Bilirubin, Urine NEGATIVE NEGATIVE     Urobilinogen, Urine Normal Normal mg/dL     Nitrite, Urine NEGATIVE NEGATIVE     Leukocyte Esterase, Urine NEGATIVE NEGATIVE   Urinalysis Microscopic   Result Value Ref Range     WBC, Urine 1-5 1-5, NONE /HPF     RBC, Urine 1-2 NONE, 1-2, 3-5 /HPF     Squamous Epithelial Cells, Urine 1-9 (SPARSE) Reference range not established. /HPF     Mucus, Urine FEW Reference range not established. /LPF   Protime-INR   Result Value Ref Range     Protime 13.0 (H) 9.8 - 12.8 seconds     INR 1.2 (H) 0.9 - 1.1   APTT    Result Value Ref Range     aPTT 24 (L) 27 - 38 seconds      CT chest abdomen pelvis w IV contrast     Result Date: 10/1/2024  Interpreted By:  Oscar Aldrich, STUDY: CT CHEST ABDOMEN PELVIS W IV CONTRAST;  10/1/2024 11:13 am   INDICATION: Signs/Symptoms:leukocytosis, ascites, recent ovarian cancer diagnosis.   COMPARISON: CT abdomen pelvis 08/08/2024   ACCESSION NUMBER(S): [id number]   ORDERING CLINICIAN: ASHLEY FAIRBANKS   TECHNIQUE: CT of the chest, abdomen, and pelvis was performed.  Contiguous axial images were obtained at 3 mm slice thickness through the chest, abdomen and pelvis. Coronal and sagittal reconstructions at 3 mm slice thickness were performed. ml of contrast  were administered intravenously without immediate complication.   FINDINGS: CHEST:   LUNG/PLEURA/LARGE AIRWAYS: No endobronchial lesion is seen.   Moderate to large bilateral pleural effusions with adjacent consolidative opacification of the bilateral lower lobes suggestive of compressive atelectasis. No pneumothorax. Mild asymmetric scattered reticular changes suggestive of chronic lung findings.     VESSELS: The thoracic aorta is unremarkable with respect course, caliber, and contour.   Prominent main pulmonary artery measuring up to 3.2 cm in anterior-posterior dimension measured on sagittal plane which may indicate sequela of pulmonary hypertension.   No significant coronary atherosclerotic calcifications are seen.   HEART: Heart size within normal limits. No pericardial effusion.   MEDIASTINUM AND OLIVE: Mildly prominent mediastinal lymph nodes measuring up to 10 mm in short axis   Moderate hiatal hernia with partial intrathoracic stomach and ascites and region of nodularity of the peritoneal fat within hiatal hernia suggestive carcinomatosis.   CHEST WALL AND LOWER NECK: No acute osseous abnormality. Multilevel presumed degenerative changes throughout the imaged spine. No acute soft tissue abnormality.   ABDOMEN:   LIVER: There is been  interval scalloping of the right hepatic margins with new regions of irregularity along the right hepatic capsule diffusely which is new since comparison imaging from 08/08/2024 there is a new elongated subcapsular collection measuring up to approximately 11.4 x 2.1 cm, difficult to measure given curvilinear projection extending over the right hepatic lobe margin (series 202, images 40-70). Similar appearing subcentimeter left hepatic lobe hypodense lesion.   BILE DUCTS: No obvious new intrahepatic biliary dilatation. Mild prominence of the common bile duct, nonspecific in a cholecystectomy patient.   GALLBLADDER: Absent.   PANCREAS: Unremarkable.   SPLEEN: Unchanged.   ADRENAL GLANDS: Unchanged.   KIDNEYS AND URETERS: Similar appearing subcentimeter right renal lower pole calculus. No hydronephrosis. No obstructing urolithiasis.   PELVIS:   BLADDER: Unremarkable.   REPRODUCTIVE ORGANS: Uterus appears grossly unchanged.   BOWEL: Moderate hiatal hernia with wall thickening of the stomach in the hernia. No new pathologic distention of bowel. Wall thickening of the colon within the splenic flexure descending colon and sigmoid colon, nonspecific.     VESSELS: No evidence of abdominal aortic aneurysm.   PERITONEUM/RETROPERITONEUM/LYMPH NODES: Large volume ascites with extensive regions of anterior omental caking and peritoneal carcinomatosis. No obvious free intraperitoneal gas. Given the presence of extensive omental caking and peritoneal carcinomatosis, superimposed peritoneal enhancement 4 other causes cannot be excluded.   BONE AND SOFT TISSUE: No acute osseous abnormality. Osseous structures appear stable. No acute abnormality of the abdominal wall soft tissues.        CHEST: 1.  Moderate to large bilateral pleural effusions with adjacent presumed compressive atelectasis. 2. Prominent main pulmonary artery which may indicate pulmonary hypertension. 3. Mildly prominent mediastinal lymph nodes measuring up to 10 mm in  short axis concerning for metastatic disease. 4. Moderate hiatal hernia with partial intrathoracic stomach. The peritoneal fat adjacent to the stomach within the hernia demonstrates evidence of probable carcinomatosis and ascites.   ABDOMEN-PELVIS: 1.  Redemonstration of large volume ascites with regions of significant anterior omental caking and peritoneal carcinomatosis commensurate with patient's provided diagnosis of ovarian cancer. 2. Regions of wall thickening of the colon extending from the splenic flexure to the sigmoid which may indicate a nonspecific colitis. 3. Since the previous examination on 08/08/2024, there are now extensive regions of scalloping of the right hepatic lobe peripherally with the appearance of a large subcapsular collection along the right hepatic margin as above. The sterility of this collection cannot be assessed via CT and clinical correlation is advised for exclusion superimposed infection within this region.     Signed by: Oscar Aldrich 10/1/2024 12:14 PM Dictation workstation:   [id number]     XR chest 1 view     Result Date: 10/1/2024  Interpreted By:  Samuel Jaramillo, STUDY: XR CHEST 1 VIEW; 10/1/2024 8:44 am   INDICATION: Signs/Symptoms:weakness, edema in legs and abdomen   COMPARISON: April 2023.   ACCESSION NUMBER(S): [id number]   ORDERING CLINICIAN: ASHLEY FAIRBANKS   FINDINGS: The study is limited due to rotation and poor inspiratory effort, with resultant crowding of the pulmonary vasculature. The cardiac silhouette is within normal limits for the technique. Moderate size hiatal hernia is again seen. There is no pneumothorax, confluent infiltrates or significant effusion. Degenerative changes involve the spine and shoulders; metallic anchors again noted over the left humeral head related to previous rotator cuff repair.        Limited study. Hiatal hernia. No acute cardiopulmonary disease.   Signed by: Samuel Jaramillo 10/1/2024 9:22 AM Dictation workstation:    [id number]     US guided abdominal paracentesis     Result Date: 9/20/2024  Interpreted By:  Peri Lawrence and Kamau Nyokabi STUDY: US GUIDED ABDOMINAL PARACENTESIS; US GUIDED PERCUTANEOUS ABDOMINAL RETROPERITONEUM BIOPSY;  9/19/2024 11:13 am   INDICATION: Signs/Symptoms:ascites; Signs/Symptoms:ascites, evaluate ovarian cancer.   COMPARISON: CT abdomen and pelvis 08/08/2024   ACCESSION NUMBER(S): [id number]; [id number]   ORDERING CLINICIAN: JEANETTE ARIAS   TECHNIQUE: INTERVENTIONALIST(S): Dr. Peri Lawrence MD   CONSENT: The patient was informed of the nature of the proposed procedure. The purposes, alternatives, risks, and benefits were explained and discussed. All questions were answered and consent was obtained.   SEDATION: 1% local lidocaine was used for anesthetic.   MEDICATION/CONTRAST: No additional.   TIME OUT:   A time out was performed immediately prior to procedure start with the interventional team, correctly identifying the patient name, date of birth, MRN, procedure, anatomy (including marking of site and side), patient position, procedure consent form, relevant laboratory and imaging test results, antibiotic administration, safety precautions, and procedure-specific equipment needs.   COMPLICATIONS: No immediate adverse events identified.   FINDINGS: The patient was placed in the supine position. Limited sonographic images of the lower abdominal wall were obtained for purposes of needle guidance, which demonstrated omental soft tissue mass which was seen on prior CT 08/08/2024. The area of concern was prepped and draped under sterile technique.   1% lidocaine was injected subcutaneously. Additional lidocaine was administered into deeper tissues surrounding the targeted area for biopsy using a 22G spinal needle. A small incision was made. Using the same access site a 18 gauge core biopsy needle was passed via a 17 gauge coaxial introducer needle to obtain a total of 1 core sample.    Postprocedure images demonstrate no evidence for hemorrhage. The patient tolerated the procedure well and there were no immediate complications. 1 core specimen was sent to pathology.     Subsequently the right lower quadrant was visualized under ultrasound which demonstrated moderate volume ascites seen on prior CT 08/08/2024. 1% lidocaine was injected subcutaneously at this site. A 19 gauge Yueh was used to target the fluid collection under ultrasound guidance. Once the site was accessed, the inner needle was removed from the catheter. The Yueh catheter was connected to drainage container. Estimated 1000 cc of serosanguineous colored fluid was removed. Post paracentesis imaging demonstrated decreased ascitic fluid. The Yueh catheter was removed. The access site was bandaged.        1. Status post ultrasound guided core needle biopsy of omental mass seen on CT 08/08/2024. 1 core specimens sent to pathology. 2. Successful paracentesis under ultrasound guidance with removal of 1 L of serosanguineous fluid.     I was present for and/or performed the critical portions of the procedure and immediately available throughout the entire procedure.   I personally reviewed the image(s) / study and interpretation. I agree with the findings as stated.   Performed and dictated at [facility].   MACRO: None   Signed by: Peri Lawrence 9/20/2024 10:06 AM Dictation workstation:   [id number]     US guided percutaneous abdominal retroperitoneum biopsy     Result Date: 9/20/2024  Interpreted By:  Peri Lawrence and Kamau Nyokabi STUDY: US GUIDED ABDOMINAL PARACENTESIS; US GUIDED PERCUTANEOUS ABDOMINAL RETROPERITONEUM BIOPSY;  9/19/2024 11:13 am   INDICATION: Signs/Symptoms:ascites; Signs/Symptoms:ascites, evaluate ovarian cancer.   COMPARISON: CT abdomen and pelvis 08/08/2024   ACCESSION NUMBER(S): [id number]; [id number]   ORDERING CLINICIAN: JEANETTE ARIAS   TECHNIQUE:  INTERVENTIONALIST(S): Dr. Peri Lawrence MD   CONSENT: The patient was informed of the nature of the proposed procedure. The purposes, alternatives, risks, and benefits were explained and discussed. All questions were answered and consent was obtained.   SEDATION: 1% local lidocaine was used for anesthetic.   MEDICATION/CONTRAST: No additional.   TIME OUT:   A time out was performed immediately prior to procedure start with the interventional team, correctly identifying the patient name, date of birth, MRN, procedure, anatomy (including marking of site and side), patient position, procedure consent form, relevant laboratory and imaging test results, antibiotic administration, safety precautions, and procedure-specific equipment needs.   COMPLICATIONS: No immediate adverse events identified.   FINDINGS: The patient was placed in the supine position. Limited sonographic images of the lower abdominal wall were obtained for purposes of needle guidance, which demonstrated omental soft tissue mass which was seen on prior CT 08/08/2024. The area of concern was prepped and draped under sterile technique.   1% lidocaine was injected subcutaneously. Additional lidocaine was administered into deeper tissues surrounding the targeted area for biopsy using a 22G spinal needle. A small incision was made. Using the same access site a 18 gauge core biopsy needle was passed via a 17 gauge coaxial introducer needle to obtain a total of 1 core sample.   Postprocedure images demonstrate no evidence for hemorrhage. The patient tolerated the procedure well and there were no immediate complications. 1 core specimen was sent to pathology.     Subsequently the right lower quadrant was visualized under ultrasound which demonstrated moderate volume ascites seen on prior CT 08/08/2024. 1% lidocaine was injected subcutaneously at this site. A 19 gauge Yueh was used to target the fluid collection under ultrasound guidance. Once the site was  accessed, the inner needle was removed from the catheter. The Yueh catheter was connected to drainage container. Estimated 1000 cc of serosanguineous colored fluid was removed. Post paracentesis imaging demonstrated decreased ascitic fluid. The Yueh catheter was removed. The access site was bandaged.        1. Status post ultrasound guided core needle biopsy of omental mass seen on CT 08/08/2024. 1 core specimens sent to pathology. 2. Successful paracentesis under ultrasound guidance with removal of 1 L of serosanguineous fluid.     I was present for and/or performed the critical portions of the procedure and immediately available throughout the entire procedure.   I personally reviewed the image(s) / study and interpretation. I agree with the findings as stated.   Performed and dictated at [facility].   MACRO: None   Signed by: Peri Lawrence 9/20/2024 10:06 AM Dictation workstation:   [id number]            Assessment/Plan        Assessment & Plan  Hyponatremia        79-year-old female with past medical history of recently diagnosed ovarian cancer with peritoneal carcinomatosis s/p paracentesis x 4 (last one done 9/19/24), obesity, hyperlipidemia, osteoarthritis, and skin cancer s/p Mohs procedure.  Patient presents to the hospital with weakness and inability to care for herself.  Patient found to be hyponatremic and with evidence on CT imaging of possible abscess of the liver and ascites secondary to malignancy.  Hospitalized for further evaluation management..     #Ovarian cancer with peritoneal carcinomatosis  #Ascites  #Bilateral pleural effusions  # Suspected liver abscess  #Abdominal pain     Telemetry monitoring  Consult to IR for paracentesis and possible drainage of liver abscess  Consult to pulmonology for bilateral pleural effusions  Antiemetics as needed  Analgesics as needed  Patient needs to be followed up with by her gynecological oncologist to determine  optimal plan going forward for treatment of her ovarian cancer     #Hyponatremia  #Generalized edema/anasarca  #Hypoalbuminemia  Patient is fluid overloaded and has significant third spacing, suspect hypervolemia hyponatremia.  Will check urine electrolytes, urine osmolality, and serum osmolality  Fluid restriction of 1500 ml for the time being.  Consider starting diuretics, defer to attending  Repeat labs in a.m.     #debility  PT/OT  Social work for discharge planning     Chronic issues:  #Obesity  #Hyperlipidemia  #Osteoarthritis     Continue with patient's home medications as appropriate     #DVT prophylaxis  SCDs  Lovenox subcutaneous     I spent 75 minutes in the professional and overall care of this patient.        Miky Kumar, APRN-CNP                 Revision History         Patient fully evaluated 10/2, awake, resting in bed. Patient with Moderate hiatal hernia with partial intrathoracic stomach and the peritoneal fat adjacent to the stomach within the hernia demonstrates evidence of probable carcinomatosis and ascites, Patient tolerated paracentesis on 10/01, awaiting culture results.No s/s or c/o acute difficulties at this time. Medications and labs reviewed.  Plan discussed with interdisciplinary team, per pulmonology - Plan:  Patient has bilateral pleural effusion in the context of malignant ascites/ovarian cancer I explained to the patient the pleural effusion most likely related to recurrent ascites, patient is requiring so far 5 steps malignant ascites in the last month most likely beneficial approaches intra-abdominal Pleurx catheter Abdominal Pleurx catheter would be the most effective way of preventing pleural effusions and ascites.  Pleural effusions are secondary to ascites, both of which are due to patient's underlying cancer Continue with goals of care discussions prior to interventional radiology consult Follow hepatic fluid analysis Continue IV antibiotics Zosyn, continue current and  repeat labs in the AM. Patient still requiring frequent cardiac and SPO2 monitoring. Discharge planning discussed with patient and care team. Therapy evaluations ordered-  [facility] 14, anticipate SNF/HHC at discharge. Patient aware and agreeable to current plan, continue plan as above. I spent 50 minutes in the professional and overall care of this patient.      Assessment & Plan  Hyponatremia    Patient fully evaluated 10/3, awake, resting in bed. Patient with Moderate hiatal hernia with partial intrathoracic stomach and the peritoneal fat adjacent to the stomach within the hernia demonstrates evidence of probable carcinomatosis and ascites, Patient tolerated paracentesis on 10/01 well,order placed for repeat paracentesis therapeutic non diagnostic with IR.continue current and repeat labs in the AM. Patient aware and agreeable to current plan, continue plan as above.    Patient fully evaluated on October 4.  Patient awaiting therapeutic paracentesis.  Patient probably will need albumin afterwards.  Continue to monitor response with above treatments recheck labs in AM.  I spent 50 minutes in the professional and overall care of this patient.               Jd Rhodes MD

## 2024-10-04 NOTE — CARE PLAN
The patient's goals for the shift include rest and pain control    The clinical goals for the shift include safety and comfort    Problem: Skin  Goal: Decreased wound size/increased tissue granulation at next dressing change  Outcome: Progressing  Flowsheets (Taken 10/2/2024 1555 by Rosemary Higgins RN)  Decreased wound size/increased tissue granulation at next dressing change: Promote sleep for wound healing     Problem: Skin  Goal: Participates in plan/prevention/treatment measures  Outcome: Progressing  Flowsheets (Taken 10/3/2024 2356)  Participates in plan/prevention/treatment measures: Elevate heels     Problem: Skin  Goal: Prevent/manage excess moisture  Outcome: Progressing  Flowsheets (Taken 10/3/2024 2356)  Prevent/manage excess moisture: Moisturize dry skin     Problem: Skin  Goal: Prevent/minimize sheer/friction injuries  Outcome: Progressing  Flowsheets (Taken 10/3/2024 2356)  Prevent/minimize sheer/friction injuries: Turn/reposition every 2 hours/use positioning/transfer devices     Problem: Skin  Goal: Promote/optimize nutrition  Outcome: Progressing  Flowsheets (Taken 10/3/2024 2356)  Promote/optimize nutrition: Offer water/supplements/favorite foods     Problem: Skin  Goal: Promote skin healing  Outcome: Progressing  Flowsheets (Taken 10/3/2024 2356)  Promote skin healing: Turn/reposition every 2 hours/use positioning/transfer devices     Problem: Pain  Goal: Takes deep breaths with improved pain control throughout the shift  Outcome: Progressing     Problem: Pain  Goal: Turns in bed with improved pain control throughout the shift  Outcome: Progressing     Problem: Pain  Goal: Walks with improved pain control throughout the shift  Outcome: Progressing

## 2024-10-04 NOTE — NURSING NOTE
Dr. Lawrence noted scanning patient with US in IR US room, not enough ascites fluid noted to safely perform paracentesis per Dr. Lawrence,macarena Lin, RN assuming care on 9th floor aware

## 2024-10-05 LAB
ALBUMIN SERPL BCP-MCNC: 2.2 G/DL (ref 3.4–5)
ALP SERPL-CCNC: 172 U/L (ref 33–136)
ALT SERPL W P-5'-P-CCNC: 8 U/L (ref 7–45)
ANION GAP SERPL CALC-SCNC: 10 MMOL/L (ref 10–20)
AST SERPL W P-5'-P-CCNC: 19 U/L (ref 9–39)
BACTERIA BLD CULT: NORMAL
BACTERIA BLD CULT: NORMAL
BASOPHILS # BLD AUTO: 0.09 X10*3/UL (ref 0–0.1)
BASOPHILS NFR BLD AUTO: 0.4 %
BILIRUB SERPL-MCNC: 0.3 MG/DL (ref 0–1.2)
BUN SERPL-MCNC: 17 MG/DL (ref 6–23)
CALCIUM SERPL-MCNC: 8.1 MG/DL (ref 8.6–10.3)
CHLORIDE SERPL-SCNC: 95 MMOL/L (ref 98–107)
CO2 SERPL-SCNC: 27 MMOL/L (ref 21–32)
CREAT SERPL-MCNC: 0.66 MG/DL (ref 0.5–1.05)
EGFRCR SERPLBLD CKD-EPI 2021: 89 ML/MIN/1.73M*2
EOSINOPHIL # BLD AUTO: 0.35 X10*3/UL (ref 0–0.4)
EOSINOPHIL NFR BLD AUTO: 1.6 %
ERYTHROCYTE [DISTWIDTH] IN BLOOD BY AUTOMATED COUNT: 15.9 % (ref 11.5–14.5)
GLUCOSE SERPL-MCNC: 99 MG/DL (ref 74–99)
HCT VFR BLD AUTO: 31.3 % (ref 36–46)
HGB BLD-MCNC: 9.5 G/DL (ref 12–16)
IMM GRANULOCYTES # BLD AUTO: 0.32 X10*3/UL (ref 0–0.5)
IMM GRANULOCYTES NFR BLD AUTO: 1.5 % (ref 0–0.9)
LYMPHOCYTES # BLD AUTO: 1.02 X10*3/UL (ref 0.8–3)
LYMPHOCYTES NFR BLD AUTO: 4.7 %
MCH RBC QN AUTO: 25.7 PG (ref 26–34)
MCHC RBC AUTO-ENTMCNC: 30.4 G/DL (ref 32–36)
MCV RBC AUTO: 85 FL (ref 80–100)
MONOCYTES # BLD AUTO: 1.08 X10*3/UL (ref 0.05–0.8)
MONOCYTES NFR BLD AUTO: 5 %
NEUTROPHILS # BLD AUTO: 18.8 X10*3/UL (ref 1.6–5.5)
NEUTROPHILS NFR BLD AUTO: 86.8 %
NRBC BLD-RTO: 0 /100 WBCS (ref 0–0)
PLATELET # BLD AUTO: 388 X10*3/UL (ref 150–450)
POTASSIUM SERPL-SCNC: 4 MMOL/L (ref 3.5–5.3)
PROT SERPL-MCNC: 5.2 G/DL (ref 6.4–8.2)
RBC # BLD AUTO: 3.7 X10*6/UL (ref 4–5.2)
SODIUM SERPL-SCNC: 128 MMOL/L (ref 136–145)
WBC # BLD AUTO: 21.7 X10*3/UL (ref 4.4–11.3)

## 2024-10-05 PROCEDURE — 2500000001 HC RX 250 WO HCPCS SELF ADMINISTERED DRUGS (ALT 637 FOR MEDICARE OP): Performed by: NURSE PRACTITIONER

## 2024-10-05 PROCEDURE — 1200000002 HC GENERAL ROOM WITH TELEMETRY DAILY

## 2024-10-05 PROCEDURE — 2500000005 HC RX 250 GENERAL PHARMACY W/O HCPCS: Performed by: RADIOLOGY

## 2024-10-05 PROCEDURE — 2500000002 HC RX 250 W HCPCS SELF ADMINISTERED DRUGS (ALT 637 FOR MEDICARE OP, ALT 636 FOR OP/ED): Performed by: NURSE PRACTITIONER

## 2024-10-05 PROCEDURE — 36415 COLL VENOUS BLD VENIPUNCTURE: CPT | Performed by: INTERNAL MEDICINE

## 2024-10-05 PROCEDURE — 84075 ASSAY ALKALINE PHOSPHATASE: CPT | Performed by: INTERNAL MEDICINE

## 2024-10-05 PROCEDURE — 2500000004 HC RX 250 GENERAL PHARMACY W/ HCPCS (ALT 636 FOR OP/ED): Performed by: NURSE PRACTITIONER

## 2024-10-05 PROCEDURE — 85025 COMPLETE CBC W/AUTO DIFF WBC: CPT | Performed by: INTERNAL MEDICINE

## 2024-10-05 RX ORDER — HEPARIN SODIUM,PORCINE/PF 10 UNIT/ML
50 SYRINGE (ML) INTRAVENOUS AS NEEDED
OUTPATIENT
Start: 2024-10-16

## 2024-10-05 RX ORDER — HEPARIN 100 UNIT/ML
500 SYRINGE INTRAVENOUS AS NEEDED
OUTPATIENT
Start: 2024-10-16

## 2024-10-05 ASSESSMENT — PAIN SCALES - GENERAL
PAINLEVEL_OUTOF10: 6
PAINLEVEL_OUTOF10: 7
PAINLEVEL_OUTOF10: 6
PAINLEVEL_OUTOF10: 10 - WORST POSSIBLE PAIN
PAINLEVEL_OUTOF10: 0 - NO PAIN
PAINLEVEL_OUTOF10: 2
PAINLEVEL_OUTOF10: 3
PAINLEVEL_OUTOF10: 0 - NO PAIN
PAINLEVEL_OUTOF10: 9
PAINLEVEL_OUTOF10: 0 - NO PAIN
PAINLEVEL_OUTOF10: 7
PAINLEVEL_OUTOF10: 2

## 2024-10-05 ASSESSMENT — PAIN DESCRIPTION - DESCRIPTORS
DESCRIPTORS: ACHING;DULL
DESCRIPTORS: DULL;ACHING;SORE
DESCRIPTORS: ACHING;DULL

## 2024-10-05 ASSESSMENT — PAIN - FUNCTIONAL ASSESSMENT
PAIN_FUNCTIONAL_ASSESSMENT: 0-10

## 2024-10-05 ASSESSMENT — COGNITIVE AND FUNCTIONAL STATUS - GENERAL
MOVING FROM LYING ON BACK TO SITTING ON SIDE OF FLAT BED WITH BEDRAILS: A LOT
MOBILITY SCORE: 12
MOVING TO AND FROM BED TO CHAIR: A LOT
DRESSING REGULAR LOWER BODY CLOTHING: A LOT
DRESSING REGULAR UPPER BODY CLOTHING: A LOT
CLIMB 3 TO 5 STEPS WITH RAILING: A LOT
PERSONAL GROOMING: A LOT
TURNING FROM BACK TO SIDE WHILE IN FLAT BAD: A LOT
WALKING IN HOSPITAL ROOM: A LOT
DAILY ACTIVITIY SCORE: 12
EATING MEALS: A LOT
TOILETING: A LOT
STANDING UP FROM CHAIR USING ARMS: A LOT
HELP NEEDED FOR BATHING: A LOT

## 2024-10-05 ASSESSMENT — PAIN DESCRIPTION - ORIENTATION
ORIENTATION: RIGHT
ORIENTATION: RIGHT;MID
ORIENTATION: RIGHT;MID

## 2024-10-05 ASSESSMENT — PAIN DESCRIPTION - LOCATION
LOCATION: ABDOMEN
LOCATION: ABDOMEN
LOCATION: BACK
LOCATION: ABDOMEN

## 2024-10-05 NOTE — CARE PLAN
The patient's goals for the shift include     The clinical goals for the shift include maintain safety

## 2024-10-05 NOTE — PROGRESS NOTES
History Of Present Illness  Rohini Beaver is a 79 y.o. 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  Her weakness has been gradually progressive over the past few weeks.  Yesterday, she endorsed feeling warm and as well as some right lower quadrant pain.  She reports some trouble breathing which improves after paracenteses.  Her first paracentesis was in early August, shortly after her ovarian cancer diagnosis.  Day of consult, patient is breathing on room air.  Vital stable.  CT chest showed bilateral large pleural effusions.     Past Medical History  She has a past medical history of Bilateral primary osteoarthritis of knee, HLD (hyperlipidemia), Lumbosacral radiculopathy, Meralgia paraesthetica, Obesity, and Physical deconditioning.    Surgical History  She has a past surgical history that includes Total knee arthroplasty (Left, 2019); Cholecystectomy; Corneal transplant; Lumbar fusion; Dilation and curettage of uterus; Shoulder arthroscopy (Left); Colonoscopy; Skin lesion excision; Trigger finger release (Right); Arthroplasty (Left); Breast biopsy (1999); and Total knee arthroplasty (Right, 2017).     Social History  She reports that she has never smoked. She has never used smokeless tobacco. She reports that she does not currently use alcohol. She reports that she does not use drugs.    Family History  Family History   Problem Relation Name Age of Onset    Colon cancer Mother      Lung cancer Father      Other (Mesothelioma) Brother      Brain cancer Mother's Brother          Allergies  Patient has no known allergies.    Review of Systems  A complete 10 point review of systems was completed and is otherwise negative unless stated.        Physical Exam  Physical Exam:  General:  Pleasant and cooperative. No apparent distress.  HEENT:  Normocephalic, atraumatic, mucus membranes moist.   Neck:  Trachea  midline.  Chest:  Clear to auscultation bilaterally.   CV:  Regular rate and rhythm.  Positive S1/S2.   Abdomen: Abdomen grossly distended, slight pain to palpation  Extremities: Bilateral lower extremity edema  Neurological:  AAOx3. No focal deficits.  Skin:  Warm and dry.         Last Recorded Vitals  /62 (BP Location: Right arm, Patient Position: Lying)   Pulse 88   Temp 35 °C (95 °F) (Tympanic)   Resp 16   Wt 104 kg (230 lb)   SpO2 98%        Assessment/Plan         Bilateral pleural effusion  Hepatic abscess versus cyst  Abdominal ascites  Ovarian carcinoma w/ peritoneal carcinomatosis carcinomatosis  HLD  Plan:  Attempted paracentesis showed small ascites fluid in IR.    Patient has bilateral pleural effusion in the context of malignant ascites/ovarian cancer  I explained to the patient the pleural effusion most likely related to recurrent ascites, patient is requiring so far 5 steps malignant ascites in the last month most likely beneficial approaches intra-abdominal Pleurx catheter  Abdominal Pleurx catheter would be the most effective way of preventing pleural effusions and ascites.  Pleural effusions are secondary to ascites, both of which are due to patient's underlying cancer  Continue with goals of care discussions prior to interventional radiology consult  Follow hepatic fluid analysis  Continue antibiotics    Skyler Escobar MD  Pulmonary critical care consultant  10/05/24  1:05 PM

## 2024-10-05 NOTE — PROGRESS NOTES
Rohini Beaver is a 79 y.o. female on day 4 of admission presenting with Hyponatremia.      Subjective   79-year-old female with past medical history of recently diagnosed ovarian cancer with peritoneal carcinomatosis s/p paracentesis x 4 (last one done 9/19/24), obesity, hyperlipidemia, osteoarthritis, and skin cancer s/p Mohs procedure.  Patient presents to the hospital with weakness and inability to care for herself.  She reports that she was at [facility] nursing [place] [facility], however had a brief hospitalization at [facility] and upon discharge decided to go home rather than go back to [facility].  She lives with her 86-year-old sister and has a couple friends who assist with appointments, however limited support system.  Sister unable to care for due to age.  Today she was in urgent reclining chair and was able to get up.  ROS additionally positive for cold sweats, distended and tender abdomen, edema, pressure injury to coccyx/gluteal fold, and decreased activity tolerance.  Denies history of heart disease.  She reports that she is scheduled for outpatient appointment with her oncologist tomorrow to determine ongoing plan.      ER course: Hemodynamically stable, afebrile, SpO2 90s on room air.  WBC 28.9, Hgb 11.0/Hct34.1 (Hgb 15.1 4/4/2023), platelets 446. Glucose 107, Sodium 126, Chloride 94, calcium 8.5, albumin 2.4, alkaline phosphatase 231.  Lactate 1.4.  BNP 74.  High-sensitivity troponin 7.  UA unremarkable. CT chest showed moderate to large bilateral pleural effusion with adjacent presumed compressive atelectasis, prominent main pulmonary artery which may indicate pulmonary hypertension, mildly prominent mediastinal lymph nodes measuring up to 10 mm in short axis concerning for metastatic disease.  Moderate hiatal hernia with partial intrathoracic stomach and the peritoneal fat adjacent to the stomach within the hernia demonstrates evidence of probable carcinomatosis and ascites.   Redemonstration of large volume ascites with regions of significant anterior omental caking and peritoneal carcinomatosis commensurate with patient's provided diagnosis of ovarian cancer. Regions of wall thickening of the colon extending from the splenic flexure to the sigmoid.  Extensive region of scalloping of the right hepatic lobe peripherally with appearance of large subcapsular collection along the right hepatic margin. Blood culture in process          Objective     Last Recorded Vitals  /75   Pulse 87   Temp 36.6 °C (97.9 °F) (Temporal)   Resp 16   Wt 104 kg (230 lb)   SpO2 97%   Intake/Output last 3 Shifts:    Intake/Output Summary (Last 24 hours) at 10/5/2024 1116  Last data filed at 10/5/2024 1033  Gross per 24 hour   Intake 1165 ml   Output 1350 ml   Net -185 ml       Admission Weight  Weight: 104 kg (230 lb) (10/01/24 0828)    Daily Weight  10/01/24 : 104 kg (230 lb)    Image Results  US guided abdominal paracentesis  Narrative: Interpreted By:  Peri Lawrence,   STUDY:  US GUIDED ABDOMINAL PARACENTESIS; ;  10/4/2024 3:02 pm      INDICATION:  Signs/Symptoms:Paracentesis.          COMPARISON:  None.      ACCESSION NUMBER(S):  [id number]      ORDERING CLINICIAN:  SHAKIRA PAEZ      TECHNIQUE:  Multiple grayscale sonographic images of the abdomen were obtained in  an attempt to perform paracentesis.      FINDINGS:  There is minimal ascites. The risks of performing paracentesis  outweighs the benefits. Procedure aborted.      Impression: Minimal ascites. The risks of performing paracentesis outweighs the  benefits.          MACRO:  None      Signed by: Peri Lawrence 10/4/2024 3:06 PM  Dictation workstation:   [id number]  XR chest 1 view  Narrative: Interpreted By:  Devin Garsia,   STUDY:  XR CHEST 1 VIEW;  10/3/2024 6:58 pm      INDICATION:  Signs/Symptoms:sob.      COMPARISON:  10/01/2024      ACCESSION NUMBER(S):  [id number]      ORDERING CLINICIAN:  SHAKIRA PAEZ       FINDINGS:  CARDIOMEDIASTINAL SILHOUETTE AND VASCULATURE:      Cardiac size:  Within normal limits considering a shallow inspiration.  Aortic shadow:  Within normal limits.      Mediastinal contours: Within normal limits.      Pulmonary vasculature:  The central vasculature is unremarkable      LUNGS:  There is a retrocardiac lucency indicating small to moderate hiatal  hernia. Probable left basilar atelectasis, increased.  The lungs otherwise are clear.      ABDOMEN AND OTHER FINDINGS:  No remarkable upper abdominal findings.      BONES:  No acute osseous changes.      Impression: 1.  Left basilar atelectasis.      Signed by: Devin Garsia 10/4/2024 8:41 AM  Dictation workstation:   [id number]    Results from last 7 days   Lab Units 10/05/24  0505   WBC AUTO x10*3/uL 21.7*   HEMOGLOBIN g/dL 9.5*   HEMATOCRIT % 31.3*   PLATELETS AUTO x10*3/uL 388      Results from last 7 days   Lab Units 10/05/24  0505   SODIUM mmol/L 128*   POTASSIUM mmol/L 4.0   CHLORIDE mmol/L 95*   CO2 mmol/L 27   BUN mg/dL 17   CREATININE mg/dL 0.66   GLUCOSE mg/dL 99   CALCIUM mg/dL 8.1*      In the review of systems weakness lethargy no vomiting abdomen still distended they could not get any fluid from paracentesis no fever    Physical Exam  Lungs are diminished but clear heart has a regular rate and rhythm abdomen is soft this is soft but is large round they could not elicit any fluid on paracentesis  Relevant Results               Assessment/Plan   This patient currently has cardiac telemetry ordered; if you would like to modify or discontinue the telemetry order, click here to go to the orders activity to modify/discontinue the order.              Assessment & Plan  Hyponatremia    Ovarian cancer with peritoneal carcinomatosis  #Ascites  #Bilateral pleural effusions  # Suspected liver abscess  #Abdominal pain     Telemetry monitoring  Consult to IR for paracentesis and possible drainage of liver abscess  Consult to pulmonology for  bilateral pleural effusions  Antiemetics as needed  Analgesics as needed  Patient needs to be followed up with by her gynecological oncologist to determine optimal plan going forward for treatment of her ovarian cancer     #Hyponatremia  #Generalized edema/anasarca  #Hypoalbuminemia  Patient is fluid overloaded and has significant third spacing, suspect hypervolemia hyponatremia.  Will check urine electrolytes, urine osmolality, and serum osmolality  Fluid restriction of 1500 ml for the time being.  Consider starting diuretics, defer to attending  Repeat labs in a.m.     #debility  PT/OT  Social work for discharge planning     Chronic issues:  #Obesity  #Hyperlipidemia  #Osteoarthritis     Continue with patient's home medications as appropriate     #DVT prophylaxis  SCDs  Lovenox subcutaneous                 Gian Sampson DO

## 2024-10-06 ENCOUNTER — APPOINTMENT (OUTPATIENT)
Dept: RADIOLOGY | Facility: HOSPITAL | Age: 79
DRG: 754 | End: 2024-10-06
Payer: MEDICARE

## 2024-10-06 VITALS
SYSTOLIC BLOOD PRESSURE: 122 MMHG | HEART RATE: 93 BPM | BODY MASS INDEX: 39.27 KG/M2 | OXYGEN SATURATION: 95 % | RESPIRATION RATE: 18 BRPM | HEIGHT: 64 IN | TEMPERATURE: 95.9 F | DIASTOLIC BLOOD PRESSURE: 60 MMHG | WEIGHT: 230 LBS

## 2024-10-06 LAB
BASOPHILS # BLD AUTO: 0.1 X10*3/UL (ref 0–0.1)
BASOPHILS NFR BLD AUTO: 0.4 %
EOSINOPHIL # BLD AUTO: 0.43 X10*3/UL (ref 0–0.4)
EOSINOPHIL NFR BLD AUTO: 1.9 %
ERYTHROCYTE [DISTWIDTH] IN BLOOD BY AUTOMATED COUNT: 16 % (ref 11.5–14.5)
HCT VFR BLD AUTO: 31.3 % (ref 36–46)
HGB BLD-MCNC: 9.5 G/DL (ref 12–16)
HOLD SPECIMEN: NORMAL
IMM GRANULOCYTES # BLD AUTO: 0.4 X10*3/UL (ref 0–0.5)
IMM GRANULOCYTES NFR BLD AUTO: 1.8 % (ref 0–0.9)
LYMPHOCYTES # BLD AUTO: 1.12 X10*3/UL (ref 0.8–3)
LYMPHOCYTES NFR BLD AUTO: 5 %
MCH RBC QN AUTO: 25.7 PG (ref 26–34)
MCHC RBC AUTO-ENTMCNC: 30.4 G/DL (ref 32–36)
MCV RBC AUTO: 85 FL (ref 80–100)
MONOCYTES # BLD AUTO: 1.3 X10*3/UL (ref 0.05–0.8)
MONOCYTES NFR BLD AUTO: 5.8 %
NEUTROPHILS # BLD AUTO: 19.17 X10*3/UL (ref 1.6–5.5)
NEUTROPHILS NFR BLD AUTO: 85.1 %
NRBC BLD-RTO: 0 /100 WBCS (ref 0–0)
PLATELET # BLD AUTO: 391 X10*3/UL (ref 150–450)
RBC # BLD AUTO: 3.7 X10*6/UL (ref 4–5.2)
WBC # BLD AUTO: 22.5 X10*3/UL (ref 4.4–11.3)

## 2024-10-06 PROCEDURE — 2500000001 HC RX 250 WO HCPCS SELF ADMINISTERED DRUGS (ALT 637 FOR MEDICARE OP): Performed by: PHYSICIAN ASSISTANT

## 2024-10-06 PROCEDURE — 71045 X-RAY EXAM CHEST 1 VIEW: CPT | Performed by: STUDENT IN AN ORGANIZED HEALTH CARE EDUCATION/TRAINING PROGRAM

## 2024-10-06 PROCEDURE — 97110 THERAPEUTIC EXERCISES: CPT | Mod: GP,CQ

## 2024-10-06 PROCEDURE — 2500000001 HC RX 250 WO HCPCS SELF ADMINISTERED DRUGS (ALT 637 FOR MEDICARE OP): Performed by: NURSE PRACTITIONER

## 2024-10-06 PROCEDURE — 85025 COMPLETE CBC W/AUTO DIFF WBC: CPT | Performed by: INTERNAL MEDICINE

## 2024-10-06 PROCEDURE — 2500000005 HC RX 250 GENERAL PHARMACY W/O HCPCS: Performed by: RADIOLOGY

## 2024-10-06 PROCEDURE — 97530 THERAPEUTIC ACTIVITIES: CPT | Mod: GP,CQ

## 2024-10-06 PROCEDURE — 2500000004 HC RX 250 GENERAL PHARMACY W/ HCPCS (ALT 636 FOR OP/ED): Performed by: NURSE PRACTITIONER

## 2024-10-06 PROCEDURE — 71045 X-RAY EXAM CHEST 1 VIEW: CPT

## 2024-10-06 PROCEDURE — 1200000002 HC GENERAL ROOM WITH TELEMETRY DAILY

## 2024-10-06 PROCEDURE — 36415 COLL VENOUS BLD VENIPUNCTURE: CPT | Performed by: INTERNAL MEDICINE

## 2024-10-06 PROCEDURE — 2500000002 HC RX 250 W HCPCS SELF ADMINISTERED DRUGS (ALT 637 FOR MEDICARE OP, ALT 636 FOR OP/ED): Performed by: NURSE PRACTITIONER

## 2024-10-06 RX ORDER — CALCIUM CARBONATE 200(500)MG
500 TABLET,CHEWABLE ORAL 2 TIMES DAILY PRN
Status: DISPENSED | OUTPATIENT
Start: 2024-10-06

## 2024-10-06 ASSESSMENT — PAIN DESCRIPTION - LOCATION
LOCATION: ABDOMEN

## 2024-10-06 ASSESSMENT — COGNITIVE AND FUNCTIONAL STATUS - GENERAL
MOBILITY SCORE: 13
CLIMB 3 TO 5 STEPS WITH RAILING: A LOT
MOVING FROM LYING ON BACK TO SITTING ON SIDE OF FLAT BED WITH BEDRAILS: A LITTLE
WALKING IN HOSPITAL ROOM: A LOT
TURNING FROM BACK TO SIDE WHILE IN FLAT BAD: A LOT
MOVING TO AND FROM BED TO CHAIR: A LOT
STANDING UP FROM CHAIR USING ARMS: A LOT

## 2024-10-06 ASSESSMENT — PAIN - FUNCTIONAL ASSESSMENT
PAIN_FUNCTIONAL_ASSESSMENT: 0-10

## 2024-10-06 ASSESSMENT — PAIN DESCRIPTION - ORIENTATION
ORIENTATION: RIGHT;MID
ORIENTATION: RIGHT

## 2024-10-06 ASSESSMENT — PAIN SCALES - GENERAL
PAINLEVEL_OUTOF10: 1
PAINLEVEL_OUTOF10: 6
PAINLEVEL_OUTOF10: 0 - NO PAIN
PAINLEVEL_OUTOF10: 6
PAINLEVEL_OUTOF10: 7
PAINLEVEL_OUTOF10: 0 - NO PAIN
PAINLEVEL_OUTOF10: 2
PAINLEVEL_OUTOF10: 2
PAINLEVEL_OUTOF10: 6

## 2024-10-06 ASSESSMENT — PAIN SCALES - WONG BAKER: WONGBAKER_NUMERICALRESPONSE: HURTS LITTLE MORE

## 2024-10-06 ASSESSMENT — PAIN DESCRIPTION - DESCRIPTORS: DESCRIPTORS: ACHING;DULL

## 2024-10-06 NOTE — PROGRESS NOTES
Physical Therapy    Physical Therapy Treatment    Patient Name: Rohini Beaver  MRN: [id number]  Department: [facility]  Room: [address] [place] [address]  Today's Date: 10/6/2024  Time Calculation  Start Time: 0837  Stop Time: 0907  Time Calculation (min): 30 min         Assessment/Plan   PT Assessment  End of Session Communication: Bedside nurse  End of Session Patient Position: Bed, 2 rail up, Alarm on (call light and needs within reach.)     PT Plan  Treatment/Interventions: Bed mobility, Transfer training, Gait training  PT Plan: Ongoing PT  PT Frequency: 4 times per week  PT Discharge Recommendations: Moderate intensity level of continued care  PT - OK to Discharge: Yes      General Visit Information:   PT  Visit  PT Received On: 10/06/24  General  Prior to Session Communication: Bedside nurse  Patient Position Received: Bed, 3 rail up, Alarm on  General Comment: Patient pleasant and agreeable to therapy with encouragement.    Subjective   Precautions:  Precautions  Medical Precautions: Fall precautions  Precautions Comment: fear of falling    Objective   Pain:  Pain Assessment  Pain Assessment: 0-10  0-10 (Numeric) Pain Score: 0 - No pain  Cognition:  Cognition  Orientation Level: Oriented X4  Treatments:  Therapeutic Exercise  Therapeutic Exercise Performed: Yes  Therapeutic Exercise Activity 1: Patient performed seated therex, BLE: APs, hip flexion, and LAQ all [id number]    Therapeutic Activity  Therapeutic Activity Performed: Yes  Therapeutic Activity 1: Patient tolerated sitting EOB with Merary x1. Patient demo's retrolean, cues to correct.  Therapeutic Activity 2: Patient able to take x4 small side steps toward L with FWW for support and ModA x1. Increased time and effort to perform, minimal off loading noted.    Bed Mobility  Bed Mobility: Yes  Bed Mobility 1  Bed Mobility 1: Supine to sitting  Level of Assistance 1: Moderate assistance  Bed Mobility Comments 1: HOB elevated, assist with B LE/hips towards EOB  Bed Mobility  2  Bed Mobility  2: Sitting to supine  Level of Assistance 2: Maximum assistance, +2    Transfers  Transfer: Yes  Transfer 1  Trials/Comments 1: Patient performed sit<>stand from EOB with ModA/MaxA x1. Cues for hand placement and anterior WS to bring COM over ABNER.    Outcome Measures:  [facility] Basic Mobility  Turning from your back to your side while in a flat bed without using bedrails: A little  Moving from lying on your back to sitting on the side of a flat bed without using bedrails: A lot  Moving to and from bed to chair (including a wheelchair): A lot  Standing up from a chair using your arms (e.g. wheelchair or bedside chair): A lot  To walk in hospital room: A lot  Climbing 3-5 steps with railing: A lot  Basic Mobility - Total Score: 13    Education Documentation  Mobility Training, taught by Desi Cordon PTA at 10/6/2024  1:01 PM.  Learner: Patient  Readiness: Acceptance  Method: Explanation  Response: Verbalizes Understanding  Comment: Educated patient on benefits of therapy and OOB activities.    Education Comments  No comments found.        OP EDUCATION:       Encounter Problems       Encounter Problems (Active)       PT Problem       STG - Pt will transition supine <> sitting with min A x 1  (Progressing)       Start:  10/02/24    Expected End:  10/16/24            STG - Pt will transfer STS with mod A x 1  (Progressing)       Start:  10/02/24    Expected End:  10/16/24            STG - Pt will amb 25' using RW with mod A x 1  (Progressing)       Start:  10/02/24    Expected End:  10/16/24               Pain - Adult

## 2024-10-06 NOTE — CARE PLAN
The patient's goals for the shift include rest and pain control    The clinical goals for the shift include Pain management.    Problem: Skin  Goal: Decreased wound size/increased tissue granulation at next dressing change  Outcome: Progressing  Flowsheets (Taken 10/5/2024 2225)  Decreased wound size/increased tissue granulation at next dressing change: Promote sleep for wound healing  Goal: Participates in plan/prevention/treatment measures  Outcome: Progressing  Flowsheets (Taken 10/5/2024 2225)  Participates in plan/prevention/treatment measures: Elevate heels  Goal: Prevent/manage excess moisture  Outcome: Progressing  Flowsheets (Taken 10/5/2024 2225)  Prevent/manage excess moisture: Moisturize dry skin  Goal: Prevent/minimize sheer/friction injuries  Outcome: Progressing  Flowsheets (Taken 10/5/2024 2225)  Prevent/minimize sheer/friction injuries:   HOB 30 degrees or less   Turn/reposition every 2 hours/use positioning/transfer devices  Goal: Promote/optimize nutrition  Outcome: Progressing  Flowsheets (Taken 10/5/2024 2225)  Promote/optimize nutrition: Consume > 50% meals/supplements  Goal: Promote skin healing  Outcome: Progressing  Flowsheets (Taken 10/5/2024 2225)  Promote skin healing:   Assess skin/pad under line(s)/device(s)   Protective dressings over bony prominences   Turn/reposition every 2 hours/use positioning/transfer devices

## 2024-10-06 NOTE — PROGRESS NOTES
History Of Present Illness  Rohini Beaver is a 79 y.o. 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  Her weakness has been gradually progressive over the past few weeks.  Yesterday, she endorsed feeling warm and as well as some right lower quadrant pain.  She reports some trouble breathing which improves after paracenteses.  Her first paracentesis was in early August, shortly after her ovarian cancer diagnosis.  Day of consult, patient is breathing on room air.  Vital stable.  CT chest showed bilateral large pleural effusions.     Past Medical History  She has a past medical history of Bilateral primary osteoarthritis of knee, HLD (hyperlipidemia), Lumbosacral radiculopathy, Meralgia paraesthetica, Obesity, and Physical deconditioning.    Surgical History  She has a past surgical history that includes Total knee arthroplasty (Left, 2019); Cholecystectomy; Corneal transplant; Lumbar fusion; Dilation and curettage of uterus; Shoulder arthroscopy (Left); Colonoscopy; Skin lesion excision; Trigger finger release (Right); Arthroplasty (Left); Breast biopsy (1999); and Total knee arthroplasty (Right, 2017).     Social History  She reports that she has never smoked. She has never used smokeless tobacco. She reports that she does not currently use alcohol. She reports that she does not use drugs.    Family History  Family History   Problem Relation Name Age of Onset    Colon cancer Mother      Lung cancer Father      Other (Mesothelioma) Brother      Brain cancer Mother's Brother          Allergies  Patient has no known allergies.    Review of Systems  A complete 10 point review of systems was completed and is otherwise negative unless stated.        Physical Exam  Physical Exam:  General:  Pleasant and cooperative. No apparent distress.  HEENT:  Normocephalic, atraumatic, mucus membranes moist.   Neck:  Trachea  midline.  Chest:  Clear to auscultation bilaterally.   CV:  Regular rate and rhythm.  Positive S1/S2.   Abdomen: Abdomen grossly distended, slight pain to palpation  Extremities: Bilateral lower extremity edema  Neurological:  AAOx3. No focal deficits.  Skin:  Warm and dry.         Last Recorded Vitals  /54 (BP Location: Right arm, Patient Position: Lying)   Pulse 86   Temp 35.9 °C (96.6 °F) (Temporal)   Resp 18   Wt 104 kg (230 lb)   SpO2 93%        Assessment/Plan         Bilateral pleural effusion  Hepatic abscess versus cyst  Abdominal ascites  Ovarian carcinoma w/ peritoneal carcinomatosis carcinomatosis  HLD  Plan:  Attempted paracentesis showed small ascites fluid in IR.    Will repeat chest x-ray  Patient has bilateral pleural effusion in the context of malignant ascites/ovarian cancer  I explained to the patient the pleural effusion most likely related to recurrent ascites, patient is requiring so far 5 steps malignant ascites in the last month most likely beneficial approaches intra-abdominal Pleurx catheter  Abdominal Pleurx catheter would be the most effective way of preventing pleural effusions and ascites.  Pleural effusions are secondary to ascites, both of which are due to patient's underlying cancer  Continue with goals of care discussions prior to interventional radiology consult  Follow hepatic fluid analysis  Continue antibiotics    Skyler Escobar MD  Pulmonary critical care consultant  10/06/24  1:31 PM

## 2024-10-06 NOTE — PROGRESS NOTES
Rohini Beaver is a 79 y.o. female on day 5 of admission presenting with Hyponatremia.      Subjective    Patient fully evaluated 10/3, awake, resting in bed. Patient with Moderate hiatal hernia with partial intrathoracic stomach and the peritoneal fat adjacent to the stomach within the hernia demonstrates evidence of probable carcinomatosis and ascites, Patient tolerated paracentesis on 10/01 well,order placed for repeat paracentesis therapeutic non diagnostic with IR.continue current and repeat labs in the AM. Patient aware and agreeable to current plan, continue plan as above. I spent 50 minutes in the professional and overall care of this patient.   Objective     Last Recorded Vitals  /54 (BP Location: Right arm, Patient Position: Lying)   Pulse 86   Temp 35.9 °C (96.6 °F) (Temporal)   Resp 18   Wt 104 kg (230 lb)   SpO2 93%   Intake/Output last 3 Shifts:    Intake/Output Summary (Last 24 hours) at 10/6/2024 1325  Last data filed at 10/6/2024 1006  Gross per 24 hour   Intake 1165 ml   Output 650 ml   Net 515 ml       Admission Weight  Weight: 104 kg (230 lb) (10/01/24 0828)    Daily Weight  10/01/24 : 104 kg (230 lb)    Image Results  US guided abdominal paracentesis  Narrative: Interpreted By:  Peri Lawrence,   STUDY:  US GUIDED ABDOMINAL PARACENTESIS; ;  10/4/2024 3:02 pm      INDICATION:  Signs/Symptoms:Paracentesis.          COMPARISON:  None.      ACCESSION NUMBER(S):  [id number]      ORDERING CLINICIAN:  SHAKIRA PAEZ      TECHNIQUE:  Multiple grayscale sonographic images of the abdomen were obtained in  an attempt to perform paracentesis.      FINDINGS:  There is minimal ascites. The risks of performing paracentesis  outweighs the benefits. Procedure aborted.      Impression: Minimal ascites. The risks of performing paracentesis outweighs the  benefits.          MACRO:  None      Signed by: Peri Lawrence 10/4/2024 3:06 PM  Dictation workstation:   [id number]  XR chest 1  view  Narrative: Interpreted By:  Devin Garsia,   STUDY:  XR CHEST 1 VIEW;  10/3/2024 6:58 pm      INDICATION:  Signs/Symptoms:sob.      COMPARISON:  10/01/2024      ACCESSION NUMBER(S):  [id number]      ORDERING CLINICIAN:  JD PAEZ      FINDINGS:  CARDIOMEDIASTINAL SILHOUETTE AND VASCULATURE:      Cardiac size:  Within normal limits considering a shallow inspiration.  Aortic shadow:  Within normal limits.      Mediastinal contours: Within normal limits.      Pulmonary vasculature:  The central vasculature is unremarkable      LUNGS:  There is a retrocardiac lucency indicating small to moderate hiatal  hernia. Probable left basilar atelectasis, increased.  The lungs otherwise are clear.      ABDOMEN AND OTHER FINDINGS:  No remarkable upper abdominal findings.      BONES:  No acute osseous changes.      Impression: 1.  Left basilar atelectasis.      Signed by: Devin Garsia 10/4/2024 8:41 AM  Dictation workstation:   [id number]      Physical Exam    Relevant Results               Assessment/Plan   This patient currently has cardiac telemetry ordered; if you would like to modify or discontinue the telemetry order, click here to go to the orders activity to modify/discontinue the order.          dJ Paez MD   Physician  Internal Medicine     H&P      Addendum     Date of Service: 10/1/2024  2:54 PM     Addendum       Expand All Collapse All    History Of Present Illness  Rohini Beaver is a 79-year-old female with past medical history of recently diagnosed ovarian cancer with peritoneal carcinomatosis s/p paracentesis x 4 (last one done 9/19/24), obesity, hyperlipidemia, osteoarthritis, and skin cancer s/p Mohs procedure.  Patient presents to the hospital with weakness and inability to care for herself.  She reports that she was at [facility] nursing [facility], however had a brief hospitalization at [facility] and upon discharge decided to go home rather than go back to [facility].  She lives with her 86-year-old sister and has a couple friends who assist with appointments, however limited support system.  Sister unable to care for due to age.  Today she was in urgent reclining chair and was able to get up.  ROS additionally positive for cold sweats, distended and tender abdomen, edema, pressure injury to coccyx/gluteal fold, and decreased activity tolerance.  Denies history of heart disease.  She reports that she is scheduled for outpatient appointment with her oncologist tomorrow to determine ongoing plan.      ER course: Hemodynamically stable, afebrile, SpO2 90s on room air.  WBC 28.9, Hgb 11.0/Hct34.1 (Hgb 15.1 4/4/2023), platelets 446. Glucose 107, Sodium 126, Chloride 94, calcium 8.5, albumin 2.4, alkaline phosphatase 231.  Lactate 1.4.  BNP 74.  High-sensitivity troponin 7.  UA unremarkable. CT chest showed moderate to large bilateral pleural effusion with adjacent presumed compressive atelectasis, prominent main pulmonary artery which may indicate pulmonary hypertension, mildly prominent mediastinal lymph nodes measuring up to 10 mm in short axis concerning for metastatic disease.  Moderate hiatal hernia with partial intrathoracic stomach and the peritoneal fat adjacent to the stomach within the hernia demonstrates evidence of probable carcinomatosis and ascites.  Redemonstration of large volume ascites with regions of significant anterior omental caking and peritoneal carcinomatosis commensurate with patient's provided diagnosis of ovarian cancer. Regions of wall thickening of the colon extending from the splenic flexure to the sigmoid.  Extensive region of scalloping of the right hepatic lobe peripherally with appearance of large subcapsular collection along the right hepatic margin. Blood culture in process     Past medical history: As above  Past surgical history: Cholecystectomy, lumbar laminectomy, left shoulder arthroscopy, right total knee replacement, corneal  transplant  Social history: No history of smoking, alcohol abuse, illicit drug use.  Lives with her elderly sister who is 86 years old.  Limited support system.   Family history: Mother-cancer (unknown type)     Past Medical History  Medical History        Past Medical History:   Diagnosis Date    Bilateral primary osteoarthritis of knee      HLD (hyperlipidemia)      Lumbosacral radiculopathy      Meralgia paraesthetica      Physical deconditioning              Surgical History  Surgical History         Past Surgical History:   Procedure Laterality Date    ARTHROPLASTY Left       Left thumb carpometacarpal arthroplasty with ligament reconstruction and tendon interposition    BREAST BIOPSY   1999     Benign    CHOLECYSTECTOMY        COLONOSCOPY        CORNEAL TRANSPLANT        DILATION AND CURETTAGE OF UTERUS        LUMBAR FUSION        SHOULDER ARTHROSCOPY Left      SKIN LESION EXCISION        TOTAL KNEE ARTHROPLASTY Left 2019    TOTAL KNEE ARTHROPLASTY Right 2017    TRIGGER FINGER RELEASE Right              Social History  She reports that she has never smoked. She has never used smokeless tobacco. She reports that she does not currently use alcohol. She reports that she does not use drugs.     Family History  Family History          Family History   Problem Relation Name Age of Onset    Colon cancer Mother        Lung cancer Father        Other (Mesothelioma) Brother        Brain cancer Mother's Brother                Allergies  Patient has no known allergies.     Review of Systems     10 point ROS negative except as noted above in HPI      Physical Exam  Vitals reviewed.   Constitutional:       General: She is awake. She is not in acute distress.     Appearance: She is obese. She is ill-appearing. She is not toxic-appearing.   HENT:      Head: Normocephalic and atraumatic.      Nose: Nose normal.      Mouth/Throat:      Mouth: Mucous membranes are moist.      Pharynx: Oropharynx is clear.   Eyes:       Conjunctiva/sclera: Conjunctivae normal.   Cardiovascular:      Rate and Rhythm: Normal rate and regular rhythm.      Pulses: Normal pulses.      Heart sounds: No murmur heard.  Pulmonary:      Effort: Pulmonary effort is normal. No respiratory distress.      Breath sounds: Decreased air movement present. Decreased breath sounds present.      Comments: Posterior bilateral breath sounds are diminished  Abdominal:      General: There is distension.      Palpations: Abdomen is soft.      Tenderness: There is abdominal tenderness. There is no guarding.      Comments: Bowel sounds hypoactive, abdomen distended, tender to palpation, dullness noted to the sides.   Musculoskeletal:         General: No swelling, deformity or signs of injury. Normal range of motion.      Cervical back: Neck supple.      Right lower leg: Edema present.      Left lower leg: Edema present.      Comments: Generalized edema/anasarca   Skin:     General: Skin is warm and dry.      Capillary Refill: Capillary refill takes less than 2 seconds.      Findings: No ecchymosis or wound.      Comments: Wound on coccyx, exam deferred  due to patient discomfort    Neurological:      General: No focal deficit present.      Mental Status: She is alert and oriented to person, place, and time.   Psychiatric:         Mood and Affect: Mood normal.         Behavior: Behavior is cooperative.                  Last Recorded Vitals  Blood pressure 117/58, pulse 100, temperature 36.2 °C (97.2 °F), temperature source Temporal, resp. rate (!) 22, weight 104 kg (230 lb), SpO2 94%.     Relevant Results              Results for orders placed or performed during the hospital encounter of 10/01/24 (from the past 24 hour(s))   CBC and Auto Differential   Result Value Ref Range     WBC 28.9 (H) 4.4 - 11.3 x10*3/uL     nRBC 0.0 0.0 - 0.0 /100 WBCs     RBC 4.15 4.00 - 5.20 x10*6/uL     Hemoglobin 11.0 (L) 12.0 - 16.0 g/dL     Hematocrit 34.1 (L) 36.0 - 46.0 %     MCV 82 80 - 100  fL     MCH 26.5 26.0 - 34.0 pg     MCHC 32.3 32.0 - 36.0 g/dL     RDW 15.7 (H) 11.5 - 14.5 %     Platelets 446 150 - 450 x10*3/uL     Neutrophils % 89.1 40.0 - 80.0 %     Immature Granulocytes %, Automated 1.0 (H) 0.0 - 0.9 %     Lymphocytes % 4.3 13.0 - 44.0 %     Monocytes % 4.8 2.0 - 10.0 %     Eosinophils % 0.5 0.0 - 6.0 %     Basophils % 0.3 0.0 - 2.0 %     Neutrophils Absolute 25.74 (H) 1.60 - 5.50 x10*3/uL     Immature Granulocytes Absolute, Automated 0.30 0.00 - 0.50 x10*3/uL     Lymphocytes Absolute 1.23 0.80 - 3.00 x10*3/uL     Monocytes Absolute 1.38 (H) 0.05 - 0.80 x10*3/uL     Eosinophils Absolute 0.14 0.00 - 0.40 x10*3/uL     Basophils Absolute 0.09 0.00 - 0.10 x10*3/uL   Comprehensive metabolic panel   Result Value Ref Range     Glucose 107 (H) 74 - 99 mg/dL     Sodium 126 (L) 136 - 145 mmol/L     Potassium 5.2 3.5 - 5.3 mmol/L     Chloride 94 (L) 98 - 107 mmol/L     Bicarbonate 24 21 - 32 mmol/L     Anion Gap 13 10 - 20 mmol/L     Urea Nitrogen 18 6 - 23 mg/dL     Creatinine 0.61 0.50 - 1.05 mg/dL     eGFR >90 >60 mL/min/1.73m*2     Calcium 8.5 (L) 8.6 - 10.3 mg/dL     Albumin 2.4 (L) 3.4 - 5.0 g/dL     Alkaline Phosphatase 231 (H) 33 - 136 U/L     Total Protein 5.7 (L) 6.4 - 8.2 g/dL     AST 23 9 - 39 U/L     Bilirubin, Total 0.3 0.0 - 1.2 mg/dL     ALT 8 7 - 45 U/L   Magnesium   Result Value Ref Range     Magnesium 1.68 1.60 - 2.40 mg/dL   Troponin I, High Sensitivity   Result Value Ref Range     Troponin I, High Sensitivity 7 0 - 13 ng/L   B-Type Natriuretic Peptide   Result Value Ref Range     BNP 74 0 - 99 pg/mL   Sars-CoV-2 PCR   Result Value Ref Range     Coronavirus 2019, PCR Not Detected Not Detected   Lactate   Result Value Ref Range     Lactate 1.4 0.4 - 2.0 mmol/L   Urinalysis with Reflex Culture and Microscopic   Result Value Ref Range     Color, Urine Light-Yellow Light-Yellow, Yellow, Dark-Yellow     Appearance, Urine Clear Clear     Specific Gravity, Urine >1.050 (N) 1.005 - 1.035      pH, Urine 6.0 5.0, 5.5, 6.0, 6.5, 7.0, 7.5, 8.0     Protein, Urine 20 (TRACE) NEGATIVE, 10 (TRACE), 20 (TRACE) mg/dL     Glucose, Urine Normal Normal mg/dL     Blood, Urine NEGATIVE NEGATIVE     Ketones, Urine NEGATIVE NEGATIVE mg/dL     Bilirubin, Urine NEGATIVE NEGATIVE     Urobilinogen, Urine Normal Normal mg/dL     Nitrite, Urine NEGATIVE NEGATIVE     Leukocyte Esterase, Urine NEGATIVE NEGATIVE   Urinalysis Microscopic   Result Value Ref Range     WBC, Urine 1-5 1-5, NONE /HPF     RBC, Urine 1-2 NONE, 1-2, 3-5 /HPF     Squamous Epithelial Cells, Urine 1-9 (SPARSE) Reference range not established. /HPF     Mucus, Urine FEW Reference range not established. /LPF   Protime-INR   Result Value Ref Range     Protime 13.0 (H) 9.8 - 12.8 seconds     INR 1.2 (H) 0.9 - 1.1   APTT   Result Value Ref Range     aPTT 24 (L) 27 - 38 seconds      CT chest abdomen pelvis w IV contrast     Result Date: 10/1/2024  Interpreted By:  Oscar Aldrich, STUDY: CT CHEST ABDOMEN PELVIS W IV CONTRAST;  10/1/2024 11:13 am   INDICATION: Signs/Symptoms:leukocytosis, ascites, recent ovarian cancer diagnosis.   COMPARISON: CT abdomen pelvis 08/08/2024   ACCESSION NUMBER(S): [id number]   ORDERING CLINICIAN: ASHLEY FAIRBANKS   TECHNIQUE: CT of the chest, abdomen, and pelvis was performed.  Contiguous axial images were obtained at 3 mm slice thickness through the chest, abdomen and pelvis. Coronal and sagittal reconstructions at 3 mm slice thickness were performed. ml of contrast  were administered intravenously without immediate complication.   FINDINGS: CHEST:   LUNG/PLEURA/LARGE AIRWAYS: No endobronchial lesion is seen.   Moderate to large bilateral pleural effusions with adjacent consolidative opacification of the bilateral lower lobes suggestive of compressive atelectasis. No pneumothorax. Mild asymmetric scattered reticular changes suggestive of chronic lung findings.     VESSELS: The thoracic aorta is unremarkable with respect course,  caliber, and contour.   Prominent main pulmonary artery measuring up to 3.2 cm in anterior-posterior dimension measured on sagittal plane which may indicate sequela of pulmonary hypertension.   No significant coronary atherosclerotic calcifications are seen.   HEART: Heart size within normal limits. No pericardial effusion.   MEDIASTINUM AND OLIVE: Mildly prominent mediastinal lymph nodes measuring up to 10 mm in short axis   Moderate hiatal hernia with partial intrathoracic stomach and ascites and region of nodularity of the peritoneal fat within hiatal hernia suggestive carcinomatosis.   CHEST WALL AND LOWER NECK: No acute osseous abnormality. Multilevel presumed degenerative changes throughout the imaged spine. No acute soft tissue abnormality.   ABDOMEN:   LIVER: There is been interval scalloping of the right hepatic margins with new regions of irregularity along the right hepatic capsule diffusely which is new since comparison imaging from 08/08/2024 there is a new elongated subcapsular collection measuring up to approximately 11.4 x 2.1 cm, difficult to measure given curvilinear projection extending over the right hepatic lobe margin (series 202, images 40-70). Similar appearing subcentimeter left hepatic lobe hypodense lesion.   BILE DUCTS: No obvious new intrahepatic biliary dilatation. Mild prominence of the common bile duct, nonspecific in a cholecystectomy patient.   GALLBLADDER: Absent.   PANCREAS: Unremarkable.   SPLEEN: Unchanged.   ADRENAL GLANDS: Unchanged.   KIDNEYS AND URETERS: Similar appearing subcentimeter right renal lower pole calculus. No hydronephrosis. No obstructing urolithiasis.   PELVIS:   BLADDER: Unremarkable.   REPRODUCTIVE ORGANS: Uterus appears grossly unchanged.   BOWEL: Moderate hiatal hernia with wall thickening of the stomach in the hernia. No new pathologic distention of bowel. Wall thickening of the colon within the splenic flexure descending colon and sigmoid colon,  nonspecific.     VESSELS: No evidence of abdominal aortic aneurysm.   PERITONEUM/RETROPERITONEUM/LYMPH NODES: Large volume ascites with extensive regions of anterior omental caking and peritoneal carcinomatosis. No obvious free intraperitoneal gas. Given the presence of extensive omental caking and peritoneal carcinomatosis, superimposed peritoneal enhancement 4 other causes cannot be excluded.   BONE AND SOFT TISSUE: No acute osseous abnormality. Osseous structures appear stable. No acute abnormality of the abdominal wall soft tissues.        CHEST: 1.  Moderate to large bilateral pleural effusions with adjacent presumed compressive atelectasis. 2. Prominent main pulmonary artery which may indicate pulmonary hypertension. 3. Mildly prominent mediastinal lymph nodes measuring up to 10 mm in short axis concerning for metastatic disease. 4. Moderate hiatal hernia with partial intrathoracic stomach. The peritoneal fat adjacent to the stomach within the hernia demonstrates evidence of probable carcinomatosis and ascites.   ABDOMEN-PELVIS: 1.  Redemonstration of large volume ascites with regions of significant anterior omental caking and peritoneal carcinomatosis commensurate with patient's provided diagnosis of ovarian cancer. 2. Regions of wall thickening of the colon extending from the splenic flexure to the sigmoid which may indicate a nonspecific colitis. 3. Since the previous examination on 08/08/2024, there are now extensive regions of scalloping of the right hepatic lobe peripherally with the appearance of a large subcapsular collection along the right hepatic margin as above. The sterility of this collection cannot be assessed via CT and clinical correlation is advised for exclusion superimposed infection within this region.     Signed by: Oscar Aldrich 10/1/2024 12:14 PM Dictation workstation:   [id number]     XR chest 1 view     Result Date: 10/1/2024  Interpreted By:  Samuel Jaramillo, STUDY: XR CHEST 1  VIEW; 10/1/2024 8:44 am   INDICATION: Signs/Symptoms:weakness, edema in legs and abdomen   COMPARISON: April 2023.   ACCESSION NUMBER(S): [id number]   ORDERING CLINICIAN: ASHLEY FAIRBANKS   FINDINGS: The study is limited due to rotation and poor inspiratory effort, with resultant crowding of the pulmonary vasculature. The cardiac silhouette is within normal limits for the technique. Moderate size hiatal hernia is again seen. There is no pneumothorax, confluent infiltrates or significant effusion. Degenerative changes involve the spine and shoulders; metallic anchors again noted over the left humeral head related to previous rotator cuff repair.        Limited study. Hiatal hernia. No acute cardiopulmonary disease.   Signed by: Samuel Jaramillo 10/1/2024 9:22 AM Dictation workstation:   [id number]     US guided abdominal paracentesis     Result Date: 9/20/2024  Interpreted By:  Peri Lawrence and Kamau Nyokabi STUDY: US GUIDED ABDOMINAL PARACENTESIS; US GUIDED PERCUTANEOUS ABDOMINAL RETROPERITONEUM BIOPSY;  9/19/2024 11:13 am   INDICATION: Signs/Symptoms:ascites; Signs/Symptoms:ascites, evaluate ovarian cancer.   COMPARISON: CT abdomen and pelvis 08/08/2024   ACCESSION NUMBER(S): [id number]; [id number]   ORDERING CLINICIAN: JEANETTE ARIAS   TECHNIQUE: INTERVENTIONALIST(S): Dr. Peri Lawrence MD   CONSENT: The patient was informed of the nature of the proposed procedure. The purposes, alternatives, risks, and benefits were explained and discussed. All questions were answered and consent was obtained.   SEDATION: 1% local lidocaine was used for anesthetic.   MEDICATION/CONTRAST: No additional.   TIME OUT:   A time out was performed immediately prior to procedure start with the interventional team, correctly identifying the patient name, date of birth, MRN, procedure, anatomy (including marking of site and side), patient position, procedure consent form, relevant laboratory and imaging test results,  antibiotic administration, safety precautions, and procedure-specific equipment needs.   COMPLICATIONS: No immediate adverse events identified.   FINDINGS: The patient was placed in the supine position. Limited sonographic images of the lower abdominal wall were obtained for purposes of needle guidance, which demonstrated omental soft tissue mass which was seen on prior CT 08/08/2024. The area of concern was prepped and draped under sterile technique.   1% lidocaine was injected subcutaneously. Additional lidocaine was administered into deeper tissues surrounding the targeted area for biopsy using a 22G spinal needle. A small incision was made. Using the same access site a 18 gauge core biopsy needle was passed via a 17 gauge coaxial introducer needle to obtain a total of 1 core sample.   Postprocedure images demonstrate no evidence for hemorrhage. The patient tolerated the procedure well and there were no immediate complications. 1 core specimen was sent to pathology.     Subsequently the right lower quadrant was visualized under ultrasound which demonstrated moderate volume ascites seen on prior CT 08/08/2024. 1% lidocaine was injected subcutaneously at this site. A 19 gauge Yueh was used to target the fluid collection under ultrasound guidance. Once the site was accessed, the inner needle was removed from the catheter. The Yueh catheter was connected to drainage container. Estimated 1000 cc of serosanguineous colored fluid was removed. Post paracentesis imaging demonstrated decreased ascitic fluid. The Yueh catheter was removed. The access site was bandaged.        1. Status post ultrasound guided core needle biopsy of omental mass seen on CT 08/08/2024. 1 core specimens sent to pathology. 2. Successful paracentesis under ultrasound guidance with removal of 1 L of serosanguineous fluid.     I was present for and/or performed the critical portions of the procedure and immediately available throughout the entire  procedure.   I personally reviewed the image(s) / study and interpretation. I agree with the findings as stated.   Performed and dictated at [facility].   MACRO: None   Signed by: Peri Lawrence 9/20/2024 10:06 AM Dictation workstation:   [id number]     US guided percutaneous abdominal retroperitoneum biopsy     Result Date: 9/20/2024  Interpreted By:  Peri Lawrence and Kamau Nyokabi STUDY: US GUIDED ABDOMINAL PARACENTESIS; US GUIDED PERCUTANEOUS ABDOMINAL RETROPERITONEUM BIOPSY;  9/19/2024 11:13 am   INDICATION: Signs/Symptoms:ascites; Signs/Symptoms:ascites, evaluate ovarian cancer.   COMPARISON: CT abdomen and pelvis 08/08/2024   ACCESSION NUMBER(S): [id number]; [id number]   ORDERING CLINICIAN: JEANETTE ARIAS   TECHNIQUE: INTERVENTIONALIST(S): Dr. Peri Lawrence MD   CONSENT: The patient was informed of the nature of the proposed procedure. The purposes, alternatives, risks, and benefits were explained and discussed. All questions were answered and consent was obtained.   SEDATION: 1% local lidocaine was used for anesthetic.   MEDICATION/CONTRAST: No additional.   TIME OUT:   A time out was performed immediately prior to procedure start with the interventional team, correctly identifying the patient name, date of birth, MRN, procedure, anatomy (including marking of site and side), patient position, procedure consent form, relevant laboratory and imaging test results, antibiotic administration, safety precautions, and procedure-specific equipment needs.   COMPLICATIONS: No immediate adverse events identified.   FINDINGS: The patient was placed in the supine position. Limited sonographic images of the lower abdominal wall were obtained for purposes of needle guidance, which demonstrated omental soft tissue mass which was seen on prior CT 08/08/2024. The area of concern was prepped and draped under sterile technique.   1% lidocaine was injected subcutaneously.  Additional lidocaine was administered into deeper tissues surrounding the targeted area for biopsy using a 22G spinal needle. A small incision was made. Using the same access site a 18 gauge core biopsy needle was passed via a 17 gauge coaxial introducer needle to obtain a total of 1 core sample.   Postprocedure images demonstrate no evidence for hemorrhage. The patient tolerated the procedure well and there were no immediate complications. 1 core specimen was sent to pathology.     Subsequently the right lower quadrant was visualized under ultrasound which demonstrated moderate volume ascites seen on prior CT 08/08/2024. 1% lidocaine was injected subcutaneously at this site. A 19 gauge Yueh was used to target the fluid collection under ultrasound guidance. Once the site was accessed, the inner needle was removed from the catheter. The Yueh catheter was connected to drainage container. Estimated 1000 cc of serosanguineous colored fluid was removed. Post paracentesis imaging demonstrated decreased ascitic fluid. The Yueh catheter was removed. The access site was bandaged.        1. Status post ultrasound guided core needle biopsy of omental mass seen on CT 08/08/2024. 1 core specimens sent to pathology. 2. Successful paracentesis under ultrasound guidance with removal of 1 L of serosanguineous fluid.     I was present for and/or performed the critical portions of the procedure and immediately available throughout the entire procedure.   I personally reviewed the image(s) / study and interpretation. I agree with the findings as stated.   Performed and dictated at [facility].   MACRO: None   Signed by: Peri Lawrence 9/20/2024 10:06 AM Dictation workstation:   [id number]            Assessment/Plan        Assessment & Plan  Hyponatremia        79-year-old female with past medical history of recently diagnosed ovarian cancer with peritoneal carcinomatosis s/p paracentesis x 4  (last one done 9/19/24), obesity, hyperlipidemia, osteoarthritis, and skin cancer s/p Mohs procedure.  Patient presents to the hospital with weakness and inability to care for herself.  Patient found to be hyponatremic and with evidence on CT imaging of possible abscess of the liver and ascites secondary to malignancy.  Hospitalized for further evaluation management..     #Ovarian cancer with peritoneal carcinomatosis  #Ascites  #Bilateral pleural effusions  # Suspected liver abscess  #Abdominal pain     Telemetry monitoring  Consult to IR for paracentesis and possible drainage of liver abscess  Consult to pulmonology for bilateral pleural effusions  Antiemetics as needed  Analgesics as needed  Patient needs to be followed up with by her gynecological oncologist to determine optimal plan going forward for treatment of her ovarian cancer     #Hyponatremia  #Generalized edema/anasarca  #Hypoalbuminemia  Patient is fluid overloaded and has significant third spacing, suspect hypervolemia hyponatremia.  Will check urine electrolytes, urine osmolality, and serum osmolality  Fluid restriction of 1500 ml for the time being.  Consider starting diuretics, defer to attending  Repeat labs in a.m.     #debility  PT/OT  Social work for discharge planning     Chronic issues:  #Obesity  #Hyperlipidemia  #Osteoarthritis     Continue with patient's home medications as appropriate     #DVT prophylaxis  SCDs  Lovenox subcutaneous     I spent 75 minutes in the professional and overall care of this patient.        Miky Kumar, APRN-CNP                 Revision History         Patient fully evaluated 10/2, awake, resting in bed. Patient with Moderate hiatal hernia with partial intrathoracic stomach and the peritoneal fat adjacent to the stomach within the hernia demonstrates evidence of probable carcinomatosis and ascites, Patient tolerated paracentesis on 10/01, awaiting culture results.No s/s or c/o acute difficulties at this time.  Medications and labs reviewed.  Plan discussed with interdisciplinary team, per pulmonology - Plan:  Patient has bilateral pleural effusion in the context of malignant ascites/ovarian cancer I explained to the patient the pleural effusion most likely related to recurrent ascites, patient is requiring so far 5 steps malignant ascites in the last month most likely beneficial approaches intra-abdominal Pleurx catheter Abdominal Pleurx catheter would be the most effective way of preventing pleural effusions and ascites.  Pleural effusions are secondary to ascites, both of which are due to patient's underlying cancer Continue with goals of care discussions prior to interventional radiology consult Follow hepatic fluid analysis Continue IV antibiotics Zosyn, continue current and repeat labs in the AM. Patient still requiring frequent cardiac and SPO2 monitoring. Discharge planning discussed with patient and care team. Therapy evaluations ordered-  [facility], anticipate SNF/C at discharge. Patient aware and agreeable to current plan, continue plan as above. I spent 50 minutes in the professional and overall care of this patient.      Assessment & Plan  Hyponatremia    Patient fully evaluated 10/3, awake, resting in bed. Patient with Moderate hiatal hernia with partial intrathoracic stomach and the peritoneal fat adjacent to the stomach within the hernia demonstrates evidence of probable carcinomatosis and ascites, Patient tolerated paracentesis on 10/01 well,order placed for repeat paracentesis therapeutic non diagnostic with IR.continue current and repeat labs in the AM. Patient aware and agreeable to current plan, continue plan as above.    Patient fully evaluated on October 4.  Patient awaiting therapeutic paracentesis.  Patient probably will need albumin afterwards.  Continue to monitor response with above treatments recheck labs in AM.  I spent 50 minutes in the professional and overall care of this patient.       Patient fully evaluated 10/06, head of bed elevated, no s/s or c/o acute difficulties at this time. Sister at bedside and agreeable to plan. Attempted therapeutic paracentesis by IR, aborted procedure due to insufficient fluid accumulation.  Medications and labs reviewed, cultures blood no growth at 4 days, AFB Culture Culture in progress and will be examined weekly. A result will be issued either when positive or after 8 weeks incubation. AFB Stain -No acid fast bacilli seen.  Plan discussed with interdisciplinary team, continue current and repeat labs in the AM. Per pulmonary - Day of consult, patient is breathing on room air. Vital stable. CT chest showed bilateral large pleural effusions. Dicussed need for Abdominal Pleurx catheter. Patient with likely carcinomatosis and plan to discharge to SNF - [facility] and will follow up with gynecology in 7 days,     Patient still requiring frequent cardiac and SPO2 monitoring. Discharge planning discussed with patient and care team. Therapy evaluations ordered- [facility], SNF at discharge. Patient aware and agreeable to current plan, continue plan as above. I spent 50 minutes in the professional and overall care of this patient.         Kim Angulo

## 2024-10-07 ENCOUNTER — TELEPHONE (OUTPATIENT)
Dept: GYNECOLOGIC ONCOLOGY | Facility: HOSPITAL | Age: 79
End: 2024-10-07
Payer: MEDICARE

## 2024-10-07 DIAGNOSIS — C54.1 ENDOMETRIAL CANCER (MULTI): ICD-10-CM

## 2024-10-07 LAB
ALBUMIN SERPL BCP-MCNC: 2.4 G/DL (ref 3.4–5)
ALBUMIN SERPL BCP-MCNC: 2.4 G/DL (ref 3.4–5)
ALP SERPL-CCNC: 168 U/L (ref 33–136)
ALP SERPL-CCNC: 171 U/L (ref 33–136)
ALT SERPL W P-5'-P-CCNC: 7 U/L (ref 7–45)
ALT SERPL W P-5'-P-CCNC: 8 U/L (ref 7–45)
ANION GAP SERPL CALC-SCNC: 14 MMOL/L (ref 10–20)
AST SERPL W P-5'-P-CCNC: 17 U/L (ref 9–39)
AST SERPL W P-5'-P-CCNC: 21 U/L (ref 9–39)
BASOPHILS # BLD AUTO: 0.11 X10*3/UL (ref 0–0.1)
BASOPHILS NFR BLD AUTO: 0.5 %
BILIRUB DIRECT SERPL-MCNC: 0.1 MG/DL (ref 0–0.3)
BILIRUB SERPL-MCNC: 0.3 MG/DL (ref 0–1.2)
BILIRUB SERPL-MCNC: 0.3 MG/DL (ref 0–1.2)
BUN SERPL-MCNC: 28 MG/DL (ref 6–23)
CALCIUM SERPL-MCNC: 8.1 MG/DL (ref 8.6–10.3)
CHLORIDE SERPL-SCNC: 92 MMOL/L (ref 98–107)
CO2 SERPL-SCNC: 23 MMOL/L (ref 21–32)
CREAT SERPL-MCNC: 1.6 MG/DL (ref 0.5–1.05)
EGFRCR SERPLBLD CKD-EPI 2021: 33 ML/MIN/1.73M*2
EOSINOPHIL # BLD AUTO: 0.51 X10*3/UL (ref 0–0.4)
EOSINOPHIL NFR BLD AUTO: 2.2 %
ERYTHROCYTE [DISTWIDTH] IN BLOOD BY AUTOMATED COUNT: 16.2 % (ref 11.5–14.5)
GLUCOSE SERPL-MCNC: 102 MG/DL (ref 74–99)
HCT VFR BLD AUTO: 32.4 % (ref 36–46)
HGB BLD-MCNC: 9.9 G/DL (ref 12–16)
IMM GRANULOCYTES # BLD AUTO: 0.37 X10*3/UL (ref 0–0.5)
IMM GRANULOCYTES NFR BLD AUTO: 1.6 % (ref 0–0.9)
LDH SERPL L TO P-CCNC: 324 U/L (ref 84–246)
LYMPHOCYTES # BLD AUTO: 1.13 X10*3/UL (ref 0.8–3)
LYMPHOCYTES NFR BLD AUTO: 4.8 %
MCH RBC QN AUTO: 25.8 PG (ref 26–34)
MCHC RBC AUTO-ENTMCNC: 30.6 G/DL (ref 32–36)
MCV RBC AUTO: 85 FL (ref 80–100)
MONOCYTES # BLD AUTO: 1.18 X10*3/UL (ref 0.05–0.8)
MONOCYTES NFR BLD AUTO: 5 %
NEUTROPHILS # BLD AUTO: 20.27 X10*3/UL (ref 1.6–5.5)
NEUTROPHILS NFR BLD AUTO: 85.9 %
NRBC BLD-RTO: 0 /100 WBCS (ref 0–0)
PLATELET # BLD AUTO: 420 X10*3/UL (ref 150–450)
POTASSIUM SERPL-SCNC: 4.1 MMOL/L (ref 3.5–5.3)
PROT SERPL-MCNC: 5.5 G/DL (ref 6.4–8.2)
PROT SERPL-MCNC: 5.6 G/DL (ref 6.4–8.2)
RBC # BLD AUTO: 3.83 X10*6/UL (ref 4–5.2)
SODIUM SERPL-SCNC: 125 MMOL/L (ref 136–145)
WBC # BLD AUTO: 23.6 X10*3/UL (ref 4.4–11.3)

## 2024-10-07 PROCEDURE — 97535 SELF CARE MNGMENT TRAINING: CPT | Mod: GO,CO

## 2024-10-07 PROCEDURE — 2500000005 HC RX 250 GENERAL PHARMACY W/O HCPCS: Performed by: RADIOLOGY

## 2024-10-07 PROCEDURE — 36415 COLL VENOUS BLD VENIPUNCTURE: CPT | Performed by: INTERNAL MEDICINE

## 2024-10-07 PROCEDURE — 1200000002 HC GENERAL ROOM WITH TELEMETRY DAILY

## 2024-10-07 PROCEDURE — 84075 ASSAY ALKALINE PHOSPHATASE: CPT

## 2024-10-07 PROCEDURE — 85025 COMPLETE CBC W/AUTO DIFF WBC: CPT | Performed by: INTERNAL MEDICINE

## 2024-10-07 PROCEDURE — 83615 LACTATE (LD) (LDH) ENZYME: CPT

## 2024-10-07 PROCEDURE — 2500000001 HC RX 250 WO HCPCS SELF ADMINISTERED DRUGS (ALT 637 FOR MEDICARE OP): Performed by: NURSE PRACTITIONER

## 2024-10-07 PROCEDURE — 97530 THERAPEUTIC ACTIVITIES: CPT | Mod: GP,CQ

## 2024-10-07 PROCEDURE — 2500000001 HC RX 250 WO HCPCS SELF ADMINISTERED DRUGS (ALT 637 FOR MEDICARE OP): Performed by: PHYSICIAN ASSISTANT

## 2024-10-07 PROCEDURE — 2500000004 HC RX 250 GENERAL PHARMACY W/ HCPCS (ALT 636 FOR OP/ED): Performed by: NURSE PRACTITIONER

## 2024-10-07 PROCEDURE — 80053 COMPREHEN METABOLIC PANEL: CPT | Performed by: INTERNAL MEDICINE

## 2024-10-07 PROCEDURE — 2500000002 HC RX 250 W HCPCS SELF ADMINISTERED DRUGS (ALT 637 FOR MEDICARE OP, ALT 636 FOR OP/ED): Performed by: NURSE PRACTITIONER

## 2024-10-07 RX ORDER — BACLOFEN 10 MG/1
5 TABLET ORAL ONCE
Status: COMPLETED | OUTPATIENT
Start: 2024-10-08 | End: 2024-10-08

## 2024-10-07 ASSESSMENT — COGNITIVE AND FUNCTIONAL STATUS - GENERAL
HELP NEEDED FOR BATHING: A LOT
HELP NEEDED FOR BATHING: A LOT
DRESSING REGULAR LOWER BODY CLOTHING: TOTAL
MOVING FROM LYING ON BACK TO SITTING ON SIDE OF FLAT BED WITH BEDRAILS: A LOT
DRESSING REGULAR UPPER BODY CLOTHING: A LOT
DAILY ACTIVITIY SCORE: 12
EATING MEALS: A LITTLE
MOVING TO AND FROM BED TO CHAIR: A LOT
DAILY ACTIVITIY SCORE: 13
WALKING IN HOSPITAL ROOM: A LOT
TOILETING: TOTAL
TURNING FROM BACK TO SIDE WHILE IN FLAT BAD: A LOT
EATING MEALS: A LITTLE
WALKING IN HOSPITAL ROOM: A LOT
CLIMB 3 TO 5 STEPS WITH RAILING: A LOT
MOVING FROM LYING ON BACK TO SITTING ON SIDE OF FLAT BED WITH BEDRAILS: A LITTLE
PERSONAL GROOMING: A LOT
STANDING UP FROM CHAIR USING ARMS: A LOT
DRESSING REGULAR UPPER BODY CLOTHING: A LOT
MOBILITY SCORE: 19
PERSONAL GROOMING: A LITTLE
CLIMB 3 TO 5 STEPS WITH RAILING: A LOT
MOBILITY SCORE: 12
DRESSING REGULAR LOWER BODY CLOTHING: A LOT
TOILETING: A LOT

## 2024-10-07 ASSESSMENT — PAIN SCALES - GENERAL
PAINLEVEL_OUTOF10: 7
PAINLEVEL_OUTOF10: 10 - WORST POSSIBLE PAIN
PAINLEVEL_OUTOF10: 0 - NO PAIN
PAINLEVEL_OUTOF10: 8
PAINLEVEL_OUTOF10: 8

## 2024-10-07 ASSESSMENT — PAIN - FUNCTIONAL ASSESSMENT: PAIN_FUNCTIONAL_ASSESSMENT: 0-10

## 2024-10-07 ASSESSMENT — PAIN DESCRIPTION - ORIENTATION: ORIENTATION: MID

## 2024-10-07 ASSESSMENT — PAIN DESCRIPTION - LOCATION
LOCATION: ABDOMEN
LOCATION: ABDOMEN

## 2024-10-07 ASSESSMENT — ACTIVITIES OF DAILY LIVING (ADL): HOME_MANAGEMENT_TIME_ENTRY: 14

## 2024-10-07 NOTE — CARE PLAN
The patient's goals for the shift include sleep    The clinical goals for the shift include comfort and rest      Problem: Skin  Goal: Decreased wound size/increased tissue granulation at next dressing change  Outcome: Progressing     Problem: Skin  Goal: Participates in plan/prevention/treatment measures  Outcome: Progressing     Problem: Skin  Goal: Prevent/manage excess moisture  Outcome: Progressing     Problem: Skin  Goal: Prevent/minimize sheer/friction injuries  Outcome: Progressing     Problem: Skin  Goal: Promote/optimize nutrition  Outcome: Progressing

## 2024-10-07 NOTE — PROGRESS NOTES
Physical Therapy    Physical Therapy Treatment    Patient Name: Rohini Beaver  MRN: [id number]  Department: [facility]  Room: [address] [place] [address]  Today's Date: 10/7/2024  Time Calculation  Start Time: 0912  Stop Time: 0949  Time Calculation (min): 37 min         Assessment/Plan   PT Assessment  End of Session Communication: Bedside nurse  End of Session Patient Position: Bed, 2 rail up, Alarm on (call light and needs within reach.)     PT Plan  Treatment/Interventions: Bed mobility, Transfer training, Gait training  PT Plan: Ongoing PT  PT Frequency: 4 times per week  PT Discharge Recommendations: Moderate intensity level of continued care  PT - OK to Discharge: Yes      General Visit Information:   PT  Visit  PT Received On: 10/07/24  General  Co-Treatment: OT  Co-Treatment Reason: maximize safety and functional mobility  Prior to Session Communication: Bedside nurse  Patient Position Received: Bed, 3 rail up, Alarm on  General Comment: Patient tearful and emotional about prognosis. Provided emotional support and therapeutic listening. patient agreeable to therapy. Tolerated bed mobility d/t frequent incontinent of bowel    Subjective   Precautions:  Precautions  Medical Precautions: Fall precautions  Precautions Comment: fear of falling    Objective   Pain:  Pain Assessment  Pain Assessment:  (vocalized pain however, no value given)  Pain Location: Generalized  Cognition:  Cognition  Overall Cognitive Status: Within Functional Limits  Treatments:  Bed Mobility  Bed Mobility: Yes  Bed Mobility 1  Bed Mobility 1: Rolling right, Rolling left  Level of Assistance 1: Maximum assistance (1-2 person assist)  Bed Mobility Comments 1: Multiple trials rolling for hygiene and toileting. Cues to utilizing UE to reach for rail and support self while on side.    Outcome Measures:  [facility] Basic Mobility  Turning from your back to your side while in a flat bed without using bedrails: A lot  Moving from lying on your back to sitting on the  side of a flat bed without using bedrails: A lot  Moving to and from bed to chair (including a wheelchair): A lot  Standing up from a chair using your arms (e.g. wheelchair or bedside chair): A lot  To walk in hospital room: A lot  Climbing 3-5 steps with railing: A lot  Basic Mobility - Total Score: 12    Education Documentation  Mobility Training, taught by Desi Cordon PTA at 10/7/2024 12:54 PM.  Learner: Patient  Readiness: Acceptance  Method: Explanation  Response: Verbalizes Understanding  Comment: patient educated on benefits of participation in therapy.    Education Comments  No comments found.        OP EDUCATION:       Encounter Problems       Encounter Problems (Active)       PT Problem       STG - Pt will transition supine <> sitting with min A x 1  (Progressing)       Start:  10/02/24    Expected End:  10/16/24            STG - Pt will transfer STS with mod A x 1  (Progressing)       Start:  10/02/24    Expected End:  10/16/24            STG - Pt will amb 25' using RW with mod A x 1  (Progressing)       Start:  10/02/24    Expected End:  10/16/24               Pain - Adult

## 2024-10-07 NOTE — TELEPHONE ENCOUNTER
The patient's sister, Erica called today because she wanted recommendations for care facilities for the patient. I advised Erica to speak with the  at [facility] in which the patient is currently admitted. Erica stated that she was given a list of facilities to choose from but that was it. Erica stated that she was not present when the  visited this morning. I advised Erica to ask to speak to the  to choose a facility that would meet the patient's needs. Erica agreed to call the  for assistance.

## 2024-10-07 NOTE — CARE PLAN
The patient's goals for the shift include sleep    The clinical goals for the shift include comfort and rest    Over the shift, the patient did not make progress toward the following goals. Barriers to progression include chronic illness. Recommendations to address these barriers include pain management and provide a quiet environment.

## 2024-10-07 NOTE — PROGRESS NOTES
Rohini Beaver is a 79 y.o. female on day 6 of admission presenting with Hyponatremia.      Subjective    Patient fully evaluated 10/3, awake, resting in bed. Patient with Moderate hiatal hernia with partial intrathoracic stomach and the peritoneal fat adjacent to the stomach within the hernia demonstrates evidence of probable carcinomatosis and ascites, Patient tolerated paracentesis on 10/01 well,order placed for repeat paracentesis therapeutic non diagnostic with IR.continue current and repeat labs in the AM. Patient aware and agreeable to current plan, continue plan as above. I spent 50 minutes in the professional and overall care of this patient.   Objective     Last Recorded Vitals  /57   Pulse 109   Temp 36.2 °C (97.2 °F) (Temporal)   Resp 18   Wt 104 kg (230 lb)   SpO2 97%   Intake/Output last 3 Shifts:    Intake/Output Summary (Last 24 hours) at 10/7/2024 1320  Last data filed at 10/7/2024 0800  Gross per 24 hour   Intake 50 ml   Output 600 ml   Net -550 ml       Admission Weight  Weight: 104 kg (230 lb) (10/01/24 0828)    Daily Weight  10/01/24 : 104 kg (230 lb)    Image Results  XR chest 1 view  Narrative: Interpreted By:  Deep Scott,   STUDY:  XR CHEST 1 VIEW; 10/6/2024 2:36 pm      INDICATION:  Signs/Symptoms:Follow-up pleural effusion, hypoxia.      COMPARISON:  None      ACCESSION NUMBER(S):  [id number]      ORDERING CLINICIAN:  FRANKY THIBODEAUX      TECHNIQUE:  1 view of the chest was performed.      FINDINGS:  Layering right-sided pleural effusion with surrounding atelectasis.  Trace left-sided pleural effusion.. No pneumothorax.  The  cardiomediastinal silhouette is within normal limits. Hiatal hernia.      Impression: 1. Layering right-sided pleural effusion with surrounding  atelectasis. Small left-sided pleural effusions surrounding  atelectasis. Findings have worsened from 10/03/2024.      Signed by: Deep Scott 10/7/2024 7:50 AM  Dictation workstation:    [id number]      Physical Exam    Relevant Results               Assessment/Plan   This patient currently has cardiac telemetry ordered; if you would like to modify or discontinue the telemetry order, click here to go to the orders activity to modify/discontinue the order.          Jd Rhodes MD   Physician  Internal Medicine     H&P      Addendum     Date of Service: 10/1/2024  2:54 PM     Addendum       Expand All Collapse All    History Of Present Illness  Rohini Beaver is a 79-year-old female with past medical history of recently diagnosed ovarian cancer with peritoneal carcinomatosis s/p paracentesis x 4 (last one done 9/19/24), obesity, hyperlipidemia, osteoarthritis, and skin cancer s/p Mohs procedure.  Patient presents to the hospital with weakness and inability to care for herself.  She reports that she was at [facility] skilled nursing facility, however had a brief hospitalization at [facility] and upon discharge decided to go home rather than go back to [facility].  She lives with her 86-year-old sister and has a couple friends who assist with appointments, however limited support system.  Sister unable to care for due to age.  Today she was in urgent reclining chair and was able to get up.  ROS additionally positive for cold sweats, distended and tender abdomen, edema, pressure injury to coccyx/gluteal fold, and decreased activity tolerance.  Denies history of heart disease.  She reports that she is scheduled for outpatient appointment with her oncologist tomorrow to determine ongoing plan.      ER course: Hemodynamically stable, afebrile, SpO2 90s on room air.  WBC 28.9, Hgb 11.0/Hct34.1 (Hgb 15.1 4/4/2023), platelets 446. Glucose 107, Sodium 126, Chloride 94, calcium 8.5, albumin 2.4, alkaline phosphatase 231.  Lactate 1.4.  BNP 74.  High-sensitivity troponin 7.  UA unremarkable. CT chest showed moderate to large bilateral pleural effusion with adjacent presumed compressive  atelectasis, prominent main pulmonary artery which may indicate pulmonary hypertension, mildly prominent mediastinal lymph nodes measuring up to 10 mm in short axis concerning for metastatic disease.  Moderate hiatal hernia with partial intrathoracic stomach and the peritoneal fat adjacent to the stomach within the hernia demonstrates evidence of probable carcinomatosis and ascites.  Redemonstration of large volume ascites with regions of significant anterior omental caking and peritoneal carcinomatosis commensurate with patient's provided diagnosis of ovarian cancer. Regions of wall thickening of the colon extending from the splenic flexure to the sigmoid.  Extensive region of scalloping of the right hepatic lobe peripherally with appearance of large subcapsular collection along the right hepatic margin. Blood culture in process     Past medical history: As above  Past surgical history: Cholecystectomy, lumbar laminectomy, left shoulder arthroscopy, right total knee replacement, corneal transplant  Social history: No history of smoking, alcohol abuse, illicit drug use.  Lives with her elderly sister who is 86 years old.  Limited support system.   Family history: Mother-cancer (unknown type)     Past Medical History  Medical History        Past Medical History:   Diagnosis Date    Bilateral primary osteoarthritis of knee      HLD (hyperlipidemia)      Lumbosacral radiculopathy      Meralgia paraesthetica      Physical deconditioning              Surgical History  Surgical History         Past Surgical History:   Procedure Laterality Date    ARTHROPLASTY Left       Left thumb carpometacarpal arthroplasty with ligament reconstruction and tendon interposition    BREAST BIOPSY   1999     Benign    CHOLECYSTECTOMY        COLONOSCOPY        CORNEAL TRANSPLANT        DILATION AND CURETTAGE OF UTERUS        LUMBAR FUSION        SHOULDER ARTHROSCOPY Left      SKIN LESION EXCISION        TOTAL KNEE ARTHROPLASTY Left 2019     TOTAL KNEE ARTHROPLASTY Right 2017    TRIGGER FINGER RELEASE Right              Social History  She reports that she has never smoked. She has never used smokeless tobacco. She reports that she does not currently use alcohol. She reports that she does not use drugs.     Family History  Family History          Family History   Problem Relation Name Age of Onset    Colon cancer Mother        Lung cancer Father        Other (Mesothelioma) Brother        Brain cancer Mother's Brother                Allergies  Patient has no known allergies.     Review of Systems     10 point ROS negative except as noted above in HPI      Physical Exam  Vitals reviewed.   Constitutional:       General: She is awake. She is not in acute distress.     Appearance: She is obese. She is ill-appearing. She is not toxic-appearing.   HENT:      Head: Normocephalic and atraumatic.      Nose: Nose normal.      Mouth/Throat:      Mouth: Mucous membranes are moist.      Pharynx: Oropharynx is clear.   Eyes:      Conjunctiva/sclera: Conjunctivae normal.   Cardiovascular:      Rate and Rhythm: Normal rate and regular rhythm.      Pulses: Normal pulses.      Heart sounds: No murmur heard.  Pulmonary:      Effort: Pulmonary effort is normal. No respiratory distress.      Breath sounds: Decreased air movement present. Decreased breath sounds present.      Comments: Posterior bilateral breath sounds are diminished  Abdominal:      General: There is distension.      Palpations: Abdomen is soft.      Tenderness: There is abdominal tenderness. There is no guarding.      Comments: Bowel sounds hypoactive, abdomen distended, tender to palpation, dullness noted to the sides.   Musculoskeletal:         General: No swelling, deformity or signs of injury. Normal range of motion.      Cervical back: Neck supple.      Right lower leg: Edema present.      Left lower leg: Edema present.      Comments: Generalized edema/anasarca   Skin:     General: Skin is warm and  dry.      Capillary Refill: Capillary refill takes less than 2 seconds.      Findings: No ecchymosis or wound.      Comments: Wound on coccyx, exam deferred  due to patient discomfort    Neurological:      General: No focal deficit present.      Mental Status: She is alert and oriented to person, place, and time.   Psychiatric:         Mood and Affect: Mood normal.         Behavior: Behavior is cooperative.                  Last Recorded Vitals  Blood pressure 117/58, pulse 100, temperature 36.2 °C (97.2 °F), temperature source Temporal, resp. rate (!) 22, weight 104 kg (230 lb), SpO2 94%.     Relevant Results              Results for orders placed or performed during the hospital encounter of 10/01/24 (from the past 24 hour(s))   CBC and Auto Differential   Result Value Ref Range     WBC 28.9 (H) 4.4 - 11.3 x10*3/uL     nRBC 0.0 0.0 - 0.0 /100 WBCs     RBC 4.15 4.00 - 5.20 x10*6/uL     Hemoglobin 11.0 (L) 12.0 - 16.0 g/dL     Hematocrit 34.1 (L) 36.0 - 46.0 %     MCV 82 80 - 100 fL     MCH 26.5 26.0 - 34.0 pg     MCHC 32.3 32.0 - 36.0 g/dL     RDW 15.7 (H) 11.5 - 14.5 %     Platelets 446 150 - 450 x10*3/uL     Neutrophils % 89.1 40.0 - 80.0 %     Immature Granulocytes %, Automated 1.0 (H) 0.0 - 0.9 %     Lymphocytes % 4.3 13.0 - 44.0 %     Monocytes % 4.8 2.0 - 10.0 %     Eosinophils % 0.5 0.0 - 6.0 %     Basophils % 0.3 0.0 - 2.0 %     Neutrophils Absolute 25.74 (H) 1.60 - 5.50 x10*3/uL     Immature Granulocytes Absolute, Automated 0.30 0.00 - 0.50 x10*3/uL     Lymphocytes Absolute 1.23 0.80 - 3.00 x10*3/uL     Monocytes Absolute 1.38 (H) 0.05 - 0.80 x10*3/uL     Eosinophils Absolute 0.14 0.00 - 0.40 x10*3/uL     Basophils Absolute 0.09 0.00 - 0.10 x10*3/uL   Comprehensive metabolic panel   Result Value Ref Range     Glucose 107 (H) 74 - 99 mg/dL     Sodium 126 (L) 136 - 145 mmol/L     Potassium 5.2 3.5 - 5.3 mmol/L     Chloride 94 (L) 98 - 107 mmol/L     Bicarbonate 24 21 - 32 mmol/L     Anion Gap 13 10 - 20  mmol/L     Urea Nitrogen 18 6 - 23 mg/dL     Creatinine 0.61 0.50 - 1.05 mg/dL     eGFR >90 >60 mL/min/1.73m*2     Calcium 8.5 (L) 8.6 - 10.3 mg/dL     Albumin 2.4 (L) 3.4 - 5.0 g/dL     Alkaline Phosphatase 231 (H) 33 - 136 U/L     Total Protein 5.7 (L) 6.4 - 8.2 g/dL     AST 23 9 - 39 U/L     Bilirubin, Total 0.3 0.0 - 1.2 mg/dL     ALT 8 7 - 45 U/L   Magnesium   Result Value Ref Range     Magnesium 1.68 1.60 - 2.40 mg/dL   Troponin I, High Sensitivity   Result Value Ref Range     Troponin I, High Sensitivity 7 0 - 13 ng/L   B-Type Natriuretic Peptide   Result Value Ref Range     BNP 74 0 - 99 pg/mL   Sars-CoV-2 PCR   Result Value Ref Range     Coronavirus 2019, PCR Not Detected Not Detected   Lactate   Result Value Ref Range     Lactate 1.4 0.4 - 2.0 mmol/L   Urinalysis with Reflex Culture and Microscopic   Result Value Ref Range     Color, Urine Light-Yellow Light-Yellow, Yellow, Dark-Yellow     Appearance, Urine Clear Clear     Specific Gravity, Urine >1.050 (N) 1.005 - 1.035     pH, Urine 6.0 5.0, 5.5, 6.0, 6.5, 7.0, 7.5, 8.0     Protein, Urine 20 (TRACE) NEGATIVE, 10 (TRACE), 20 (TRACE) mg/dL     Glucose, Urine Normal Normal mg/dL     Blood, Urine NEGATIVE NEGATIVE     Ketones, Urine NEGATIVE NEGATIVE mg/dL     Bilirubin, Urine NEGATIVE NEGATIVE     Urobilinogen, Urine Normal Normal mg/dL     Nitrite, Urine NEGATIVE NEGATIVE     Leukocyte Esterase, Urine NEGATIVE NEGATIVE   Urinalysis Microscopic   Result Value Ref Range     WBC, Urine 1-5 1-5, NONE /HPF     RBC, Urine 1-2 NONE, 1-2, 3-5 /HPF     Squamous Epithelial Cells, Urine 1-9 (SPARSE) Reference range not established. /HPF     Mucus, Urine FEW Reference range not established. /LPF   Protime-INR   Result Value Ref Range     Protime 13.0 (H) 9.8 - 12.8 seconds     INR 1.2 (H) 0.9 - 1.1   APTT   Result Value Ref Range     aPTT 24 (L) 27 - 38 seconds      CT chest abdomen pelvis w IV contrast     Result Date: 10/1/2024  Interpreted By:  Oscar Aldrich,  STUDY: CT CHEST ABDOMEN PELVIS W IV CONTRAST;  10/1/2024 11:13 am   INDICATION: Signs/Symptoms:leukocytosis, ascites, recent ovarian cancer diagnosis.   COMPARISON: CT abdomen pelvis 08/08/2024   ACCESSION NUMBER(S): [id number]   ORDERING CLINICIAN: ASHLEY FAIRBANKS   TECHNIQUE: CT of the chest, abdomen, and pelvis was performed.  Contiguous axial images were obtained at 3 mm slice thickness through the chest, abdomen and pelvis. Coronal and sagittal reconstructions at 3 mm slice thickness were performed. ml of contrast  were administered intravenously without immediate complication.   FINDINGS: CHEST:   LUNG/PLEURA/LARGE AIRWAYS: No endobronchial lesion is seen.   Moderate to large bilateral pleural effusions with adjacent consolidative opacification of the bilateral lower lobes suggestive of compressive atelectasis. No pneumothorax. Mild asymmetric scattered reticular changes suggestive of chronic lung findings.     VESSELS: The thoracic aorta is unremarkable with respect course, caliber, and contour.   Prominent main pulmonary artery measuring up to 3.2 cm in anterior-posterior dimension measured on sagittal plane which may indicate sequela of pulmonary hypertension.   No significant coronary atherosclerotic calcifications are seen.   HEART: Heart size within normal limits. No pericardial effusion.   MEDIASTINUM AND OLIVE: Mildly prominent mediastinal lymph nodes measuring up to 10 mm in short axis   Moderate hiatal hernia with partial intrathoracic stomach and ascites and region of nodularity of the peritoneal fat within hiatal hernia suggestive carcinomatosis.   CHEST WALL AND LOWER NECK: No acute osseous abnormality. Multilevel presumed degenerative changes throughout the imaged spine. No acute soft tissue abnormality.   ABDOMEN:   LIVER: There is been interval scalloping of the right hepatic margins with new regions of irregularity along the right hepatic capsule diffusely which is new since comparison imaging  from 08/08/2024 there is a new elongated subcapsular collection measuring up to approximately 11.4 x 2.1 cm, difficult to measure given curvilinear projection extending over the right hepatic lobe margin (series 202, images 40-70). Similar appearing subcentimeter left hepatic lobe hypodense lesion.   BILE DUCTS: No obvious new intrahepatic biliary dilatation. Mild prominence of the common bile duct, nonspecific in a cholecystectomy patient.   GALLBLADDER: Absent.   PANCREAS: Unremarkable.   SPLEEN: Unchanged.   ADRENAL GLANDS: Unchanged.   KIDNEYS AND URETERS: Similar appearing subcentimeter right renal lower pole calculus. No hydronephrosis. No obstructing urolithiasis.   PELVIS:   BLADDER: Unremarkable.   REPRODUCTIVE ORGANS: Uterus appears grossly unchanged.   BOWEL: Moderate hiatal hernia with wall thickening of the stomach in the hernia. No new pathologic distention of bowel. Wall thickening of the colon within the splenic flexure descending colon and sigmoid colon, nonspecific.     VESSELS: No evidence of abdominal aortic aneurysm.   PERITONEUM/RETROPERITONEUM/LYMPH NODES: Large volume ascites with extensive regions of anterior omental caking and peritoneal carcinomatosis. No obvious free intraperitoneal gas. Given the presence of extensive omental caking and peritoneal carcinomatosis, superimposed peritoneal enhancement 4 other causes cannot be excluded.   BONE AND SOFT TISSUE: No acute osseous abnormality. Osseous structures appear stable. No acute abnormality of the abdominal wall soft tissues.        CHEST: 1.  Moderate to large bilateral pleural effusions with adjacent presumed compressive atelectasis. 2. Prominent main pulmonary artery which may indicate pulmonary hypertension. 3. Mildly prominent mediastinal lymph nodes measuring up to 10 mm in short axis concerning for metastatic disease. 4. Moderate hiatal hernia with partial intrathoracic stomach. The peritoneal fat adjacent to the stomach within the  hernia demonstrates evidence of probable carcinomatosis and ascites.   ABDOMEN-PELVIS: 1.  Redemonstration of large volume ascites with regions of significant anterior omental caking and peritoneal carcinomatosis commensurate with patient's provided diagnosis of ovarian cancer. 2. Regions of wall thickening of the colon extending from the splenic flexure to the sigmoid which may indicate a nonspecific colitis. 3. Since the previous examination on 08/08/2024, there are now extensive regions of scalloping of the right hepatic lobe peripherally with the appearance of a large subcapsular collection along the right hepatic margin as above. The sterility of this collection cannot be assessed via CT and clinical correlation is advised for exclusion superimposed infection within this region.     Signed by: Oscar Aldrich 10/1/2024 12:14 PM Dictation workstation:   [id number]     XR chest 1 view     Result Date: 10/1/2024  Interpreted By:  Samuel Jaramillo, STUDY: XR CHEST 1 VIEW; 10/1/2024 8:44 am   INDICATION: Signs/Symptoms:weakness, edema in legs and abdomen   COMPARISON: April 2023.   ACCESSION NUMBER(S): [id number]   ORDERING CLINICIAN: ASHLEY FAIRBANKS   FINDINGS: The study is limited due to rotation and poor inspiratory effort, with resultant crowding of the pulmonary vasculature. The cardiac silhouette is within normal limits for the technique. Moderate size hiatal hernia is again seen. There is no pneumothorax, confluent infiltrates or significant effusion. Degenerative changes involve the spine and shoulders; metallic anchors again noted over the left humeral head related to previous rotator cuff repair.        Limited study. Hiatal hernia. No acute cardiopulmonary disease.   Signed by: Samuel Jaramillo 10/1/2024 9:22 AM Dictation workstation:   [id number]     US guided abdominal paracentesis     Result Date: 9/20/2024  Interpreted By:  Peri Lawrence and Kamau Nyokabi STUDY: US GUIDED ABDOMINAL  PARACENTESIS; US GUIDED PERCUTANEOUS ABDOMINAL RETROPERITONEUM BIOPSY;  9/19/2024 11:13 am   INDICATION: Signs/Symptoms:ascites; Signs/Symptoms:ascites, evaluate ovarian cancer.   COMPARISON: CT abdomen and pelvis 08/08/2024   ACCESSION NUMBER(S): [id number]; [id number]   ORDERING CLINICIAN: JEANETTE ARIAS   TECHNIQUE: INTERVENTIONALIST(S): Dr. Peri Lawrence MD   CONSENT: The patient was informed of the nature of the proposed procedure. The purposes, alternatives, risks, and benefits were explained and discussed. All questions were answered and consent was obtained.   SEDATION: 1% local lidocaine was used for anesthetic.   MEDICATION/CONTRAST: No additional.   TIME OUT:   A time out was performed immediately prior to procedure start with the interventional team, correctly identifying the patient name, date of birth, MRN, procedure, anatomy (including marking of site and side), patient position, procedure consent form, relevant laboratory and imaging test results, antibiotic administration, safety precautions, and procedure-specific equipment needs.   COMPLICATIONS: No immediate adverse events identified.   FINDINGS: The patient was placed in the supine position. Limited sonographic images of the lower abdominal wall were obtained for purposes of needle guidance, which demonstrated omental soft tissue mass which was seen on prior CT 08/08/2024. The area of concern was prepped and draped under sterile technique.   1% lidocaine was injected subcutaneously. Additional lidocaine was administered into deeper tissues surrounding the targeted area for biopsy using a 22G spinal needle. A small incision was made. Using the same access site a 18 gauge core biopsy needle was passed via a 17 gauge coaxial introducer needle to obtain a total of 1 core sample.   Postprocedure images demonstrate no evidence for hemorrhage. The patient tolerated the procedure well and there were no immediate complications. 1 core specimen was  sent to pathology.     Subsequently the right lower quadrant was visualized under ultrasound which demonstrated moderate volume ascites seen on prior CT 08/08/2024. 1% lidocaine was injected subcutaneously at this site. A 19 gauge Yueh was used to target the fluid collection under ultrasound guidance. Once the site was accessed, the inner needle was removed from the catheter. The Yueh catheter was connected to drainage container. Estimated 1000 cc of serosanguineous colored fluid was removed. Post paracentesis imaging demonstrated decreased ascitic fluid. The Yueh catheter was removed. The access site was bandaged.        1. Status post ultrasound guided core needle biopsy of omental mass seen on CT 08/08/2024. 1 core specimens sent to pathology. 2. Successful paracentesis under ultrasound guidance with removal of 1 L of serosanguineous fluid.     I was present for and/or performed the critical portions of the procedure and immediately available throughout the entire procedure.   I personally reviewed the image(s) / study and interpretation. I agree with the findings as stated.   Performed and dictated at [facility].   MACRO: None   Signed by: Peri Lawrence 9/20/2024 10:06 AM Dictation workstation:   [id number]     US guided percutaneous abdominal retroperitoneum biopsy     Result Date: 9/20/2024  Interpreted By:  Peri Lawrence and Kamau Nyokabi STUDY: US GUIDED ABDOMINAL PARACENTESIS; US GUIDED PERCUTANEOUS ABDOMINAL RETROPERITONEUM BIOPSY;  9/19/2024 11:13 am   INDICATION: Signs/Symptoms:ascites; Signs/Symptoms:ascites, evaluate ovarian cancer.   COMPARISON: CT abdomen and pelvis 08/08/2024   ACCESSION NUMBER(S): [id number]; [id number]   ORDERING CLINICIAN: JEANETTE ARIAS   TECHNIQUE: INTERVENTIONALIST(S): Dr. Peri Lawrence MD   CONSENT: The patient was informed of the nature of the proposed procedure. The purposes, alternatives, risks, and benefits were  explained and discussed. All questions were answered and consent was obtained.   SEDATION: 1% local lidocaine was used for anesthetic.   MEDICATION/CONTRAST: No additional.   TIME OUT:   A time out was performed immediately prior to procedure start with the interventional team, correctly identifying the patient name, date of birth, MRN, procedure, anatomy (including marking of site and side), patient position, procedure consent form, relevant laboratory and imaging test results, antibiotic administration, safety precautions, and procedure-specific equipment needs.   COMPLICATIONS: No immediate adverse events identified.   FINDINGS: The patient was placed in the supine position. Limited sonographic images of the lower abdominal wall were obtained for purposes of needle guidance, which demonstrated omental soft tissue mass which was seen on prior CT 08/08/2024. The area of concern was prepped and draped under sterile technique.   1% lidocaine was injected subcutaneously. Additional lidocaine was administered into deeper tissues surrounding the targeted area for biopsy using a 22G spinal needle. A small incision was made. Using the same access site a 18 gauge core biopsy needle was passed via a 17 gauge coaxial introducer needle to obtain a total of 1 core sample.   Postprocedure images demonstrate no evidence for hemorrhage. The patient tolerated the procedure well and there were no immediate complications. 1 core specimen was sent to pathology.     Subsequently the right lower quadrant was visualized under ultrasound which demonstrated moderate volume ascites seen on prior CT 08/08/2024. 1% lidocaine was injected subcutaneously at this site. A 19 gauge Yueh was used to target the fluid collection under ultrasound guidance. Once the site was accessed, the inner needle was removed from the catheter. The Yueh catheter was connected to drainage container. Estimated 1000 cc of serosanguineous colored fluid was removed.  Post paracentesis imaging demonstrated decreased ascitic fluid. The Yueh catheter was removed. The access site was bandaged.        1. Status post ultrasound guided core needle biopsy of omental mass seen on CT 08/08/2024. 1 core specimens sent to pathology. 2. Successful paracentesis under ultrasound guidance with removal of 1 L of serosanguineous fluid.     I was present for and/or performed the critical portions of the procedure and immediately available throughout the entire procedure.   I personally reviewed the image(s) / study and interpretation. I agree with the findings as stated.   Performed and dictated at [facility] Hospital.   MACRO: None   Signed by: Peri Lawrence 9/20/2024 10:06 AM Dictation workstation:   [id number]            Assessment/Plan        Assessment & Plan  Hyponatremia        79-year-old female with past medical history of recently diagnosed ovarian cancer with peritoneal carcinomatosis s/p paracentesis x 4 (last one done 9/19/24), obesity, hyperlipidemia, osteoarthritis, and skin cancer s/p Mohs procedure.  Patient presents to the hospital with weakness and inability to care for herself.  Patient found to be hyponatremic and with evidence on CT imaging of possible abscess of the liver and ascites secondary to malignancy.  Hospitalized for further evaluation management..     #Ovarian cancer with peritoneal carcinomatosis  #Ascites  #Bilateral pleural effusions  # Suspected liver abscess  #Abdominal pain     Telemetry monitoring  Consult to IR for paracentesis and possible drainage of liver abscess  Consult to pulmonology for bilateral pleural effusions  Antiemetics as needed  Analgesics as needed  Patient needs to be followed up with by her gynecological oncologist to determine optimal plan going forward for treatment of her ovarian cancer     #Hyponatremia  #Generalized edema/anasarca  #Hypoalbuminemia  Patient is fluid overloaded and has significant  third spacing, suspect hypervolemia hyponatremia.  Will check urine electrolytes, urine osmolality, and serum osmolality  Fluid restriction of 1500 ml for the time being.  Consider starting diuretics, defer to attending  Repeat labs in a.m.     #debility  PT/OT  Social work for discharge planning     Chronic issues:  #Obesity  #Hyperlipidemia  #Osteoarthritis     Continue with patient's home medications as appropriate     #DVT prophylaxis  SCDs  Lovenox subcutaneous     I spent 75 minutes in the professional and overall care of this patient.        Miky Kumar, APRN-CNP                 Revision History         Patient fully evaluated 10/2, awake, resting in bed. Patient with Moderate hiatal hernia with partial intrathoracic stomach and the peritoneal fat adjacent to the stomach within the hernia demonstrates evidence of probable carcinomatosis and ascites, Patient tolerated paracentesis on 10/01, awaiting culture results.No s/s or c/o acute difficulties at this time. Medications and labs reviewed.  Plan discussed with interdisciplinary team, per pulmonology - Plan:  Patient has bilateral pleural effusion in the context of malignant ascites/ovarian cancer I explained to the patient the pleural effusion most likely related to recurrent ascites, patient is requiring so far 5 steps malignant ascites in the last month most likely beneficial approaches intra-abdominal Pleurx catheter Abdominal Pleurx catheter would be the most effective way of preventing pleural effusions and ascites.  Pleural effusions are secondary to ascites, both of which are due to patient's underlying cancer Continue with goals of care discussions prior to interventional radiology consult Follow hepatic fluid analysis Continue IV antibiotics Zosyn, continue current and repeat labs in the AM. Patient still requiring frequent cardiac and SPO2 monitoring. Discharge planning discussed with patient and care team. Therapy evaluations ordered-  [facility],  anticipate SNF/C at discharge. Patient aware and agreeable to current plan, continue plan as above. I spent 50 minutes in the professional and overall care of this patient.      Assessment & Plan  Hyponatremia    Patient fully evaluated 10/3, awake, resting in bed. Patient with Moderate hiatal hernia with partial intrathoracic stomach and the peritoneal fat adjacent to the stomach within the hernia demonstrates evidence of probable carcinomatosis and ascites, Patient tolerated paracentesis on 10/01 well,order placed for repeat paracentesis therapeutic non diagnostic with IR.continue current and repeat labs in the AM. Patient aware and agreeable to current plan, continue plan as above.    Patient fully evaluated on October 4.  Patient awaiting therapeutic paracentesis.  Patient probably will need albumin afterwards.  Continue to monitor response with above treatments recheck labs in AM.  I spent 50 minutes in the professional and overall care of this patient.      Patient fully evaluated 10/06, head of bed elevated, no s/s or c/o acute difficulties at this time. Sister at bedside and agreeable to plan. Attempted therapeutic paracentesis by IR, aborted procedure due to insufficient fluid accumulation.  Medications and labs reviewed, cultures blood no growth at 4 days, AFB Culture Culture in progress and will be examined weekly. A result will be issued either when positive or after 8 weeks incubation. AFB Stain -No acid fast bacilli seen.  Plan discussed with interdisciplinary team, continue current and repeat labs in the AM. Per pulmonary - Day of consult, patient is breathing on room air. Vital stable. CT chest showed bilateral large pleural effusions. Dicussed need for Abdominal Pleurx catheter. Patient with likely carcinomatosis and plan to discharge to [facility] and will follow up with gynecology in 7 days,     Patient still requiring frequent cardiac and SPO2 monitoring. Discharge planning  discussed with patient and care team. Therapy evaluations ordered- [place] [id number], [facility] at discharge. Patient aware and agreeable to current plan, continue plan as above. I spent 50 minutes in the professional and overall care of this patient.    Patient fully evaluated 10/07, head of bed elevated, no s/s or c/o acute difficulties at this time. Medications and labs reviewed, sodium 125, nephrology consulted.  Cultures blood no growth at 4 days, AFB Culture Culture in progress and will be examined weekly. A result will be issued either when positive or after 8 weeks incubation. AFB Stain -No acid fast bacilli seen.  Plan discussed with interdisciplinary team, continue current and repeat labs in the AM, new supplemental oxygen requirements, will repeat chest xray in AM, maintain HOB elevated to alleviate postural dyspnea. Vitals stable. Patient with likely carcinomatosis and plan to discharge to SNF - [facility] and will follow up with gynecology in 7 days, atient still requiring frequent cardiac and SPO2 monitoring. Discharge planning discussed with patient and care team. Therapy evaluations ordered- [place] [id number], [facility] at discharge. Patient aware and agreeable to current plan, continue plan as above. I spent 50 minutes in the professional and overall care of this patient.       Kim Angulo

## 2024-10-07 NOTE — PROGRESS NOTES
10/07/24 1111   Patient Choice   Provider Choice list and CMS website (https://medicare.gov/care-compare#search) for post-acute Quality and Resource Measure Data were provided and reviewed with: Family;Patient     Potential Pleurx catheter placement in the future. Called into patients room.  States Still working with Dr. Moralez and not ready for discharge. She does not want any of the Snf list on her choice list. Wants one in [place]. Asked I call her sister.  Called her sister and  she will be here after lunch. Will look at list then and decide with her sister.   notified to drop of a SNF choice list for [address] [place] [address].

## 2024-10-07 NOTE — PROGRESS NOTES
Occupational Therapy    OT Treatment    Patient Name: Rohini Beaver  MRN: [id number]  Department: [facility]  Room: [address] [place] [address]  Today's Date: 10/7/2024  Time Calculation  Start Time: 0912  Stop Time: 0949  Time Calculation (min): 37 min        Assessment:  End of Session Communication: Bedside nurse  End of Session Patient Position: Bed, 2 rail up, Alarm on     Plan:  Treatment Interventions: ADL retraining, Functional transfer training, Patient/family training, Equipment evaluation/education, Compensatory technique education, Endurance training (energy conservation / diaphragmatic breathing tehcniques training)  OT Frequency: 3 times per week  OT Discharge Recommendations: Moderate intensity level of continued care  OT - OK to Discharge: Yes (to next level of care when medically cleared by physician/medical team)  Treatment Interventions: ADL retraining, Functional transfer training, Patient/family training, Equipment evaluation/education, Compensatory technique education, Endurance training (energy conservation / diaphragmatic breathing tehcniques training)    Subjective   Previous Visit Info:  OT Last Visit  OT Received On: 10/07/24  General:  General  Co-Treatment: PT, OT  Co-Treatment Reason: to maximize safety and therapeutic intervention  Prior to Session Communication: Bedside nurse  Patient Position Received: Bed, 3 rail up, Alarm on  General Comment: Patient tearful regarding recent findings with prognosis. Emotional support provided. Agreeable to therapy session, nurse reports patient able to particpate. (Mandy, IV)  Precautions:  Medical Precautions: Fall precautions    Pain:  Pain Assessment  0-10 (Numeric) Pain Score:  (Patient endorses pain especially during rolling however did not quantify; nurse aware and patient reports already received pain meds.)    Activities of Daily Living: Toileting  Toileting Comments: Dependent for hygiene, Max assist x1-2 for rolling L and R.  Functional Standing  Tolerance:     Bed Mobility/Transfers: Bed Mobility  Bed Mobility: Yes  Bed Mobility 1  Bed Mobility 1: Rolling right, Rolling left  Bed Mobility Comments 1: Patient performs multiple trials L and R with Max assist x1-2 for hygiene after incontinence episode of BM. Noted mepilex soiled from BM, notified nurse.       Cognitive Skill DAMPAC Daily Activity  Putting on and taking off regular lower body clothing: Total  Bathing (including washing, rinsing, drying): A lot  Putting on and taking off regular upper body clothing: A lot  Toileting, which includes using toilet, bedpan or urinal: Total  Taking care of personal grooming such as brushing teeth: A little  Eating Meals: A little  Daily Activity - Total Score: 12  EDUCATION:   Fall prevention, safety during bed mobility    Goals:  Encounter Problems       Encounter Problems (Active)       OT Goals       Patient will complete upper and lower body bathing/dressing; toileting with minimal assist using adaptive equipment as needed  (Progressing)       Start:  10/02/24    Expected End:  10/16/24            Patient will perform bed mobility and functional transfers safely with minimal assist : bed, chair, commode using DME as needed  (Progressing)       Start:  10/02/24    Expected End:  10/16/24            Patient will tolerate standing for 5 mins. and show overall fair (+) standing balance during ADL's and functional transfers/mobility  (Progressing)       Start:  10/02/24    Expected End:  10/16/24            Patient will apply energy conservation/diaphragmatic breathing techniques to ADL's and functional transfers with minimal cues  (Progressing)       Start:  10/02/24    Expected End:  10/16/24

## 2024-10-07 NOTE — PROGRESS NOTES
Subjective   Patient breathing comfortably on room air. Denies shortness of breath.       Objective     Last Recorded Vitals  /57   Pulse 91   Temp 36.6 °C (97.9 °F) (Temporal)   Resp 18   Wt 104 kg (230 lb)   SpO2 97%   Intake/Output last 3 Shifts:    Intake/Output Summary (Last 24 hours) at 10/7/2024 1133  Last data filed at 10/7/2024 0800  Gross per 24 hour   Intake 50 ml   Output 600 ml   Net -550 ml       Admission Weight  Weight: 104 kg (230 lb) (10/01/24 0828)    Daily Weight  10/01/24 : 104 kg (230 lb)    Physical Exam  Constitutional:       Appearance: Normal appearance.   HENT:      Head: Normocephalic and atraumatic.      Mouth/Throat:      Mouth: Mucous membranes are moist.   Eyes:      Extraocular Movements: Extraocular movements intact.   Cardiovascular:      Rate and Rhythm: Normal rate and regular rhythm.   Pulmonary:      Effort: Pulmonary effort is normal.      Breath sounds: Normal breath sounds.   Abdominal:      General: Abdomen is flat. There is no distension.      Palpations: Abdomen is soft.   Musculoskeletal:      Right lower leg: Edema present.      Left lower leg: Edema present.   Skin:     General: Skin is warm and dry.   Neurological:      General: No focal deficit present.      Mental Status: She is alert and oriented to person, place, and time.   Psychiatric:         Mood and Affect: Mood normal.             Assessment/Plan          Bilateral pleural effusion  Hepatic abscess versus cyst  Abdominal ascites  Ovarian carcinoma w/ peritoneal carcinomatosis carcinomatosis  HLD  Plan:  Consult IR for right sided diagnostic and therapeutic thoracentesis  Unable to safely perform paracentesis due to lack of fluid  Patient has bilateral pleural effusion in the context of malignant ascites/ovarian cancer  I explained to the patient the pleural effusion most likely related to recurrent ascites, patient is requiring so far 5 steps malignant ascites in the last month most likely  beneficial approaches intra-abdominal Pleurx catheter. Abdominal Pleurx catheter would be the most effective way of preventing pleural effusions and ascites.  Pleural effusions are secondary to ascites, both of which are due to patient's underlying cancer  Continue with goals of care discussions prior to interventional radiology consult  Follow hepatic fluid analysis  Continue antibiotics  This is a preliminary note, please await attending attestation for a finalized plan.    Dr. Elsy Ortiz  Internal Medicine PGY-3  Please message me with any questions        STAFF PHYSICIAN NOTE OF PERSONAL INVOLVEMENT IN CARE  As the attending physician, I certify that I personally reviewed the patient's history and personally examined the patient to confirm the physical findings described above, and that I reviewed the relevant imaging studies and available reports.  I also discussed the differential diagnosis and all of the proposed management plans with the patient and individuals accompanying the patient to this visit.  They had the opportunity to ask questions about the proposed management plans and to have those questions answered.

## 2024-10-08 ENCOUNTER — APPOINTMENT (OUTPATIENT)
Dept: RADIOLOGY | Facility: HOSPITAL | Age: 79
DRG: 754 | End: 2024-10-08
Payer: MEDICARE

## 2024-10-08 LAB
ALBUMIN SERPL BCP-MCNC: 2.2 G/DL (ref 3.4–5)
ALP SERPL-CCNC: 172 U/L (ref 33–136)
ALT SERPL W P-5'-P-CCNC: 7 U/L (ref 7–45)
ANION GAP SERPL CALC-SCNC: 15 MMOL/L (ref 10–20)
AST SERPL W P-5'-P-CCNC: 17 U/L (ref 9–39)
BASOPHILS # BLD AUTO: 0.12 X10*3/UL (ref 0–0.1)
BASOPHILS NFR BLD AUTO: 0.5 %
BILIRUB DIRECT SERPL-MCNC: 0.1 MG/DL (ref 0–0.3)
BILIRUB SERPL-MCNC: 0.3 MG/DL (ref 0–1.2)
BUN SERPL-MCNC: 34 MG/DL (ref 6–23)
CALCIUM SERPL-MCNC: 7.9 MG/DL (ref 8.6–10.3)
CHLORIDE SERPL-SCNC: 91 MMOL/L (ref 98–107)
CLARITY FLD: CLEAR
CO2 SERPL-SCNC: 23 MMOL/L (ref 21–32)
COLOR FLD: NORMAL
CREAT SERPL-MCNC: 1.82 MG/DL (ref 0.5–1.05)
EGFRCR SERPLBLD CKD-EPI 2021: 28 ML/MIN/1.73M*2
EOSINOPHIL # BLD AUTO: 0.48 X10*3/UL (ref 0–0.4)
EOSINOPHIL NFR BLD AUTO: 1.9 %
ERYTHROCYTE [DISTWIDTH] IN BLOOD BY AUTOMATED COUNT: 16.1 % (ref 11.5–14.5)
GLUCOSE FLD-MCNC: 137 MG/DL
GLUCOSE SERPL-MCNC: 97 MG/DL (ref 74–99)
HCT VFR BLD AUTO: 30.8 % (ref 36–46)
HGB BLD-MCNC: 9.4 G/DL (ref 12–16)
HOLD SPECIMEN: NORMAL
IMM GRANULOCYTES # BLD AUTO: 0.3 X10*3/UL (ref 0–0.5)
IMM GRANULOCYTES NFR BLD AUTO: 1.2 % (ref 0–0.9)
LDH FLD L TO P-CCNC: 226 U/L
LDH SERPL L TO P-CCNC: 254 U/L (ref 84–246)
LYMPHOCYTES # BLD AUTO: 1.17 X10*3/UL (ref 0.8–3)
LYMPHOCYTES NFR BLD AUTO: 4.6 %
MCH RBC QN AUTO: 25.3 PG (ref 26–34)
MCHC RBC AUTO-ENTMCNC: 30.5 G/DL (ref 32–36)
MCV RBC AUTO: 83 FL (ref 80–100)
MONOCYTES # BLD AUTO: 1.15 X10*3/UL (ref 0.05–0.8)
MONOCYTES NFR BLD AUTO: 4.5 %
NEUTROPHILS # BLD AUTO: 22.27 X10*3/UL (ref 1.6–5.5)
NEUTROPHILS NFR BLD AUTO: 87.3 %
NRBC BLD-RTO: 0 /100 WBCS (ref 0–0)
PLATELET # BLD AUTO: 425 X10*3/UL (ref 150–450)
POTASSIUM SERPL-SCNC: 4.3 MMOL/L (ref 3.5–5.3)
PREALB SERPL-MCNC: 7.5 MG/DL (ref 18–40)
PROT FLD-MCNC: 2.9 G/DL
PROT SERPL-MCNC: 4.9 G/DL (ref 6.4–8.2)
RBC # BLD AUTO: 3.71 X10*6/UL (ref 4–5.2)
RBC # FLD AUTO: 3000 /UL
SODIUM SERPL-SCNC: 125 MMOL/L (ref 136–145)
TSH SERPL-ACNC: 5.24 MIU/L (ref 0.44–3.98)
VANCOMYCIN SERPL-MCNC: 33.8 UG/ML (ref 5–20)
WBC # BLD AUTO: 25.5 X10*3/UL (ref 4.4–11.3)
WBC # FLD AUTO: 40 /UL

## 2024-10-08 PROCEDURE — 84443 ASSAY THYROID STIM HORMONE: CPT | Performed by: INTERNAL MEDICINE

## 2024-10-08 PROCEDURE — 71045 X-RAY EXAM CHEST 1 VIEW: CPT | Performed by: STUDENT IN AN ORGANIZED HEALTH CARE EDUCATION/TRAINING PROGRAM

## 2024-10-08 PROCEDURE — 84134 ASSAY OF PREALBUMIN: CPT | Mod: PARLAB | Performed by: INTERNAL MEDICINE

## 2024-10-08 PROCEDURE — 82610 CYSTATIN C: CPT | Performed by: INTERNAL MEDICINE

## 2024-10-08 PROCEDURE — 71045 X-RAY EXAM CHEST 1 VIEW: CPT

## 2024-10-08 PROCEDURE — 32555 ASPIRATE PLEURA W/ IMAGING: CPT

## 2024-10-08 PROCEDURE — 36415 COLL VENOUS BLD VENIPUNCTURE: CPT

## 2024-10-08 PROCEDURE — 80202 ASSAY OF VANCOMYCIN: CPT | Performed by: NURSE PRACTITIONER

## 2024-10-08 PROCEDURE — 2500000001 HC RX 250 WO HCPCS SELF ADMINISTERED DRUGS (ALT 637 FOR MEDICARE OP): Performed by: INTERNAL MEDICINE

## 2024-10-08 PROCEDURE — 80053 COMPREHEN METABOLIC PANEL: CPT | Performed by: INTERNAL MEDICINE

## 2024-10-08 PROCEDURE — 82248 BILIRUBIN DIRECT: CPT

## 2024-10-08 PROCEDURE — 94640 AIRWAY INHALATION TREATMENT: CPT

## 2024-10-08 PROCEDURE — 85025 COMPLETE CBC W/AUTO DIFF WBC: CPT | Performed by: INTERNAL MEDICINE

## 2024-10-08 PROCEDURE — 0W993ZZ DRAINAGE OF RIGHT PLEURAL CAVITY, PERCUTANEOUS APPROACH: ICD-10-PCS | Performed by: RADIOLOGY

## 2024-10-08 PROCEDURE — 2500000002 HC RX 250 W HCPCS SELF ADMINISTERED DRUGS (ALT 637 FOR MEDICARE OP, ALT 636 FOR OP/ED): Performed by: INTERNAL MEDICINE

## 2024-10-08 PROCEDURE — 82945 GLUCOSE OTHER FLUID: CPT | Mod: PARLAB

## 2024-10-08 PROCEDURE — 36415 COLL VENOUS BLD VENIPUNCTURE: CPT | Performed by: INTERNAL MEDICINE

## 2024-10-08 PROCEDURE — 2500000004 HC RX 250 GENERAL PHARMACY W/ HCPCS (ALT 636 FOR OP/ED): Performed by: NURSE PRACTITIONER

## 2024-10-08 PROCEDURE — 83615 LACTATE (LD) (LDH) ENZYME: CPT | Mod: PARLAB

## 2024-10-08 PROCEDURE — 2500000005 HC RX 250 GENERAL PHARMACY W/O HCPCS: Performed by: RADIOLOGY

## 2024-10-08 PROCEDURE — 83615 LACTATE (LD) (LDH) ENZYME: CPT

## 2024-10-08 PROCEDURE — 1200000002 HC GENERAL ROOM WITH TELEMETRY DAILY

## 2024-10-08 PROCEDURE — 2500000001 HC RX 250 WO HCPCS SELF ADMINISTERED DRUGS (ALT 637 FOR MEDICARE OP): Performed by: NURSE PRACTITIONER

## 2024-10-08 PROCEDURE — 83986 ASSAY PH BODY FLUID NOS: CPT | Mod: PARLAB

## 2024-10-08 PROCEDURE — 97535 SELF CARE MNGMENT TRAINING: CPT | Mod: GO

## 2024-10-08 PROCEDURE — 89050 BODY FLUID CELL COUNT: CPT

## 2024-10-08 PROCEDURE — 84157 ASSAY OF PROTEIN OTHER: CPT | Mod: PARLAB

## 2024-10-08 PROCEDURE — 97535 SELF CARE MNGMENT TRAINING: CPT | Mod: GP,CQ

## 2024-10-08 PROCEDURE — 88112 CYTOPATH CELL ENHANCE TECH: CPT | Mod: TC

## 2024-10-08 PROCEDURE — 2500000004 HC RX 250 GENERAL PHARMACY W/ HCPCS (ALT 636 FOR OP/ED): Performed by: RADIOLOGY

## 2024-10-08 RX ORDER — LIDOCAINE HYDROCHLORIDE 10 MG/ML
INJECTION, SOLUTION EPIDURAL; INFILTRATION; INTRACAUDAL; PERINEURAL
Status: COMPLETED | OUTPATIENT
Start: 2024-10-08 | End: 2024-10-08

## 2024-10-08 RX ORDER — FAMOTIDINE 20 MG/1
10 TABLET, FILM COATED ORAL DAILY
Status: DISCONTINUED | OUTPATIENT
Start: 2024-10-08 | End: 2024-10-13

## 2024-10-08 RX ORDER — IPRATROPIUM BROMIDE AND ALBUTEROL SULFATE 2.5; .5 MG/3ML; MG/3ML
3 SOLUTION RESPIRATORY (INHALATION) 3 TIMES DAILY
Status: DISCONTINUED | OUTPATIENT
Start: 2024-10-08 | End: 2024-10-08

## 2024-10-08 RX ORDER — ENOXAPARIN SODIUM 100 MG/ML
30 INJECTION SUBCUTANEOUS 2 TIMES DAILY
Status: DISCONTINUED | OUTPATIENT
Start: 2024-10-08 | End: 2024-10-14 | Stop reason: HOSPADM

## 2024-10-08 RX ORDER — IPRATROPIUM BROMIDE AND ALBUTEROL SULFATE 2.5; .5 MG/3ML; MG/3ML
3 SOLUTION RESPIRATORY (INHALATION) EVERY 2 HOUR PRN
Status: DISCONTINUED | OUTPATIENT
Start: 2024-10-08 | End: 2024-10-14 | Stop reason: HOSPADM

## 2024-10-08 RX ORDER — IPRATROPIUM BROMIDE AND ALBUTEROL SULFATE 2.5; .5 MG/3ML; MG/3ML
3 SOLUTION RESPIRATORY (INHALATION)
Status: DISCONTINUED | OUTPATIENT
Start: 2024-10-08 | End: 2024-10-10

## 2024-10-08 ASSESSMENT — COGNITIVE AND FUNCTIONAL STATUS - GENERAL
STANDING UP FROM CHAIR USING ARMS: A LOT
EATING MEALS: A LITTLE
TURNING FROM BACK TO SIDE WHILE IN FLAT BAD: A LOT
DRESSING REGULAR UPPER BODY CLOTHING: A LOT
WALKING IN HOSPITAL ROOM: TOTAL
PERSONAL GROOMING: A LITTLE
HELP NEEDED FOR BATHING: TOTAL
MOVING TO AND FROM BED TO CHAIR: A LOT
DRESSING REGULAR LOWER BODY CLOTHING: TOTAL
MOVING FROM LYING ON BACK TO SITTING ON SIDE OF FLAT BED WITH BEDRAILS: A LOT
TOILETING: TOTAL
CLIMB 3 TO 5 STEPS WITH RAILING: TOTAL
DAILY ACTIVITIY SCORE: 11
MOBILITY SCORE: 10

## 2024-10-08 ASSESSMENT — ACTIVITIES OF DAILY LIVING (ADL): HOME_MANAGEMENT_TIME_ENTRY: 30

## 2024-10-08 ASSESSMENT — PAIN SCALES - GENERAL
PAINLEVEL_OUTOF10: 0 - NO PAIN
PAINLEVEL_OUTOF10: 9
PAINLEVEL_OUTOF10: 0 - NO PAIN
PAINLEVEL_OUTOF10: 6
PAINLEVEL_OUTOF10: 0 - NO PAIN
PAINLEVEL_OUTOF10: 0 - NO PAIN
PAINLEVEL_OUTOF10: 6
PAINLEVEL_OUTOF10: 0 - NO PAIN
PAINLEVEL_OUTOF10: 1
PAINLEVEL_OUTOF10: 0 - NO PAIN

## 2024-10-08 ASSESSMENT — PAIN SCALES - WONG BAKER: WONGBAKER_NUMERICALRESPONSE: HURTS EVEN MORE

## 2024-10-08 ASSESSMENT — PAIN - FUNCTIONAL ASSESSMENT
PAIN_FUNCTIONAL_ASSESSMENT: 0-10

## 2024-10-08 NOTE — PROGRESS NOTES
Vancomycin Dosing by Pharmacy- FOLLOW UP    Rohini Beaver is a 79 y.o. year old female who Pharmacy has been consulted for vancomycin dosing for other abdominal infection . Based on the patient's indication and renal status this patient is being dosed based on a goal trough/random level of 15-20.     Renal function is currently declining. Patient CrCl from 0.66 baseline to 1.80 today. Will change patient to dosing by levels.    Current vancomycin dose: 750 mg given every 12 hours    Most recent random level: 33.8 mcg/mL    Visit Vitals  /50   Pulse 94   Temp 36.8 °C (98.2 °F)   Resp 18        Lab Results   Component Value Date    CREATININE 1.82 (H) 10/08/2024    CREATININE 1.60 (H) 10/07/2024    CREATININE 0.66 10/05/2024    CREATININE 0.68 10/04/2024        Patient weight is as follows:   Vitals:    10/01/24 1805   Weight: 104 kg (230 lb)       Cultures:  No results found for the encounter in last 14 days.       I/O last 3 completed shifts:  In: 150 (1.4 mL/kg) [P.O.:150]  Out: 1100 (10.5 mL/kg) [Urine:1100 (0.3 mL/kg/hr)]  Weight: 104.3 kg   I/O during current shift:  No intake/output data recorded.    Temp (24hrs), Av.2 °C (97.2 °F), Min:35.9 °C (96.6 °F), Max:36.8 °C (98.2 °F)    Assessment/Plan    Dosing by levels due to EUSEBIO. Above goal random/trough level. Will not give a vancomycin dose today and will obtain a lab tomorrow with AM labs to allow for clearance of vancomycin. Will re-dose when patient is closer to goal trough level.  The next level will be obtained on 10/9 at AM labs. May be obtained sooner if clinically indicated.   Will continue to monitor renal function daily while on vancomycin and order serum creatinine at least every 48 hours if not already ordered.  Follow for continued vancomycin needs, clinical response, and signs/symptoms of toxicity.       Sherri Greene, PharmD

## 2024-10-08 NOTE — CARE PLAN
The patient's goals for the shift include sleep    The clinical goals for the shift include SAFETY    Over the shift, the patient did not make progress toward the following goals. Barriers to progression include Pt has recent paracentesis. Recommendations to address these barriers include Maintain stable vitals; Maintain safety.

## 2024-10-08 NOTE — CONSULTS
Reason For Consult  Hyponatremia/acute kidney injury    History Of Present Illness  Rohini Beaver is a 79 y.o. female who presented originally to the hospital with weakness and abdominal distention, discomfort, weight loss and pain.    Patient went to the emergency room had a CT scan which showed a 3 cm pelvic mass with endometrial thickening with ascites and omental cake, patient have elevation of the  and CEA consistent with carcinoma she had paracentesis of more than a liter of fluid with cytology consistent with for non-small cell carcinoma consistent with adenocarcinoma.  On 9/19/2024 patient have US guided abdominal paracentesis for peritoneal carcinomatosis.  He is admitted to [facility] with weakness, shortness of breath, abdominal distention and tenderness.  Patient denies nocturia, hematuria, dysuria or metallic taste.  Regard to her hyponatremia her serum sodium start dropping 7 days ago, and her serum creatinine increased from her baseline of 0.7-1.60 on October 7.  Today her serum creatinine is 1.82 with a GFR of 28 and serum sodium 125.  Today she denies any chest pain still endorses mild shortness of breath and refers her appetite is improving               Past Medical History  She has a past medical history of Bilateral primary osteoarthritis of knee, HLD (hyperlipidemia), Lumbosacral radiculopathy, Meralgia paraesthetica, Obesity, and Physical deconditioning.  1.  Ovarian cancer   2.  GERD  3.  Adjustment disorder with depressed mood  4.  Chronic neuropathy  5.  Hyperlipidemia  6.  Degenerative joint disease  6.  Obesity  7.  Corneal dystrophy status post corneal procedures  8.  History of skin cancer involving nose.  S/p Mohs procedure.  9.  Osteoarthritis  10.  Meralgia paresthetica  11.  Peritoneal carcinomatosis  12.  Malignant ascites  13.  Obesity  14.  Low back pain  15.  Acute kidney injury  16.  Hyponatremia  17.  Anemia  18.  Renal calculi  Surgical History  She has a past  "surgical history that includes Total knee arthroplasty (Left, 2019); Cholecystectomy; Corneal transplant; Lumbar fusion; Dilation and curettage of uterus; Shoulder arthroscopy (Left); Colonoscopy; Skin lesion excision; Trigger finger release (Right); Arthroplasty (Left); Breast biopsy (1999); and Total knee arthroplasty (Right, 2017).     Social History  She reports that she has never smoked. She has never used smokeless tobacco. She reports that she does not currently use alcohol. She reports that she does not use drugs.    Family History  Family History   Problem Relation Name Age of Onset    Colon cancer Mother      Lung cancer Father      Other (Mesothelioma) Brother      Brain cancer Mother's Brother          Allergies  Patient has no known allergies.    Review of Systems  10 point review of system negative except as noted in the HPI     Physical Exam  General Appearance; alert oriented  Skin pallor  HEENT; pupils react to light and accommodation  Tongue; well-hydrated  Neck; no jugular vein distention, no thyromegaly, no adenopathy, no bruit  Lungs; bibasilar crackles on expiration  Heart; no rubs no gallops, heart rate is regular  Abdomen; there is tympanic note to percussion with distention no rebound no guarding no bruit  ; no CVA tenderness  Musculoskeletal; decreased muscle tone  1+ edema of the legs  Neurological; no tremors no clonus     Last Recorded Vitals  Blood pressure 108/73, pulse 88, temperature 36 °C (96.8 °F), temperature source Temporal, resp. rate 18, height 1.626 m (5' 4\"), weight 104 kg (230 lb), SpO2 98%.    Relevant Results  Results for orders placed or performed during the hospital encounter of 10/01/24 (from the past 24 hour(s))   CBC and Auto Differential   Result Value Ref Range    WBC 25.5 (H) 4.4 - 11.3 x10*3/uL    nRBC 0.0 0.0 - 0.0 /100 WBCs    RBC 3.71 (L) 4.00 - 5.20 x10*6/uL    Hemoglobin 9.4 (L) 12.0 - 16.0 g/dL    Hematocrit 30.8 (L) 36.0 - 46.0 %    MCV 83 80 - 100 fL    " MCH 25.3 (L) 26.0 - 34.0 pg    MCHC 30.5 (L) 32.0 - 36.0 g/dL    RDW 16.1 (H) 11.5 - 14.5 %    Platelets 425 150 - 450 x10*3/uL    Neutrophils % 87.3 40.0 - 80.0 %    Immature Granulocytes %, Automated 1.2 (H) 0.0 - 0.9 %    Lymphocytes % 4.6 13.0 - 44.0 %    Monocytes % 4.5 2.0 - 10.0 %    Eosinophils % 1.9 0.0 - 6.0 %    Basophils % 0.5 0.0 - 2.0 %    Neutrophils Absolute 22.27 (H) 1.60 - 5.50 x10*3/uL    Immature Granulocytes Absolute, Automated 0.30 0.00 - 0.50 x10*3/uL    Lymphocytes Absolute 1.17 0.80 - 3.00 x10*3/uL    Monocytes Absolute 1.15 (H) 0.05 - 0.80 x10*3/uL    Eosinophils Absolute 0.48 (H) 0.00 - 0.40 x10*3/uL    Basophils Absolute 0.12 (H) 0.00 - 0.10 x10*3/uL   Vancomycin   Result Value Ref Range    Vancomycin 33.8 (H) 5.0 - 20.0 ug/mL   Comprehensive metabolic panel   Result Value Ref Range    Glucose 97 74 - 99 mg/dL    Sodium 125 (L) 136 - 145 mmol/L    Potassium 4.3 3.5 - 5.3 mmol/L    Chloride 91 (L) 98 - 107 mmol/L    Bicarbonate 23 21 - 32 mmol/L    Anion Gap 15 10 - 20 mmol/L    Urea Nitrogen 34 (H) 6 - 23 mg/dL    Creatinine 1.82 (H) 0.50 - 1.05 mg/dL    eGFR 28 (L) >60 mL/min/1.73m*2    Calcium 7.9 (L) 8.6 - 10.3 mg/dL    Albumin 2.2 (L) 3.4 - 5.0 g/dL    Alkaline Phosphatase 172 (H) 33 - 136 U/L    Total Protein 4.9 (L) 6.4 - 8.2 g/dL    AST 17 9 - 39 U/L    Bilirubin, Total 0.3 0.0 - 1.2 mg/dL    ALT 7 7 - 45 U/L   Lactate dehydrogenase   Result Value Ref Range     (H) 84 - 246 U/L   Bilirubin, Direct   Result Value Ref Range    Bilirubin, Direct 0.1 0.0 - 0.3 mg/dL   XR chest 1 view    Result Date: 10/8/2024  Interpreted By:  Deep Scott, STUDY: XR CHEST 1 VIEW; 10/8/2024 7:09 am   INDICATION: Signs/Symptoms:sob.   COMPARISON: Chest x-ray 10/06/2020   ACCESSION NUMBER(S): [id number]   ORDERING CLINICIAN: SHAKIRA PAEZ   TECHNIQUE: 1 view of the chest was performed.   FINDINGS: Hypoventilatory scan. Minimal improvement of the layering bilateral pleural effusion with  surrounding atelectasis. No pneumothorax.  The cardiomediastinal silhouette is stable. Ossifications surrounding the right shoulder joint likely degenerative. Metallic objects along the left humeral head could be related to prior surgery.       1. Minimal improvement of the layering bilateral pleural effusion with surrounding atelectasis.   Signed by: Deep Scott 10/8/2024 7:38 AM Dictation workstation:   [id number]    XR chest 1 view    Result Date: 10/7/2024  Interpreted By:  Deep Scott, STUDY: XR CHEST 1 VIEW; 10/6/2024 2:36 pm   INDICATION: Signs/Symptoms:Follow-up pleural effusion, hypoxia.   COMPARISON: None   ACCESSION NUMBER(S): [id number]   ORDERING CLINICIAN: FRANKY THIBODEAUX   TECHNIQUE: 1 view of the chest was performed.   FINDINGS: Layering right-sided pleural effusion with surrounding atelectasis. Trace left-sided pleural effusion.. No pneumothorax.  The cardiomediastinal silhouette is within normal limits. Hiatal hernia.       1. Layering right-sided pleural effusion with surrounding atelectasis. Small left-sided pleural effusions surrounding atelectasis. Findings have worsened from 10/03/2024.   Signed by: Deep Scott 10/7/2024 7:50 AM Dictation workstation:   [id number]    US guided abdominal paracentesis    Result Date: 10/4/2024  Interpreted By:  Peri Lawrence, STUDY: US GUIDED ABDOMINAL PARACENTESIS; ;  10/4/2024 3:02 pm   INDICATION: Signs/Symptoms:Paracentesis.     COMPARISON: None.   ACCESSION NUMBER(S): [id number]   ORDERING CLINICIAN: SHAKIRA PAEZ   TECHNIQUE: Multiple grayscale sonographic images of the abdomen were obtained in an attempt to perform paracentesis.   FINDINGS: There is minimal ascites. The risks of performing paracentesis outweighs the benefits. Procedure aborted.       Minimal ascites. The risks of performing paracentesis outweighs the benefits.     MACRO: None   Signed by: Peri Lawrence 10/4/2024 3:06 PM Dictation workstation:   [id number]    XR chest  1 view    Result Date: 10/4/2024  Interpreted By:  Devin Garsia, STUDY: XR CHEST 1 VIEW;  10/3/2024 6:58 pm   INDICATION: Signs/Symptoms:sob.   COMPARISON: 10/01/2024   ACCESSION NUMBER(S): [id number]   ORDERING CLINICIAN: SHAKIRA PAEZ   FINDINGS: CARDIOMEDIASTINAL SILHOUETTE AND VASCULATURE:   Cardiac size:  Within normal limits considering a shallow inspiration. Aortic shadow:  Within normal limits.   Mediastinal contours: Within normal limits.   Pulmonary vasculature:  The central vasculature is unremarkable   LUNGS: There is a retrocardiac lucency indicating small to moderate hiatal hernia. Probable left basilar atelectasis, increased. The lungs otherwise are clear.   ABDOMEN AND OTHER FINDINGS: No remarkable upper abdominal findings.   BONES: No acute osseous changes.       1.  Left basilar atelectasis.   Signed by: Devin Garsia 10/4/2024 8:41 AM Dictation workstation:   [id number]    ECG 12 lead    Result Date: 10/3/2024  Normal sinus rhythm Low voltage QRS Cannot rule out Anterior infarct , age undetermined Abnormal ECG No previous ECGs available See ED provider note for full interpretation and clinical correlation Confirmed by Dayan Velásquez ([id number]) on 10/3/2024 5:39:34 PM    CT chest abdomen pelvis w IV contrast    Result Date: 10/1/2024  Interpreted By:  Oscar Aldrich, STUDY: CT CHEST ABDOMEN PELVIS W IV CONTRAST;  10/1/2024 11:13 am   INDICATION: Signs/Symptoms:leukocytosis, ascites, recent ovarian cancer diagnosis.   COMPARISON: CT abdomen pelvis 08/08/2024   ACCESSION NUMBER(S): [id number]   ORDERING CLINICIAN: ASHLEY FAIRBANKS   TECHNIQUE: CT of the chest, abdomen, and pelvis was performed.  Contiguous axial images were obtained at 3 mm slice thickness through the chest, abdomen and pelvis. Coronal and sagittal reconstructions at 3 mm slice thickness were performed. ml of contrast  were administered intravenously without immediate complication.   FINDINGS: CHEST:   LUNG/PLEURA/LARGE AIRWAYS:  No endobronchial lesion is seen.   Moderate to large bilateral pleural effusions with adjacent consolidative opacification of the bilateral lower lobes suggestive of compressive atelectasis. No pneumothorax. Mild asymmetric scattered reticular changes suggestive of chronic lung findings.     VESSELS: The thoracic aorta is unremarkable with respect course, caliber, and contour.   Prominent main pulmonary artery measuring up to 3.2 cm in anterior-posterior dimension measured on sagittal plane which may indicate sequela of pulmonary hypertension.   No significant coronary atherosclerotic calcifications are seen.   HEART: Heart size within normal limits. No pericardial effusion.   MEDIASTINUM AND OLIVE: Mildly prominent mediastinal lymph nodes measuring up to 10 mm in short axis   Moderate hiatal hernia with partial intrathoracic stomach and ascites and region of nodularity of the peritoneal fat within hiatal hernia suggestive carcinomatosis.   CHEST WALL AND LOWER NECK: No acute osseous abnormality. Multilevel presumed degenerative changes throughout the imaged spine. No acute soft tissue abnormality.   ABDOMEN:   LIVER: There is been interval scalloping of the right hepatic margins with new regions of irregularity along the right hepatic capsule diffusely which is new since comparison imaging from 08/08/2024 there is a new elongated subcapsular collection measuring up to approximately 11.4 x 2.1 cm, difficult to measure given curvilinear projection extending over the right hepatic lobe margin (series 202, images 40-70). Similar appearing subcentimeter left hepatic lobe hypodense lesion.   BILE DUCTS: No obvious new intrahepatic biliary dilatation. Mild prominence of the common bile duct, nonspecific in a cholecystectomy patient.   GALLBLADDER: Absent.   PANCREAS: Unremarkable.   SPLEEN: Unchanged.   ADRENAL GLANDS: Unchanged.   KIDNEYS AND URETERS: Similar appearing subcentimeter right renal lower pole calculus. No  hydronephrosis. No obstructing urolithiasis.   PELVIS:   BLADDER: Unremarkable.   REPRODUCTIVE ORGANS: Uterus appears grossly unchanged.   BOWEL: Moderate hiatal hernia with wall thickening of the stomach in the hernia. No new pathologic distention of bowel. Wall thickening of the colon within the splenic flexure descending colon and sigmoid colon, nonspecific.     VESSELS: No evidence of abdominal aortic aneurysm.   PERITONEUM/RETROPERITONEUM/LYMPH NODES: Large volume ascites with extensive regions of anterior omental caking and peritoneal carcinomatosis. No obvious free intraperitoneal gas. Given the presence of extensive omental caking and peritoneal carcinomatosis, superimposed peritoneal enhancement 4 other causes cannot be excluded.   BONE AND SOFT TISSUE: No acute osseous abnormality. Osseous structures appear stable. No acute abnormality of the abdominal wall soft tissues.       CHEST: 1.  Moderate to large bilateral pleural effusions with adjacent presumed compressive atelectasis. 2. Prominent main pulmonary artery which may indicate pulmonary hypertension. 3. Mildly prominent mediastinal lymph nodes measuring up to 10 mm in short axis concerning for metastatic disease. 4. Moderate hiatal hernia with partial intrathoracic stomach. The peritoneal fat adjacent to the stomach within the hernia demonstrates evidence of probable carcinomatosis and ascites.   ABDOMEN-PELVIS: 1.  Redemonstration of large volume ascites with regions of significant anterior omental caking and peritoneal carcinomatosis commensurate with patient's provided diagnosis of ovarian cancer. 2. Regions of wall thickening of the colon extending from the splenic flexure to the sigmoid which may indicate a nonspecific colitis. 3. Since the previous examination on 08/08/2024, there are now extensive regions of scalloping of the right hepatic lobe peripherally with the appearance of a large subcapsular collection along the right hepatic margin as  above. The sterility of this collection cannot be assessed via CT and clinical correlation is advised for exclusion superimposed infection within this region.     Signed by: Oscar Aldrich 10/1/2024 12:14 PM Dictation workstation:   [id number]    XR chest 1 view    Result Date: 10/1/2024  Interpreted By:  Samuel Jaramillo, STUDY: XR CHEST 1 VIEW; 10/1/2024 8:44 am   INDICATION: Signs/Symptoms:weakness, edema in legs and abdomen   COMPARISON: April 2023.   ACCESSION NUMBER(S): [id number]   ORDERING CLINICIAN: ASHLEY FAIRBANKS   FINDINGS: The study is limited due to rotation and poor inspiratory effort, with resultant crowding of the pulmonary vasculature. The cardiac silhouette is within normal limits for the technique. Moderate size hiatal hernia is again seen. There is no pneumothorax, confluent infiltrates or significant effusion. Degenerative changes involve the spine and shoulders; metallic anchors again noted over the left humeral head related to previous rotator cuff repair.       Limited study. Hiatal hernia. No acute cardiopulmonary disease.   Signed by: Samuel Jaramillo 10/1/2024 9:22 AM Dictation workstation:   [id number]    US guided abdominal paracentesis    Result Date: 9/20/2024  Interpreted By:  Peri Lawrence and Kamau Nyokabi STUDY: US GUIDED ABDOMINAL PARACENTESIS; US GUIDED PERCUTANEOUS ABDOMINAL RETROPERITONEUM BIOPSY;  9/19/2024 11:13 am   INDICATION: Signs/Symptoms:ascites; Signs/Symptoms:ascites, evaluate ovarian cancer.   COMPARISON: CT abdomen and pelvis 08/08/2024   ACCESSION NUMBER(S): [id number]; [id number]   ORDERING CLINICIAN: JEANETTE ARIAS   TECHNIQUE: INTERVENTIONALIST(S): Dr. Peri Lawrence MD   CONSENT: The patient was informed of the nature of the proposed procedure. The purposes, alternatives, risks, and benefits were explained and discussed. All questions were answered and consent was obtained.   SEDATION: 1% local lidocaine was used for anesthetic.    MEDICATION/CONTRAST: No additional.   TIME OUT:   A time out was performed immediately prior to procedure start with the interventional team, correctly identifying the patient name, date of birth, MRN, procedure, anatomy (including marking of site and side), patient position, procedure consent form, relevant laboratory and imaging test results, antibiotic administration, safety precautions, and procedure-specific equipment needs.   COMPLICATIONS: No immediate adverse events identified.   FINDINGS: The patient was placed in the supine position. Limited sonographic images of the lower abdominal wall were obtained for purposes of needle guidance, which demonstrated omental soft tissue mass which was seen on prior CT 08/08/2024. The area of concern was prepped and draped under sterile technique.   1% lidocaine was injected subcutaneously. Additional lidocaine was administered into deeper tissues surrounding the targeted area for biopsy using a 22G spinal needle. A small incision was made. Using the same access site a 18 gauge core biopsy needle was passed via a 17 gauge coaxial introducer needle to obtain a total of 1 core sample.   Postprocedure images demonstrate no evidence for hemorrhage. The patient tolerated the procedure well and there were no immediate complications. 1 core specimen was sent to pathology.     Subsequently the right lower quadrant was visualized under ultrasound which demonstrated moderate volume ascites seen on prior CT 08/08/2024. 1% lidocaine was injected subcutaneously at this site. A 19 gauge Yueh was used to target the fluid collection under ultrasound guidance. Once the site was accessed, the inner needle was removed from the catheter. The Yueh catheter was connected to drainage container. Estimated 1000 cc of serosanguineous colored fluid was removed. Post paracentesis imaging demonstrated decreased ascitic fluid. The Yueh catheter was removed. The access site was bandaged.       1.  Status post ultrasound guided core needle biopsy of omental mass seen on CT 08/08/2024. 1 core specimens sent to pathology. 2. Successful paracentesis under ultrasound guidance with removal of 1 L of serosanguineous fluid.     I was present for and/or performed the critical portions of the procedure and immediately available throughout the entire procedure.   I personally reviewed the image(s) / study and interpretation. I agree with the findings as stated.   Performed and dictated at [facility].   MACRO: None   Signed by: Peri Lawrence 9/20/2024 10:06 AM Dictation workstation:   [id number]    US guided percutaneous abdominal retroperitoneum biopsy    Result Date: 9/20/2024  Interpreted By:  Peri Lawrence and Kamau Nyokabi STUDY: US GUIDED ABDOMINAL PARACENTESIS; US GUIDED PERCUTANEOUS ABDOMINAL RETROPERITONEUM BIOPSY;  9/19/2024 11:13 am   INDICATION: Signs/Symptoms:ascites; Signs/Symptoms:ascites, evaluate ovarian cancer.   COMPARISON: CT abdomen and pelvis 08/08/2024   ACCESSION NUMBER(S): [id number]; [id number]   ORDERING CLINICIAN: JEANETTE ARIAS   TECHNIQUE: INTERVENTIONALIST(S): Dr. Peri Lawrence MD   CONSENT: The patient was informed of the nature of the proposed procedure. The purposes, alternatives, risks, and benefits were explained and discussed. All questions were answered and consent was obtained.   SEDATION: 1% local lidocaine was used for anesthetic.   MEDICATION/CONTRAST: No additional.   TIME OUT:   A time out was performed immediately prior to procedure start with the interventional team, correctly identifying the patient name, date of birth, MRN, procedure, anatomy (including marking of site and side), patient position, procedure consent form, relevant laboratory and imaging test results, antibiotic administration, safety precautions, and procedure-specific equipment needs.   COMPLICATIONS: No immediate adverse events identified.    FINDINGS: The patient was placed in the supine position. Limited sonographic images of the lower abdominal wall were obtained for purposes of needle guidance, which demonstrated omental soft tissue mass which was seen on prior CT 08/08/2024. The area of concern was prepped and draped under sterile technique.   1% lidocaine was injected subcutaneously. Additional lidocaine was administered into deeper tissues surrounding the targeted area for biopsy using a 22G spinal needle. A small incision was made. Using the same access site a 18 gauge core biopsy needle was passed via a 17 gauge coaxial introducer needle to obtain a total of 1 core sample.   Postprocedure images demonstrate no evidence for hemorrhage. The patient tolerated the procedure well and there were no immediate complications. 1 core specimen was sent to pathology.     Subsequently the right lower quadrant was visualized under ultrasound which demonstrated moderate volume ascites seen on prior CT 08/08/2024. 1% lidocaine was injected subcutaneously at this site. A 19 gauge Yueh was used to target the fluid collection under ultrasound guidance. Once the site was accessed, the inner needle was removed from the catheter. The Yueh catheter was connected to drainage container. Estimated 1000 cc of serosanguineous colored fluid was removed. Post paracentesis imaging demonstrated decreased ascitic fluid. The Yueh catheter was removed. The access site was bandaged.       1. Status post ultrasound guided core needle biopsy of omental mass seen on CT 08/08/2024. 1 core specimens sent to pathology. 2. Successful paracentesis under ultrasound guidance with removal of 1 L of serosanguineous fluid.     I was present for and/or performed the critical portions of the procedure and immediately available throughout the entire procedure.   I personally reviewed the image(s) / study and interpretation. I agree with the findings as stated.   Performed and dictated at  [facility].   MACRO: None   Signed by: Peri Lawrence 9/20/2024 10:06 AM Dictation workstation:   [id number]           Assessment/Plan   Hyponatremia  Acute kidney injury  Ascites  Malnutrition  Anemia  Pleural effusion  Plan;  Discontinue potential nephrotoxins  Nutritional/iron/renal indices/urinary indices  DuoNeb aerosols  I spent  60 minutes in the professional and overall care of this patient.  Thank you very much for allowing to participate in the care of this patient    Elder Wells MD

## 2024-10-08 NOTE — PROGRESS NOTES
Medication Adjustment    The following medication(s) was/were adjusted for Rohini Beaver per protocol/policy due to altered renal function.    Medication(s) adjusted:   Lovenox 40mg subcutaneous BID (twice daily dosing was discussed on admission due to patient's BMI of almost 40kg/m2) to Lovenox 30mg subcutaneous BID for CrCl <30mL/min- will continue with twice daily dosing, as noted by admitting provider.     Marta Rockwell, PharmD, BCPS   10/8/2024 2:23 PM

## 2024-10-08 NOTE — PROGRESS NOTES
TCC met with patient's sister at bedside, per sister's request, for questions about SNF choices.  Patient is out of the room for paracentesis.  Questions answered, freedom of choice explained.  Sister will discuss options with patient upon her return to the room and provide choices later today or tomorrow.  Care Transitions will continue to follow.

## 2024-10-08 NOTE — PROGRESS NOTES
"Physical Therapy    Physical Therapy Treatment    Patient Name: Rohini Beaver  MRN: [id number]  Department: [facility]  Room: [address] [place], [place] [address]  Today's Date: 10/8/2024  Time Calculation  Start Time: 1044  Stop Time: 1126  Time Calculation (min): 42 min       Assessment/Plan   PT Assessment  End of Session Communication: Bedside nurse, PCT/NA/CTA  End of Session Patient Position: Bed, 2 rail up, Alarm on     PT Plan  Treatment/Interventions: Bed mobility, Transfer training, Gait training  PT Plan: Ongoing PT  PT Frequency: 4 times per week  PT Discharge Recommendations: Moderate intensity level of continued care  PT - OK to Discharge: Yes      General Visit Information:   PT  Visit  PT Received On: 10/08/24  General  Co-Treatment: co-tx with OT to ensure safe therapeutic tx to facilitate maximum participation with skilled intervention  Prior to Session Communication: Bedside nurse  Patient Position Received: Bed, 2 rail up, Alarm on  General Comment:  (pt pleasant and motivated to participate in therapy session; \"I want to move\")    Subjective   Precautions:  Precautions  Medical Precautions: Fall precautions, Oxygen therapy device and L/min (2L)  Precautions Comment:  (niurka)        Objective   Pain:  Pain Assessment  Pain Assessment: 0-10  0-10 (Numeric) Pain Score: 0 - No pain     Treatments:  Bed Mobility  Bed Mobility:  (sup <> sit with max A x 2 using bedrail and drawsheet to assist.  pt c/o feeling somewhat dizzy while initially seated EOB however reports improvement overtime and vitals taken and WNL.  pt also able roll R/L with max A x 1 and bedrails used to assist.)    Ambulation/Gait Training  Ambulation/Gait Training Performed:  (unable to progress at this time as pt incontinent of stool while standing and needing to be returned supine to be cleaned up)    Transfers  Transfer:  (sit <> stand with FWW and max A x 2.  cuing for sequencing and upright posture with pt able to stand approx. 1 min.)    Outcome " Measures:  [facility] Basic Mobility  Turning from your back to your side while in a flat bed without using bedrails: A lot  Moving from lying on your back to sitting on the side of a flat bed without using bedrails: A lot  Moving to and from bed to chair (including a wheelchair): A lot  Standing up from a chair using your arms (e.g. wheelchair or bedside chair): A lot  To walk in hospital room: Total  Climbing 3-5 steps with railing: Total  Basic Mobility - Total Score: 10    Education Documentation  Mobility Training, taught by Sharlene Walker PTA at 10/8/2024  3:07 PM.  Learner: Patient  Readiness: Acceptance  Method: Explanation  Response: Verbalizes Understanding    Education Comments  No comments found.        EDUCATION:       Encounter Problems       Encounter Problems (Active)       PT Problem       STG - Pt will transition supine <> sitting with min A x 1  (Progressing)       Start:  10/02/24    Expected End:  10/16/24            STG - Pt will transfer STS with mod A x 1  (Progressing)       Start:  10/02/24    Expected End:  10/16/24            STG - Pt will amb 25' using RW with mod A x 1  (Progressing)       Start:  10/02/24    Expected End:  10/16/24

## 2024-10-08 NOTE — PROGRESS NOTES
Occupational Therapy    OT Treatment    Patient Name: Rohini Beaver  MRN: [id number]  Department: [facility]  Room: [address] [place] [address]  Today's Date: 10/8/2024  Time Calculation  Start Time: 1045  Stop Time: 1130  Time Calculation (min): 45 min        Assessment:  End of Session Communication: Bedside nurse, PCT/NA/CTA  End of Session Patient Position: Bed, 2 rail up, Alarm on  OT Assessment Results: Decreased ADL status, Decreased upper extremity strength, Decreased endurance, Decreased functional mobility, Decreased gross motor control, Decreased IADLs  Strengths: Coping skills, Attitude of self  Plan:  Treatment Interventions: ADL retraining, Functional transfer training, Patient/family training, Equipment evaluation/education, Compensatory technique education, Endurance training (energy conservation / diaphragmatic breathing tehcniques training)  OT Frequency: 3 times per week  OT Discharge Recommendations: Moderate intensity level of continued care  OT - OK to Discharge: Yes (to next level of care when medically cleared by physician/medical team)  Treatment Interventions: ADL retraining, Functional transfer training, Patient/family training, Equipment evaluation/education, Compensatory technique education, Endurance training (energy conservation / diaphragmatic breathing tehcniques training)    Subjective   Previous Visit Info:  OT Last Visit  OT Received On: 10/08/24  General:  General  Co-Treatment: PT  Co-Treatment Reason: PTA to facilitate patient's safety and overall tolerance of session  Prior to Session Communication: Bedside nurse  Patient Position Received: Bed, 2 rail up, Alarm on  General Comment: patient pleasant and coopertive throughout, fatigued during session  Precautions:  Precautions Comment: Fall Precautions      Vital Signs Comment: continuous pulse ox donned, patient's vitals stable throughout session     Pain:  Pain Assessment  Pain Assessment: 0-10  0-10 (Numeric) Pain Score: 0 - No pain    Objective     Cognition:  Cognition  Overall Cognitive Status: Within Functional Limits    Activities of Daily Living:    Grooming  Grooming Comments: MIN A for combing back of hair    UE Dressing  UE Dressing Comments: MOD A to don hospital gown while supine in bed, able to doff gown with verbal cues    LE Dressing  LE Dressing: Yes  LE Dressing Comments: total assist for donning/doffing socks while supine in bed    Toileting  Toileting Comments: MAX A x2 for completing bed level bowel and urinary hygiene while supine in bed and rolling (R) <> (L); patient incontinent during session    Bed Mobility/Transfers: Bed Mobility  Bed Mobility: Yes  Bed Mobility 1  Bed Mobility Comments 1: supine <> sit with MAX A x1 and bed rail assist; MAX A x2 for supine <> sit with MAX A x2 for lifting/lowering trunk and mobilizing (B) LEs    Transfers  Transfer: Yes (MAX A x2 for sit <> stand from EOB with FWW assist)    Functional Mobility:  Functional Mobility  Functional Mobility Performed:  (unable to complete functional mobility this date 2/2 bowel incontinence in standing)    Strength:  Strength Comments: generalized weakness throughout session      Outcome Measures:[facility] Daily Activity  Putting on and taking off regular lower body clothing: Total  Bathing (including washing, rinsing, drying): Total  Putting on and taking off regular upper body clothing: A lot  Toileting, which includes using toilet, bedpan or urinal: Total  Taking care of personal grooming such as brushing teeth: A little  Eating Meals: A little  Daily Activity - Total Score: 11        Education Documentation  Precautions, taught by Lizbeth Savage OT at 10/8/2024  2:27 PM.  Learner: Patient  Readiness: Acceptance  Method: Explanation  Response: Verbalizes Understanding, Needs Reinforcement    ADL Training, taught by Lizbeth Savage OT at 10/8/2024  2:27 PM.  Learner: Patient  Readiness: Acceptance  Method: Explanation  Response: Verbalizes Understanding, Needs  Reinforcement    Body Mechanics, taught by Lizbeth Savage OT at 10/8/2024  2:27 PM.  Learner: Patient  Readiness: Acceptance  Method: Explanation  Response: Verbalizes Understanding, Needs Reinforcement    Education Comments  No comments found.        OP EDUCATION:  Education  Individual(s) Educated: Patient  Education Provided: Anatomy & Physiology, Diagnosis & Precautions, Ergonomics and postural realignment, Joint protection and energy conservation, Fall precautons  Education Comment: continued education raequired    Goals:  Encounter Problems       Encounter Problems (Active)       OT Goals       Patient will complete upper and lower body bathing/dressing; toileting with minimal assist using adaptive equipment as needed  (Progressing)       Start:  10/02/24    Expected End:  10/16/24            Patient will perform bed mobility and functional transfers safely with minimal assist : bed, chair, commode using DME as needed  (Progressing)       Start:  10/02/24    Expected End:  10/16/24            Patient will tolerate standing for 5 mins. and show overall fair (+) standing balance during ADL's and functional transfers/mobility  (Progressing)       Start:  10/02/24    Expected End:  10/16/24            Patient will apply energy conservation/diaphragmatic breathing techniques to ADL's and functional transfers with minimal cues  (Progressing)       Start:  10/02/24    Expected End:  10/16/24

## 2024-10-08 NOTE — PROGRESS NOTES
"Rohini Beaver is a 79 y.o. female on day 7 of admission presenting with Hyponatremia.      Subjective    Patient fully evaluated 10/3, awake, resting in bed. Patient with Moderate hiatal hernia with partial intrathoracic stomach and the peritoneal fat adjacent to the stomach within the hernia demonstrates evidence of probable carcinomatosis and ascites, Patient tolerated paracentesis on 10/01 well,order placed for repeat paracentesis therapeutic non diagnostic with IR.continue current and repeat labs in the AM. Patient aware and agreeable to current plan, continue plan as above. I spent 50 minutes in the professional and overall care of this patient.   Objective     Last Recorded Vitals  /73 (BP Location: Right arm, Patient Position: Lying)   Pulse 88   Temp 36 °C (96.8 °F) (Temporal)   Resp 18   Wt 104 kg (230 lb)   SpO2 98%   Intake/Output last 3 Shifts:    Intake/Output Summary (Last 24 hours) at 10/8/2024 1426  Last data filed at 10/8/2024 1259  Gross per 24 hour   Intake 550 ml   Output 800 ml   Net -250 ml       Admission Weight  Weight: 104 kg (230 lb) (10/01/24 0828)    Daily Weight  10/01/24 : 104 kg (230 lb)    Image Results  US guided aspiration of abscess, hematoma, cyst  Narrative: Interpreted By:  Parrish Vargas,   STUDY:  US GUIDED ASPIRATION OF ABSCESS, HEMATOMA, CYST;  10/1/2024 4:49 pm      INDICATION:  Signs/Symptoms:concern for liver absess on ct and need therapeutic  paracentesis.          COMPARISON:  None.      ACCESSION NUMBER(S):  [id number]      ORDERING CLINICIAN:  ERNESTO RIVERA      TECHNIQUE:  INTERVENTIONALIST(S):  Parrish Vargas MD      The history and physical exam pertinent to the procedure were  reviewed and no updates were made.\"      CONSENT:  The patient/patient's POA/next of kin was informed of the nature of  the proposed procedure. The purposes, alternatives, risks, and  benefits were explained and discussed. All questions were answered  and consent was obtained.   "    SEDATION:  None      MEDICATION/CONTRAST:  No additional      TIME OUT:  A time out was performed immediately prior to procedure start with  the interventional team, correctly identifying the patient name, date  of birth, MRN, procedure, anatomy (including marking of site and  side), patient position, procedure consent form, relevant laboratory  and imaging test results, antibiotic administration, safety  precautions, and procedure-specific equipment needs.      COMPLICATIONS:  No immediate adverse events identified.      FINDINGS:  Sonographic evaluation of the patient demonstrated only small amount  of simple appearing fluid adjacent to the liver. This was targeted  for drainage. Site prepped and draped in usual sterile fashion. 1%  lidocaine for anesthesia. 5 Uzbek Yueh catheter advanced into the  collection. Stylet removed. 25 cc of slightly cloudy serous fluid was  removed. Needle removed. Access site covered with sterile bandage.  Patient tolerated procedure without complication.      Impression: Aspiration of perihepatic collection which demonstrated thin yellow  fluid..      I was present for and/or performed the critical portions of the  procedure and immediately available throughout the entire procedure.      I personally reviewed the image(s) / study and interpretation. I  agree with the findings as stated.      Performed and dictated at [facility].      MACRO:  None      Signed by: Parrish Vargas 10/8/2024 2:06 PM  Dictation workstation:   [id number]  XR chest 1 view  Narrative: Interpreted By:  Deep Scott,   STUDY:  XR CHEST 1 VIEW; 10/8/2024 7:09 am      INDICATION:  Signs/Symptoms:sob.      COMPARISON:  Chest x-ray 10/06/2020      ACCESSION NUMBER(S):  [id number]      ORDERING CLINICIAN:  SHAKIRA PAEZ      TECHNIQUE:  1 view of the chest was performed.      FINDINGS:  Hypoventilatory scan. Minimal improvement of the layering bilateral  pleural effusion with  surrounding atelectasis. No pneumothorax.  The  cardiomediastinal silhouette is stable. Ossifications surrounding the  right shoulder joint likely degenerative. Metallic objects along the  left humeral head could be related to prior surgery.      Impression: 1. Minimal improvement of the layering bilateral pleural effusion  with surrounding atelectasis.      Signed by: Deep Scott 10/8/2024 7:38 AM  Dictation workstation:   [id number]      Physical Exam    Relevant Results               Assessment/Plan   This patient currently has cardiac telemetry ordered; if you would like to modify or discontinue the telemetry order, click here to go to the orders activity to modify/discontinue the order.          Jd Rhodes MD   Physician  Internal Medicine     H&P      Addendum     Date of Service: 10/1/2024  2:54 PM     Addendum       Expand All Collapse All    History Of Present Illness  Rohini Beaver is a 79-year-old female with past medical history of recently diagnosed ovarian cancer with peritoneal carcinomatosis s/p paracentesis x 4 (last one done 9/19/24), obesity, hyperlipidemia, osteoarthritis, and skin cancer s/p Mohs procedure.  Patient presents to the hospital with weakness and inability to care for herself.  She reports that she was at [facility] skilled nursing facility, however had a brief hospitalization at [facility] and upon discharge decided to go home rather than go back to [facility].  She lives with her 86-year-old sister and has a couple friends who assist with appointments, however limited support system.  Sister unable to care for due to age.  Today she was in urgent reclining chair and was able to get up.  ROS additionally positive for cold sweats, distended and tender abdomen, edema, pressure injury to coccyx/gluteal fold, and decreased activity tolerance.  Denies history of heart disease.  She reports that she is scheduled for outpatient appointment with her oncologist  tomorrow to determine ongoing plan.      ER course: Hemodynamically stable, afebrile, SpO2 90s on room air.  WBC 28.9, Hgb 11.0/Hct34.1 (Hgb 15.1 4/4/2023), platelets 446. Glucose 107, Sodium 126, Chloride 94, calcium 8.5, albumin 2.4, alkaline phosphatase 231.  Lactate 1.4.  BNP 74.  High-sensitivity troponin 7.  UA unremarkable. CT chest showed moderate to large bilateral pleural effusion with adjacent presumed compressive atelectasis, prominent main pulmonary artery which may indicate pulmonary hypertension, mildly prominent mediastinal lymph nodes measuring up to 10 mm in short axis concerning for metastatic disease.  Moderate hiatal hernia with partial intrathoracic stomach and the peritoneal fat adjacent to the stomach within the hernia demonstrates evidence of probable carcinomatosis and ascites.  Redemonstration of large volume ascites with regions of significant anterior omental caking and peritoneal carcinomatosis commensurate with patient's provided diagnosis of ovarian cancer. Regions of wall thickening of the colon extending from the splenic flexure to the sigmoid.  Extensive region of scalloping of the right hepatic lobe peripherally with appearance of large subcapsular collection along the right hepatic margin. Blood culture in process     Past medical history: As above  Past surgical history: Cholecystectomy, lumbar laminectomy, left shoulder arthroscopy, right total knee replacement, corneal transplant  Social history: No history of smoking, alcohol abuse, illicit drug use.  Lives with her elderly sister who is 86 years old.  Limited support system.   Family history: Mother-cancer (unknown type)     Past Medical History  Medical History        Past Medical History:   Diagnosis Date    Bilateral primary osteoarthritis of knee      HLD (hyperlipidemia)      Lumbosacral radiculopathy      Meralgia paraesthetica      Physical deconditioning              Surgical History  Surgical History         Past  Surgical History:   Procedure Laterality Date    ARTHROPLASTY Left       Left thumb carpometacarpal arthroplasty with ligament reconstruction and tendon interposition    BREAST BIOPSY   1999     Benign    CHOLECYSTECTOMY        COLONOSCOPY        CORNEAL TRANSPLANT        DILATION AND CURETTAGE OF UTERUS        LUMBAR FUSION        SHOULDER ARTHROSCOPY Left      SKIN LESION EXCISION        TOTAL KNEE ARTHROPLASTY Left 2019    TOTAL KNEE ARTHROPLASTY Right 2017    TRIGGER FINGER RELEASE Right              Social History  She reports that she has never smoked. She has never used smokeless tobacco. She reports that she does not currently use alcohol. She reports that she does not use drugs.     Family History  Family History          Family History   Problem Relation Name Age of Onset    Colon cancer Mother        Lung cancer Father        Other (Mesothelioma) Brother        Brain cancer Mother's Brother                Allergies  Patient has no known allergies.     Review of Systems     10 point ROS negative except as noted above in HPI      Physical Exam  Vitals reviewed.   Constitutional:       General: She is awake. She is not in acute distress.     Appearance: She is obese. She is ill-appearing. She is not toxic-appearing.   HENT:      Head: Normocephalic and atraumatic.      Nose: Nose normal.      Mouth/Throat:      Mouth: Mucous membranes are moist.      Pharynx: Oropharynx is clear.   Eyes:      Conjunctiva/sclera: Conjunctivae normal.   Cardiovascular:      Rate and Rhythm: Normal rate and regular rhythm.      Pulses: Normal pulses.      Heart sounds: No murmur heard.  Pulmonary:      Effort: Pulmonary effort is normal. No respiratory distress.      Breath sounds: Decreased air movement present. Decreased breath sounds present.      Comments: Posterior bilateral breath sounds are diminished  Abdominal:      General: There is distension.      Palpations: Abdomen is soft.      Tenderness: There is abdominal  tenderness. There is no guarding.      Comments: Bowel sounds hypoactive, abdomen distended, tender to palpation, dullness noted to the sides.   Musculoskeletal:         General: No swelling, deformity or signs of injury. Normal range of motion.      Cervical back: Neck supple.      Right lower leg: Edema present.      Left lower leg: Edema present.      Comments: Generalized edema/anasarca   Skin:     General: Skin is warm and dry.      Capillary Refill: Capillary refill takes less than 2 seconds.      Findings: No ecchymosis or wound.      Comments: Wound on coccyx, exam deferred  due to patient discomfort    Neurological:      General: No focal deficit present.      Mental Status: She is alert and oriented to person, place, and time.   Psychiatric:         Mood and Affect: Mood normal.         Behavior: Behavior is cooperative.                  Last Recorded Vitals  Blood pressure 117/58, pulse 100, temperature 36.2 °C (97.2 °F), temperature source Temporal, resp. rate (!) 22, weight 104 kg (230 lb), SpO2 94%.     Relevant Results              Results for orders placed or performed during the hospital encounter of 10/01/24 (from the past 24 hour(s))   CBC and Auto Differential   Result Value Ref Range     WBC 28.9 (H) 4.4 - 11.3 x10*3/uL     nRBC 0.0 0.0 - 0.0 /100 WBCs     RBC 4.15 4.00 - 5.20 x10*6/uL     Hemoglobin 11.0 (L) 12.0 - 16.0 g/dL     Hematocrit 34.1 (L) 36.0 - 46.0 %     MCV 82 80 - 100 fL     MCH 26.5 26.0 - 34.0 pg     MCHC 32.3 32.0 - 36.0 g/dL     RDW 15.7 (H) 11.5 - 14.5 %     Platelets 446 150 - 450 x10*3/uL     Neutrophils % 89.1 40.0 - 80.0 %     Immature Granulocytes %, Automated 1.0 (H) 0.0 - 0.9 %     Lymphocytes % 4.3 13.0 - 44.0 %     Monocytes % 4.8 2.0 - 10.0 %     Eosinophils % 0.5 0.0 - 6.0 %     Basophils % 0.3 0.0 - 2.0 %     Neutrophils Absolute 25.74 (H) 1.60 - 5.50 x10*3/uL     Immature Granulocytes Absolute, Automated 0.30 0.00 - 0.50 x10*3/uL     Lymphocytes Absolute 1.23  0.80 - 3.00 x10*3/uL     Monocytes Absolute 1.38 (H) 0.05 - 0.80 x10*3/uL     Eosinophils Absolute 0.14 0.00 - 0.40 x10*3/uL     Basophils Absolute 0.09 0.00 - 0.10 x10*3/uL   Comprehensive metabolic panel   Result Value Ref Range     Glucose 107 (H) 74 - 99 mg/dL     Sodium 126 (L) 136 - 145 mmol/L     Potassium 5.2 3.5 - 5.3 mmol/L     Chloride 94 (L) 98 - 107 mmol/L     Bicarbonate 24 21 - 32 mmol/L     Anion Gap 13 10 - 20 mmol/L     Urea Nitrogen 18 6 - 23 mg/dL     Creatinine 0.61 0.50 - 1.05 mg/dL     eGFR >90 >60 mL/min/1.73m*2     Calcium 8.5 (L) 8.6 - 10.3 mg/dL     Albumin 2.4 (L) 3.4 - 5.0 g/dL     Alkaline Phosphatase 231 (H) 33 - 136 U/L     Total Protein 5.7 (L) 6.4 - 8.2 g/dL     AST 23 9 - 39 U/L     Bilirubin, Total 0.3 0.0 - 1.2 mg/dL     ALT 8 7 - 45 U/L   Magnesium   Result Value Ref Range     Magnesium 1.68 1.60 - 2.40 mg/dL   Troponin I, High Sensitivity   Result Value Ref Range     Troponin I, High Sensitivity 7 0 - 13 ng/L   B-Type Natriuretic Peptide   Result Value Ref Range     BNP 74 0 - 99 pg/mL   Sars-CoV-2 PCR   Result Value Ref Range     Coronavirus 2019, PCR Not Detected Not Detected   Lactate   Result Value Ref Range     Lactate 1.4 0.4 - 2.0 mmol/L   Urinalysis with Reflex Culture and Microscopic   Result Value Ref Range     Color, Urine Light-Yellow Light-Yellow, Yellow, Dark-Yellow     Appearance, Urine Clear Clear     Specific Gravity, Urine >1.050 (N) 1.005 - 1.035     pH, Urine 6.0 5.0, 5.5, 6.0, 6.5, 7.0, 7.5, 8.0     Protein, Urine 20 (TRACE) NEGATIVE, 10 (TRACE), 20 (TRACE) mg/dL     Glucose, Urine Normal Normal mg/dL     Blood, Urine NEGATIVE NEGATIVE     Ketones, Urine NEGATIVE NEGATIVE mg/dL     Bilirubin, Urine NEGATIVE NEGATIVE     Urobilinogen, Urine Normal Normal mg/dL     Nitrite, Urine NEGATIVE NEGATIVE     Leukocyte Esterase, Urine NEGATIVE NEGATIVE   Urinalysis Microscopic   Result Value Ref Range     WBC, Urine 1-5 1-5, NONE /HPF     RBC, Urine 1-2 NONE, 1-2,  3-5 /HPF     Squamous Epithelial Cells, Urine 1-9 (SPARSE) Reference range not established. /HPF     Mucus, Urine FEW Reference range not established. /LPF   Protime-INR   Result Value Ref Range     Protime 13.0 (H) 9.8 - 12.8 seconds     INR 1.2 (H) 0.9 - 1.1   APTT   Result Value Ref Range     aPTT 24 (L) 27 - 38 seconds      CT chest abdomen pelvis w IV contrast     Result Date: 10/1/2024  Interpreted By:  Oscar Aldrich, STUDY: CT CHEST ABDOMEN PELVIS W IV CONTRAST;  10/1/2024 11:13 am   INDICATION: Signs/Symptoms:leukocytosis, ascites, recent ovarian cancer diagnosis.   COMPARISON: CT abdomen pelvis 08/08/2024   ACCESSION NUMBER(S): [id number]   ORDERING CLINICIAN: ASHLEY FAIRBANKS   TECHNIQUE: CT of the chest, abdomen, and pelvis was performed.  Contiguous axial images were obtained at 3 mm slice thickness through the chest, abdomen and pelvis. Coronal and sagittal reconstructions at 3 mm slice thickness were performed. ml of contrast  were administered intravenously without immediate complication.   FINDINGS: CHEST:   LUNG/PLEURA/LARGE AIRWAYS: No endobronchial lesion is seen.   Moderate to large bilateral pleural effusions with adjacent consolidative opacification of the bilateral lower lobes suggestive of compressive atelectasis. No pneumothorax. Mild asymmetric scattered reticular changes suggestive of chronic lung findings.     VESSELS: The thoracic aorta is unremarkable with respect course, caliber, and contour.   Prominent main pulmonary artery measuring up to 3.2 cm in anterior-posterior dimension measured on sagittal plane which may indicate sequela of pulmonary hypertension.   No significant coronary atherosclerotic calcifications are seen.   HEART: Heart size within normal limits. No pericardial effusion.   MEDIASTINUM AND OLIVE: Mildly prominent mediastinal lymph nodes measuring up to 10 mm in short axis   Moderate hiatal hernia with partial intrathoracic stomach and ascites and region of nodularity  of the peritoneal fat within hiatal hernia suggestive carcinomatosis.   CHEST WALL AND LOWER NECK: No acute osseous abnormality. Multilevel presumed degenerative changes throughout the imaged spine. No acute soft tissue abnormality.   ABDOMEN:   LIVER: There is been interval scalloping of the right hepatic margins with new regions of irregularity along the right hepatic capsule diffusely which is new since comparison imaging from 08/08/2024 there is a new elongated subcapsular collection measuring up to approximately 11.4 x 2.1 cm, difficult to measure given curvilinear projection extending over the right hepatic lobe margin (series 202, images 40-70). Similar appearing subcentimeter left hepatic lobe hypodense lesion.   BILE DUCTS: No obvious new intrahepatic biliary dilatation. Mild prominence of the common bile duct, nonspecific in a cholecystectomy patient.   GALLBLADDER: Absent.   PANCREAS: Unremarkable.   SPLEEN: Unchanged.   ADRENAL GLANDS: Unchanged.   KIDNEYS AND URETERS: Similar appearing subcentimeter right renal lower pole calculus. No hydronephrosis. No obstructing urolithiasis.   PELVIS:   BLADDER: Unremarkable.   REPRODUCTIVE ORGANS: Uterus appears grossly unchanged.   BOWEL: Moderate hiatal hernia with wall thickening of the stomach in the hernia. No new pathologic distention of bowel. Wall thickening of the colon within the splenic flexure descending colon and sigmoid colon, nonspecific.     VESSELS: No evidence of abdominal aortic aneurysm.   PERITONEUM/RETROPERITONEUM/LYMPH NODES: Large volume ascites with extensive regions of anterior omental caking and peritoneal carcinomatosis. No obvious free intraperitoneal gas. Given the presence of extensive omental caking and peritoneal carcinomatosis, superimposed peritoneal enhancement 4 other causes cannot be excluded.   BONE AND SOFT TISSUE: No acute osseous abnormality. Osseous structures appear stable. No acute abnormality of the abdominal wall soft  tissues.        CHEST: 1.  Moderate to large bilateral pleural effusions with adjacent presumed compressive atelectasis. 2. Prominent main pulmonary artery which may indicate pulmonary hypertension. 3. Mildly prominent mediastinal lymph nodes measuring up to 10 mm in short axis concerning for metastatic disease. 4. Moderate hiatal hernia with partial intrathoracic stomach. The peritoneal fat adjacent to the stomach within the hernia demonstrates evidence of probable carcinomatosis and ascites.   ABDOMEN-PELVIS: 1.  Redemonstration of large volume ascites with regions of significant anterior omental caking and peritoneal carcinomatosis commensurate with patient's provided diagnosis of ovarian cancer. 2. Regions of wall thickening of the colon extending from the splenic flexure to the sigmoid which may indicate a nonspecific colitis. 3. Since the previous examination on 08/08/2024, there are now extensive regions of scalloping of the right hepatic lobe peripherally with the appearance of a large subcapsular collection along the right hepatic margin as above. The sterility of this collection cannot be assessed via CT and clinical correlation is advised for exclusion superimposed infection within this region.     Signed by: Oscar Aldrich 10/1/2024 12:14 PM Dictation workstation:   [id number]     XR chest 1 view     Result Date: 10/1/2024  Interpreted By:  Samuel Jaramillo, STUDY: XR CHEST 1 VIEW; 10/1/2024 8:44 am   INDICATION: Signs/Symptoms:weakness, edema in legs and abdomen   COMPARISON: April 2023.   ACCESSION NUMBER(S): [id number]   ORDERING CLINICIAN: ASHLEY FAIRBANKS   FINDINGS: The study is limited due to rotation and poor inspiratory effort, with resultant crowding of the pulmonary vasculature. The cardiac silhouette is within normal limits for the technique. Moderate size hiatal hernia is again seen. There is no pneumothorax, confluent infiltrates or significant effusion. Degenerative changes involve the  spine and shoulders; metallic anchors again noted over the left humeral head related to previous rotator cuff repair.        Limited study. Hiatal hernia. No acute cardiopulmonary disease.   Signed by: Samuel Jaramillo 10/1/2024 9:22 AM Dictation workstation:   [id number]     US guided abdominal paracentesis     Result Date: 9/20/2024  Interpreted By:  Peri Lawrence and Kamau Nyokabi STUDY: US GUIDED ABDOMINAL PARACENTESIS; US GUIDED PERCUTANEOUS ABDOMINAL RETROPERITONEUM BIOPSY;  9/19/2024 11:13 am   INDICATION: Signs/Symptoms:ascites; Signs/Symptoms:ascites, evaluate ovarian cancer.   COMPARISON: CT abdomen and pelvis 08/08/2024   ACCESSION NUMBER(S): [id number]; [id number]   ORDERING CLINICIAN: JEANETTE ARIAS   TECHNIQUE: INTERVENTIONALIST(S): Dr. Peri Lawrence MD   CONSENT: The patient was informed of the nature of the proposed procedure. The purposes, alternatives, risks, and benefits were explained and discussed. All questions were answered and consent was obtained.   SEDATION: 1% local lidocaine was used for anesthetic.   MEDICATION/CONTRAST: No additional.   TIME OUT:   A time out was performed immediately prior to procedure start with the interventional team, correctly identifying the patient name, date of birth, MRN, procedure, anatomy (including marking of site and side), patient position, procedure consent form, relevant laboratory and imaging test results, antibiotic administration, safety precautions, and procedure-specific equipment needs.   COMPLICATIONS: No immediate adverse events identified.   FINDINGS: The patient was placed in the supine position. Limited sonographic images of the lower abdominal wall were obtained for purposes of needle guidance, which demonstrated omental soft tissue mass which was seen on prior CT 08/08/2024. The area of concern was prepped and draped under sterile technique.   1% lidocaine was injected subcutaneously. Additional lidocaine was administered into  deeper tissues surrounding the targeted area for biopsy using a 22G spinal needle. A small incision was made. Using the same access site a 18 gauge core biopsy needle was passed via a 17 gauge coaxial introducer needle to obtain a total of 1 core sample.   Postprocedure images demonstrate no evidence for hemorrhage. The patient tolerated the procedure well and there were no immediate complications. 1 core specimen was sent to pathology.     Subsequently the right lower quadrant was visualized under ultrasound which demonstrated moderate volume ascites seen on prior CT 08/08/2024. 1% lidocaine was injected subcutaneously at this site. A 19 gauge Yueh was used to target the fluid collection under ultrasound guidance. Once the site was accessed, the inner needle was removed from the catheter. The Yueh catheter was connected to drainage container. Estimated 1000 cc of serosanguineous colored fluid was removed. Post paracentesis imaging demonstrated decreased ascitic fluid. The Yueh catheter was removed. The access site was bandaged.        1. Status post ultrasound guided core needle biopsy of omental mass seen on CT 08/08/2024. 1 core specimens sent to pathology. 2. Successful paracentesis under ultrasound guidance with removal of 1 L of serosanguineous fluid.     I was present for and/or performed the critical portions of the procedure and immediately available throughout the entire procedure.   I personally reviewed the image(s) / study and interpretation. I agree with the findings as stated.   Performed and dictated at [facility].   MACRO: None   Signed by: Peri Lawrence 9/20/2024 10:06 AM Dictation workstation:   [id number]     US guided percutaneous abdominal retroperitoneum biopsy     Result Date: 9/20/2024  Interpreted By:  Peri Lawrence and Kamau Nyokabi STUDY: US GUIDED ABDOMINAL PARACENTESIS; US GUIDED PERCUTANEOUS ABDOMINAL RETROPERITONEUM BIOPSY;   9/19/2024 11:13 am   INDICATION: Signs/Symptoms:ascites; Signs/Symptoms:ascites, evaluate ovarian cancer.   COMPARISON: CT abdomen and pelvis 08/08/2024   ACCESSION NUMBER(S): [id number]; [id number]   ORDERING CLINICIAN: JEANETTE ARIAS   TECHNIQUE: INTERVENTIONALIST(S): Dr. Peri Lawrence MD   CONSENT: The patient was informed of the nature of the proposed procedure. The purposes, alternatives, risks, and benefits were explained and discussed. All questions were answered and consent was obtained.   SEDATION: 1% local lidocaine was used for anesthetic.   MEDICATION/CONTRAST: No additional.   TIME OUT:   A time out was performed immediately prior to procedure start with the interventional team, correctly identifying the patient name, date of birth, MRN, procedure, anatomy (including marking of site and side), patient position, procedure consent form, relevant laboratory and imaging test results, antibiotic administration, safety precautions, and procedure-specific equipment needs.   COMPLICATIONS: No immediate adverse events identified.   FINDINGS: The patient was placed in the supine position. Limited sonographic images of the lower abdominal wall were obtained for purposes of needle guidance, which demonstrated omental soft tissue mass which was seen on prior CT 08/08/2024. The area of concern was prepped and draped under sterile technique.   1% lidocaine was injected subcutaneously. Additional lidocaine was administered into deeper tissues surrounding the targeted area for biopsy using a 22G spinal needle. A small incision was made. Using the same access site a 18 gauge core biopsy needle was passed via a 17 gauge coaxial introducer needle to obtain a total of 1 core sample.   Postprocedure images demonstrate no evidence for hemorrhage. The patient tolerated the procedure well and there were no immediate complications. 1 core specimen was sent to pathology.     Subsequently the right lower quadrant was  visualized under ultrasound which demonstrated moderate volume ascites seen on prior CT 08/08/2024. 1% lidocaine was injected subcutaneously at this site. A 19 gauge Yueh was used to target the fluid collection under ultrasound guidance. Once the site was accessed, the inner needle was removed from the catheter. The Yueh catheter was connected to drainage container. Estimated 1000 cc of serosanguineous colored fluid was removed. Post paracentesis imaging demonstrated decreased ascitic fluid. The Yueh catheter was removed. The access site was bandaged.        1. Status post ultrasound guided core needle biopsy of omental mass seen on CT 08/08/2024. 1 core specimens sent to pathology. 2. Successful paracentesis under ultrasound guidance with removal of 1 L of serosanguineous fluid.     I was present for and/or performed the critical portions of the procedure and immediately available throughout the entire procedure.   I personally reviewed the image(s) / study and interpretation. I agree with the findings as stated.   Performed and dictated at [facility].   MACRO: None   Signed by: Peri Lawrence 9/20/2024 10:06 AM Dictation workstation:   [id number]            Assessment/Plan        Assessment & Plan  Hyponatremia        79-year-old female with past medical history of recently diagnosed ovarian cancer with peritoneal carcinomatosis s/p paracentesis x 4 (last one done 9/19/24), obesity, hyperlipidemia, osteoarthritis, and skin cancer s/p Mohs procedure.  Patient presents to the hospital with weakness and inability to care for herself.  Patient found to be hyponatremic and with evidence on CT imaging of possible abscess of the liver and ascites secondary to malignancy.  Hospitalized for further evaluation management..     #Ovarian cancer with peritoneal carcinomatosis  #Ascites  #Bilateral pleural effusions  # Suspected liver abscess  #Abdominal pain     Telemetry  monitoring  Consult to IR for paracentesis and possible drainage of liver abscess  Consult to pulmonology for bilateral pleural effusions  Antiemetics as needed  Analgesics as needed  Patient needs to be followed up with by her gynecological oncologist to determine optimal plan going forward for treatment of her ovarian cancer     #Hyponatremia  #Generalized edema/anasarca  #Hypoalbuminemia  Patient is fluid overloaded and has significant third spacing, suspect hypervolemia hyponatremia.  Will check urine electrolytes, urine osmolality, and serum osmolality  Fluid restriction of 1500 ml for the time being.  Consider starting diuretics, defer to attending  Repeat labs in a.m.     #debility  PT/OT  Social work for discharge planning     Chronic issues:  #Obesity  #Hyperlipidemia  #Osteoarthritis     Continue with patient's home medications as appropriate     #DVT prophylaxis  SCDs  Lovenox subcutaneous     I spent 75 minutes in the professional and overall care of this patient.        АННА Foster-CNP                 Revision History         Patient fully evaluated 10/2, awake, resting in bed. Patient with Moderate hiatal hernia with partial intrathoracic stomach and the peritoneal fat adjacent to the stomach within the hernia demonstrates evidence of probable carcinomatosis and ascites, Patient tolerated paracentesis on 10/01, awaiting culture results.No s/s or c/o acute difficulties at this time. Medications and labs reviewed.  Plan discussed with interdisciplinary team, per pulmonology - Plan:  Patient has bilateral pleural effusion in the context of malignant ascites/ovarian cancer I explained to the patient the pleural effusion most likely related to recurrent ascites, patient is requiring so far 5 steps malignant ascites in the last month most likely beneficial approaches intra-abdominal Pleurx catheter Abdominal Pleurx catheter would be the most effective way of preventing pleural effusions and ascites.   Pleural effusions are secondary to ascites, both of which are due to patient's underlying cancer Continue with goals of care discussions prior to interventional radiology consult Follow hepatic fluid analysis Continue IV antibiotics Zosyn, continue current and repeat labs in the AM. Patient still requiring frequent cardiac and SPO2 monitoring. Discharge planning discussed with patient and care team. Therapy evaluations ordered-  [facility], anticipate SNF/C at discharge. Patient aware and agreeable to current plan, continue plan as above. I spent 50 minutes in the professional and overall care of this patient.      Assessment & Plan  Hyponatremia    Patient fully evaluated 10/3, awake, resting in bed. Patient with Moderate hiatal hernia with partial intrathoracic stomach and the peritoneal fat adjacent to the stomach within the hernia demonstrates evidence of probable carcinomatosis and ascites, Patient tolerated paracentesis on 10/01 well,order placed for repeat paracentesis therapeutic non diagnostic with IR.continue current and repeat labs in the AM. Patient aware and agreeable to current plan, continue plan as above.    Patient fully evaluated on October 4.  Patient awaiting therapeutic paracentesis.  Patient probably will need albumin afterwards.  Continue to monitor response with above treatments recheck labs in AM.  I spent 50 minutes in the professional and overall care of this patient.      Patient fully evaluated 10/06, head of bed elevated, no s/s or c/o acute difficulties at this time. Sister at bedside and agreeable to plan. Attempted therapeutic paracentesis by IR, aborted procedure due to insufficient fluid accumulation.  Medications and labs reviewed, cultures blood no growth at 4 days, AFB Culture Culture in progress and will be examined weekly. A result will be issued either when positive or after 8 weeks incubation. AFB Stain -No acid fast bacilli seen.  Plan discussed with interdisciplinary team,  continue current and repeat labs in the AM. Per pulmonary - Day of consult, patient is breathing on room air. Vital stable. CT chest showed bilateral large pleural effusions. Dicussed need for Abdominal Pleurx catheter. Patient with likely carcinomatosis and plan to discharge to [facility] and will follow up with gynecology in 7 days,     Patient still requiring frequent cardiac and SPO2 monitoring. Discharge planning discussed with patient and care team. Therapy evaluations ordered- [place] [id number], [facility] at discharge. Patient aware and agreeable to current plan, continue plan as above. I spent 50 minutes in the professional and overall care of this patient.    Patient fully evaluated 10/07, head of bed elevated, no s/s or c/o acute difficulties at this time. Medications and labs reviewed, sodium 125, nephrology consulted.  Cultures blood no growth at 4 days, AFB Culture Culture in progress and will be examined weekly. A result will be issued either when positive or after 8 weeks incubation. AFB Stain -No acid fast bacilli seen.  Plan discussed with interdisciplinary team, continue current and repeat labs in the AM, new supplemental oxygen requirements, will repeat chest xray in AM, maintain HOB elevated to alleviate postural dyspnea. Vitals stable. Patient with likely carcinomatosis and plan to discharge to [facility] and will follow up with gynecology in 7 days, atient still requiring frequent cardiac and SPO2 monitoring. Discharge planning discussed with patient and care team. Therapy evaluations ordered- [place] [id number], [facility] at discharge. Patient aware and agreeable to current plan, continue plan as above. I spent 50 minutes in the professional and overall care of this patient.      Patient fully evaluated 10/08, head of bed elevated, no s/s or c/o acute difficulties at this time. Medications and labs reviewed, sodium low again today at 125, nephrology consulted. Awaiting hepatic fluid  analysis -AFB Culture -Culture in progress and will be examined weekly. A result will be issued either when positive or after 8 weeks incubation. AFB Stain -No acid fast bacilli seen.  Plan discussed with interdisciplinary team, per nephrology - Hyponatremia  Acute kidney injury   Ascites  Malnutrition  Anemia  Pleural effusion  Plan;  Discontinue potential nephrotoxins  Nutritional/iron/renal indices/urinary indices  DuoNeb aerosols  Appreciate nephrology input. Patient requiring supplemental oxygen at this time, continue to maintain HOB elevated as tolerated. Patient seen by pulmonology - Consult IR for right sided diagnostic and therapeutic thoracentesis  Unable to safely perform paracentesis due to lack of fluid  Patient has bilateral pleural effusion in the context of malignant ascites/ovarian cancer - Pleural effusions are secondary to ascites, both of which are due to patient's underlying cancer. Continue with goals of care discussions prior to interventional radiology consult. Continue IV antibiotics zosyn, probiotics added. Continue current plan and repeat labs in the AM, repeat chest xray in AM, maintain HOB elevated to alleviate postural dyspnea. Vitals stable. Patient with likely carcinomatosis and plan to discharge to SNF - [facility] and will follow up with gynecology in 7 days, atient still requiring frequent cardiac and SPO2 monitoring. Discharge planning discussed with patient and care team. Therapy evaluations ordered- [place] [id number], [facility] at discharge. Patient aware and agreeable to current plan, continue plan as above. I spent 50 minutes in the professional and overall care of this patient.  Kim Angulo

## 2024-10-08 NOTE — PROGRESS NOTES
Subjective   Patient breathing comfortably on room air.       Objective     Last Recorded Vitals  /54   Pulse 84   Temp 36.5 °C (97.7 °F)   Resp 18   Wt 104 kg (230 lb)   SpO2 97%   Intake/Output last 3 Shifts:    Intake/Output Summary (Last 24 hours) at 10/8/2024 1626  Last data filed at 10/8/2024 1259  Gross per 24 hour   Intake 550 ml   Output 800 ml   Net -250 ml       Admission Weight  Weight: 104 kg (230 lb) (10/01/24 0828)    Daily Weight  10/01/24 : 104 kg (230 lb)      Physical Exam  Constitutional:       Appearance: Normal appearance.   HENT:      Head: Normocephalic and atraumatic.      Mouth/Throat:      Mouth: Mucous membranes are moist.   Eyes:      Extraocular Movements: Extraocular movements intact.   Cardiovascular:      Rate and Rhythm: Normal rate and regular rhythm.   Pulmonary:      Effort: Pulmonary effort is normal.      Breath sounds: Normal breath sounds.   Abdominal:      General: Abdomen is flat. There is no distension.      Palpations: Abdomen is soft.   Musculoskeletal:      Right lower leg: No edema.      Left lower leg: No edema.   Skin:     General: Skin is warm and dry.   Neurological:      General: No focal deficit present.      Mental Status: She is alert and oriented to person, place, and time.   Psychiatric:         Mood and Affect: Mood normal.             Assessment/Plan            Bilateral pleural effusion  Hepatic abscess versus cyst  Abdominal ascites  Ovarian carcinoma w/ peritoneal carcinomatosis carcinomatosis  HLD    Plan:  Underwent thoracentesis. Pleural fluid analysis still pending  Unable to safely perform paracentesis due to lack of fluid  Patient has bilateral pleural effusion in the context of malignant ascites/ovarian cancer  Follow hepatic fluid analysis      This is a preliminary note, please await attending attestation for a finalized plan.     Dr. Elsy Ortiz  Internal Medicine PGY-3  Please message me with any questions      STAFF PHYSICIAN NOTE  OF PERSONAL INVOLVEMENT IN CARE  As the attending physician, I certify that I personally reviewed the patient's history and personally examined the patient to confirm the physical findings described above, and that I reviewed the relevant imaging studies and available reports.  I also discussed the differential diagnosis and all of the proposed management plans with the patient and individuals accompanying the patient to this visit.  They had the opportunity to ask questions about the proposed management plans and to have those questions answered.

## 2024-10-09 ENCOUNTER — TELEPHONE (OUTPATIENT)
Dept: GYNECOLOGIC ONCOLOGY | Facility: HOSPITAL | Age: 79
End: 2024-10-09
Payer: MEDICARE

## 2024-10-09 LAB
ACID FAST STN SPEC: NORMAL
ALBUMIN SERPL BCP-MCNC: 2.3 G/DL (ref 3.4–5)
ALP SERPL-CCNC: 155 U/L (ref 33–136)
ALT SERPL W P-5'-P-CCNC: 7 U/L (ref 7–45)
ANION GAP SERPL CALC-SCNC: 13 MMOL/L (ref 10–20)
AST SERPL W P-5'-P-CCNC: 16 U/L (ref 9–39)
BASOPHILS # BLD AUTO: 0.11 X10*3/UL (ref 0–0.1)
BASOPHILS NFR BLD AUTO: 0.5 %
BILIRUB SERPL-MCNC: 0.3 MG/DL (ref 0–1.2)
BUN SERPL-MCNC: 41 MG/DL (ref 6–23)
CALCIUM SERPL-MCNC: 8.2 MG/DL (ref 8.6–10.3)
CHLORIDE SERPL-SCNC: 93 MMOL/L (ref 98–107)
CHOLEST SERPL-MCNC: 101 MG/DL (ref 0–199)
CHOLESTEROL/HDL RATIO: 2.9
CO2 SERPL-SCNC: 24 MMOL/L (ref 21–32)
CORTIS AM PEAK SERPL-MSCNC: 23.9 UG/DL (ref 5–20)
CREAT SERPL-MCNC: 2.15 MG/DL (ref 0.5–1.05)
EGFRCR SERPLBLD CKD-EPI 2021: 23 ML/MIN/1.73M*2
EOSINOPHIL # BLD AUTO: 0.64 X10*3/UL (ref 0–0.4)
EOSINOPHIL NFR BLD AUTO: 2.7 %
ERYTHROCYTE [DISTWIDTH] IN BLOOD BY AUTOMATED COUNT: 16.1 % (ref 11.5–14.5)
GLUCOSE SERPL-MCNC: 101 MG/DL (ref 74–99)
HCT VFR BLD AUTO: 30.3 % (ref 36–46)
HDLC SERPL-MCNC: 34.3 MG/DL
HGB BLD-MCNC: 9.3 G/DL (ref 12–16)
IMM GRANULOCYTES # BLD AUTO: 0.27 X10*3/UL (ref 0–0.5)
IMM GRANULOCYTES NFR BLD AUTO: 1.1 % (ref 0–0.9)
IRON SATN MFR SERPL: 16 % (ref 25–45)
IRON SERPL-MCNC: 22 UG/DL (ref 35–150)
LDLC SERPL CALC-MCNC: 34 MG/DL
LYMPHOCYTES # BLD AUTO: 0.87 X10*3/UL (ref 0.8–3)
LYMPHOCYTES NFR BLD AUTO: 3.7 %
MCH RBC QN AUTO: 25.7 PG (ref 26–34)
MCHC RBC AUTO-ENTMCNC: 30.7 G/DL (ref 32–36)
MCV RBC AUTO: 84 FL (ref 80–100)
MONOCYTES # BLD AUTO: 0.93 X10*3/UL (ref 0.05–0.8)
MONOCYTES NFR BLD AUTO: 3.9 %
MYCOBACTERIUM SPEC CULT: NORMAL
MYOGLOBIN SERPL-MCNC: 49 UG/L
NEUTROPHILS # BLD AUTO: 21.01 X10*3/UL (ref 1.6–5.5)
NEUTROPHILS NFR BLD AUTO: 88.1 %
NON HDL CHOLESTEROL: 67 MG/DL (ref 0–149)
NRBC BLD-RTO: 0 /100 WBCS (ref 0–0)
OSMOLALITY UR: 344 MOSM/KG (ref 200–1200)
PH FLD: 7.65 [PH]
PLATELET # BLD AUTO: 422 X10*3/UL (ref 150–450)
POTASSIUM SERPL-SCNC: 3.9 MMOL/L (ref 3.5–5.3)
PROT SERPL-MCNC: 5.3 G/DL (ref 6.4–8.2)
PTH-INTACT SERPL-MCNC: 45.2 PG/ML (ref 18.5–88)
RBC # BLD AUTO: 3.62 X10*6/UL (ref 4–5.2)
SODIUM SERPL-SCNC: 126 MMOL/L (ref 136–145)
TIBC SERPL-MCNC: 139 UG/DL (ref 240–445)
TRIGL SERPL-MCNC: 165 MG/DL (ref 0–149)
UIBC SERPL-MCNC: 117 UG/DL (ref 110–370)
URATE SERPL-MCNC: 9.4 MG/DL (ref 2.3–6.7)
VANCOMYCIN SERPL-MCNC: 32.8 UG/ML (ref 5–20)
VLDL: 33 MG/DL (ref 0–40)
WBC # BLD AUTO: 23.8 X10*3/UL (ref 4.4–11.3)

## 2024-10-09 PROCEDURE — 80061 LIPID PANEL: CPT | Performed by: INTERNAL MEDICINE

## 2024-10-09 PROCEDURE — 2500000001 HC RX 250 WO HCPCS SELF ADMINISTERED DRUGS (ALT 637 FOR MEDICARE OP): Performed by: INTERNAL MEDICINE

## 2024-10-09 PROCEDURE — 84550 ASSAY OF BLOOD/URIC ACID: CPT | Performed by: INTERNAL MEDICINE

## 2024-10-09 PROCEDURE — 94640 AIRWAY INHALATION TREATMENT: CPT

## 2024-10-09 PROCEDURE — 80053 COMPREHEN METABOLIC PANEL: CPT | Performed by: INTERNAL MEDICINE

## 2024-10-09 PROCEDURE — 2500000004 HC RX 250 GENERAL PHARMACY W/ HCPCS (ALT 636 FOR OP/ED): Performed by: NURSE PRACTITIONER

## 2024-10-09 PROCEDURE — 2500000005 HC RX 250 GENERAL PHARMACY W/O HCPCS: Performed by: RADIOLOGY

## 2024-10-09 PROCEDURE — 85025 COMPLETE CBC W/AUTO DIFF WBC: CPT | Performed by: INTERNAL MEDICINE

## 2024-10-09 PROCEDURE — 36415 COLL VENOUS BLD VENIPUNCTURE: CPT | Performed by: INTERNAL MEDICINE

## 2024-10-09 PROCEDURE — 1200000002 HC GENERAL ROOM WITH TELEMETRY DAILY

## 2024-10-09 PROCEDURE — 82533 TOTAL CORTISOL: CPT | Mod: PARLAB | Performed by: INTERNAL MEDICINE

## 2024-10-09 PROCEDURE — 83874 ASSAY OF MYOGLOBIN: CPT | Mod: PARLAB | Performed by: INTERNAL MEDICINE

## 2024-10-09 PROCEDURE — 83935 ASSAY OF URINE OSMOLALITY: CPT | Mod: PARLAB | Performed by: NURSE PRACTITIONER

## 2024-10-09 PROCEDURE — 2500000001 HC RX 250 WO HCPCS SELF ADMINISTERED DRUGS (ALT 637 FOR MEDICARE OP): Performed by: NURSE PRACTITIONER

## 2024-10-09 PROCEDURE — 80202 ASSAY OF VANCOMYCIN: CPT | Performed by: NURSE PRACTITIONER

## 2024-10-09 PROCEDURE — 2500000004 HC RX 250 GENERAL PHARMACY W/ HCPCS (ALT 636 FOR OP/ED): Performed by: INTERNAL MEDICINE

## 2024-10-09 PROCEDURE — 2500000002 HC RX 250 W HCPCS SELF ADMINISTERED DRUGS (ALT 637 FOR MEDICARE OP, ALT 636 FOR OP/ED): Performed by: INTERNAL MEDICINE

## 2024-10-09 PROCEDURE — 83970 ASSAY OF PARATHORMONE: CPT | Mod: PARLAB | Performed by: INTERNAL MEDICINE

## 2024-10-09 PROCEDURE — 83540 ASSAY OF IRON: CPT | Performed by: INTERNAL MEDICINE

## 2024-10-09 PROCEDURE — 2500000001 HC RX 250 WO HCPCS SELF ADMINISTERED DRUGS (ALT 637 FOR MEDICARE OP): Performed by: PHYSICIAN ASSISTANT

## 2024-10-09 RX ORDER — SODIUM BICARBONATE 650 MG/1
650 TABLET ORAL 2 TIMES DAILY
Status: DISCONTINUED | OUTPATIENT
Start: 2024-10-09 | End: 2024-10-14 | Stop reason: HOSPADM

## 2024-10-09 RX ORDER — ALLOPURINOL 100 MG/1
100 TABLET ORAL DAILY
Status: DISCONTINUED | OUTPATIENT
Start: 2024-10-09 | End: 2024-10-10

## 2024-10-09 RX ORDER — DULOXETIN HYDROCHLORIDE 30 MG/1
30 CAPSULE, DELAYED RELEASE ORAL DAILY
Status: DISCONTINUED | OUTPATIENT
Start: 2024-10-10 | End: 2024-10-14 | Stop reason: HOSPADM

## 2024-10-09 ASSESSMENT — PAIN DESCRIPTION - DESCRIPTORS
DESCRIPTORS: ACHING

## 2024-10-09 ASSESSMENT — COGNITIVE AND FUNCTIONAL STATUS - GENERAL
HELP NEEDED FOR BATHING: A LOT
TURNING FROM BACK TO SIDE WHILE IN FLAT BAD: A LOT
MOVING TO AND FROM BED TO CHAIR: A LOT
STANDING UP FROM CHAIR USING ARMS: A LOT
CLIMB 3 TO 5 STEPS WITH RAILING: A LOT
TOILETING: A LOT
DAILY ACTIVITIY SCORE: 12
WALKING IN HOSPITAL ROOM: A LOT
MOBILITY SCORE: 12
PERSONAL GROOMING: A LOT
DRESSING REGULAR UPPER BODY CLOTHING: A LOT
EATING MEALS: A LOT
DRESSING REGULAR LOWER BODY CLOTHING: A LOT
MOVING FROM LYING ON BACK TO SITTING ON SIDE OF FLAT BED WITH BEDRAILS: A LOT

## 2024-10-09 ASSESSMENT — PAIN SCALES - WONG BAKER: WONGBAKER_NUMERICALRESPONSE: HURTS EVEN MORE

## 2024-10-09 ASSESSMENT — PAIN - FUNCTIONAL ASSESSMENT
PAIN_FUNCTIONAL_ASSESSMENT: 0-10

## 2024-10-09 ASSESSMENT — PAIN SCALES - GENERAL
PAINLEVEL_OUTOF10: 6
PAINLEVEL_OUTOF10: 6
PAINLEVEL_OUTOF10: 8
PAINLEVEL_OUTOF10: 6

## 2024-10-09 ASSESSMENT — PAIN DESCRIPTION - LOCATION: LOCATION: ABDOMEN

## 2024-10-09 NOTE — PROGRESS NOTES
"Rohini Beaver is a 79 y.o. female on day 8 of admission presenting with Hyponatremia.      Subjective    Patient fully evaluated 10/3, awake, resting in bed. Patient with Moderate hiatal hernia with partial intrathoracic stomach and the peritoneal fat adjacent to the stomach within the hernia demonstrates evidence of probable carcinomatosis and ascites, Patient tolerated paracentesis on 10/01 well,order placed for repeat paracentesis therapeutic non diagnostic with IR.continue current and repeat labs in the AM. Patient aware and agreeable to current plan, continue plan as above. I spent 50 minutes in the professional and overall care of this patient.   Objective     Last Recorded Vitals  /52   Pulse 93   Temp 35.7 °C (96.3 °F)   Resp 16   Wt 104 kg (230 lb)   SpO2 96%   Intake/Output last 3 Shifts:    Intake/Output Summary (Last 24 hours) at 10/9/2024 1720  Last data filed at 10/8/2024 1855  Gross per 24 hour   Intake 50 ml   Output --   Net 50 ml       Admission Weight  Weight: 104 kg (230 lb) (10/01/24 0828)    Daily Weight  10/01/24 : 104 kg (230 lb)    Image Results  US thoracentesis  Narrative: Interpreted By:  Parrish Vargas,   STUDY:  US THORACENTESIS;  10/8/2024 3:18 pm      INDICATION:  Signs/Symptoms:Diagnostic and therapeutic right sided thoracentesis.          COMPARISON:  None.      ACCESSION NUMBER(S):  [id number]      ORDERING CLINICIAN:  SHAKIRA PAEZ      TECHNIQUE:  INTERVENTIONALIST(S):  Parrish Vargas MD      The history and physical exam pertinent to the procedure were  reviewed and no updates were made.\"      CONSENT:  The patient/patient's POA/next of kin was informed of the nature of  the proposed procedure. The purposes, alternatives, risks, and  benefits were explained and discussed. All questions were answered  and consent was obtained.      SEDATION:  None      MEDICATION/CONTRAST:  No additional      TIME OUT:  A time out was performed immediately prior to procedure start " with  the interventional team, correctly identifying the patient name, date  of birth, MRN, procedure, anatomy (including marking of site and  side), patient position, procedure consent form, relevant laboratory  and imaging test results, antibiotic administration, safety  precautions, and procedure-specific equipment needs.      FINDINGS:  The patient was placed in the sitting position.      The pleural space was examined with grey scale ultrasound, and the  most accessible fluid identified and marked for thoracentesis.      The skin was prepped and draped in usual manner. Local anesthesia  with Lidocaine was administered and a  right-sided thoracentesis was  performed.  A 5 Romanian One-Step thoracentesis needle/catheter was  then placed where marked.  Approximately 550 mL of yellowish colored  fluid was removed.  The needle/catheter was then withdrawn.      The patient tolerated the procedure well and there were no immediate  complications. Specimen(s) sent to the laboratory and pathology for  further evaluation, per the requesting team.      Impression: Uneventful  right-sided thoracentesis, as detailed above.      I personally performed and/or directly supervised this study and was  present for the entire procedure.      I personally reviewed the study and resident interpretation. I agree  with the findings as stated.      Performed and dictated at [facility].      MACRO:  None      Signed by: Parrish Vargas 10/9/2024 1:17 PM  Dictation workstation:   [id number]  XR chest 1 view  Narrative: Interpreted By:  Devin Garsia,   STUDY:  XR CHEST 1 VIEW;  10/8/2024 4:57 pm      INDICATION:  Signs/Symptoms:Reassess after thoracentesis.      COMPARISON:  10/08/2024 at 7:00 a.m.      ACCESSION NUMBER(S):  [id number]      ORDERING CLINICIAN:  SHAKIRA PAEZ      FINDINGS:  CARDIOMEDIASTINAL SILHOUETTE AND VASCULATURE:      Cardiac size:  The right cardiac margin is obscured.  Aortic  shadow:  Within normal limits.      Mediastinal contours: Within normal limits.      Pulmonary vasculature:  Unremarkable, with resolution of prominence  and cephalization on the prior exam that was probably due to  congestion.      LUNGS:  There is opacification of the right lower lung probably from a  combination of elevated hemidiaphragm with effusion and atelectasis.  This appears fairly similar to the previous exam. Is no evidence of  right pneumothorax. Left retrocardiac opacity appears slightly  improved, also probably due to atelectasis.      ABDOMEN AND OTHER FINDINGS:  No remarkable upper abdominal findings.      BONES:  No acute osseous changes.      Impression: 1.  No complication such as pneumothorax after reported thoracentesis.      Signed by: Devin Garsia 10/9/2024 10:16 AM  Dictation workstation:   [id number]      Physical Exam    Relevant Results               Assessment/Plan   This patient currently has cardiac telemetry ordered; if you would like to modify or discontinue the telemetry order, click here to go to the orders activity to modify/discontinue the order.          Jd Rhodes MD   Physician  Internal Medicine     H&P      Addendum     Date of Service: 10/1/2024  2:54 PM     Addendum       Expand All Collapse All    History Of Present Illness  Rohini Beaver is a 79-year-old female with past medical history of recently diagnosed ovarian cancer with peritoneal carcinomatosis s/p paracentesis x 4 (last one done 9/19/24), obesity, hyperlipidemia, osteoarthritis, and skin cancer s/p Mohs procedure.  Patient presents to the hospital with weakness and inability to care for herself.  She reports that she was at [facility] skilled nursing facility, however had a brief hospitalization at [facility] and upon discharge decided to go home rather than go back to [facility].  She lives with her 86-year-old sister and has a couple friends who assist with appointments, however  limited support system.  Sister unable to care for due to age.  Today she was in urgent reclining chair and was able to get up.  ROS additionally positive for cold sweats, distended and tender abdomen, edema, pressure injury to coccyx/gluteal fold, and decreased activity tolerance.  Denies history of heart disease.  She reports that she is scheduled for outpatient appointment with her oncologist tomorrow to determine ongoing plan.      ER course: Hemodynamically stable, afebrile, SpO2 90s on room air.  WBC 28.9, Hgb 11.0/Hct34.1 (Hgb 15.1 4/4/2023), platelets 446. Glucose 107, Sodium 126, Chloride 94, calcium 8.5, albumin 2.4, alkaline phosphatase 231.  Lactate 1.4.  BNP 74.  High-sensitivity troponin 7.  UA unremarkable. CT chest showed moderate to large bilateral pleural effusion with adjacent presumed compressive atelectasis, prominent main pulmonary artery which may indicate pulmonary hypertension, mildly prominent mediastinal lymph nodes measuring up to 10 mm in short axis concerning for metastatic disease.  Moderate hiatal hernia with partial intrathoracic stomach and the peritoneal fat adjacent to the stomach within the hernia demonstrates evidence of probable carcinomatosis and ascites.  Redemonstration of large volume ascites with regions of significant anterior omental caking and peritoneal carcinomatosis commensurate with patient's provided diagnosis of ovarian cancer. Regions of wall thickening of the colon extending from the splenic flexure to the sigmoid.  Extensive region of scalloping of the right hepatic lobe peripherally with appearance of large subcapsular collection along the right hepatic margin. Blood culture in process     Past medical history: As above  Past surgical history: Cholecystectomy, lumbar laminectomy, left shoulder arthroscopy, right total knee replacement, corneal transplant  Social history: No history of smoking, alcohol abuse, illicit drug use.  Lives with her elderly sister who  is 86 years old.  Limited support system.   Family history: Mother-cancer (unknown type)     Past Medical History  Medical History        Past Medical History:   Diagnosis Date    Bilateral primary osteoarthritis of knee      HLD (hyperlipidemia)      Lumbosacral radiculopathy      Meralgia paraesthetica      Physical deconditioning              Surgical History  Surgical History         Past Surgical History:   Procedure Laterality Date    ARTHROPLASTY Left       Left thumb carpometacarpal arthroplasty with ligament reconstruction and tendon interposition    BREAST BIOPSY   1999     Benign    CHOLECYSTECTOMY        COLONOSCOPY        CORNEAL TRANSPLANT        DILATION AND CURETTAGE OF UTERUS        LUMBAR FUSION        SHOULDER ARTHROSCOPY Left      SKIN LESION EXCISION        TOTAL KNEE ARTHROPLASTY Left 2019    TOTAL KNEE ARTHROPLASTY Right 2017    TRIGGER FINGER RELEASE Right              Social History  She reports that she has never smoked. She has never used smokeless tobacco. She reports that she does not currently use alcohol. She reports that she does not use drugs.     Family History  Family History          Family History   Problem Relation Name Age of Onset    Colon cancer Mother        Lung cancer Father        Other (Mesothelioma) Brother        Brain cancer Mother's Brother                Allergies  Patient has no known allergies.     Review of Systems     10 point ROS negative except as noted above in HPI      Physical Exam  Vitals reviewed.   Constitutional:       General: She is awake. She is not in acute distress.     Appearance: She is obese. She is ill-appearing. She is not toxic-appearing.   HENT:      Head: Normocephalic and atraumatic.      Nose: Nose normal.      Mouth/Throat:      Mouth: Mucous membranes are moist.      Pharynx: Oropharynx is clear.   Eyes:      Conjunctiva/sclera: Conjunctivae normal.   Cardiovascular:      Rate and Rhythm: Normal rate and regular rhythm.      Pulses:  Normal pulses.      Heart sounds: No murmur heard.  Pulmonary:      Effort: Pulmonary effort is normal. No respiratory distress.      Breath sounds: Decreased air movement present. Decreased breath sounds present.      Comments: Posterior bilateral breath sounds are diminished  Abdominal:      General: There is distension.      Palpations: Abdomen is soft.      Tenderness: There is abdominal tenderness. There is no guarding.      Comments: Bowel sounds hypoactive, abdomen distended, tender to palpation, dullness noted to the sides.   Musculoskeletal:         General: No swelling, deformity or signs of injury. Normal range of motion.      Cervical back: Neck supple.      Right lower leg: Edema present.      Left lower leg: Edema present.      Comments: Generalized edema/anasarca   Skin:     General: Skin is warm and dry.      Capillary Refill: Capillary refill takes less than 2 seconds.      Findings: No ecchymosis or wound.      Comments: Wound on coccyx, exam deferred  due to patient discomfort    Neurological:      General: No focal deficit present.      Mental Status: She is alert and oriented to person, place, and time.   Psychiatric:         Mood and Affect: Mood normal.         Behavior: Behavior is cooperative.                  Last Recorded Vitals  Blood pressure 117/58, pulse 100, temperature 36.2 °C (97.2 °F), temperature source Temporal, resp. rate (!) 22, weight 104 kg (230 lb), SpO2 94%.     Relevant Results              Results for orders placed or performed during the hospital encounter of 10/01/24 (from the past 24 hour(s))   CBC and Auto Differential   Result Value Ref Range     WBC 28.9 (H) 4.4 - 11.3 x10*3/uL     nRBC 0.0 0.0 - 0.0 /100 WBCs     RBC 4.15 4.00 - 5.20 x10*6/uL     Hemoglobin 11.0 (L) 12.0 - 16.0 g/dL     Hematocrit 34.1 (L) 36.0 - 46.0 %     MCV 82 80 - 100 fL     MCH 26.5 26.0 - 34.0 pg     MCHC 32.3 32.0 - 36.0 g/dL     RDW 15.7 (H) 11.5 - 14.5 %     Platelets 446 150 - 450  x10*3/uL     Neutrophils % 89.1 40.0 - 80.0 %     Immature Granulocytes %, Automated 1.0 (H) 0.0 - 0.9 %     Lymphocytes % 4.3 13.0 - 44.0 %     Monocytes % 4.8 2.0 - 10.0 %     Eosinophils % 0.5 0.0 - 6.0 %     Basophils % 0.3 0.0 - 2.0 %     Neutrophils Absolute 25.74 (H) 1.60 - 5.50 x10*3/uL     Immature Granulocytes Absolute, Automated 0.30 0.00 - 0.50 x10*3/uL     Lymphocytes Absolute 1.23 0.80 - 3.00 x10*3/uL     Monocytes Absolute 1.38 (H) 0.05 - 0.80 x10*3/uL     Eosinophils Absolute 0.14 0.00 - 0.40 x10*3/uL     Basophils Absolute 0.09 0.00 - 0.10 x10*3/uL   Comprehensive metabolic panel   Result Value Ref Range     Glucose 107 (H) 74 - 99 mg/dL     Sodium 126 (L) 136 - 145 mmol/L     Potassium 5.2 3.5 - 5.3 mmol/L     Chloride 94 (L) 98 - 107 mmol/L     Bicarbonate 24 21 - 32 mmol/L     Anion Gap 13 10 - 20 mmol/L     Urea Nitrogen 18 6 - 23 mg/dL     Creatinine 0.61 0.50 - 1.05 mg/dL     eGFR >90 >60 mL/min/1.73m*2     Calcium 8.5 (L) 8.6 - 10.3 mg/dL     Albumin 2.4 (L) 3.4 - 5.0 g/dL     Alkaline Phosphatase 231 (H) 33 - 136 U/L     Total Protein 5.7 (L) 6.4 - 8.2 g/dL     AST 23 9 - 39 U/L     Bilirubin, Total 0.3 0.0 - 1.2 mg/dL     ALT 8 7 - 45 U/L   Magnesium   Result Value Ref Range     Magnesium 1.68 1.60 - 2.40 mg/dL   Troponin I, High Sensitivity   Result Value Ref Range     Troponin I, High Sensitivity 7 0 - 13 ng/L   B-Type Natriuretic Peptide   Result Value Ref Range     BNP 74 0 - 99 pg/mL   Sars-CoV-2 PCR   Result Value Ref Range     Coronavirus 2019, PCR Not Detected Not Detected   Lactate   Result Value Ref Range     Lactate 1.4 0.4 - 2.0 mmol/L   Urinalysis with Reflex Culture and Microscopic   Result Value Ref Range     Color, Urine Light-Yellow Light-Yellow, Yellow, Dark-Yellow     Appearance, Urine Clear Clear     Specific Gravity, Urine >1.050 (N) 1.005 - 1.035     pH, Urine 6.0 5.0, 5.5, 6.0, 6.5, 7.0, 7.5, 8.0     Protein, Urine 20 (TRACE) NEGATIVE, 10 (TRACE), 20 (TRACE) mg/dL      Glucose, Urine Normal Normal mg/dL     Blood, Urine NEGATIVE NEGATIVE     Ketones, Urine NEGATIVE NEGATIVE mg/dL     Bilirubin, Urine NEGATIVE NEGATIVE     Urobilinogen, Urine Normal Normal mg/dL     Nitrite, Urine NEGATIVE NEGATIVE     Leukocyte Esterase, Urine NEGATIVE NEGATIVE   Urinalysis Microscopic   Result Value Ref Range     WBC, Urine 1-5 1-5, NONE /HPF     RBC, Urine 1-2 NONE, 1-2, 3-5 /HPF     Squamous Epithelial Cells, Urine 1-9 (SPARSE) Reference range not established. /HPF     Mucus, Urine FEW Reference range not established. /LPF   Protime-INR   Result Value Ref Range     Protime 13.0 (H) 9.8 - 12.8 seconds     INR 1.2 (H) 0.9 - 1.1   APTT   Result Value Ref Range     aPTT 24 (L) 27 - 38 seconds      CT chest abdomen pelvis w IV contrast     Result Date: 10/1/2024  Interpreted By:  Oscar Aldrich, STUDY: CT CHEST ABDOMEN PELVIS W IV CONTRAST;  10/1/2024 11:13 am   INDICATION: Signs/Symptoms:leukocytosis, ascites, recent ovarian cancer diagnosis.   COMPARISON: CT abdomen pelvis 08/08/2024   ACCESSION NUMBER(S): [id number]   ORDERING CLINICIAN: ASHLEY FAIRBANKS   TECHNIQUE: CT of the chest, abdomen, and pelvis was performed.  Contiguous axial images were obtained at 3 mm slice thickness through the chest, abdomen and pelvis. Coronal and sagittal reconstructions at 3 mm slice thickness were performed. ml of contrast  were administered intravenously without immediate complication.   FINDINGS: CHEST:   LUNG/PLEURA/LARGE AIRWAYS: No endobronchial lesion is seen.   Moderate to large bilateral pleural effusions with adjacent consolidative opacification of the bilateral lower lobes suggestive of compressive atelectasis. No pneumothorax. Mild asymmetric scattered reticular changes suggestive of chronic lung findings.     VESSELS: The thoracic aorta is unremarkable with respect course, caliber, and contour.   Prominent main pulmonary artery measuring up to 3.2 cm in anterior-posterior dimension measured on  sagittal plane which may indicate sequela of pulmonary hypertension.   No significant coronary atherosclerotic calcifications are seen.   HEART: Heart size within normal limits. No pericardial effusion.   MEDIASTINUM AND OLIVE: Mildly prominent mediastinal lymph nodes measuring up to 10 mm in short axis   Moderate hiatal hernia with partial intrathoracic stomach and ascites and region of nodularity of the peritoneal fat within hiatal hernia suggestive carcinomatosis.   CHEST WALL AND LOWER NECK: No acute osseous abnormality. Multilevel presumed degenerative changes throughout the imaged spine. No acute soft tissue abnormality.   ABDOMEN:   LIVER: There is been interval scalloping of the right hepatic margins with new regions of irregularity along the right hepatic capsule diffusely which is new since comparison imaging from 08/08/2024 there is a new elongated subcapsular collection measuring up to approximately 11.4 x 2.1 cm, difficult to measure given curvilinear projection extending over the right hepatic lobe margin (series 202, images 40-70). Similar appearing subcentimeter left hepatic lobe hypodense lesion.   BILE DUCTS: No obvious new intrahepatic biliary dilatation. Mild prominence of the common bile duct, nonspecific in a cholecystectomy patient.   GALLBLADDER: Absent.   PANCREAS: Unremarkable.   SPLEEN: Unchanged.   ADRENAL GLANDS: Unchanged.   KIDNEYS AND URETERS: Similar appearing subcentimeter right renal lower pole calculus. No hydronephrosis. No obstructing urolithiasis.   PELVIS:   BLADDER: Unremarkable.   REPRODUCTIVE ORGANS: Uterus appears grossly unchanged.   BOWEL: Moderate hiatal hernia with wall thickening of the stomach in the hernia. No new pathologic distention of bowel. Wall thickening of the colon within the splenic flexure descending colon and sigmoid colon, nonspecific.     VESSELS: No evidence of abdominal aortic aneurysm.   PERITONEUM/RETROPERITONEUM/LYMPH NODES: Large volume ascites  with extensive regions of anterior omental caking and peritoneal carcinomatosis. No obvious free intraperitoneal gas. Given the presence of extensive omental caking and peritoneal carcinomatosis, superimposed peritoneal enhancement 4 other causes cannot be excluded.   BONE AND SOFT TISSUE: No acute osseous abnormality. Osseous structures appear stable. No acute abnormality of the abdominal wall soft tissues.        CHEST: 1.  Moderate to large bilateral pleural effusions with adjacent presumed compressive atelectasis. 2. Prominent main pulmonary artery which may indicate pulmonary hypertension. 3. Mildly prominent mediastinal lymph nodes measuring up to 10 mm in short axis concerning for metastatic disease. 4. Moderate hiatal hernia with partial intrathoracic stomach. The peritoneal fat adjacent to the stomach within the hernia demonstrates evidence of probable carcinomatosis and ascites.   ABDOMEN-PELVIS: 1.  Redemonstration of large volume ascites with regions of significant anterior omental caking and peritoneal carcinomatosis commensurate with patient's provided diagnosis of ovarian cancer. 2. Regions of wall thickening of the colon extending from the splenic flexure to the sigmoid which may indicate a nonspecific colitis. 3. Since the previous examination on 08/08/2024, there are now extensive regions of scalloping of the right hepatic lobe peripherally with the appearance of a large subcapsular collection along the right hepatic margin as above. The sterility of this collection cannot be assessed via CT and clinical correlation is advised for exclusion superimposed infection within this region.     Signed by: Oscar Aldrich 10/1/2024 12:14 PM Dictation workstation:   [id number]     XR chest 1 view     Result Date: 10/1/2024  Interpreted By:  Samuel Jaramillo, STUDY: XR CHEST 1 VIEW; 10/1/2024 8:44 am   INDICATION: Signs/Symptoms:weakness, edema in legs and abdomen   COMPARISON: April 2023.   ACCESSION  NUMBER(S): [id number]   ORDERING CLINICIAN: ASHLEY FAIRBANKS   FINDINGS: The study is limited due to rotation and poor inspiratory effort, with resultant crowding of the pulmonary vasculature. The cardiac silhouette is within normal limits for the technique. Moderate size hiatal hernia is again seen. There is no pneumothorax, confluent infiltrates or significant effusion. Degenerative changes involve the spine and shoulders; metallic anchors again noted over the left humeral head related to previous rotator cuff repair.        Limited study. Hiatal hernia. No acute cardiopulmonary disease.   Signed by: Samuel Jaramillo 10/1/2024 9:22 AM Dictation workstation:   [id number]     US guided abdominal paracentesis     Result Date: 9/20/2024  Interpreted By:  Peri Lawrence and Kamau Nyokabi STUDY: US GUIDED ABDOMINAL PARACENTESIS; US GUIDED PERCUTANEOUS ABDOMINAL RETROPERITONEUM BIOPSY;  9/19/2024 11:13 am   INDICATION: Signs/Symptoms:ascites; Signs/Symptoms:ascites, evaluate ovarian cancer.   COMPARISON: CT abdomen and pelvis 08/08/2024   ACCESSION NUMBER(S): [id number]; [id number]   ORDERING CLINICIAN: JEANETTE ARIAS   TECHNIQUE: INTERVENTIONALIST(S): Dr. Peri Lawrence MD   CONSENT: The patient was informed of the nature of the proposed procedure. The purposes, alternatives, risks, and benefits were explained and discussed. All questions were answered and consent was obtained.   SEDATION: 1% local lidocaine was used for anesthetic.   MEDICATION/CONTRAST: No additional.   TIME OUT:   A time out was performed immediately prior to procedure start with the interventional team, correctly identifying the patient name, date of birth, MRN, procedure, anatomy (including marking of site and side), patient position, procedure consent form, relevant laboratory and imaging test results, antibiotic administration, safety precautions, and procedure-specific equipment needs.   COMPLICATIONS: No immediate adverse events  identified.   FINDINGS: The patient was placed in the supine position. Limited sonographic images of the lower abdominal wall were obtained for purposes of needle guidance, which demonstrated omental soft tissue mass which was seen on prior CT 08/08/2024. The area of concern was prepped and draped under sterile technique.   1% lidocaine was injected subcutaneously. Additional lidocaine was administered into deeper tissues surrounding the targeted area for biopsy using a 22G spinal needle. A small incision was made. Using the same access site a 18 gauge core biopsy needle was passed via a 17 gauge coaxial introducer needle to obtain a total of 1 core sample.   Postprocedure images demonstrate no evidence for hemorrhage. The patient tolerated the procedure well and there were no immediate complications. 1 core specimen was sent to pathology.     Subsequently the right lower quadrant was visualized under ultrasound which demonstrated moderate volume ascites seen on prior CT 08/08/2024. 1% lidocaine was injected subcutaneously at this site. A 19 gauge Yueh was used to target the fluid collection under ultrasound guidance. Once the site was accessed, the inner needle was removed from the catheter. The Yueh catheter was connected to drainage container. Estimated 1000 cc of serosanguineous colored fluid was removed. Post paracentesis imaging demonstrated decreased ascitic fluid. The Yueh catheter was removed. The access site was bandaged.        1. Status post ultrasound guided core needle biopsy of omental mass seen on CT 08/08/2024. 1 core specimens sent to pathology. 2. Successful paracentesis under ultrasound guidance with removal of 1 L of serosanguineous fluid.     I was present for and/or performed the critical portions of the procedure and immediately available throughout the entire procedure.   I personally reviewed the image(s) / study and interpretation. I agree with the findings as stated.   Performed and  dictated at [facility].   MACRO: None   Signed by: Peri Lawrence 9/20/2024 10:06 AM Dictation workstation:   [id number]     US guided percutaneous abdominal retroperitoneum biopsy     Result Date: 9/20/2024  Interpreted By:  Peri Lawrence and Kamau Nyokabi STUDY: US GUIDED ABDOMINAL PARACENTESIS; US GUIDED PERCUTANEOUS ABDOMINAL RETROPERITONEUM BIOPSY;  9/19/2024 11:13 am   INDICATION: Signs/Symptoms:ascites; Signs/Symptoms:ascites, evaluate ovarian cancer.   COMPARISON: CT abdomen and pelvis 08/08/2024   ACCESSION NUMBER(S): [id number]; [id number]   ORDERING CLINICIAN: JEANETTE ARIAS   TECHNIQUE: INTERVENTIONALIST(S): Dr. Peri Lawrence MD   CONSENT: The patient was informed of the nature of the proposed procedure. The purposes, alternatives, risks, and benefits were explained and discussed. All questions were answered and consent was obtained.   SEDATION: 1% local lidocaine was used for anesthetic.   MEDICATION/CONTRAST: No additional.   TIME OUT:   A time out was performed immediately prior to procedure start with the interventional team, correctly identifying the patient name, date of birth, MRN, procedure, anatomy (including marking of site and side), patient position, procedure consent form, relevant laboratory and imaging test results, antibiotic administration, safety precautions, and procedure-specific equipment needs.   COMPLICATIONS: No immediate adverse events identified.   FINDINGS: The patient was placed in the supine position. Limited sonographic images of the lower abdominal wall were obtained for purposes of needle guidance, which demonstrated omental soft tissue mass which was seen on prior CT 08/08/2024. The area of concern was prepped and draped under sterile technique.   1% lidocaine was injected subcutaneously. Additional lidocaine was administered into deeper tissues surrounding the targeted area for biopsy using a 22G spinal needle. A  small incision was made. Using the same access site a 18 gauge core biopsy needle was passed via a 17 gauge coaxial introducer needle to obtain a total of 1 core sample.   Postprocedure images demonstrate no evidence for hemorrhage. The patient tolerated the procedure well and there were no immediate complications. 1 core specimen was sent to pathology.     Subsequently the right lower quadrant was visualized under ultrasound which demonstrated moderate volume ascites seen on prior CT 08/08/2024. 1% lidocaine was injected subcutaneously at this site. A 19 gauge Yueh was used to target the fluid collection under ultrasound guidance. Once the site was accessed, the inner needle was removed from the catheter. The Yueh catheter was connected to drainage container. Estimated 1000 cc of serosanguineous colored fluid was removed. Post paracentesis imaging demonstrated decreased ascitic fluid. The Yueh catheter was removed. The access site was bandaged.        1. Status post ultrasound guided core needle biopsy of omental mass seen on CT 08/08/2024. 1 core specimens sent to pathology. 2. Successful paracentesis under ultrasound guidance with removal of 1 L of serosanguineous fluid.     I was present for and/or performed the critical portions of the procedure and immediately available throughout the entire procedure.   I personally reviewed the image(s) / study and interpretation. I agree with the findings as stated.   Performed and dictated at [facility].   MACRO: None   Signed by: Peri Lawrence 9/20/2024 10:06 AM Dictation workstation:   [id number]            Assessment/Plan        Assessment & Plan  Hyponatremia        79-year-old female with past medical history of recently diagnosed ovarian cancer with peritoneal carcinomatosis s/p paracentesis x 4 (last one done 9/19/24), obesity, hyperlipidemia, osteoarthritis, and skin cancer s/p Mohs procedure.  Patient presents to the  hospital with weakness and inability to care for herself.  Patient found to be hyponatremic and with evidence on CT imaging of possible abscess of the liver and ascites secondary to malignancy.  Hospitalized for further evaluation management..     #Ovarian cancer with peritoneal carcinomatosis  #Ascites  #Bilateral pleural effusions  # Suspected liver abscess  #Abdominal pain     Telemetry monitoring  Consult to IR for paracentesis and possible drainage of liver abscess  Consult to pulmonology for bilateral pleural effusions  Antiemetics as needed  Analgesics as needed  Patient needs to be followed up with by her gynecological oncologist to determine optimal plan going forward for treatment of her ovarian cancer     #Hyponatremia  #Generalized edema/anasarca  #Hypoalbuminemia  Patient is fluid overloaded and has significant third spacing, suspect hypervolemia hyponatremia.  Will check urine electrolytes, urine osmolality, and serum osmolality  Fluid restriction of 1500 ml for the time being.  Consider starting diuretics, defer to attending  Repeat labs in a.m.     #debility  PT/OT  Social work for discharge planning     Chronic issues:  #Obesity  #Hyperlipidemia  #Osteoarthritis     Continue with patient's home medications as appropriate     #DVT prophylaxis  SCDs  Lovenox subcutaneous     I spent 75 minutes in the professional and overall care of this patient.        Miky Kumar, APRN-CNP                 Revision History         Patient fully evaluated 10/2, awake, resting in bed. Patient with Moderate hiatal hernia with partial intrathoracic stomach and the peritoneal fat adjacent to the stomach within the hernia demonstrates evidence of probable carcinomatosis and ascites, Patient tolerated paracentesis on 10/01, awaiting culture results.No s/s or c/o acute difficulties at this time. Medications and labs reviewed.  Plan discussed with interdisciplinary team, per pulmonology - Plan:  Patient has bilateral  pleural effusion in the context of malignant ascites/ovarian cancer I explained to the patient the pleural effusion most likely related to recurrent ascites, patient is requiring so far 5 steps malignant ascites in the last month most likely beneficial approaches intra-abdominal Pleurx catheter Abdominal Pleurx catheter would be the most effective way of preventing pleural effusions and ascites.  Pleural effusions are secondary to ascites, both of which are due to patient's underlying cancer Continue with goals of care discussions prior to interventional radiology consult Follow hepatic fluid analysis Continue IV antibiotics Zosyn, continue current and repeat labs in the AM. Patient still requiring frequent cardiac and SPO2 monitoring. Discharge planning discussed with patient and care team. Therapy evaluations ordered-  [facility], anticipate SNF/HHC at discharge. Patient aware and agreeable to current plan, continue plan as above. I spent 50 minutes in the professional and overall care of this patient.      Assessment & Plan  Hyponatremia    Patient fully evaluated 10/3, awake, resting in bed. Patient with Moderate hiatal hernia with partial intrathoracic stomach and the peritoneal fat adjacent to the stomach within the hernia demonstrates evidence of probable carcinomatosis and ascites, Patient tolerated paracentesis on 10/01 well,order placed for repeat paracentesis therapeutic non diagnostic with IR.continue current and repeat labs in the AM. Patient aware and agreeable to current plan, continue plan as above.    Patient fully evaluated on October 4.  Patient awaiting therapeutic paracentesis.  Patient probably will need albumin afterwards.  Continue to monitor response with above treatments recheck labs in AM.  I spent 50 minutes in the professional and overall care of this patient.      Patient fully evaluated 10/06, head of bed elevated, no s/s or c/o acute difficulties at this time. Sister at bedside and  agreeable to plan. Attempted therapeutic paracentesis by IR, aborted procedure due to insufficient fluid accumulation.  Medications and labs reviewed, cultures blood no growth at 4 days, AFB Culture Culture in progress and will be examined weekly. A result will be issued either when positive or after 8 weeks incubation. AFB Stain -No acid fast bacilli seen.  Plan discussed with interdisciplinary team, continue current and repeat labs in the AM. Per pulmonary - Day of consult, patient is breathing on room air. Vital stable. CT chest showed bilateral large pleural effusions. Dicussed need for Abdominal Pleurx catheter. Patient with likely carcinomatosis and plan to discharge to [facility] and will follow up with gynecology in 7 days,     Patient still requiring frequent cardiac and SPO2 monitoring. Discharge planning discussed with patient and care team. Therapy evaluations ordered- [address] at discharge. Patient aware and agreeable to current plan, continue plan as above. I spent 50 minutes in the professional and overall care of this patient.    Patient fully evaluated 10/07, head of bed elevated, no s/s or c/o acute difficulties at this time. Medications and labs reviewed, sodium 125, nephrology consulted.  Cultures blood no growth at 4 days, AFB Culture Culture in progress and will be examined weekly. A result will be issued either when positive or after 8 weeks incubation. AFB Stain -No acid fast bacilli seen.  Plan discussed with interdisciplinary team, continue current and repeat labs in the AM, new supplemental oxygen requirements, will repeat chest xray in AM, maintain HOB elevated to alleviate postural dyspnea. Vitals stable. Patient with likely carcinomatosis and plan to discharge to [facility] and will follow up with gynecology in 7 days, atient still requiring frequent cardiac and SPO2 monitoring. Discharge planning discussed with patient and care team. Therapy  evaluations ordered- [address] at discharge. Patient aware and agreeable to current plan, continue plan as above. I spent 50 minutes in the professional and overall care of this patient.      Patient fully evaluated 10/08, head of bed elevated, no s/s or c/o acute difficulties at this time. Medications and labs reviewed, sodium low again today at 125, nephrology consulted. Awaiting hepatic fluid analysis -AFB Culture -Culture in progress and will be examined weekly. A result will be issued either when positive or after 8 weeks incubation. AFB Stain -No acid fast bacilli seen.  Plan discussed with interdisciplinary team, per nephrology - Hyponatremia  Acute kidney injury   Ascites  Malnutrition  Anemia  Pleural effusion  Plan;  Discontinue potential nephrotoxins  Nutritional/iron/renal indices/urinary indices  DuoNeb aerosols  Appreciate nephrology input. Patient requiring supplemental oxygen at this time, continue to maintain HOB elevated as tolerated. Patient seen by pulmonology - Consult IR for right sided diagnostic and therapeutic thoracentesis  Unable to safely perform paracentesis due to lack of fluid  Patient has bilateral pleural effusion in the context of malignant ascites/ovarian cancer - Pleural effusions are secondary to ascites, both of which are due to patient's underlying cancer. Continue with goals of care discussions prior to interventional radiology consult. Continue IV antibiotics zosyn, probiotics added. Continue current plan and repeat labs in the AM, repeat chest xray in AM, maintain HOB elevated to alleviate postural dyspnea. Vitals stable. Patient with likely carcinomatosis and plan to discharge to SNF - [facility] and will follow up with gynecology in 7 days, atient still requiring frequent cardiac and SPO2 monitoring. Discharge planning discussed with patient and care team. Therapy evaluations ordered- [place] [id number], [facility] at discharge. Patient aware and agreeable to current plan,  continue plan as above. I spent 50 minutes in the professional and overall care of this patient.    Patient fully evaluated 10/09, patient resting in bed with HOB, no s/s or c/o acute difficulties at this time. Medications and labs reviewed, cultures no growth at 4 days, AFB cultures in process. Plan discussed with interdisciplinary team, pulmonary plan - Bilateral pleural effusion  Abdominal ascites  Ovarian carcinoma w/ peritoneal carcinomatosis carcinomatosis  HLD  Patient clear for discharge to SNF from standpoint of pulmonology  Patient will likely need an abdominal PleurX catheter at some point. However, we were unable to perform safely during this hospitalization due to lack of ascitic fluid  Pleural fluid suggests exudative effusion, likely secondary to malignancy  Okay to discontinue antibiotics from standpoint of pulmonology. Low suspicion of pneumonia. Leukocytosis is unlikely reflective of infection.   Will continue current and repeat labs in the AM. Patient still requiring frequent cardiac and SPO2 monitoring. Discharge planning discussed with patient and care team. Therapy evaluations ordered- [facility], anticipate SNF at discharge. Patient aware and agreeable to current plan, continue plan as above. I spent 50 minutes in the professional and overall care of this patient.    Kim Angulo

## 2024-10-09 NOTE — CARE PLAN
BMT Heme Malignancy Rooming Note    Steph Agee - 4/17/2017 9:18 AM     Chief Complaint   Patient presents with     Blood Draw     Labs Drawn      RECHECK     Patient here for provider visit r/t Hodgkins post transplant.         /71  Pulse 91  Temp 97  F (36.1  C) (Oral)  Resp 15  Wt 80.1 kg (176 lb 9.6 oz)  SpO2 99%  BMI 32.29 kg/m2     Medications reviewed: Yes    Dressing changed: Yes - See doc flowsheet for details.     Medications given: No    Staff time:3    Additional information if applicable: na    Rosamaria Farias RN       The patient's goals for the shift include sleep    The clinical goals for the shift include pain management

## 2024-10-09 NOTE — PROGRESS NOTES
Subjective   Patient reports improved breathing after thoracentesis. Denies fevers and chills.       Objective     Last Recorded Vitals  /58   Pulse 94   Temp 36.2 °C (97.2 °F)   Resp 16   Wt 104 kg (230 lb)   SpO2 97%   Intake/Output last 3 Shifts:    Intake/Output Summary (Last 24 hours) at 10/9/2024 6550  Last data filed at 10/8/2024 1855  Gross per 24 hour   Intake 50 ml   Output --   Net 50 ml       Admission Weight  Weight: 104 kg (230 lb) (10/01/24 0828)    Daily Weight  10/01/24 : 104 kg (230 lb)      Physical Exam  Constitutional:       Appearance: Normal appearance.   HENT:      Head: Normocephalic and atraumatic.      Mouth/Throat:      Mouth: Mucous membranes are moist.   Eyes:      Extraocular Movements: Extraocular movements intact.   Cardiovascular:      Rate and Rhythm: Normal rate and regular rhythm.   Pulmonary:      Effort: Pulmonary effort is normal.      Breath sounds: Normal breath sounds.   Abdominal:      General: Abdomen is flat. There is distension.      Palpations: Abdomen is soft.      Tenderness: There is abdominal tenderness.   Musculoskeletal:      Right lower leg: No edema.      Left lower leg: No edema.   Skin:     General: Skin is warm and dry.   Neurological:      General: No focal deficit present.      Mental Status: She is alert and oriented to person, place, and time.   Psychiatric:         Mood and Affect: Mood normal.           Assessment/Plan          Bilateral pleural effusion  Abdominal ascites  Ovarian carcinoma w/ peritoneal carcinomatosis carcinomatosis  HLD     Plan:  Patient clear for discharge to SNF from standpoint of pulmonology  Patient will likely need an abdominal PleurX catheter at some point. However, we were unable to perform safely during this hospitalization due to lack of ascitic fluid  Pleural fluid suggests exudative effusion, likely secondary to malignancy  Okay to discontinue antibiotics from standpoint of pulmonology. Low suspicion of  pneumonia. Leukocytosis is unlikely reflective of infection.         This is a preliminary note, please await attending attestation for a finalized plan.     Dr. Elsy Ortiz  Internal Medicine PGY-3  Please message me with any questions      STAFF PHYSICIAN NOTE OF PERSONAL INVOLVEMENT IN CARE  As the attending physician, I certify that I personally reviewed the patient's history and personally examined the patient to confirm the physical findings described above, and that I reviewed the relevant imaging studies and available reports.  I also discussed the differential diagnosis and all of the proposed management plans with the patient and individuals accompanying the patient to this visit.  They had the opportunity to ask questions about the proposed management plans and to have those questions answered.

## 2024-10-09 NOTE — TELEPHONE ENCOUNTER
Patients sister, Erica, called and left message on nurse line requesting clarification of text/phone messages she is receiving for appointments scheduled at [place].    Returned sisters call, message left on voice mail notifying that appointment reminders are for infusion appointments for chemo treatments.    Notified sister that Dr. Villalba and chemo team are aware that patient is currently admitted at [facility] and chemo/infusion appointments are to be determined based on timing of hospital discharge and patients health status.   Notified sister that chemo team will reach out to her once patient has been discharged from the hospital.

## 2024-10-09 NOTE — PROGRESS NOTES
"Rohini Beaver is a 79 y.o. female on day 8 of admission presenting with Hyponatremia.    Subjective   Labs Pending(Urine Indices)  Alert Denies Dyspnea   Renal chemistries increasing Serum creatinine today is 2.15    Objective     Physical Exam  General Appearance; alert oriented  Skin pallor  HEENT; pupils react to light and accommodation  Tongue; well-hydrated  Neck; no jugular vein distention, no thyromegaly, no adenopathy, no bruit  Lungs; bibasilar crackles on expiration  Heart; no rubs no gallops, heart rate is regular  Abdomen; there is tympanic note to percussion with distention no rebound no guarding no bruit  ; no CVA tenderness  Musculoskeletal; decreased muscle tone  1+ edema of the legs  Neurological; no tremors no clonus  Last Recorded Vitals  Blood pressure 124/52, pulse 94, temperature 36.6 °C (97.9 °F), resp. rate 18, height 1.626 m (5' 4\"), weight 104 kg (230 lb), SpO2 96%.  Intake/Output last 3 Shifts:  I/O last 3 completed shifts:  In: 600 (5.8 mL/kg) [P.O.:550; IV Piggyback:50]  Out: 800 (7.7 mL/kg) [Urine:800 (0.2 mL/kg/hr)]  Weight: 104.3 kg     Relevant Results    Results for orders placed or performed during the hospital encounter of 10/01/24 (from the past 24 hour(s))   TSH   Result Value Ref Range    Thyroid Stimulating Hormone 5.24 (H) 0.44 - 3.98 mIU/L   Prealbumin   Result Value Ref Range    Prealbumin 7.5 (L) 18.0 - 40.0 mg/dL   SST TOP   Result Value Ref Range    Extra Tube Hold for add-ons.    pH, Body Fluid   Result Value Ref Range    pH, Fluid 7.65 See Below   Lactate Dehydrogenase, Fluid   Result Value Ref Range    LD, Fluid 226 Not established. U/L   Glucose, Fluid   Result Value Ref Range    Glucose, Fluid 137 Not established mg/dL   Protein, Total Fluid   Result Value Ref Range    Protein, Total Fluid 2.9 Not established g/dL   Body Fluid Cell Count   Result Value Ref Range    Color, Fluid Straw Colorless, Straw, Yellow    Clarity, Fluid Clear Clear    WBC, Fluid 40 See " Comment /uL    RBC, Fluid 3,000 0  /uL /uL   CBC and Auto Differential   Result Value Ref Range    WBC 23.8 (H) 4.4 - 11.3 x10*3/uL    nRBC 0.0 0.0 - 0.0 /100 WBCs    RBC 3.62 (L) 4.00 - 5.20 x10*6/uL    Hemoglobin 9.3 (L) 12.0 - 16.0 g/dL    Hematocrit 30.3 (L) 36.0 - 46.0 %    MCV 84 80 - 100 fL    MCH 25.7 (L) 26.0 - 34.0 pg    MCHC 30.7 (L) 32.0 - 36.0 g/dL    RDW 16.1 (H) 11.5 - 14.5 %    Platelets 422 150 - 450 x10*3/uL    Neutrophils % 88.1 40.0 - 80.0 %    Immature Granulocytes %, Automated 1.1 (H) 0.0 - 0.9 %    Lymphocytes % 3.7 13.0 - 44.0 %    Monocytes % 3.9 2.0 - 10.0 %    Eosinophils % 2.7 0.0 - 6.0 %    Basophils % 0.5 0.0 - 2.0 %    Neutrophils Absolute 21.01 (H) 1.60 - 5.50 x10*3/uL    Immature Granulocytes Absolute, Automated 0.27 0.00 - 0.50 x10*3/uL    Lymphocytes Absolute 0.87 0.80 - 3.00 x10*3/uL    Monocytes Absolute 0.93 (H) 0.05 - 0.80 x10*3/uL    Eosinophils Absolute 0.64 (H) 0.00 - 0.40 x10*3/uL    Basophils Absolute 0.11 (H) 0.00 - 0.10 x10*3/uL   Comprehensive metabolic panel   Result Value Ref Range    Glucose 101 (H) 74 - 99 mg/dL    Sodium 126 (L) 136 - 145 mmol/L    Potassium 3.9 3.5 - 5.3 mmol/L    Chloride 93 (L) 98 - 107 mmol/L    Bicarbonate 24 21 - 32 mmol/L    Anion Gap 13 10 - 20 mmol/L    Urea Nitrogen 41 (H) 6 - 23 mg/dL    Creatinine 2.15 (H) 0.50 - 1.05 mg/dL    eGFR 23 (L) >60 mL/min/1.73m*2    Calcium 8.2 (L) 8.6 - 10.3 mg/dL    Albumin 2.3 (L) 3.4 - 5.0 g/dL    Alkaline Phosphatase 155 (H) 33 - 136 U/L    Total Protein 5.3 (L) 6.4 - 8.2 g/dL    AST 16 9 - 39 U/L    Bilirubin, Total 0.3 0.0 - 1.2 mg/dL    ALT 7 7 - 45 U/L   Vancomycin   Result Value Ref Range    Vancomycin 32.8 (H) 5.0 - 20.0 ug/mL   Uric Acid   Result Value Ref Range    Uric Acid 9.4 (H) 2.3 - 6.7 mg/dL   Lipid Panel   Result Value Ref Range    Cholesterol 101 0 - 199 mg/dL    HDL-Cholesterol 34.3 mg/dL    Cholesterol/HDL Ratio 2.9     LDL Calculated 34 <=99 mg/dL    VLDL 33 0 - 40 mg/dL     Triglycerides 165 (H) 0 - 149 mg/dL    Non HDL Cholesterol 67 0 - 149 mg/dL   Iron and TIBC   Result Value Ref Range    Iron 22 (L) 35 - 150 ug/dL    UIBC 117 110 - 370 ug/dL    TIBC 139 (L) 240 - 445 ug/dL    % Saturation 16 (L) 25 - 45 %                   Assessment/Plan     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      Elder Wells MD

## 2024-10-09 NOTE — CARE PLAN
The patient's goals for the shift include sleep    The clinical goals for the shift include maintaining adequate  pain management

## 2024-10-09 NOTE — CONSULTS
"Nutrition Follow Up Assessment:   Nutrition Assessment         Patient is a 79 y.o. female presenting with weakness      Nutrition History:  Food and Nutrient History: Pt is eating well and has decided that the magic cup is too sweet for her.  she is refusing a supplement  Food Allergies/Intolerances:  None  GI Symptoms: None  Oral Problems: None       Anthropometrics:  Height: 162.6 cm (5' 4\")   Weight: 104 kg (230 lb)   BMI (Calculated): 39.46  IBW/kg (Dietitian Calculated): 54 kg  Percent of IBW: 191 %       Weight History:     Weight Change %:       Nutrition Focused Physical Exam Findings:    Subcutaneous Fat Loss:      Muscle Wasting:     Edema:     Physical Findings:       Nutrition Significant Labs:  BMP Trend:   Results from last 7 days   Lab Units 10/08/24  0503 10/07/24  0449 10/05/24  0505 10/04/24  0527   GLUCOSE mg/dL 97 102* 99 101*   CALCIUM mg/dL 7.9* 8.1* 8.1* 8.0*   SODIUM mmol/L 125* 125* 128* 127*   POTASSIUM mmol/L 4.3 4.1 4.0 4.6   CO2 mmol/L 23 23 27 24   CHLORIDE mmol/L 91* 92* 95* 94*   BUN mg/dL 34* 28* 17 18   CREATININE mg/dL 1.82* 1.60* 0.66 0.68        Nutrition Specific Medications:  Vit C; D3; colace; lovenox; protonix; miralax; zocor; zofran    I/O:   Last BM Date: 10/06/24; Stool Appearance: Soft (10/08/24 1819)    Dietary Orders (From admission, onward)       Start     Ordered    10/01/24 1445  Adult diet Regular; 1500 mL fluid  Diet effective now        Question Answer Comment   Diet type Regular    Dietary fluid restriction / 24h: 1500 mL fluid        10/01/24 1444                     Estimated Needs:      Method for Estimating Needs: 3423-5468   30-35 charis kg of IBW with cancer Dx     Method for Estimating Needs: 65-81  1.2-1.5 gm lg if IBW as medically indicated     Method for Estimating Needs: 9800-8102  20-30 ml kg of IBW as medically indicated        Nutrition Diagnosis        Nutrition Diagnosis  Patient has Nutrition Diagnosis: Yes  Diagnosis Status (1): Ongoing  Nutrition " Diagnosis 1: Increased nutrient needs  Related to (1): physiological causes  As Evidenced by (1): new cancer Dx   wound healing needs       Nutrition Interventions/Recommendations         Nutrition Prescription:  Individualized Nutrition Prescription Provided for : Continue regular diet to encourage intake,  continue fluid restriction as needed,  discontinuing supplements due to pt refusal        Nutrition Interventions:   Interventions: Meals and snacks  Meals and Snacks: Fluid-modified diet  Goal: >75% of meals    Collaboration and Referral of Nutrition Care: Collaboration by nutrition professional with other providers    Nutrition Education:      Pt knows she needs to eat    Nutrition Monitoring and Evaluation   Food/Nutrient Related History Monitoring  Monitoring and Evaluation Plan: Energy intake, Fluid intake, Amount of food  Criteria: >75% of energy needs  Criteria: adequate fluid intake without fluid overload  Criteria: >75% of meals    Body Composition/Growth/Weight History  Monitoring and Evaluation Plan: Weight    Biochemical Data, Medical Tests and Procedures  Monitoring and Evaluation Plan: Electrolyte/renal panel, Glucose/endocrine profile  Criteria: improved sodium  Criteria: glucose within desired range              Time Spent (min): 30 minutes

## 2024-10-10 ENCOUNTER — TELEPHONE (OUTPATIENT)
Dept: GYNECOLOGIC ONCOLOGY | Facility: HOSPITAL | Age: 79
End: 2024-10-10

## 2024-10-10 LAB
ALBUMIN SERPL BCP-MCNC: 2.2 G/DL (ref 3.4–5)
ALP SERPL-CCNC: 146 U/L (ref 33–136)
ALT SERPL W P-5'-P-CCNC: 7 U/L (ref 7–45)
ANION GAP SERPL CALC-SCNC: 15 MMOL/L (ref 10–20)
AST SERPL W P-5'-P-CCNC: 16 U/L (ref 9–39)
BASOPHILS # BLD AUTO: 0.08 X10*3/UL (ref 0–0.1)
BASOPHILS NFR BLD AUTO: 0.4 %
BILIRUB SERPL-MCNC: 0.2 MG/DL (ref 0–1.2)
BUN SERPL-MCNC: 46 MG/DL (ref 6–23)
CALCIUM SERPL-MCNC: 8.1 MG/DL (ref 8.6–10.3)
CHLORIDE SERPL-SCNC: 93 MMOL/L (ref 98–107)
CO2 SERPL-SCNC: 23 MMOL/L (ref 21–32)
CREAT SERPL-MCNC: 2.62 MG/DL (ref 0.5–1.05)
CYSTATIN C SERPL-MCNC: 3.6 MG/L (ref 0.5–1.2)
EGFRCR SERPLBLD CKD-EPI 2021: 18 ML/MIN/1.73M*2
EOSINOPHIL # BLD AUTO: 0.52 X10*3/UL (ref 0–0.4)
EOSINOPHIL NFR BLD AUTO: 2.3 %
ERYTHROCYTE [DISTWIDTH] IN BLOOD BY AUTOMATED COUNT: 16.5 % (ref 11.5–14.5)
GFR/BSA.PRED SERPLBLD CYS-BASED-ARV: 12 ML/MIN/BSA
GLUCOSE SERPL-MCNC: 99 MG/DL (ref 74–99)
HCT VFR BLD AUTO: 29.2 % (ref 36–46)
HGB BLD-MCNC: 9.2 G/DL (ref 12–16)
IMM GRANULOCYTES # BLD AUTO: 0.3 X10*3/UL (ref 0–0.5)
IMM GRANULOCYTES NFR BLD AUTO: 1.3 % (ref 0–0.9)
LYMPHOCYTES # BLD AUTO: 0.86 X10*3/UL (ref 0.8–3)
LYMPHOCYTES NFR BLD AUTO: 3.8 %
MCH RBC QN AUTO: 26.1 PG (ref 26–34)
MCHC RBC AUTO-ENTMCNC: 31.5 G/DL (ref 32–36)
MCV RBC AUTO: 83 FL (ref 80–100)
MONOCYTES # BLD AUTO: 1.06 X10*3/UL (ref 0.05–0.8)
MONOCYTES NFR BLD AUTO: 4.7 %
NEUTROPHILS # BLD AUTO: 19.96 X10*3/UL (ref 1.6–5.5)
NEUTROPHILS NFR BLD AUTO: 87.5 %
NRBC BLD-RTO: 0 /100 WBCS (ref 0–0)
PLATELET # BLD AUTO: 417 X10*3/UL (ref 150–450)
POTASSIUM SERPL-SCNC: 3.8 MMOL/L (ref 3.5–5.3)
PROT SERPL-MCNC: 5.2 G/DL (ref 6.4–8.2)
RBC # BLD AUTO: 3.53 X10*6/UL (ref 4–5.2)
SODIUM SERPL-SCNC: 127 MMOL/L (ref 136–145)
VANCOMYCIN SERPL-MCNC: 31.5 UG/ML (ref 5–20)
WBC # BLD AUTO: 22.8 X10*3/UL (ref 4.4–11.3)

## 2024-10-10 PROCEDURE — 97110 THERAPEUTIC EXERCISES: CPT | Mod: GP

## 2024-10-10 PROCEDURE — 80202 ASSAY OF VANCOMYCIN: CPT | Performed by: NURSE PRACTITIONER

## 2024-10-10 PROCEDURE — 36415 COLL VENOUS BLD VENIPUNCTURE: CPT | Performed by: INTERNAL MEDICINE

## 2024-10-10 PROCEDURE — 2500000004 HC RX 250 GENERAL PHARMACY W/ HCPCS (ALT 636 FOR OP/ED): Performed by: INTERNAL MEDICINE

## 2024-10-10 PROCEDURE — 85025 COMPLETE CBC W/AUTO DIFF WBC: CPT | Performed by: INTERNAL MEDICINE

## 2024-10-10 PROCEDURE — 2500000002 HC RX 250 W HCPCS SELF ADMINISTERED DRUGS (ALT 637 FOR MEDICARE OP, ALT 636 FOR OP/ED): Performed by: INTERNAL MEDICINE

## 2024-10-10 PROCEDURE — 97535 SELF CARE MNGMENT TRAINING: CPT | Mod: GO

## 2024-10-10 PROCEDURE — 2500000001 HC RX 250 WO HCPCS SELF ADMINISTERED DRUGS (ALT 637 FOR MEDICARE OP): Performed by: INTERNAL MEDICINE

## 2024-10-10 PROCEDURE — 2500000001 HC RX 250 WO HCPCS SELF ADMINISTERED DRUGS (ALT 637 FOR MEDICARE OP): Performed by: NURSE PRACTITIONER

## 2024-10-10 PROCEDURE — 2500000005 HC RX 250 GENERAL PHARMACY W/O HCPCS: Performed by: RADIOLOGY

## 2024-10-10 PROCEDURE — 84075 ASSAY ALKALINE PHOSPHATASE: CPT | Performed by: INTERNAL MEDICINE

## 2024-10-10 PROCEDURE — 2500000005 HC RX 250 GENERAL PHARMACY W/O HCPCS: Performed by: INTERNAL MEDICINE

## 2024-10-10 PROCEDURE — 1200000002 HC GENERAL ROOM WITH TELEMETRY DAILY

## 2024-10-10 PROCEDURE — 2500000004 HC RX 250 GENERAL PHARMACY W/ HCPCS (ALT 636 FOR OP/ED): Performed by: NURSE PRACTITIONER

## 2024-10-10 RX ORDER — FLUTICASONE PROPIONATE 50 MCG
2 SPRAY, SUSPENSION (ML) NASAL DAILY
Status: DISCONTINUED | OUTPATIENT
Start: 2024-10-10 | End: 2024-10-14 | Stop reason: HOSPADM

## 2024-10-10 RX ORDER — ALLOPURINOL 100 MG/1
50 TABLET ORAL DAILY
Status: DISCONTINUED | OUTPATIENT
Start: 2024-10-11 | End: 2024-10-13

## 2024-10-10 RX ORDER — CETIRIZINE HYDROCHLORIDE 10 MG/1
10 TABLET ORAL DAILY
Status: DISCONTINUED | OUTPATIENT
Start: 2024-10-10 | End: 2024-10-14 | Stop reason: HOSPADM

## 2024-10-10 ASSESSMENT — PAIN DESCRIPTION - LOCATION
LOCATION: ABDOMEN

## 2024-10-10 ASSESSMENT — ACTIVITIES OF DAILY LIVING (ADL): HOME_MANAGEMENT_TIME_ENTRY: 15

## 2024-10-10 ASSESSMENT — PAIN - FUNCTIONAL ASSESSMENT
PAIN_FUNCTIONAL_ASSESSMENT: 0-10

## 2024-10-10 ASSESSMENT — COGNITIVE AND FUNCTIONAL STATUS - GENERAL
DRESSING REGULAR LOWER BODY CLOTHING: TOTAL
MOBILITY SCORE: 8
STANDING UP FROM CHAIR USING ARMS: TOTAL
PERSONAL GROOMING: A LITTLE
HELP NEEDED FOR BATHING: TOTAL
TURNING FROM BACK TO SIDE WHILE IN FLAT BAD: A LOT
CLIMB 3 TO 5 STEPS WITH RAILING: TOTAL
DAILY ACTIVITIY SCORE: 12
DRESSING REGULAR UPPER BODY CLOTHING: A LOT
MOVING FROM LYING ON BACK TO SITTING ON SIDE OF FLAT BED WITH BEDRAILS: A LOT
WALKING IN HOSPITAL ROOM: TOTAL
MOVING TO AND FROM BED TO CHAIR: TOTAL
TOILETING: TOTAL

## 2024-10-10 ASSESSMENT — PAIN SCALES - GENERAL
PAINLEVEL_OUTOF10: 6
PAINLEVEL_OUTOF10: 2
PAINLEVEL_OUTOF10: 2
PAINLEVEL_OUTOF10: 0 - NO PAIN
PAINLEVEL_OUTOF10: 9
PAINLEVEL_OUTOF10: 0 - NO PAIN
PAINLEVEL_OUTOF10: 2
PAINLEVEL_OUTOF10: 6

## 2024-10-10 NOTE — PROGRESS NOTES
Call made to bedside to try and reach patient/sister, no answer.   Call made to sister directly. She reports because patient is suppose to get surgery at [facility], she notes they haven't reviewed the SNF list yet. Provider notes he feels patient can discharge to SNF but sister has questions. Message sent to provider, and sister agreed to call this T[facility] when she is at bedside. Care Transitions will continue to follow during course of hospital stay for any changes to discharge needs.  Jenane Georges RN

## 2024-10-10 NOTE — PROGRESS NOTES
Vancomycin Dosing by Pharmacy- FOLLOW UP    Rohini Beaver is a 79 y.o. year old female who Pharmacy has been consulted for vancomycin dosing for other abdominal infection . Based on the patient's indication and renal status this patient is being dosed based on a goal trough/random level of 15-20.     Renal function is currently declining.    Current holding vancomycin    Most recent random level: 31.5 mcg/mL    Visit Vitals  /52   Pulse 93   Temp 36.4 °C (97.5 °F)   Resp 16        Lab Results   Component Value Date    CREATININE 2.62 (H) 10/10/2024    CREATININE 2.15 (H) 10/09/2024    CREATININE 1.82 (H) 10/08/2024    CREATININE 1.60 (H) 10/07/2024        Patient weight is as follows:   Vitals:    10/01/24 1805   Weight: 104 kg (230 lb)       Cultures:  No results found for the encounter in last 14 days.       No intake/output data recorded.  I/O during current shift:  No intake/output data recorded.    Temp (24hrs), Av.3 °C (97.3 °F), Min:35.7 °C (96.3 °F), Max:36.6 °C (97.9 °F)      Assessment/Plan    Above goal random/trough level. Continue holding vancomycin dose due to decreased clearance (most recent level 31.5) related to increasing SCR.   The next level will be obtained on 1012 at AM labs. May be obtained sooner if clinically indicated.   Will continue to monitor renal function daily while on vancomycin and order serum creatinine at least every 48 hours if not already ordered.  Follow for continued vancomycin needs, clinical response, and signs/symptoms of toxicity.       Sherri Greene, PharmD

## 2024-10-10 NOTE — TELEPHONE ENCOUNTER
I called the patient's sister to notify her that Rohini is scheduled to see her physician on 10/16. I also stated on the message that if Rohini is well she will have treatment that day as well.

## 2024-10-10 NOTE — PROGRESS NOTES
"  Subjective   Patient urinary indices are still pending.  Patient had thoracentesis open nearly 550 mL of fluid.  Today she complains of nasal stuffiness m  Her vancomycin levels are elevated and vancomycin has been discontinued.  Her renal chemistries uptrending BUN is 46 creatinine 2.62.  Liver functions are normal.  CBC hemoglobin is 9.2  WBC count is 22,800.  Her dose of allopurinol is being adjusted for renal function  Objective     Physical Exam  General Appearance; alert oriented  Skin pallor  HEENT; pupils react to light and accommodation  Tender maxillary sinuses  Tongue; well-hydrated  Neck; no jugular vein distention, no thyromegaly, no adenopathy, no bruit  Lungs; bibasilar crackles on expiration  Heart; no rubs no gallops, heart rate is regular  Abdomen; there is tympanic note to percussion with distention no rebound no guarding no bruit  ; no CVA tenderness  Musculoskeletal; decreased muscle tone  1+ edema of the legs  Neurological; no tremors no clonus  Last Recorded Vitals  Blood pressure 114/52, pulse 93, temperature 36.4 °C (97.5 °F), temperature source Temporal, resp. rate 16, height 1.626 m (5' 4\"), weight 104 kg (230 lb), SpO2 97%.  Intake/Output last 3 Shifts:  No intake/output data recorded.    Relevant Results    Results for orders placed or performed during the hospital encounter of 10/01/24 (from the past 24 hour(s))   CBC and Auto Differential   Result Value Ref Range    WBC 22.8 (H) 4.4 - 11.3 x10*3/uL    nRBC 0.0 0.0 - 0.0 /100 WBCs    RBC 3.53 (L) 4.00 - 5.20 x10*6/uL    Hemoglobin 9.2 (L) 12.0 - 16.0 g/dL    Hematocrit 29.2 (L) 36.0 - 46.0 %    MCV 83 80 - 100 fL    MCH 26.1 26.0 - 34.0 pg    MCHC 31.5 (L) 32.0 - 36.0 g/dL    RDW 16.5 (H) 11.5 - 14.5 %    Platelets 417 150 - 450 x10*3/uL    Neutrophils % 87.5 40.0 - 80.0 %    Immature Granulocytes %, Automated 1.3 (H) 0.0 - 0.9 %    Lymphocytes % 3.8 13.0 - 44.0 %    Monocytes % 4.7 2.0 - 10.0 %    Eosinophils % 2.3 0.0 - 6.0 %    " Basophils % 0.4 0.0 - 2.0 %    Neutrophils Absolute 19.96 (H) 1.60 - 5.50 x10*3/uL    Immature Granulocytes Absolute, Automated 0.30 0.00 - 0.50 x10*3/uL    Lymphocytes Absolute 0.86 0.80 - 3.00 x10*3/uL    Monocytes Absolute 1.06 (H) 0.05 - 0.80 x10*3/uL    Eosinophils Absolute 0.52 (H) 0.00 - 0.40 x10*3/uL    Basophils Absolute 0.08 0.00 - 0.10 x10*3/uL   Comprehensive metabolic panel   Result Value Ref Range    Glucose 99 74 - 99 mg/dL    Sodium 127 (L) 136 - 145 mmol/L    Potassium 3.8 3.5 - 5.3 mmol/L    Chloride 93 (L) 98 - 107 mmol/L    Bicarbonate 23 21 - 32 mmol/L    Anion Gap 15 10 - 20 mmol/L    Urea Nitrogen 46 (H) 6 - 23 mg/dL    Creatinine 2.62 (H) 0.50 - 1.05 mg/dL    eGFR 18 (L) >60 mL/min/1.73m*2    Calcium 8.1 (L) 8.6 - 10.3 mg/dL    Albumin 2.2 (L) 3.4 - 5.0 g/dL    Alkaline Phosphatase 146 (H) 33 - 136 U/L    Total Protein 5.2 (L) 6.4 - 8.2 g/dL    AST 16 9 - 39 U/L    Bilirubin, Total 0.2 0.0 - 1.2 mg/dL    ALT 7 7 - 45 U/L   Vancomycin   Result Value Ref Range    Vancomycin 31.5 (H) 5.0 - 20.0 ug/mL                   Assessment/Plan     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      Elder Wells MD

## 2024-10-10 NOTE — PROGRESS NOTES
Physical Therapy    Physical Therapy Treatment    Patient Name: Rohini Beaver  MRN: [id number]  Today's Date: 10/10/2024  Time Calculation  Start Time: 1343  Stop Time: 1410  Time Calculation (min): 27 min     910/910-A    Assessment/Plan   PT Assessment  PT Assessment Results: Decreased strength, Decreased endurance, Impaired balance, Decreased mobility  Evaluation/Treatment Tolerance: Patient limited by fatigue  End of Session Communication: Bedside nurse  End of Session Patient Position: Bed, 2 rail up, Alarm on (hob elevated to comfort, call light in reach, scd's donned/activated)     PT Plan  Treatment/Interventions: Bed mobility, Transfer training, Gait training  PT Plan: Ongoing PT  PT Frequency: 4 times per week  PT Discharge Recommendations: Moderate intensity level of continued care  PT - OK to Discharge: Yes    Current Problem:  Patient Active Problem List   Diagnosis    BMI 40.0-44.9, adult (Multi)    Hiatal hernia    Gastroesophageal reflux disease    Malignant ascites (CMS-HCC)    Hyponatremia    Endometrial cancer (Multi)       General Visit Information:   PT  Visit  PT Received On: 10/10/24  Response to Previous Treatment: Patient reporting fatigue but able to participate.  General  Family/Caregiver Present:  (sister present, supportive)  Co-Treatment: OT  Co-Treatment Reason: to maximize pt safety& mobility  Patient Position Received: Bed, 2 rail up, Alarm on  General Comment: responsive to encouragement, fatigue & dizziness w/ upright activity, tends to lean lt & posterior loss of balance  Subjective     Precautions:  Precautions  Medical Precautions: Fall precautions, Oxygen therapy device and L/min (2L)  Precautions Comment: fall, purewick, 2Lo2, scd's, telemetry  Pain:  Pain Assessment  Pain Assessment:  (denies c/o pain, premedicated by RN, positioned for comfort after therapy session)    Cognition:  Cognition  Overall Cognitive Status: Within Functional Limits  Activity Tolerance:  Activity  Tolerance  Endurance: Decreased tolerance for upright activites    Treatments:  Therapeutic Exercise  Therapeutic Exercise Performed:  (ble therex to facilitate increased functional mobility, cues & assist for proper technique & maximum tolerable effort, performed 10x each: ankle pumps, heel slides, laq)        Bed Mobility  Bed Mobility:  (max assist x1 rolling rt/lt w/ cues to reach for opposite rail to increase ease of mobility; max assist x2 supine<>sit w/ assist for trunk &le's, marked difficulty attaining sitting position to edge of bed, retropulsive & resistive to forward mobility)     Transfers  Transfer:  (unable/unsafe due to marked fatigue w/ sitting edge of bed/patient unable to tolerate attempts to stand & requesting back to bed)   Outcome Measures:     [facility] Basic Mobility  Turning from your back to your side while in a flat bed without using bedrails: A lot  Moving from lying on your back to sitting on the side of a flat bed without using bedrails: A lot  Moving to and from bed to chair (including a wheelchair): Total  Standing up from a chair using your arms (e.g. wheelchair or bedside chair): Total  To walk in hospital room: Total  Climbing 3-5 steps with railing: Total  Basic Mobility - Total Score: 8   Education Documentation  Mobility Training, taught by Wen Desai PT at 10/10/2024  2:47 PM.  Learner: Family, Patient  Readiness: Acceptance  Method: Explanation  Response: Verbalizes Understanding  Comment: safety, activity progression, performance of le therex  EDUCATION:     Encounter Problems       Encounter Problems (Active)       PT Problem       STG - Pt will transition supine <> sitting with min A x 1  (Progressing)       Start:  10/02/24    Expected End:  10/16/24            STG - Pt will transfer STS with mod A x 1  (Progressing)       Start:  10/02/24    Expected End:  10/16/24            STG - Pt will amb 25' using RW with mod A x 1  (Progressing)       Start:  10/02/24    Expected  End:  10/16/24                     pain well controlled. pt amenable for dc home

## 2024-10-10 NOTE — PROGRESS NOTES
Subjective   Patient laying comfortably, breathing comfortably. Eager to leave hospital.       Objective     Last Recorded Vitals  /50   Pulse 90   Temp 36.3 °C (97.3 °F)   Resp 16   Wt 104 kg (230 lb)   SpO2 95%   Intake/Output last 3 Shifts:    Intake/Output Summary (Last 24 hours) at 10/10/2024 1356  Last data filed at 10/10/2024 1244  Gross per 24 hour   Intake 350 ml   Output --   Net 350 ml       Admission Weight  Weight: 104 kg (230 lb) (10/01/24 0828)    Daily Weight  10/01/24 : 104 kg (230 lb)      Physical Exam  Constitutional:       Appearance: Normal appearance.   HENT:      Head: Normocephalic and atraumatic.      Mouth/Throat:      Mouth: Mucous membranes are moist.   Eyes:      Extraocular Movements: Extraocular movements intact.   Cardiovascular:      Rate and Rhythm: Normal rate and regular rhythm.   Pulmonary:      Effort: Pulmonary effort is normal.      Breath sounds: Normal breath sounds.   Abdominal:      General: Abdomen is flat. There is distension.      Palpations: Abdomen is soft.      Tenderness: There is abdominal tenderness.   Musculoskeletal:      Right lower leg: No edema.      Left lower leg: No edema.   Skin:     General: Skin is warm and dry.   Neurological:      General: No focal deficit present.      Mental Status: She is alert and oriented to person, place, and time.   Psychiatric:         Mood and Affect: Mood normal.           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  EUSEBIO management per nephrology  Patient will likely need an abdominal PleurX catheter at some point. However, we were unable to perform safely during this hospitalization due to lack of ascitic fluid  Pleural fluid suggests exudative effusion, likely secondary to malignancy  Okay to discontinue antibiotics from standpoint of  pulmonology. Low suspicion of pneumonia. Leukocytosis is unlikely reflective of infection.         This is a preliminary note, please await attending attestation for a finalized plan.     Dr. Elsy Ortiz  Internal Medicine PGY-3  Please message me with any questions      STAFF PHYSICIAN NOTE OF PERSONAL INVOLVEMENT IN CARE  As the attending physician, I certify that I personally reviewed the patient's history and personally examined the patient to confirm the physical findings described above, and that I reviewed the relevant imaging studies and available reports.  I also discussed the differential diagnosis and all of the proposed management plans with the patient and individuals accompanying the patient to this visit.  They had the opportunity to ask questions about the proposed management plans and to have those questions answered.

## 2024-10-10 NOTE — PROGRESS NOTES
"Rohini Beaver is a 79 y.o. female on day 9 of admission presenting with Hyponatremia.      Subjective    Patient fully evaluated 10/3, awake, resting in bed. Patient with Moderate hiatal hernia with partial intrathoracic stomach and the peritoneal fat adjacent to the stomach within the hernia demonstrates evidence of probable carcinomatosis and ascites, Patient tolerated paracentesis on 10/01 well,order placed for repeat paracentesis therapeutic non diagnostic with IR.continue current and repeat labs in the AM. Patient aware and agreeable to current plan, continue plan as above. I spent 50 minutes in the professional and overall care of this patient.   Objective     Last Recorded Vitals  /50 (BP Location: Right arm, Patient Position: Lying)   Pulse 90   Temp 36.3 °C (97.3 °F) (Temporal)   Resp 16   Wt 104 kg (230 lb)   SpO2 95%   Intake/Output last 3 Shifts:    Intake/Output Summary (Last 24 hours) at 10/10/2024 1530  Last data filed at 10/10/2024 1244  Gross per 24 hour   Intake 350 ml   Output --   Net 350 ml       Admission Weight  Weight: 104 kg (230 lb) (10/01/24 0828)    Daily Weight  10/01/24 : 104 kg (230 lb)    Image Results  US thoracentesis  Narrative: Interpreted By:  Parrish Vargas,   STUDY:  US THORACENTESIS;  10/8/2024 3:18 pm      INDICATION:  Signs/Symptoms:Diagnostic and therapeutic right sided thoracentesis.          COMPARISON:  None.      ACCESSION NUMBER(S):  [id number]      ORDERING CLINICIAN:  SHAKIRA PAEZ      TECHNIQUE:  INTERVENTIONALIST(S):  Parrish Vargas MD      The history and physical exam pertinent to the procedure were  reviewed and no updates were made.\"      CONSENT:  The patient/patient's POA/next of kin was informed of the nature of  the proposed procedure. The purposes, alternatives, risks, and  benefits were explained and discussed. All questions were answered  and consent was obtained.      SEDATION:  None      MEDICATION/CONTRAST:  No additional      TIME " OUT:  A time out was performed immediately prior to procedure start with  the interventional team, correctly identifying the patient name, date  of birth, MRN, procedure, anatomy (including marking of site and  side), patient position, procedure consent form, relevant laboratory  and imaging test results, antibiotic administration, safety  precautions, and procedure-specific equipment needs.      FINDINGS:  The patient was placed in the sitting position.      The pleural space was examined with grey scale ultrasound, and the  most accessible fluid identified and marked for thoracentesis.      The skin was prepped and draped in usual manner. Local anesthesia  with Lidocaine was administered and a  right-sided thoracentesis was  performed.  A 5 Bengali One-Step thoracentesis needle/catheter was  then placed where marked.  Approximately 550 mL of yellowish colored  fluid was removed.  The needle/catheter was then withdrawn.      The patient tolerated the procedure well and there were no immediate  complications. Specimen(s) sent to the laboratory and pathology for  further evaluation, per the requesting team.      Impression: Uneventful  right-sided thoracentesis, as detailed above.      I personally performed and/or directly supervised this study and was  present for the entire procedure.      I personally reviewed the study and resident interpretation. I agree  with the findings as stated.      Performed and dictated at [facility].      MACRO:  None      Signed by: Parrish Vargas 10/9/2024 1:17 PM  Dictation workstation:   [id number]  XR chest 1 view  Narrative: Interpreted By:  Devin Garsia,   STUDY:  XR CHEST 1 VIEW;  10/8/2024 4:57 pm      INDICATION:  Signs/Symptoms:Reassess after thoracentesis.      COMPARISON:  10/08/2024 at 7:00 a.m.      ACCESSION NUMBER(S):  [id number]      ORDERING CLINICIAN:  SHAKIRA PAEZ      FINDINGS:  CARDIOMEDIASTINAL SILHOUETTE AND VASCULATURE:       Cardiac size:  The right cardiac margin is obscured.  Aortic shadow:  Within normal limits.      Mediastinal contours: Within normal limits.      Pulmonary vasculature:  Unremarkable, with resolution of prominence  and cephalization on the prior exam that was probably due to  congestion.      LUNGS:  There is opacification of the right lower lung probably from a  combination of elevated hemidiaphragm with effusion and atelectasis.  This appears fairly similar to the previous exam. Is no evidence of  right pneumothorax. Left retrocardiac opacity appears slightly  improved, also probably due to atelectasis.      ABDOMEN AND OTHER FINDINGS:  No remarkable upper abdominal findings.      BONES:  No acute osseous changes.      Impression: 1.  No complication such as pneumothorax after reported thoracentesis.      Signed by: Devin Garsia 10/9/2024 10:16 AM  Dictation workstation:   [id number]      Physical Exam    Relevant Results               Assessment/Plan   This patient currently has cardiac telemetry ordered; if you would like to modify or discontinue the telemetry order, click here to go to the orders activity to modify/discontinue the order.          Jd Rhodes MD   Physician  Internal Medicine     H&P      Addendum     Date of Service: 10/1/2024  2:54 PM     Addendum       Expand All Collapse All    History Of Present Illness  Rohini Beaver is a 79-year-old female with past medical history of recently diagnosed ovarian cancer with peritoneal carcinomatosis s/p paracentesis x 4 (last one done 9/19/24), obesity, hyperlipidemia, osteoarthritis, and skin cancer s/p Mohs procedure.  Patient presents to the hospital with weakness and inability to care for herself.  She reports that she was at [facility] skilled nursing facility, however had a brief hospitalization at [facility] and upon discharge decided to go home rather than go back to [facility].  She lives with her 86-year-old sister and  has a couple friends who assist with appointments, however limited support system.  Sister unable to care for due to age.  Today she was in urgent reclining chair and was able to get up.  ROS additionally positive for cold sweats, distended and tender abdomen, edema, pressure injury to coccyx/gluteal fold, and decreased activity tolerance.  Denies history of heart disease.  She reports that she is scheduled for outpatient appointment with her oncologist tomorrow to determine ongoing plan.      ER course: Hemodynamically stable, afebrile, SpO2 90s on room air.  WBC 28.9, Hgb 11.0/Hct34.1 (Hgb 15.1 4/4/2023), platelets 446. Glucose 107, Sodium 126, Chloride 94, calcium 8.5, albumin 2.4, alkaline phosphatase 231.  Lactate 1.4.  BNP 74.  High-sensitivity troponin 7.  UA unremarkable. CT chest showed moderate to large bilateral pleural effusion with adjacent presumed compressive atelectasis, prominent main pulmonary artery which may indicate pulmonary hypertension, mildly prominent mediastinal lymph nodes measuring up to 10 mm in short axis concerning for metastatic disease.  Moderate hiatal hernia with partial intrathoracic stomach and the peritoneal fat adjacent to the stomach within the hernia demonstrates evidence of probable carcinomatosis and ascites.  Redemonstration of large volume ascites with regions of significant anterior omental caking and peritoneal carcinomatosis commensurate with patient's provided diagnosis of ovarian cancer. Regions of wall thickening of the colon extending from the splenic flexure to the sigmoid.  Extensive region of scalloping of the right hepatic lobe peripherally with appearance of large subcapsular collection along the right hepatic margin. Blood culture in process     Past medical history: As above  Past surgical history: Cholecystectomy, lumbar laminectomy, left shoulder arthroscopy, right total knee replacement, corneal transplant  Social history: No history of smoking, alcohol  abuse, illicit drug use.  Lives with her elderly sister who is 86 years old.  Limited support system.   Family history: Mother-cancer (unknown type)     Past Medical History  Medical History        Past Medical History:   Diagnosis Date    Bilateral primary osteoarthritis of knee      HLD (hyperlipidemia)      Lumbosacral radiculopathy      Meralgia paraesthetica      Physical deconditioning              Surgical History  Surgical History         Past Surgical History:   Procedure Laterality Date    ARTHROPLASTY Left       Left thumb carpometacarpal arthroplasty with ligament reconstruction and tendon interposition    BREAST BIOPSY   1999     Benign    CHOLECYSTECTOMY        COLONOSCOPY        CORNEAL TRANSPLANT        DILATION AND CURETTAGE OF UTERUS        LUMBAR FUSION        SHOULDER ARTHROSCOPY Left      SKIN LESION EXCISION        TOTAL KNEE ARTHROPLASTY Left 2019    TOTAL KNEE ARTHROPLASTY Right 2017    TRIGGER FINGER RELEASE Right              Social History  She reports that she has never smoked. She has never used smokeless tobacco. She reports that she does not currently use alcohol. She reports that she does not use drugs.     Family History  Family History          Family History   Problem Relation Name Age of Onset    Colon cancer Mother        Lung cancer Father        Other (Mesothelioma) Brother        Brain cancer Mother's Brother                Allergies  Patient has no known allergies.     Review of Systems     10 point ROS negative except as noted above in HPI      Physical Exam  Vitals reviewed.   Constitutional:       General: She is awake. She is not in acute distress.     Appearance: She is obese. She is ill-appearing. She is not toxic-appearing.   HENT:      Head: Normocephalic and atraumatic.      Nose: Nose normal.      Mouth/Throat:      Mouth: Mucous membranes are moist.      Pharynx: Oropharynx is clear.   Eyes:      Conjunctiva/sclera: Conjunctivae normal.   Cardiovascular:      Rate  and Rhythm: Normal rate and regular rhythm.      Pulses: Normal pulses.      Heart sounds: No murmur heard.  Pulmonary:      Effort: Pulmonary effort is normal. No respiratory distress.      Breath sounds: Decreased air movement present. Decreased breath sounds present.      Comments: Posterior bilateral breath sounds are diminished  Abdominal:      General: There is distension.      Palpations: Abdomen is soft.      Tenderness: There is abdominal tenderness. There is no guarding.      Comments: Bowel sounds hypoactive, abdomen distended, tender to palpation, dullness noted to the sides.   Musculoskeletal:         General: No swelling, deformity or signs of injury. Normal range of motion.      Cervical back: Neck supple.      Right lower leg: Edema present.      Left lower leg: Edema present.      Comments: Generalized edema/anasarca   Skin:     General: Skin is warm and dry.      Capillary Refill: Capillary refill takes less than 2 seconds.      Findings: No ecchymosis or wound.      Comments: Wound on coccyx, exam deferred  due to patient discomfort    Neurological:      General: No focal deficit present.      Mental Status: She is alert and oriented to person, place, and time.   Psychiatric:         Mood and Affect: Mood normal.         Behavior: Behavior is cooperative.                  Last Recorded Vitals  Blood pressure 117/58, pulse 100, temperature 36.2 °C (97.2 °F), temperature source Temporal, resp. rate (!) 22, weight 104 kg (230 lb), SpO2 94%.     Relevant Results              Results for orders placed or performed during the hospital encounter of 10/01/24 (from the past 24 hour(s))   CBC and Auto Differential   Result Value Ref Range     WBC 28.9 (H) 4.4 - 11.3 x10*3/uL     nRBC 0.0 0.0 - 0.0 /100 WBCs     RBC 4.15 4.00 - 5.20 x10*6/uL     Hemoglobin 11.0 (L) 12.0 - 16.0 g/dL     Hematocrit 34.1 (L) 36.0 - 46.0 %     MCV 82 80 - 100 fL     MCH 26.5 26.0 - 34.0 pg     MCHC 32.3 32.0 - 36.0 g/dL     RDW  15.7 (H) 11.5 - 14.5 %     Platelets 446 150 - 450 x10*3/uL     Neutrophils % 89.1 40.0 - 80.0 %     Immature Granulocytes %, Automated 1.0 (H) 0.0 - 0.9 %     Lymphocytes % 4.3 13.0 - 44.0 %     Monocytes % 4.8 2.0 - 10.0 %     Eosinophils % 0.5 0.0 - 6.0 %     Basophils % 0.3 0.0 - 2.0 %     Neutrophils Absolute 25.74 (H) 1.60 - 5.50 x10*3/uL     Immature Granulocytes Absolute, Automated 0.30 0.00 - 0.50 x10*3/uL     Lymphocytes Absolute 1.23 0.80 - 3.00 x10*3/uL     Monocytes Absolute 1.38 (H) 0.05 - 0.80 x10*3/uL     Eosinophils Absolute 0.14 0.00 - 0.40 x10*3/uL     Basophils Absolute 0.09 0.00 - 0.10 x10*3/uL   Comprehensive metabolic panel   Result Value Ref Range     Glucose 107 (H) 74 - 99 mg/dL     Sodium 126 (L) 136 - 145 mmol/L     Potassium 5.2 3.5 - 5.3 mmol/L     Chloride 94 (L) 98 - 107 mmol/L     Bicarbonate 24 21 - 32 mmol/L     Anion Gap 13 10 - 20 mmol/L     Urea Nitrogen 18 6 - 23 mg/dL     Creatinine 0.61 0.50 - 1.05 mg/dL     eGFR >90 >60 mL/min/1.73m*2     Calcium 8.5 (L) 8.6 - 10.3 mg/dL     Albumin 2.4 (L) 3.4 - 5.0 g/dL     Alkaline Phosphatase 231 (H) 33 - 136 U/L     Total Protein 5.7 (L) 6.4 - 8.2 g/dL     AST 23 9 - 39 U/L     Bilirubin, Total 0.3 0.0 - 1.2 mg/dL     ALT 8 7 - 45 U/L   Magnesium   Result Value Ref Range     Magnesium 1.68 1.60 - 2.40 mg/dL   Troponin I, High Sensitivity   Result Value Ref Range     Troponin I, High Sensitivity 7 0 - 13 ng/L   B-Type Natriuretic Peptide   Result Value Ref Range     BNP 74 0 - 99 pg/mL   Sars-CoV-2 PCR   Result Value Ref Range     Coronavirus 2019, PCR Not Detected Not Detected   Lactate   Result Value Ref Range     Lactate 1.4 0.4 - 2.0 mmol/L   Urinalysis with Reflex Culture and Microscopic   Result Value Ref Range     Color, Urine Light-Yellow Light-Yellow, Yellow, Dark-Yellow     Appearance, Urine Clear Clear     Specific Gravity, Urine >1.050 (N) 1.005 - 1.035     pH, Urine 6.0 5.0, 5.5, 6.0, 6.5, 7.0, 7.5, 8.0     Protein, Urine 20  (TRACE) NEGATIVE, 10 (TRACE), 20 (TRACE) mg/dL     Glucose, Urine Normal Normal mg/dL     Blood, Urine NEGATIVE NEGATIVE     Ketones, Urine NEGATIVE NEGATIVE mg/dL     Bilirubin, Urine NEGATIVE NEGATIVE     Urobilinogen, Urine Normal Normal mg/dL     Nitrite, Urine NEGATIVE NEGATIVE     Leukocyte Esterase, Urine NEGATIVE NEGATIVE   Urinalysis Microscopic   Result Value Ref Range     WBC, Urine 1-5 1-5, NONE /HPF     RBC, Urine 1-2 NONE, 1-2, 3-5 /HPF     Squamous Epithelial Cells, Urine 1-9 (SPARSE) Reference range not established. /HPF     Mucus, Urine FEW Reference range not established. /LPF   Protime-INR   Result Value Ref Range     Protime 13.0 (H) 9.8 - 12.8 seconds     INR 1.2 (H) 0.9 - 1.1   APTT   Result Value Ref Range     aPTT 24 (L) 27 - 38 seconds      CT chest abdomen pelvis w IV contrast     Result Date: 10/1/2024  Interpreted By:  Oscar Aldrich, STUDY: CT CHEST ABDOMEN PELVIS W IV CONTRAST;  10/1/2024 11:13 am   INDICATION: Signs/Symptoms:leukocytosis, ascites, recent ovarian cancer diagnosis.   COMPARISON: CT abdomen pelvis 08/08/2024   ACCESSION NUMBER(S): [id number]   ORDERING CLINICIAN: ASHLEY FAIRBANKS   TECHNIQUE: CT of the chest, abdomen, and pelvis was performed.  Contiguous axial images were obtained at 3 mm slice thickness through the chest, abdomen and pelvis. Coronal and sagittal reconstructions at 3 mm slice thickness were performed. ml of contrast  were administered intravenously without immediate complication.   FINDINGS: CHEST:   LUNG/PLEURA/LARGE AIRWAYS: No endobronchial lesion is seen.   Moderate to large bilateral pleural effusions with adjacent consolidative opacification of the bilateral lower lobes suggestive of compressive atelectasis. No pneumothorax. Mild asymmetric scattered reticular changes suggestive of chronic lung findings.     VESSELS: The thoracic aorta is unremarkable with respect course, caliber, and contour.   Prominent main pulmonary artery measuring up to 3.2  cm in anterior-posterior dimension measured on sagittal plane which may indicate sequela of pulmonary hypertension.   No significant coronary atherosclerotic calcifications are seen.   HEART: Heart size within normal limits. No pericardial effusion.   MEDIASTINUM AND OLIVE: Mildly prominent mediastinal lymph nodes measuring up to 10 mm in short axis   Moderate hiatal hernia with partial intrathoracic stomach and ascites and region of nodularity of the peritoneal fat within hiatal hernia suggestive carcinomatosis.   CHEST WALL AND LOWER NECK: No acute osseous abnormality. Multilevel presumed degenerative changes throughout the imaged spine. No acute soft tissue abnormality.   ABDOMEN:   LIVER: There is been interval scalloping of the right hepatic margins with new regions of irregularity along the right hepatic capsule diffusely which is new since comparison imaging from 08/08/2024 there is a new elongated subcapsular collection measuring up to approximately 11.4 x 2.1 cm, difficult to measure given curvilinear projection extending over the right hepatic lobe margin (series 202, images 40-70). Similar appearing subcentimeter left hepatic lobe hypodense lesion.   BILE DUCTS: No obvious new intrahepatic biliary dilatation. Mild prominence of the common bile duct, nonspecific in a cholecystectomy patient.   GALLBLADDER: Absent.   PANCREAS: Unremarkable.   SPLEEN: Unchanged.   ADRENAL GLANDS: Unchanged.   KIDNEYS AND URETERS: Similar appearing subcentimeter right renal lower pole calculus. No hydronephrosis. No obstructing urolithiasis.   PELVIS:   BLADDER: Unremarkable.   REPRODUCTIVE ORGANS: Uterus appears grossly unchanged.   BOWEL: Moderate hiatal hernia with wall thickening of the stomach in the hernia. No new pathologic distention of bowel. Wall thickening of the colon within the splenic flexure descending colon and sigmoid colon, nonspecific.     VESSELS: No evidence of abdominal aortic aneurysm.    PERITONEUM/RETROPERITONEUM/LYMPH NODES: Large volume ascites with extensive regions of anterior omental caking and peritoneal carcinomatosis. No obvious free intraperitoneal gas. Given the presence of extensive omental caking and peritoneal carcinomatosis, superimposed peritoneal enhancement 4 other causes cannot be excluded.   BONE AND SOFT TISSUE: No acute osseous abnormality. Osseous structures appear stable. No acute abnormality of the abdominal wall soft tissues.        CHEST: 1.  Moderate to large bilateral pleural effusions with adjacent presumed compressive atelectasis. 2. Prominent main pulmonary artery which may indicate pulmonary hypertension. 3. Mildly prominent mediastinal lymph nodes measuring up to 10 mm in short axis concerning for metastatic disease. 4. Moderate hiatal hernia with partial intrathoracic stomach. The peritoneal fat adjacent to the stomach within the hernia demonstrates evidence of probable carcinomatosis and ascites.   ABDOMEN-PELVIS: 1.  Redemonstration of large volume ascites with regions of significant anterior omental caking and peritoneal carcinomatosis commensurate with patient's provided diagnosis of ovarian cancer. 2. Regions of wall thickening of the colon extending from the splenic flexure to the sigmoid which may indicate a nonspecific colitis. 3. Since the previous examination on 08/08/2024, there are now extensive regions of scalloping of the right hepatic lobe peripherally with the appearance of a large subcapsular collection along the right hepatic margin as above. The sterility of this collection cannot be assessed via CT and clinical correlation is advised for exclusion superimposed infection within this region.     Signed by: Oscar Aldrich 10/1/2024 12:14 PM Dictation workstation:   [id number]     XR chest 1 view     Result Date: 10/1/2024  Interpreted By:  Samuel Jaramillo, STUDY: XR CHEST 1 VIEW; 10/1/2024 8:44 am   INDICATION: Signs/Symptoms:weakness, edema in  legs and abdomen   COMPARISON: April 2023.   ACCESSION NUMBER(S): [id number]   ORDERING CLINICIAN: ASHLEY FAIRBANKS   FINDINGS: The study is limited due to rotation and poor inspiratory effort, with resultant crowding of the pulmonary vasculature. The cardiac silhouette is within normal limits for the technique. Moderate size hiatal hernia is again seen. There is no pneumothorax, confluent infiltrates or significant effusion. Degenerative changes involve the spine and shoulders; metallic anchors again noted over the left humeral head related to previous rotator cuff repair.        Limited study. Hiatal hernia. No acute cardiopulmonary disease.   Signed by: Samuel Jaramillo 10/1/2024 9:22 AM Dictation workstation:   [id number]     US guided abdominal paracentesis     Result Date: 9/20/2024  Interpreted By:  Peri Lawrence and Kamau Nyokabi STUDY: US GUIDED ABDOMINAL PARACENTESIS; US GUIDED PERCUTANEOUS ABDOMINAL RETROPERITONEUM BIOPSY;  9/19/2024 11:13 am   INDICATION: Signs/Symptoms:ascites; Signs/Symptoms:ascites, evaluate ovarian cancer.   COMPARISON: CT abdomen and pelvis 08/08/2024   ACCESSION NUMBER(S): [id number]; [id number]   ORDERING CLINICIAN: JEANETTE ARIAS   TECHNIQUE: INTERVENTIONALIST(S): Dr. Peri Lawrence MD   CONSENT: The patient was informed of the nature of the proposed procedure. The purposes, alternatives, risks, and benefits were explained and discussed. All questions were answered and consent was obtained.   SEDATION: 1% local lidocaine was used for anesthetic.   MEDICATION/CONTRAST: No additional.   TIME OUT:   A time out was performed immediately prior to procedure start with the interventional team, correctly identifying the patient name, date of birth, MRN, procedure, anatomy (including marking of site and side), patient position, procedure consent form, relevant laboratory and imaging test results, antibiotic administration, safety precautions, and procedure-specific equipment  needs.   COMPLICATIONS: No immediate adverse events identified.   FINDINGS: The patient was placed in the supine position. Limited sonographic images of the lower abdominal wall were obtained for purposes of needle guidance, which demonstrated omental soft tissue mass which was seen on prior CT 08/08/2024. The area of concern was prepped and draped under sterile technique.   1% lidocaine was injected subcutaneously. Additional lidocaine was administered into deeper tissues surrounding the targeted area for biopsy using a 22G spinal needle. A small incision was made. Using the same access site a 18 gauge core biopsy needle was passed via a 17 gauge coaxial introducer needle to obtain a total of 1 core sample.   Postprocedure images demonstrate no evidence for hemorrhage. The patient tolerated the procedure well and there were no immediate complications. 1 core specimen was sent to pathology.     Subsequently the right lower quadrant was visualized under ultrasound which demonstrated moderate volume ascites seen on prior CT 08/08/2024. 1% lidocaine was injected subcutaneously at this site. A 19 gauge Yueh was used to target the fluid collection under ultrasound guidance. Once the site was accessed, the inner needle was removed from the catheter. The Yueh catheter was connected to drainage container. Estimated 1000 cc of serosanguineous colored fluid was removed. Post paracentesis imaging demonstrated decreased ascitic fluid. The Yueh catheter was removed. The access site was bandaged.        1. Status post ultrasound guided core needle biopsy of omental mass seen on CT 08/08/2024. 1 core specimens sent to pathology. 2. Successful paracentesis under ultrasound guidance with removal of 1 L of serosanguineous fluid.     I was present for and/or performed the critical portions of the procedure and immediately available throughout the entire procedure.   I personally reviewed the image(s) / study and interpretation. I  agree with the findings as stated.   Performed and dictated at [facility].   MACRO: None   Signed by: Peri Lawrence 9/20/2024 10:06 AM Dictation workstation:   [id number]     US guided percutaneous abdominal retroperitoneum biopsy     Result Date: 9/20/2024  Interpreted By:  Peri Lawrence and Kamau Nyokabi STUDY: US GUIDED ABDOMINAL PARACENTESIS; US GUIDED PERCUTANEOUS ABDOMINAL RETROPERITONEUM BIOPSY;  9/19/2024 11:13 am   INDICATION: Signs/Symptoms:ascites; Signs/Symptoms:ascites, evaluate ovarian cancer.   COMPARISON: CT abdomen and pelvis 08/08/2024   ACCESSION NUMBER(S): [id number]; [id number]   ORDERING CLINICIAN: JEANETTE ARIAS   TECHNIQUE: INTERVENTIONALIST(S): Dr. Peri Lawrence MD   CONSENT: The patient was informed of the nature of the proposed procedure. The purposes, alternatives, risks, and benefits were explained and discussed. All questions were answered and consent was obtained.   SEDATION: 1% local lidocaine was used for anesthetic.   MEDICATION/CONTRAST: No additional.   TIME OUT:   A time out was performed immediately prior to procedure start with the interventional team, correctly identifying the patient name, date of birth, MRN, procedure, anatomy (including marking of site and side), patient position, procedure consent form, relevant laboratory and imaging test results, antibiotic administration, safety precautions, and procedure-specific equipment needs.   COMPLICATIONS: No immediate adverse events identified.   FINDINGS: The patient was placed in the supine position. Limited sonographic images of the lower abdominal wall were obtained for purposes of needle guidance, which demonstrated omental soft tissue mass which was seen on prior CT 08/08/2024. The area of concern was prepped and draped under sterile technique.   1% lidocaine was injected subcutaneously. Additional lidocaine was administered into deeper tissues surrounding the  targeted area for biopsy using a 22G spinal needle. A small incision was made. Using the same access site a 18 gauge core biopsy needle was passed via a 17 gauge coaxial introducer needle to obtain a total of 1 core sample.   Postprocedure images demonstrate no evidence for hemorrhage. The patient tolerated the procedure well and there were no immediate complications. 1 core specimen was sent to pathology.     Subsequently the right lower quadrant was visualized under ultrasound which demonstrated moderate volume ascites seen on prior CT 08/08/2024. 1% lidocaine was injected subcutaneously at this site. A 19 gauge Yueh was used to target the fluid collection under ultrasound guidance. Once the site was accessed, the inner needle was removed from the catheter. The Yueh catheter was connected to drainage container. Estimated 1000 cc of serosanguineous colored fluid was removed. Post paracentesis imaging demonstrated decreased ascitic fluid. The Yueh catheter was removed. The access site was bandaged.        1. Status post ultrasound guided core needle biopsy of omental mass seen on CT 08/08/2024. 1 core specimens sent to pathology. 2. Successful paracentesis under ultrasound guidance with removal of 1 L of serosanguineous fluid.     I was present for and/or performed the critical portions of the procedure and immediately available throughout the entire procedure.   I personally reviewed the image(s) / study and interpretation. I agree with the findings as stated.   Performed and dictated at [facility].   MACRO: None   Signed by: Peri Lawrence 9/20/2024 10:06 AM Dictation workstation:   [id number]            Assessment/Plan        Assessment & Plan  Hyponatremia        79-year-old female with past medical history of recently diagnosed ovarian cancer with peritoneal carcinomatosis s/p paracentesis x 4 (last one done 9/19/24), obesity, hyperlipidemia, osteoarthritis, and skin  cancer s/p Mohs procedure.  Patient presents to the hospital with weakness and inability to care for herself.  Patient found to be hyponatremic and with evidence on CT imaging of possible abscess of the liver and ascites secondary to malignancy.  Hospitalized for further evaluation management..     #Ovarian cancer with peritoneal carcinomatosis  #Ascites  #Bilateral pleural effusions  # Suspected liver abscess  #Abdominal pain     Telemetry monitoring  Consult to IR for paracentesis and possible drainage of liver abscess  Consult to pulmonology for bilateral pleural effusions  Antiemetics as needed  Analgesics as needed  Patient needs to be followed up with by her gynecological oncologist to determine optimal plan going forward for treatment of her ovarian cancer     #Hyponatremia  #Generalized edema/anasarca  #Hypoalbuminemia  Patient is fluid overloaded and has significant third spacing, suspect hypervolemia hyponatremia.  Will check urine electrolytes, urine osmolality, and serum osmolality  Fluid restriction of 1500 ml for the time being.  Consider starting diuretics, defer to attending  Repeat labs in a.m.     #debility  PT/OT  Social work for discharge planning     Chronic issues:  #Obesity  #Hyperlipidemia  #Osteoarthritis     Continue with patient's home medications as appropriate     #DVT prophylaxis  SCDs  Lovenox subcutaneous     I spent 75 minutes in the professional and overall care of this patient.        Miky Kumar, APRN-CNP                 Revision History         Patient fully evaluated 10/2, awake, resting in bed. Patient with Moderate hiatal hernia with partial intrathoracic stomach and the peritoneal fat adjacent to the stomach within the hernia demonstrates evidence of probable carcinomatosis and ascites, Patient tolerated paracentesis on 10/01, awaiting culture results.No s/s or c/o acute difficulties at this time. Medications and labs reviewed.  Plan discussed with interdisciplinary team,  per pulmonology - Plan:  Patient has bilateral pleural effusion in the context of malignant ascites/ovarian cancer I explained to the patient the pleural effusion most likely related to recurrent ascites, patient is requiring so far 5 steps malignant ascites in the last month most likely beneficial approaches intra-abdominal Pleurx catheter Abdominal Pleurx catheter would be the most effective way of preventing pleural effusions and ascites.  Pleural effusions are secondary to ascites, both of which are due to patient's underlying cancer Continue with goals of care discussions prior to interventional radiology consult Follow hepatic fluid analysis Continue IV antibiotics Zosyn, continue current and repeat labs in the AM. Patient still requiring frequent cardiac and SPO2 monitoring. Discharge planning discussed with patient and care team. Therapy evaluations ordered-  [facility] 14, anticipate SNF/C at discharge. Patient aware and agreeable to current plan, continue plan as above. I spent 50 minutes in the professional and overall care of this patient.      Assessment & Plan  Hyponatremia    Patient fully evaluated 10/3, awake, resting in bed. Patient with Moderate hiatal hernia with partial intrathoracic stomach and the peritoneal fat adjacent to the stomach within the hernia demonstrates evidence of probable carcinomatosis and ascites, Patient tolerated paracentesis on 10/01 well,order placed for repeat paracentesis therapeutic non diagnostic with IR.continue current and repeat labs in the AM. Patient aware and agreeable to current plan, continue plan as above.    Patient fully evaluated on October 4.  Patient awaiting therapeutic paracentesis.  Patient probably will need albumin afterwards.  Continue to monitor response with above treatments recheck labs in AM.  I spent 50 minutes in the professional and overall care of this patient.      Patient fully evaluated 10/06, head of bed elevated, no s/s or c/o acute  difficulties at this time. Sister at bedside and agreeable to plan. Attempted therapeutic paracentesis by IR, aborted procedure due to insufficient fluid accumulation.  Medications and labs reviewed, cultures blood no growth at 4 days, AFB Culture Culture in progress and will be examined weekly. A result will be issued either when positive or after 8 weeks incubation. AFB Stain -No acid fast bacilli seen.  Plan discussed with interdisciplinary team, continue current and repeat labs in the AM. Per pulmonary - Day of consult, patient is breathing on room air. Vital stable. CT chest showed bilateral large pleural effusions. Dicussed need for Abdominal Pleurx catheter. Patient with likely carcinomatosis and plan to discharge to [facility] and will follow up with gynecology in 7 days,     Patient still requiring frequent cardiac and SPO2 monitoring. Discharge planning discussed with patient and care team. Therapy evaluations ordered- [place] [id number], [facility] at discharge. Patient aware and agreeable to current plan, continue plan as above. I spent 50 minutes in the professional and overall care of this patient.    Patient fully evaluated 10/07, head of bed elevated, no s/s or c/o acute difficulties at this time. Medications and labs reviewed, sodium 125, nephrology consulted.  Cultures blood no growth at 4 days, AFB Culture Culture in progress and will be examined weekly. A result will be issued either when positive or after 8 weeks incubation. AFB Stain -No acid fast bacilli seen.  Plan discussed with interdisciplinary team, continue current and repeat labs in the AM, new supplemental oxygen requirements, will repeat chest xray in AM, maintain HOB elevated to alleviate postural dyspnea. Vitals stable. Patient with likely carcinomatosis and plan to discharge to [facility] and will follow up with gynecology in 7 days, atient still requiring frequent cardiac and SPO2 monitoring. Discharge planning  discussed with patient and care team. Therapy evaluations ordered- [address] at discharge. Patient aware and agreeable to current plan, continue plan as above. I spent 50 minutes in the professional and overall care of this patient.      Patient fully evaluated 10/08, head of bed elevated, no s/s or c/o acute difficulties at this time. Medications and labs reviewed, sodium low again today at 125, nephrology consulted. Awaiting hepatic fluid analysis -AFB Culture -Culture in progress and will be examined weekly. A result will be issued either when positive or after 8 weeks incubation. AFB Stain -No acid fast bacilli seen.  Plan discussed with interdisciplinary team, per nephrology - Hyponatremia  Acute kidney injury   Ascites  Malnutrition  Anemia  Pleural effusion  Plan;  Discontinue potential nephrotoxins  Nutritional/iron/renal indices/urinary indices  DuoNeb aerosols  Appreciate nephrology input. Patient requiring supplemental oxygen at this time, continue to maintain HOB elevated as tolerated. Patient seen by pulmonology - Consult IR for right sided diagnostic and therapeutic thoracentesis  Unable to safely perform paracentesis due to lack of fluid  Patient has bilateral pleural effusion in the context of malignant ascites/ovarian cancer - Pleural effusions are secondary to ascites, both of which are due to patient's underlying cancer. Continue with goals of care discussions prior to interventional radiology consult. Continue IV antibiotics zosyn, probiotics added. Continue current plan and repeat labs in the AM, repeat chest xray in AM, maintain HOB elevated to alleviate postural dyspnea. Vitals stable. Patient with likely carcinomatosis and plan to discharge to SNF - [facility] and will follow up with gynecology in 7 days, atient still requiring frequent cardiac and SPO2 monitoring. Discharge planning discussed with patient and care team. Therapy evaluations ordered- [address] at discharge.  Patient aware and agreeable to current plan, continue plan as above. I spent 50 minutes in the professional and overall care of this patient.    Patient fully evaluated 10/09, patient resting in bed with HOB, no s/s or c/o acute difficulties at this time. Medications and labs reviewed, cultures no growth at 4 days, AFB cultures in process. Plan discussed with interdisciplinary team, pulmonary plan - Bilateral pleural effusion  Abdominal ascites  Ovarian carcinoma w/ peritoneal carcinomatosis carcinomatosis  HLD  Patient clear for discharge to SNF from standpoint of pulmonology  Patient will likely need an abdominal PleurX catheter at some point. However, we were unable to perform safely during this hospitalization due to lack of ascitic fluid  Pleural fluid suggests exudative effusion, likely secondary to malignancy  Okay to discontinue antibiotics from standpoint of pulmonology. Low suspicion of pneumonia. Leukocytosis is unlikely reflective of infection.   Will continue current and repeat labs in the AM. Patient still requiring frequent cardiac and SPO2 monitoring. Discharge planning discussed with patient and care team. Therapy evaluations ordered- [facility] 10, anticipate SNF at discharge. Patient aware and agreeable to current plan, continue plan as above. I spent 50 minutes in the professional and overall care of this patient.    Patient fully evaluated 10/10, patient resting in bed with HOB, no s/s or c/o acute difficulties at this time. Medications and labs reviewed, cultures no growth at 5 days, AFB cultures in process. Plan discussed with interdisciplinary team, pulmonary plan- Bilateral pleural effusion  Abdominal ascites Ovarian carcinoma w/ peritoneal carcinomatosis carcinomatosis HLD Patient clear for discharge to SNF from standpoint.   Patient will likely need an abdominal PleurX catheter at some point. However, we were unable to perform safely during this hospitalization due to lack of ascitic fluid  Pleural fluid suggests exudative effusion, likely secondary to malignancy Okay to discontinue antibiotics from standpoint of pulmonology. Low suspicion of pneumonia. Leukocytosis is unlikely reflective of infection.   Patient's sister at bedside, discussion of plan of care and will follow up with gynecology outpatient, possibly Dr. Jones. Will continue current and repeat labs in the AM. Patient with frequent bowel movements, soft, will hold miralax at this time. Patient still requiring frequent cardiac and SPO2 monitoring. Discharge planning discussed with patient and care team. Therapy evaluations ordered- [facility] 10, anticipate SNF at discharge. Patient aware and agreeable to current plan, continue plan as above. I spent 50 minutes in the professional and overall care of this patient.    Kim Angulo

## 2024-10-10 NOTE — PROGRESS NOTES
Occupational Therapy    OT Treatment    Patient Name: Rohini Beaver  MRN: [id number]  Department: [facility]  Room: [address] [place] [address]  Today's Date: 10/10/2024  Time Calculation  Start Time: 1343  Stop Time: 1410  Time Calculation (min): 27 min        Assessment:  End of Session Communication: Bedside nurse, PCT/MELANIA/DANYEL  End of Session Patient Position: Bed, 2 rail up, Alarm on (call light in reach, HOB elevated to comfort, SCDs donned and activated)  OT Assessment Results: Decreased ADL status, Decreased endurance, Decreased upper extremity strength, Decreased functional mobility  Strengths: Attitude of self, Support of extended family/friends  Barriers to Participation: Comorbidities  Plan:  Treatment Interventions: ADL retraining, Functional transfer training, Patient/family training, Equipment evaluation/education, Compensatory technique education, Endurance training (energy conservation / diaphragmatic breathing tehcniques training)  OT Frequency: 3 times per week  OT Discharge Recommendations: Moderate intensity level of continued care  OT - OK to Discharge: Yes (to next level of care when medically cleared by physician/medical team)  Treatment Interventions: ADL retraining, Functional transfer training, Patient/family training, Equipment evaluation/education, Compensatory technique education, Endurance training (energy conservation / diaphragmatic breathing tehcniques training)    Subjective   Previous Visit Info:  OT Last Visit  OT Received On: 10/10/24  General:  General  Family/Caregiver Present: Yes (patient sister present, supportive)  Co-Treatment: PT  Co-Treatment Reason: for safety and to maximize therapeutic performance  Prior to Session Communication: Bedside nurse, PCT/MELANIA/DANYEL  Patient Position Received: Bed, 2 rail up, Alarm on  General Comment: patient required min encouragement to participate, pleasant and cooperative.  Precautions:  Medical Precautions: Fall precautions (niurka, 02 via NC,  alarms,)              Pain:  Pain Assessment  Pain Assessment: 0-10  0-10 (Numeric) Pain Score: 0 - No pain    Objective    Cognition:  Cognition  Overall Cognitive Status: Within Functional Limits       Activities of Daily Living: Grooming  Grooming Level of Assistance: Close supervision  Grooming Where Assessed: Edge of bed  Grooming Comments: seated EOB unsupported for 15 mins during grooming including washing face and brushing teeth; requires MOD A overall for postutral control/support due to left lateral lean and retropulsive.    Toileting  Toileting Level of Assistance: Dependent (x 2)  Where Assessed: Bed level  Toileting Comments: episode of bowel incontinence    Bed Mobility/Transfers: Bed Mobility  Bed Mobility:  (completed supine <-->sit with MAX A x 2; rolling L/R in bed with rail during toileting with MAX A x 1)    Transfers  Transfer:  (unable to safely attempt transfers this date due to patient fatigue and bowel incontinence)        Outcome Measures:[facility] Daily Activity  Putting on and taking off regular lower body clothing: Total  Bathing (including washing, rinsing, drying): Total  Putting on and taking off regular upper body clothing: A lot  Toileting, which includes using toilet, bedpan or urinal: Total  Taking care of personal grooming such as brushing teeth: A little  Eating Meals: None  Daily Activity - Total Score: 12        Education Documentation  ADL Training, taught by Vivienne Torres OT at 10/10/2024  2:30 PM.  Learner: Patient  Readiness: Acceptance  Method: Explanation  Response: Verbalizes Understanding, Needs Reinforcement    Body Mechanics, taught by Vivienne Torres OT at 10/10/2024  2:30 PM.  Learner: Patient  Readiness: Acceptance  Method: Explanation  Response: Verbalizes Understanding, Needs Reinforcement    Education Comments  No comments found.        OP EDUCATION:       Goals:  Encounter Problems       Encounter Problems (Active)       OT Goals       Patient will complete  upper and lower body bathing/dressing; toileting with minimal assist using adaptive equipment as needed  (Progressing)       Start:  10/02/24    Expected End:  10/16/24            Patient will perform bed mobility and functional transfers safely with minimal assist : bed, chair, commode using DME as needed  (Progressing)       Start:  10/02/24    Expected End:  10/16/24            Patient will tolerate standing for 5 mins. and show overall fair (+) standing balance during ADL's and functional transfers/mobility  (Progressing)       Start:  10/02/24    Expected End:  10/16/24            Patient will apply energy conservation/diaphragmatic breathing techniques to ADL's and functional transfers with minimal cues  (Progressing)       Start:  10/02/24    Expected End:  10/16/24

## 2024-10-10 NOTE — CARE PLAN
The patient's goals for the shift include sleep    The clinical goals for the shift include maintain adequate pain management

## 2024-10-11 LAB
ALBUMIN SERPL BCP-MCNC: 2.3 G/DL (ref 3.4–5)
ALP SERPL-CCNC: 156 U/L (ref 33–136)
ALT SERPL W P-5'-P-CCNC: 6 U/L (ref 7–45)
ANION GAP SERPL CALC-SCNC: 15 MMOL/L (ref 10–20)
AST SERPL W P-5'-P-CCNC: 17 U/L (ref 9–39)
BILIRUB SERPL-MCNC: 0.3 MG/DL (ref 0–1.2)
BUN SERPL-MCNC: 51 MG/DL (ref 6–23)
CALCIUM SERPL-MCNC: 8.3 MG/DL (ref 8.6–10.3)
CHLORIDE SERPL-SCNC: 94 MMOL/L (ref 98–107)
CO2 SERPL-SCNC: 24 MMOL/L (ref 21–32)
CREAT SERPL-MCNC: 2.97 MG/DL (ref 0.5–1.05)
EGFRCR SERPLBLD CKD-EPI 2021: 16 ML/MIN/1.73M*2
ERYTHROCYTE [DISTWIDTH] IN BLOOD BY AUTOMATED COUNT: 16.4 % (ref 11.5–14.5)
GLUCOSE SERPL-MCNC: 91 MG/DL (ref 74–99)
HCT VFR BLD AUTO: 30.2 % (ref 36–46)
HGB BLD-MCNC: 9.4 G/DL (ref 12–16)
MCH RBC QN AUTO: 25.9 PG (ref 26–34)
MCHC RBC AUTO-ENTMCNC: 31.1 G/DL (ref 32–36)
MCV RBC AUTO: 83 FL (ref 80–100)
NRBC BLD-RTO: 0 /100 WBCS (ref 0–0)
PLATELET # BLD AUTO: 438 X10*3/UL (ref 150–450)
POTASSIUM SERPL-SCNC: 4.1 MMOL/L (ref 3.5–5.3)
PROT SERPL-MCNC: 5.5 G/DL (ref 6.4–8.2)
RBC # BLD AUTO: 3.63 X10*6/UL (ref 4–5.2)
SODIUM SERPL-SCNC: 129 MMOL/L (ref 136–145)
WBC # BLD AUTO: 26.6 X10*3/UL (ref 4.4–11.3)

## 2024-10-11 PROCEDURE — 2500000004 HC RX 250 GENERAL PHARMACY W/ HCPCS (ALT 636 FOR OP/ED): Performed by: INTERNAL MEDICINE

## 2024-10-11 PROCEDURE — 1200000002 HC GENERAL ROOM WITH TELEMETRY DAILY

## 2024-10-11 PROCEDURE — 2500000005 HC RX 250 GENERAL PHARMACY W/O HCPCS: Performed by: INTERNAL MEDICINE

## 2024-10-11 PROCEDURE — 2500000001 HC RX 250 WO HCPCS SELF ADMINISTERED DRUGS (ALT 637 FOR MEDICARE OP): Performed by: INTERNAL MEDICINE

## 2024-10-11 PROCEDURE — 2500000001 HC RX 250 WO HCPCS SELF ADMINISTERED DRUGS (ALT 637 FOR MEDICARE OP): Performed by: NURSE PRACTITIONER

## 2024-10-11 PROCEDURE — 80053 COMPREHEN METABOLIC PANEL: CPT | Performed by: INTERNAL MEDICINE

## 2024-10-11 PROCEDURE — 97530 THERAPEUTIC ACTIVITIES: CPT | Mod: GP,CQ

## 2024-10-11 PROCEDURE — 36415 COLL VENOUS BLD VENIPUNCTURE: CPT | Performed by: INTERNAL MEDICINE

## 2024-10-11 PROCEDURE — 85027 COMPLETE CBC AUTOMATED: CPT | Performed by: INTERNAL MEDICINE

## 2024-10-11 PROCEDURE — 2500000004 HC RX 250 GENERAL PHARMACY W/ HCPCS (ALT 636 FOR OP/ED): Performed by: NURSE PRACTITIONER

## 2024-10-11 RX ORDER — LIDOCAINE AND PRILOCAINE 25; 25 MG/G; MG/G
1 CREAM TOPICAL 2 TIMES DAILY
OUTPATIENT
Start: 2024-10-11

## 2024-10-11 RX ORDER — OXYCODONE HYDROCHLORIDE 5 MG/1
5 TABLET ORAL EVERY 6 HOURS PRN
Status: DISCONTINUED | OUTPATIENT
Start: 2024-10-11 | End: 2024-10-13

## 2024-10-11 RX ORDER — HYDROCORTISONE 25 MG/G
1 CREAM TOPICAL 2 TIMES DAILY
OUTPATIENT
Start: 2024-10-11

## 2024-10-11 ASSESSMENT — PAIN DESCRIPTION - LOCATION
LOCATION: ABDOMEN
LOCATION: ABDOMEN
LOCATION: BUTTOCKS

## 2024-10-11 ASSESSMENT — COGNITIVE AND FUNCTIONAL STATUS - GENERAL
MOVING FROM LYING ON BACK TO SITTING ON SIDE OF FLAT BED WITH BEDRAILS: A LOT
DAILY ACTIVITIY SCORE: 12
TURNING FROM BACK TO SIDE WHILE IN FLAT BAD: A LOT
DRESSING REGULAR UPPER BODY CLOTHING: A LOT
DRESSING REGULAR UPPER BODY CLOTHING: A LOT
DAILY ACTIVITIY SCORE: 12
EATING MEALS: A LOT
WALKING IN HOSPITAL ROOM: A LOT
CLIMB 3 TO 5 STEPS WITH RAILING: A LOT
MOBILITY SCORE: 12
HELP NEEDED FOR BATHING: A LOT
TOILETING: A LOT
MOBILITY SCORE: 8
TURNING FROM BACK TO SIDE WHILE IN FLAT BAD: A LOT
WALKING IN HOSPITAL ROOM: TOTAL
DRESSING REGULAR LOWER BODY CLOTHING: A LOT
MOVING TO AND FROM BED TO CHAIR: TOTAL
STANDING UP FROM CHAIR USING ARMS: A LOT
PERSONAL GROOMING: A LOT
MOVING TO AND FROM BED TO CHAIR: A LOT
MOVING FROM LYING ON BACK TO SITTING ON SIDE OF FLAT BED WITH BEDRAILS: A LOT
CLIMB 3 TO 5 STEPS WITH RAILING: TOTAL
STANDING UP FROM CHAIR USING ARMS: TOTAL
TOILETING: TOTAL
HELP NEEDED FOR BATHING: TOTAL
PERSONAL GROOMING: A LITTLE
DRESSING REGULAR LOWER BODY CLOTHING: TOTAL

## 2024-10-11 ASSESSMENT — PAIN SCALES - GENERAL
PAINLEVEL_OUTOF10: 7
PAINLEVEL_OUTOF10: 7
PAINLEVEL_OUTOF10: 6
PAINLEVEL_OUTOF10: 0 - NO PAIN
PAINLEVEL_OUTOF10: 3
PAINLEVEL_OUTOF10: 0 - NO PAIN
PAINLEVEL_OUTOF10: 2
PAINLEVEL_OUTOF10: 2

## 2024-10-11 ASSESSMENT — PAIN - FUNCTIONAL ASSESSMENT
PAIN_FUNCTIONAL_ASSESSMENT: 0-10

## 2024-10-11 ASSESSMENT — PAIN DESCRIPTION - ORIENTATION
ORIENTATION: ANTERIOR
ORIENTATION: ANTERIOR

## 2024-10-11 NOTE — PROGRESS NOTES
"  Subjective   Patient refers complaint of rectal irritation and pain after bowel movements  Complains of abdominal distention.  Patient renal chemistries continue to downtrend today BUN is 51 creatinine 2.97 with sodium 129.  Patient had a GFR by Cystatin C at this close 12 mL/min/ BSA.  Patient was advised that she might need renal replacement therapy.  Pre-albumin was low at 7.5  .  TSH was elevated at 5.24  PTH was normal  Hemoglobin today is 9.4/hematocrit 30.2  WBC count is elevated 26,600  Objective     Physical Exam  General Appearance; alert oriented  Skin pallor  HEENT; pupils react to light and accommodation  Tender maxillary sinuses  Tongue; well-hydrated  Neck; no jugular vein distention, no thyromegaly, no adenopathy, no bruit  Lungs; bibasilar crackles on expiration  Heart; no rubs no gallops, heart rate is regular  Abdomen; there is tympanic note to percussion with distention no rebound no guarding no bruit  ; no CVA tenderness  Musculoskeletal; decreased muscle tone  1+ edema of the legs  Neurological; no tremors no clonus  Last Recorded Vitals  Blood pressure (!) 118/49, pulse 93, temperature 36.3 °C (97.3 °F), resp. rate 16, height 1.626 m (5' 4\"), weight 104 kg (230 lb), SpO2 94%.  Intake/Output last 3 Shifts:  I/O last 3 completed shifts:  In: 999.5 (9.6 mL/kg) [IV Piggyback:999.5]  Out: - (0 mL/kg)   Weight: 104.3 kg     Relevant Results    Results for orders placed or performed during the hospital encounter of 10/01/24 (from the past 24 hour(s))   CBC   Result Value Ref Range    WBC 26.6 (H) 4.4 - 11.3 x10*3/uL    nRBC 0.0 0.0 - 0.0 /100 WBCs    RBC 3.63 (L) 4.00 - 5.20 x10*6/uL    Hemoglobin 9.4 (L) 12.0 - 16.0 g/dL    Hematocrit 30.2 (L) 36.0 - 46.0 %    MCV 83 80 - 100 fL    MCH 25.9 (L) 26.0 - 34.0 pg    MCHC 31.1 (L) 32.0 - 36.0 g/dL    RDW 16.4 (H) 11.5 - 14.5 %    Platelets 438 150 - 450 x10*3/uL   Comprehensive metabolic panel   Result Value Ref Range    Glucose 91 74 - 99 mg/dL    " Sodium 129 (L) 136 - 145 mmol/L    Potassium 4.1 3.5 - 5.3 mmol/L    Chloride 94 (L) 98 - 107 mmol/L    Bicarbonate 24 21 - 32 mmol/L    Anion Gap 15 10 - 20 mmol/L    Urea Nitrogen 51 (H) 6 - 23 mg/dL    Creatinine 2.97 (H) 0.50 - 1.05 mg/dL    eGFR 16 (L) >60 mL/min/1.73m*2    Calcium 8.3 (L) 8.6 - 10.3 mg/dL    Albumin 2.3 (L) 3.4 - 5.0 g/dL    Alkaline Phosphatase 156 (H) 33 - 136 U/L    Total Protein 5.5 (L) 6.4 - 8.2 g/dL    AST 17 9 - 39 U/L    Bilirubin, Total 0.3 0.0 - 1.2 mg/dL    ALT 6 (L) 7 - 45 U/L                   Assessment/Plan     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      Elder Wells MD

## 2024-10-11 NOTE — PROGRESS NOTES
Subjective   Patient laying comfortably, breathing comfortably. Eager to leave hospital.       Objective     Last Recorded Vitals  /56   Pulse 92   Temp 36.9 °C (98.4 °F) (Temporal)   Resp 16   Wt 104 kg (230 lb)   SpO2 96%   Intake/Output last 3 Shifts:    Intake/Output Summary (Last 24 hours) at 10/11/2024 1126  Last data filed at 10/11/2024 0118  Gross per 24 hour   Intake 999.45 ml   Output --   Net 999.45 ml       Admission Weight  Weight: 104 kg (230 lb) (10/01/24 0828)    Daily Weight  10/01/24 : 104 kg (230 lb)      Physical Exam  Constitutional:       Appearance: Normal appearance.   HENT:      Head: Normocephalic and atraumatic.      Mouth/Throat:      Mouth: Mucous membranes are moist.   Eyes:      Extraocular Movements: Extraocular movements intact.   Cardiovascular:      Rate and Rhythm: Normal rate and regular rhythm.   Pulmonary:      Effort: Pulmonary effort is normal.      Breath sounds: Normal breath sounds.   Abdominal:      General: Abdomen is flat. There is distension.      Palpations: Abdomen is soft.      Tenderness: There is abdominal tenderness.   Musculoskeletal:      Right lower leg: No edema.      Left lower leg: No edema.   Skin:     General: Skin is warm and dry.   Neurological:      General: No focal deficit present.      Mental Status: She is alert and oriented to person, place, and time.   Psychiatric:         Mood and Affect: Mood normal.           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  EUSEBIO management per nephrology  Patient will likely need an abdominal PleurX catheter at some point. However, we were unable to perform safely during this hospitalization due to lack of ascitic fluid  Pleural  fluid suggests exudative effusion, likely secondary to malignancy  Okay to discontinue antibiotics from standpoint of pulmonology. Low suspicion of pneumonia. Leukocytosis is unlikely reflective of infection.         This is a preliminary note, please await attending attestation for a finalized plan.     Skyler Escobar MD  Pulmonary critical care consultant  10/11/24  11:27 AM       STAFF PHYSICIAN NOTE OF PERSONAL INVOLVEMENT IN CARE  As the attending physician, I certify that I personally reviewed the patient's history and personally examined the patient to confirm the physical findings described above, and that I reviewed the relevant imaging studies and available reports.  I also discussed the differential diagnosis and all of the proposed management plans with the patient and individuals accompanying the patient to this visit.  They had the opportunity to ask questions about the proposed management plans and to have those questions answered.

## 2024-10-11 NOTE — PROGRESS NOTES
10/11/24 1124   Patient Choice   Provider Choice list and CMS website (https://medicare.gov/care-compare#search) for post-acute Quality and Resource Measure Data were provided and reviewed with: Family     Medically cleared to be discharged by Pulmonary.  Called sister Erica and left a message to return my call to discuss discharge plans. Called patients room  and mobile phone, no answer.   1250  Called sister Erica SNF choices are  1. [facility]  2 [facility] 3 [address]  Was at [facility] in August.  [facility] asked to send referrals.

## 2024-10-11 NOTE — PROGRESS NOTES
Occupational Therapy    OT Treatment    Patient Name: Rohini Beaver  MRN: [id number]  Department: [facility]  Room: [address] [place] [address]  Today's Date: 10/11/2024  Time Calculation  Start Time: 1103  Stop Time: 1129  Time Calculation (min): 26 min        Assessment:        Plan:  Treatment Interventions: ADL retraining, Functional transfer training, Patient/family training, Equipment evaluation/education, Compensatory technique education, Endurance training (energy conservation / diaphragmatic breathing tehcniques training)  OT Frequency: 3 times per week  OT Discharge Recommendations: Moderate intensity level of continued care  OT - OK to Discharge: Yes (to next level of care when medically cleared by physician/medical team)  Treatment Interventions: ADL retraining, Functional transfer training, Patient/family training, Equipment evaluation/education, Compensatory technique education, Endurance training (energy conservation / diaphragmatic breathing tehcniques training)    Subjective   Previous Visit Info:  OT Last Visit  OT Received On: 10/11/24  General:  General  Prior to Session Communication: Bedside nurse  Patient Position Received: Bed, 2 rail up, Alarm on  Precautions:  Medical Precautions: Fall precautions  Precautions Comment: fall, purewick, 2Lo2, scd's, telemetry    Pain:  Pain Assessment  Pain Assessment: 0-10    Objective       Bed Mobility/Transfers: Bed Mobility 1  Bed Mobility Comments 1: supine to sit at maximal assistance of two and sit to supine at maximal assistance of two.    Sitting Balance:  Static Sitting Balance  Static Sitting-Comment/Number of Minutes: static sitting balance at the edge of the bed ~4-5 mins at maximal assistance of one to remain in midline    Outcome Measures:AM PAC Daily Activity  Putting on and taking off regular lower body clothing: Total  Bathing (including washing, rinsing, drying): Total  Putting on and taking off regular upper body clothing: A lot  Toileting, which includes  using toilet, bedpan or urinal: Total  Taking care of personal grooming such as brushing teeth: A little  Eating Meals: None  Daily Activity - Total Score: 12   EDUCATION:  Education  Individual(s) Educated: Patient  Education Provided: Fall precautions, Risk and benefits of OT discussed with patient or other  Patient Response to Education: Patient/Caregiver Verbalized Understanding of Information    Goals:  Encounter Problems       Encounter Problems (Active)       OT Goals       Patient will complete upper and lower body bathing/dressing; toileting with minimal assist using adaptive equipment as needed  (Progressing)       Start:  10/02/24    Expected End:  10/16/24            Patient will perform bed mobility and functional transfers safely with minimal assist : bed, chair, commode using DME as needed  (Progressing)       Start:  10/02/24    Expected End:  10/16/24            Patient will tolerate standing for 5 mins. and show overall fair (+) standing balance during ADL's and functional transfers/mobility  (Progressing)       Start:  10/02/24    Expected End:  10/16/24            Patient will apply energy conservation/diaphragmatic breathing techniques to ADL's and functional transfers with minimal cues  (Progressing)       Start:  10/02/24    Expected End:  10/16/24

## 2024-10-11 NOTE — CONSULTS
"Nutrition Follow Up Assessment:   Nutrition Assessment         Patient is a 79 y.o. female presenting with weakness      Nutrition History:  Food and Nutrient History: Pt's intake remains erratic.  she seemed very sad today and has not been eating as well as she was last visit. Her sister was there also trying to encourage her to eat  Food Allergies/Intolerances:  None  GI Symptoms: None  Oral Problems: None       Anthropometrics:  Height: 162.6 cm (5' 4\")   Weight: 104 kg (230 lb)   BMI (Calculated): 39.46  IBW/kg (Dietitian Calculated): 54 kg  Percent of IBW: 191 %       Weight History:     Weight Change %:       Nutrition Focused Physical Exam Findings:    Subcutaneous Fat Loss:      Muscle Wasting:     Edema:     Physical Findings:       Nutrition Significant Labs:  BMP Trend:   Results from last 7 days   Lab Units 10/11/24  0458 10/10/24  0459 10/09/24  0509 10/08/24  0503   GLUCOSE mg/dL 91 99 101* 97   CALCIUM mg/dL 8.3* 8.1* 8.2* 7.9*   SODIUM mmol/L 129* 127* 126* 125*   POTASSIUM mmol/L 4.1 3.8 3.9 4.3   CO2 mmol/L 24 23 24 23   CHLORIDE mmol/L 94* 93* 93* 91*   BUN mg/dL 51* 46* 41* 34*   CREATININE mg/dL 2.97* 2.62* 2.15* 1.82*        Nutrition Specific Medications:  Vut Cl D-3; colace; lovenox; protonix; miralax; zocor; zofran    I/O:   Last BM Date: 10/11/24; Stool Appearance: Loose (10/11/24 1000)    Dietary Orders (From admission, onward)       Start     Ordered    10/01/24 1445  Adult diet Regular; 1500 mL fluid  Diet effective now        Question Answer Comment   Diet type Regular    Dietary fluid restriction / 24h: 1500 mL fluid        10/01/24 1444                     Estimated Needs:      Method for Estimating Needs: 2880-2039   30-35 charis kg of IBW with cancer Dx     Method for Estimating Needs: 65-81  1.2-1.5 gm lg if IBW with cancer Dx     Method for Estimating Needs: 3775-2439  20-30 ml kg of IBW as medically indicated        Nutrition Diagnosis        Nutrition Diagnosis  Patient has " Nutrition Diagnosis: Yes  Diagnosis Status (1): Ongoing  Nutrition Diagnosis 1: Increased nutrient needs  Related to (1): physiological causes  As Evidenced by (1): new cancer Dx   wound healing needs  Additional Nutrition Diagnosis: Diagnosis 2  Diagnosis Status (2): New  Nutrition Diagnosis 2: Inadequate oral intake  Related to (2): limited food acceptance  As Evidenced by (2): pt has been eating erratically  which has not improved.  She does not like sweet foods and refuses supplements       Nutrition Interventions/Recommendations         Nutrition Prescription:  Individualized Nutrition Prescription Provided for : Continue regular diet to encourage intake,  continue fluid restriction as needed,        Nutrition Interventions:   Interventions: Meals and snacks  Meals and Snacks: Fluid-modified diet  Goal: >75% of meals    Collaboration and Referral of Nutrition Care: Collaboration by nutrition professional with other providers    Nutrition Education:   Pt knows that she needs to eat        Nutrition Monitoring and Evaluation   Food/Nutrient Related History Monitoring  Monitoring and Evaluation Plan: Energy intake, Fluid intake, Amount of food  Criteria: >75% of energy needs  Criteria: adequate fluid intake without fluid overload  Criteria: >75% of meals    Body Composition/Growth/Weight History  Monitoring and Evaluation Plan: Weight    Biochemical Data, Medical Tests and Procedures  Monitoring and Evaluation Plan: Electrolyte/renal panel, Glucose/endocrine profile  Criteria: improved sodium  Criteria: glucose within desired range              Time Spent (min): 30 minutes

## 2024-10-11 NOTE — PROGRESS NOTES
"Rohini Beaver is a 79 y.o. female on day 10 of admission presenting with Hyponatremia.      Subjective    Patient fully evaluated 10/3, awake, resting in bed. Patient with Moderate hiatal hernia with partial intrathoracic stomach and the peritoneal fat adjacent to the stomach within the hernia demonstrates evidence of probable carcinomatosis and ascites, Patient tolerated paracentesis on 10/01 well,order placed for repeat paracentesis therapeutic non diagnostic with IR.continue current and repeat labs in the AM. Patient aware and agreeable to current plan, continue plan as above. I spent 50 minutes in the professional and overall care of this patient.   Objective     Last Recorded Vitals  BP (!) 118/49   Pulse 93   Temp 36.3 °C (97.3 °F) (Temporal)   Resp 16   Wt 104 kg (230 lb)   SpO2 94%   Intake/Output last 3 Shifts:    Intake/Output Summary (Last 24 hours) at 10/11/2024 1446  Last data filed at 10/11/2024 0118  Gross per 24 hour   Intake 649.45 ml   Output --   Net 649.45 ml       Admission Weight  Weight: 104 kg (230 lb) (10/01/24 0828)    Daily Weight  10/01/24 : 104 kg (230 lb)    Image Results  US thoracentesis  Narrative: Interpreted By:  Parrish Vargas,   STUDY:  US THORACENTESIS;  10/8/2024 3:18 pm      INDICATION:  Signs/Symptoms:Diagnostic and therapeutic right sided thoracentesis.          COMPARISON:  None.      ACCESSION NUMBER(S):  [id number]      ORDERING CLINICIAN:  SHAKIRA PAEZ      TECHNIQUE:  INTERVENTIONALIST(S):  Parrish Vargas MD      The history and physical exam pertinent to the procedure were  reviewed and no updates were made.\"      CONSENT:  The patient/patient's POA/next of kin was informed of the nature of  the proposed procedure. The purposes, alternatives, risks, and  benefits were explained and discussed. All questions were answered  and consent was obtained.      SEDATION:  None      MEDICATION/CONTRAST:  No additional      TIME OUT:  A time out was performed immediately " prior to procedure start with  the interventional team, correctly identifying the patient name, date  of birth, MRN, procedure, anatomy (including marking of site and  side), patient position, procedure consent form, relevant laboratory  and imaging test results, antibiotic administration, safety  precautions, and procedure-specific equipment needs.      FINDINGS:  The patient was placed in the sitting position.      The pleural space was examined with grey scale ultrasound, and the  most accessible fluid identified and marked for thoracentesis.      The skin was prepped and draped in usual manner. Local anesthesia  with Lidocaine was administered and a  right-sided thoracentesis was  performed.  A 5 Lao One-Step thoracentesis needle/catheter was  then placed where marked.  Approximately 550 mL of yellowish colored  fluid was removed.  The needle/catheter was then withdrawn.      The patient tolerated the procedure well and there were no immediate  complications. Specimen(s) sent to the laboratory and pathology for  further evaluation, per the requesting team.      Impression: Uneventful  right-sided thoracentesis, as detailed above.      I personally performed and/or directly supervised this study and was  present for the entire procedure.      I personally reviewed the study and resident interpretation. I agree  with the findings as stated.      Performed and dictated at [facility].      MACRO:  None      Signed by: Parrish Vargas 10/9/2024 1:17 PM  Dictation workstation:   [id number]  XR chest 1 view  Narrative: Interpreted By:  Devin Garsia,   STUDY:  XR CHEST 1 VIEW;  10/8/2024 4:57 pm      INDICATION:  Signs/Symptoms:Reassess after thoracentesis.      COMPARISON:  10/08/2024 at 7:00 a.m.      ACCESSION NUMBER(S):  [id number]      ORDERING CLINICIAN:  SHAKIRA PAEZ      FINDINGS:  CARDIOMEDIASTINAL SILHOUETTE AND VASCULATURE:      Cardiac size:  The right cardiac margin  is obscured.  Aortic shadow:  Within normal limits.      Mediastinal contours: Within normal limits.      Pulmonary vasculature:  Unremarkable, with resolution of prominence  and cephalization on the prior exam that was probably due to  congestion.      LUNGS:  There is opacification of the right lower lung probably from a  combination of elevated hemidiaphragm with effusion and atelectasis.  This appears fairly similar to the previous exam. Is no evidence of  right pneumothorax. Left retrocardiac opacity appears slightly  improved, also probably due to atelectasis.      ABDOMEN AND OTHER FINDINGS:  No remarkable upper abdominal findings.      BONES:  No acute osseous changes.      Impression: 1.  No complication such as pneumothorax after reported thoracentesis.      Signed by: Devin Garsia 10/9/2024 10:16 AM  Dictation workstation:   [id number]      Physical Exam    Relevant Results               Assessment/Plan   This patient currently has cardiac telemetry ordered; if you would like to modify or discontinue the telemetry order, click here to go to the orders activity to modify/discontinue the order.          Jd Rhodes MD   Physician  Internal Medicine     H&P      Addendum     Date of Service: 10/1/2024  2:54 PM     Addendum       Expand All Collapse All    History Of Present Illness  Rohini Beaver is a 79-year-old female with past medical history of recently diagnosed ovarian cancer with peritoneal carcinomatosis s/p paracentesis x 4 (last one done 9/19/24), obesity, hyperlipidemia, osteoarthritis, and skin cancer s/p Mohs procedure.  Patient presents to the hospital with weakness and inability to care for herself.  She reports that she was at [facility] skilled nursing facility, however had a brief hospitalization at [facility] and upon discharge decided to go home rather than go back to [facility].  She lives with her 86-year-old sister and has a couple friends who assist with  appointments, however limited support system.  Sister unable to care for due to age.  Today she was in urgent reclining chair and was able to get up.  ROS additionally positive for cold sweats, distended and tender abdomen, edema, pressure injury to coccyx/gluteal fold, and decreased activity tolerance.  Denies history of heart disease.  She reports that she is scheduled for outpatient appointment with her oncologist tomorrow to determine ongoing plan.      ER course: Hemodynamically stable, afebrile, SpO2 90s on room air.  WBC 28.9, Hgb 11.0/Hct34.1 (Hgb 15.1 4/4/2023), platelets 446. Glucose 107, Sodium 126, Chloride 94, calcium 8.5, albumin 2.4, alkaline phosphatase 231.  Lactate 1.4.  BNP 74.  High-sensitivity troponin 7.  UA unremarkable. CT chest showed moderate to large bilateral pleural effusion with adjacent presumed compressive atelectasis, prominent main pulmonary artery which may indicate pulmonary hypertension, mildly prominent mediastinal lymph nodes measuring up to 10 mm in short axis concerning for metastatic disease.  Moderate hiatal hernia with partial intrathoracic stomach and the peritoneal fat adjacent to the stomach within the hernia demonstrates evidence of probable carcinomatosis and ascites.  Redemonstration of large volume ascites with regions of significant anterior omental caking and peritoneal carcinomatosis commensurate with patient's provided diagnosis of ovarian cancer. Regions of wall thickening of the colon extending from the splenic flexure to the sigmoid.  Extensive region of scalloping of the right hepatic lobe peripherally with appearance of large subcapsular collection along the right hepatic margin. Blood culture in process     Past medical history: As above  Past surgical history: Cholecystectomy, lumbar laminectomy, left shoulder arthroscopy, right total knee replacement, corneal transplant  Social history: No history of smoking, alcohol abuse, illicit drug use.  Lives with  her elderly sister who is 86 years old.  Limited support system.   Family history: Mother-cancer (unknown type)     Past Medical History  Medical History        Past Medical History:   Diagnosis Date    Bilateral primary osteoarthritis of knee      HLD (hyperlipidemia)      Lumbosacral radiculopathy      Meralgia paraesthetica      Physical deconditioning              Surgical History  Surgical History         Past Surgical History:   Procedure Laterality Date    ARTHROPLASTY Left       Left thumb carpometacarpal arthroplasty with ligament reconstruction and tendon interposition    BREAST BIOPSY   1999     Benign    CHOLECYSTECTOMY        COLONOSCOPY        CORNEAL TRANSPLANT        DILATION AND CURETTAGE OF UTERUS        LUMBAR FUSION        SHOULDER ARTHROSCOPY Left      SKIN LESION EXCISION        TOTAL KNEE ARTHROPLASTY Left 2019    TOTAL KNEE ARTHROPLASTY Right 2017    TRIGGER FINGER RELEASE Right              Social History  She reports that she has never smoked. She has never used smokeless tobacco. She reports that she does not currently use alcohol. She reports that she does not use drugs.     Family History  Family History          Family History   Problem Relation Name Age of Onset    Colon cancer Mother        Lung cancer Father        Other (Mesothelioma) Brother        Brain cancer Mother's Brother                Allergies  Patient has no known allergies.     Review of Systems     10 point ROS negative except as noted above in HPI      Physical Exam  Vitals reviewed.   Constitutional:       General: She is awake. She is not in acute distress.     Appearance: She is obese. She is ill-appearing. She is not toxic-appearing.   HENT:      Head: Normocephalic and atraumatic.      Nose: Nose normal.      Mouth/Throat:      Mouth: Mucous membranes are moist.      Pharynx: Oropharynx is clear.   Eyes:      Conjunctiva/sclera: Conjunctivae normal.   Cardiovascular:      Rate and Rhythm: Normal rate and regular  rhythm.      Pulses: Normal pulses.      Heart sounds: No murmur heard.  Pulmonary:      Effort: Pulmonary effort is normal. No respiratory distress.      Breath sounds: Decreased air movement present. Decreased breath sounds present.      Comments: Posterior bilateral breath sounds are diminished  Abdominal:      General: There is distension.      Palpations: Abdomen is soft.      Tenderness: There is abdominal tenderness. There is no guarding.      Comments: Bowel sounds hypoactive, abdomen distended, tender to palpation, dullness noted to the sides.   Musculoskeletal:         General: No swelling, deformity or signs of injury. Normal range of motion.      Cervical back: Neck supple.      Right lower leg: Edema present.      Left lower leg: Edema present.      Comments: Generalized edema/anasarca   Skin:     General: Skin is warm and dry.      Capillary Refill: Capillary refill takes less than 2 seconds.      Findings: No ecchymosis or wound.      Comments: Wound on coccyx, exam deferred  due to patient discomfort    Neurological:      General: No focal deficit present.      Mental Status: She is alert and oriented to person, place, and time.   Psychiatric:         Mood and Affect: Mood normal.         Behavior: Behavior is cooperative.                  Last Recorded Vitals  Blood pressure 117/58, pulse 100, temperature 36.2 °C (97.2 °F), temperature source Temporal, resp. rate (!) 22, weight 104 kg (230 lb), SpO2 94%.     Relevant Results              Results for orders placed or performed during the hospital encounter of 10/01/24 (from the past 24 hour(s))   CBC and Auto Differential   Result Value Ref Range     WBC 28.9 (H) 4.4 - 11.3 x10*3/uL     nRBC 0.0 0.0 - 0.0 /100 WBCs     RBC 4.15 4.00 - 5.20 x10*6/uL     Hemoglobin 11.0 (L) 12.0 - 16.0 g/dL     Hematocrit 34.1 (L) 36.0 - 46.0 %     MCV 82 80 - 100 fL     MCH 26.5 26.0 - 34.0 pg     MCHC 32.3 32.0 - 36.0 g/dL     RDW 15.7 (H) 11.5 - 14.5 %     Platelets  446 150 - 450 x10*3/uL     Neutrophils % 89.1 40.0 - 80.0 %     Immature Granulocytes %, Automated 1.0 (H) 0.0 - 0.9 %     Lymphocytes % 4.3 13.0 - 44.0 %     Monocytes % 4.8 2.0 - 10.0 %     Eosinophils % 0.5 0.0 - 6.0 %     Basophils % 0.3 0.0 - 2.0 %     Neutrophils Absolute 25.74 (H) 1.60 - 5.50 x10*3/uL     Immature Granulocytes Absolute, Automated 0.30 0.00 - 0.50 x10*3/uL     Lymphocytes Absolute 1.23 0.80 - 3.00 x10*3/uL     Monocytes Absolute 1.38 (H) 0.05 - 0.80 x10*3/uL     Eosinophils Absolute 0.14 0.00 - 0.40 x10*3/uL     Basophils Absolute 0.09 0.00 - 0.10 x10*3/uL   Comprehensive metabolic panel   Result Value Ref Range     Glucose 107 (H) 74 - 99 mg/dL     Sodium 126 (L) 136 - 145 mmol/L     Potassium 5.2 3.5 - 5.3 mmol/L     Chloride 94 (L) 98 - 107 mmol/L     Bicarbonate 24 21 - 32 mmol/L     Anion Gap 13 10 - 20 mmol/L     Urea Nitrogen 18 6 - 23 mg/dL     Creatinine 0.61 0.50 - 1.05 mg/dL     eGFR >90 >60 mL/min/1.73m*2     Calcium 8.5 (L) 8.6 - 10.3 mg/dL     Albumin 2.4 (L) 3.4 - 5.0 g/dL     Alkaline Phosphatase 231 (H) 33 - 136 U/L     Total Protein 5.7 (L) 6.4 - 8.2 g/dL     AST 23 9 - 39 U/L     Bilirubin, Total 0.3 0.0 - 1.2 mg/dL     ALT 8 7 - 45 U/L   Magnesium   Result Value Ref Range     Magnesium 1.68 1.60 - 2.40 mg/dL   Troponin I, High Sensitivity   Result Value Ref Range     Troponin I, High Sensitivity 7 0 - 13 ng/L   B-Type Natriuretic Peptide   Result Value Ref Range     BNP 74 0 - 99 pg/mL   Sars-CoV-2 PCR   Result Value Ref Range     Coronavirus 2019, PCR Not Detected Not Detected   Lactate   Result Value Ref Range     Lactate 1.4 0.4 - 2.0 mmol/L   Urinalysis with Reflex Culture and Microscopic   Result Value Ref Range     Color, Urine Light-Yellow Light-Yellow, Yellow, Dark-Yellow     Appearance, Urine Clear Clear     Specific Gravity, Urine >1.050 (N) 1.005 - 1.035     pH, Urine 6.0 5.0, 5.5, 6.0, 6.5, 7.0, 7.5, 8.0     Protein, Urine 20 (TRACE) NEGATIVE, 10 (TRACE), 20  (TRACE) mg/dL     Glucose, Urine Normal Normal mg/dL     Blood, Urine NEGATIVE NEGATIVE     Ketones, Urine NEGATIVE NEGATIVE mg/dL     Bilirubin, Urine NEGATIVE NEGATIVE     Urobilinogen, Urine Normal Normal mg/dL     Nitrite, Urine NEGATIVE NEGATIVE     Leukocyte Esterase, Urine NEGATIVE NEGATIVE   Urinalysis Microscopic   Result Value Ref Range     WBC, Urine 1-5 1-5, NONE /HPF     RBC, Urine 1-2 NONE, 1-2, 3-5 /HPF     Squamous Epithelial Cells, Urine 1-9 (SPARSE) Reference range not established. /HPF     Mucus, Urine FEW Reference range not established. /LPF   Protime-INR   Result Value Ref Range     Protime 13.0 (H) 9.8 - 12.8 seconds     INR 1.2 (H) 0.9 - 1.1   APTT   Result Value Ref Range     aPTT 24 (L) 27 - 38 seconds      CT chest abdomen pelvis w IV contrast     Result Date: 10/1/2024  Interpreted By:  Oscar Aldrich, STUDY: CT CHEST ABDOMEN PELVIS W IV CONTRAST;  10/1/2024 11:13 am   INDICATION: Signs/Symptoms:leukocytosis, ascites, recent ovarian cancer diagnosis.   COMPARISON: CT abdomen pelvis 08/08/2024   ACCESSION NUMBER(S): [id number]   ORDERING CLINICIAN: ASHLEY FAIRBANKS   TECHNIQUE: CT of the chest, abdomen, and pelvis was performed.  Contiguous axial images were obtained at 3 mm slice thickness through the chest, abdomen and pelvis. Coronal and sagittal reconstructions at 3 mm slice thickness were performed. ml of contrast  were administered intravenously without immediate complication.   FINDINGS: CHEST:   LUNG/PLEURA/LARGE AIRWAYS: No endobronchial lesion is seen.   Moderate to large bilateral pleural effusions with adjacent consolidative opacification of the bilateral lower lobes suggestive of compressive atelectasis. No pneumothorax. Mild asymmetric scattered reticular changes suggestive of chronic lung findings.     VESSELS: The thoracic aorta is unremarkable with respect course, caliber, and contour.   Prominent main pulmonary artery measuring up to 3.2 cm in anterior-posterior dimension  measured on sagittal plane which may indicate sequela of pulmonary hypertension.   No significant coronary atherosclerotic calcifications are seen.   HEART: Heart size within normal limits. No pericardial effusion.   MEDIASTINUM AND OLIVE: Mildly prominent mediastinal lymph nodes measuring up to 10 mm in short axis   Moderate hiatal hernia with partial intrathoracic stomach and ascites and region of nodularity of the peritoneal fat within hiatal hernia suggestive carcinomatosis.   CHEST WALL AND LOWER NECK: No acute osseous abnormality. Multilevel presumed degenerative changes throughout the imaged spine. No acute soft tissue abnormality.   ABDOMEN:   LIVER: There is been interval scalloping of the right hepatic margins with new regions of irregularity along the right hepatic capsule diffusely which is new since comparison imaging from 08/08/2024 there is a new elongated subcapsular collection measuring up to approximately 11.4 x 2.1 cm, difficult to measure given curvilinear projection extending over the right hepatic lobe margin (series 202, images 40-70). Similar appearing subcentimeter left hepatic lobe hypodense lesion.   BILE DUCTS: No obvious new intrahepatic biliary dilatation. Mild prominence of the common bile duct, nonspecific in a cholecystectomy patient.   GALLBLADDER: Absent.   PANCREAS: Unremarkable.   SPLEEN: Unchanged.   ADRENAL GLANDS: Unchanged.   KIDNEYS AND URETERS: Similar appearing subcentimeter right renal lower pole calculus. No hydronephrosis. No obstructing urolithiasis.   PELVIS:   BLADDER: Unremarkable.   REPRODUCTIVE ORGANS: Uterus appears grossly unchanged.   BOWEL: Moderate hiatal hernia with wall thickening of the stomach in the hernia. No new pathologic distention of bowel. Wall thickening of the colon within the splenic flexure descending colon and sigmoid colon, nonspecific.     VESSELS: No evidence of abdominal aortic aneurysm.   PERITONEUM/RETROPERITONEUM/LYMPH NODES: Large  volume ascites with extensive regions of anterior omental caking and peritoneal carcinomatosis. No obvious free intraperitoneal gas. Given the presence of extensive omental caking and peritoneal carcinomatosis, superimposed peritoneal enhancement 4 other causes cannot be excluded.   BONE AND SOFT TISSUE: No acute osseous abnormality. Osseous structures appear stable. No acute abnormality of the abdominal wall soft tissues.        CHEST: 1.  Moderate to large bilateral pleural effusions with adjacent presumed compressive atelectasis. 2. Prominent main pulmonary artery which may indicate pulmonary hypertension. 3. Mildly prominent mediastinal lymph nodes measuring up to 10 mm in short axis concerning for metastatic disease. 4. Moderate hiatal hernia with partial intrathoracic stomach. The peritoneal fat adjacent to the stomach within the hernia demonstrates evidence of probable carcinomatosis and ascites.   ABDOMEN-PELVIS: 1.  Redemonstration of large volume ascites with regions of significant anterior omental caking and peritoneal carcinomatosis commensurate with patient's provided diagnosis of ovarian cancer. 2. Regions of wall thickening of the colon extending from the splenic flexure to the sigmoid which may indicate a nonspecific colitis. 3. Since the previous examination on 08/08/2024, there are now extensive regions of scalloping of the right hepatic lobe peripherally with the appearance of a large subcapsular collection along the right hepatic margin as above. The sterility of this collection cannot be assessed via CT and clinical correlation is advised for exclusion superimposed infection within this region.     Signed by: Oscar Aldrich 10/1/2024 12:14 PM Dictation workstation:   [id number]     XR chest 1 view     Result Date: 10/1/2024  Interpreted By:  Samuel Jaramillo, STUDY: XR CHEST 1 VIEW; 10/1/2024 8:44 am   INDICATION: Signs/Symptoms:weakness, edema in legs and abdomen   COMPARISON: April 2023.    ACCESSION NUMBER(S): [id number]   ORDERING CLINICIAN: ASHLEY FAIRBANKS   FINDINGS: The study is limited due to rotation and poor inspiratory effort, with resultant crowding of the pulmonary vasculature. The cardiac silhouette is within normal limits for the technique. Moderate size hiatal hernia is again seen. There is no pneumothorax, confluent infiltrates or significant effusion. Degenerative changes involve the spine and shoulders; metallic anchors again noted over the left humeral head related to previous rotator cuff repair.        Limited study. Hiatal hernia. No acute cardiopulmonary disease.   Signed by: Samuel Jaramillo 10/1/2024 9:22 AM Dictation workstation:   [id number]     US guided abdominal paracentesis     Result Date: 9/20/2024  Interpreted By:  Peri Lawrence and Kamau Nyokabi STUDY: US GUIDED ABDOMINAL PARACENTESIS; US GUIDED PERCUTANEOUS ABDOMINAL RETROPERITONEUM BIOPSY;  9/19/2024 11:13 am   INDICATION: Signs/Symptoms:ascites; Signs/Symptoms:ascites, evaluate ovarian cancer.   COMPARISON: CT abdomen and pelvis 08/08/2024   ACCESSION NUMBER(S): [id number]; [id number]   ORDERING CLINICIAN: JEANETTE ARIAS   TECHNIQUE: INTERVENTIONALIST(S): Dr. Peri Lawrence MD   CONSENT: The patient was informed of the nature of the proposed procedure. The purposes, alternatives, risks, and benefits were explained and discussed. All questions were answered and consent was obtained.   SEDATION: 1% local lidocaine was used for anesthetic.   MEDICATION/CONTRAST: No additional.   TIME OUT:   A time out was performed immediately prior to procedure start with the interventional team, correctly identifying the patient name, date of birth, MRN, procedure, anatomy (including marking of site and side), patient position, procedure consent form, relevant laboratory and imaging test results, antibiotic administration, safety precautions, and procedure-specific equipment needs.   COMPLICATIONS: No immediate adverse  events identified.   FINDINGS: The patient was placed in the supine position. Limited sonographic images of the lower abdominal wall were obtained for purposes of needle guidance, which demonstrated omental soft tissue mass which was seen on prior CT 08/08/2024. The area of concern was prepped and draped under sterile technique.   1% lidocaine was injected subcutaneously. Additional lidocaine was administered into deeper tissues surrounding the targeted area for biopsy using a 22G spinal needle. A small incision was made. Using the same access site a 18 gauge core biopsy needle was passed via a 17 gauge coaxial introducer needle to obtain a total of 1 core sample.   Postprocedure images demonstrate no evidence for hemorrhage. The patient tolerated the procedure well and there were no immediate complications. 1 core specimen was sent to pathology.     Subsequently the right lower quadrant was visualized under ultrasound which demonstrated moderate volume ascites seen on prior CT 08/08/2024. 1% lidocaine was injected subcutaneously at this site. A 19 gauge Yueh was used to target the fluid collection under ultrasound guidance. Once the site was accessed, the inner needle was removed from the catheter. The Yueh catheter was connected to drainage container. Estimated 1000 cc of serosanguineous colored fluid was removed. Post paracentesis imaging demonstrated decreased ascitic fluid. The Yueh catheter was removed. The access site was bandaged.        1. Status post ultrasound guided core needle biopsy of omental mass seen on CT 08/08/2024. 1 core specimens sent to pathology. 2. Successful paracentesis under ultrasound guidance with removal of 1 L of serosanguineous fluid.     I was present for and/or performed the critical portions of the procedure and immediately available throughout the entire procedure.   I personally reviewed the image(s) / study and interpretation. I agree with the findings as stated.   Performed  and dictated at [facility].   MACRO: None   Signed by: Peri Lawrence 9/20/2024 10:06 AM Dictation workstation:   [id number]     US guided percutaneous abdominal retroperitoneum biopsy     Result Date: 9/20/2024  Interpreted By:  Peri Lawrence and Kamau Nyokabi STUDY: US GUIDED ABDOMINAL PARACENTESIS; US GUIDED PERCUTANEOUS ABDOMINAL RETROPERITONEUM BIOPSY;  9/19/2024 11:13 am   INDICATION: Signs/Symptoms:ascites; Signs/Symptoms:ascites, evaluate ovarian cancer.   COMPARISON: CT abdomen and pelvis 08/08/2024   ACCESSION NUMBER(S): [id number]; [id number]   ORDERING CLINICIAN: JEANETTE ARIAS   TECHNIQUE: INTERVENTIONALIST(S): Dr. Peri Lawrence MD   CONSENT: The patient was informed of the nature of the proposed procedure. The purposes, alternatives, risks, and benefits were explained and discussed. All questions were answered and consent was obtained.   SEDATION: 1% local lidocaine was used for anesthetic.   MEDICATION/CONTRAST: No additional.   TIME OUT:   A time out was performed immediately prior to procedure start with the interventional team, correctly identifying the patient name, date of birth, MRN, procedure, anatomy (including marking of site and side), patient position, procedure consent form, relevant laboratory and imaging test results, antibiotic administration, safety precautions, and procedure-specific equipment needs.   COMPLICATIONS: No immediate adverse events identified.   FINDINGS: The patient was placed in the supine position. Limited sonographic images of the lower abdominal wall were obtained for purposes of needle guidance, which demonstrated omental soft tissue mass which was seen on prior CT 08/08/2024. The area of concern was prepped and draped under sterile technique.   1% lidocaine was injected subcutaneously. Additional lidocaine was administered into deeper tissues surrounding the targeted area for biopsy using a 22G spinal needle.  A small incision was made. Using the same access site a 18 gauge core biopsy needle was passed via a 17 gauge coaxial introducer needle to obtain a total of 1 core sample.   Postprocedure images demonstrate no evidence for hemorrhage. The patient tolerated the procedure well and there were no immediate complications. 1 core specimen was sent to pathology.     Subsequently the right lower quadrant was visualized under ultrasound which demonstrated moderate volume ascites seen on prior CT 08/08/2024. 1% lidocaine was injected subcutaneously at this site. A 19 gauge Yueh was used to target the fluid collection under ultrasound guidance. Once the site was accessed, the inner needle was removed from the catheter. The Yueh catheter was connected to drainage container. Estimated 1000 cc of serosanguineous colored fluid was removed. Post paracentesis imaging demonstrated decreased ascitic fluid. The Yueh catheter was removed. The access site was bandaged.        1. Status post ultrasound guided core needle biopsy of omental mass seen on CT 08/08/2024. 1 core specimens sent to pathology. 2. Successful paracentesis under ultrasound guidance with removal of 1 L of serosanguineous fluid.     I was present for and/or performed the critical portions of the procedure and immediately available throughout the entire procedure.   I personally reviewed the image(s) / study and interpretation. I agree with the findings as stated.   Performed and dictated at [facility].   MACRO: None   Signed by: Peri Lawrence 9/20/2024 10:06 AM Dictation workstation:   [id number]            Assessment/Plan        Assessment & Plan  Hyponatremia        79-year-old female with past medical history of recently diagnosed ovarian cancer with peritoneal carcinomatosis s/p paracentesis x 4 (last one done 9/19/24), obesity, hyperlipidemia, osteoarthritis, and skin cancer s/p Mohs procedure.  Patient presents to the  hospital with weakness and inability to care for herself.  Patient found to be hyponatremic and with evidence on CT imaging of possible abscess of the liver and ascites secondary to malignancy.  Hospitalized for further evaluation management..     #Ovarian cancer with peritoneal carcinomatosis  #Ascites  #Bilateral pleural effusions  # Suspected liver abscess  #Abdominal pain     Telemetry monitoring  Consult to IR for paracentesis and possible drainage of liver abscess  Consult to pulmonology for bilateral pleural effusions  Antiemetics as needed  Analgesics as needed  Patient needs to be followed up with by her gynecological oncologist to determine optimal plan going forward for treatment of her ovarian cancer     #Hyponatremia  #Generalized edema/anasarca  #Hypoalbuminemia  Patient is fluid overloaded and has significant third spacing, suspect hypervolemia hyponatremia.  Will check urine electrolytes, urine osmolality, and serum osmolality  Fluid restriction of 1500 ml for the time being.  Consider starting diuretics, defer to attending  Repeat labs in a.m.     #debility  PT/OT  Social work for discharge planning     Chronic issues:  #Obesity  #Hyperlipidemia  #Osteoarthritis     Continue with patient's home medications as appropriate     #DVT prophylaxis  SCDs  Lovenox subcutaneous     I spent 75 minutes in the professional and overall care of this patient.                 Revision History         Patient fully evaluated 10/2, awake, resting in bed. Patient with Moderate hiatal hernia with partial intrathoracic stomach and the peritoneal fat adjacent to the stomach within the hernia demonstrates evidence of probable carcinomatosis and ascites, Patient tolerated paracentesis on 10/01, awaiting culture results.No s/s or c/o acute difficulties at this time. Medications and labs reviewed.  Plan discussed with interdisciplinary team, per pulmonology - Plan:  Patient has bilateral pleural effusion in the context of  malignant ascites/ovarian cancer I explained to the patient the pleural effusion most likely related to recurrent ascites, patient is requiring so far 5 steps malignant ascites in the last month most likely beneficial approaches intra-abdominal Pleurx catheter Abdominal Pleurx catheter would be the most effective way of preventing pleural effusions and ascites.  Pleural effusions are secondary to ascites, both of which are due to patient's underlying cancer Continue with goals of care discussions prior to interventional radiology consult Follow hepatic fluid analysis Continue IV antibiotics Zosyn, continue current and repeat labs in the AM. Patient still requiring frequent cardiac and SPO2 monitoring. Discharge planning discussed with patient and care team. Therapy evaluations ordered-  [facility], anticipate SNF/HHC at discharge. Patient aware and agreeable to current plan, continue plan as above. I spent 50 minutes in the professional and overall care of this patient.      Assessment & Plan  Hyponatremia    Patient fully evaluated 10/3, awake, resting in bed. Patient with Moderate hiatal hernia with partial intrathoracic stomach and the peritoneal fat adjacent to the stomach within the hernia demonstrates evidence of probable carcinomatosis and ascites, Patient tolerated paracentesis on 10/01 well,order placed for repeat paracentesis therapeutic non diagnostic with IR.continue current and repeat labs in the AM. Patient aware and agreeable to current plan, continue plan as above.    Patient fully evaluated on October 4.  Patient awaiting therapeutic paracentesis.  Patient probably will need albumin afterwards.  Continue to monitor response with above treatments recheck labs in AM.  I spent 50 minutes in the professional and overall care of this patient.      Patient fully evaluated 10/06, head of bed elevated, no s/s or c/o acute difficulties at this time. Sister at bedside and agreeable to plan. Attempted  therapeutic paracentesis by IR, aborted procedure due to insufficient fluid accumulation.  Medications and labs reviewed, cultures blood no growth at 4 days, AFB Culture Culture in progress and will be examined weekly. A result will be issued either when positive or after 8 weeks incubation. AFB Stain -No acid fast bacilli seen.  Plan discussed with interdisciplinary team, continue current and repeat labs in the AM. Per pulmonary - Day of consult, patient is breathing on room air. Vital stable. CT chest showed bilateral large pleural effusions. Dicussed need for Abdominal Pleurx catheter. Patient with likely carcinomatosis and plan to discharge to [facility] and will follow up with gynecology in 7 days,     Patient still requiring frequent cardiac and SPO2 monitoring. Discharge planning discussed with patient and care team. Therapy evaluations ordered- [place] [id number], [facility] at discharge. Patient aware and agreeable to current plan, continue plan as above. I spent 50 minutes in the professional and overall care of this patient.    Patient fully evaluated 10/07, head of bed elevated, no s/s or c/o acute difficulties at this time. Medications and labs reviewed, sodium 125, nephrology consulted.  Cultures blood no growth at 4 days, AFB Culture Culture in progress and will be examined weekly. A result will be issued either when positive or after 8 weeks incubation. AFB Stain -No acid fast bacilli seen.  Plan discussed with interdisciplinary team, continue current and repeat labs in the AM, new supplemental oxygen requirements, will repeat chest xray in AM, maintain HOB elevated to alleviate postural dyspnea. Vitals stable. Patient with likely carcinomatosis and plan to discharge to [facility] and will follow up with gynecology in 7 days, atient still requiring frequent cardiac and SPO2 monitoring. Discharge planning discussed with patient and care team. Therapy evaluations ordered- [place] [id number], [facility]  at discharge. Patient aware and agreeable to current plan, continue plan as above. I spent 50 minutes in the professional and overall care of this patient.      Patient fully evaluated 10/08, head of bed elevated, no s/s or c/o acute difficulties at this time. Medications and labs reviewed, sodium low again today at 125, nephrology consulted. Awaiting hepatic fluid analysis -AFB Culture -Culture in progress and will be examined weekly. A result will be issued either when positive or after 8 weeks incubation. AFB Stain -No acid fast bacilli seen.  Plan discussed with interdisciplinary team, per nephrology - Hyponatremia  Acute kidney injury   Ascites  Malnutrition  Anemia  Pleural effusion  Plan;  Discontinue potential nephrotoxins  Nutritional/iron/renal indices/urinary indices  DuoNeb aerosols  Appreciate nephrology input. Patient requiring supplemental oxygen at this time, continue to maintain HOB elevated as tolerated. Patient seen by pulmonology - Consult IR for right sided diagnostic and therapeutic thoracentesis  Unable to safely perform paracentesis due to lack of fluid  Patient has bilateral pleural effusion in the context of malignant ascites/ovarian cancer - Pleural effusions are secondary to ascites, both of which are due to patient's underlying cancer. Continue with goals of care discussions prior to interventional radiology consult. Continue IV antibiotics zosyn, probiotics added. Continue current plan and repeat labs in the AM, repeat chest xray in AM, maintain HOB elevated to alleviate postural dyspnea. Vitals stable. Patient with likely carcinomatosis and plan to discharge to SNF - [facility] and will follow up with gynecology in 7 days, atient still requiring frequent cardiac and SPO2 monitoring. Discharge planning discussed with patient and care team. Therapy evaluations ordered- [facility] 13, SNF at discharge. Patient aware and agreeable to current plan, continue plan as above. I spent 50  minutes in the professional and overall care of this patient.    Patient fully evaluated 10/09, patient resting in bed with HOB, no s/s or c/o acute difficulties at this time. Medications and labs reviewed, cultures no growth at 4 days, AFB cultures in process. Plan discussed with interdisciplinary team, pulmonary plan - Bilateral pleural effusion  Abdominal ascites  Ovarian carcinoma w/ peritoneal carcinomatosis carcinomatosis  HLD  Patient clear for discharge to SNF from standpoint of pulmonology  Patient will likely need an abdominal PleurX catheter at some point. However, we were unable to perform safely during this hospitalization due to lack of ascitic fluid  Pleural fluid suggests exudative effusion, likely secondary to malignancy  Okay to discontinue antibiotics from standpoint of pulmonology. Low suspicion of pneumonia. Leukocytosis is unlikely reflective of infection.   Will continue current and repeat labs in the AM. Patient still requiring frequent cardiac and SPO2 monitoring. Discharge planning discussed with patient and care team. Therapy evaluations ordered- [facility] 10, anticipate SNF at discharge. Patient aware and agreeable to current plan, continue plan as above. I spent 50 minutes in the professional and overall care of this patient.    Patient fully evaluated 10/10, patient resting in bed with HOB, no s/s or c/o acute difficulties at this time. Medications and labs reviewed, cultures no growth at 5 days, AFB cultures in process. Plan discussed with interdisciplinary team, pulmonary plan- Bilateral pleural effusion  Abdominal ascites Ovarian carcinoma w/ peritoneal carcinomatosis carcinomatosis HLD Patient clear for discharge to SNF from standpoint.   Patient will likely need an abdominal PleurX catheter at some point. However, we were unable to perform safely during this hospitalization due to lack of ascitic fluid Pleural fluid suggests exudative effusion, likely secondary to malignancy Okay to  discontinue antibiotics from standpoint of pulmonology. Low suspicion of pneumonia. Leukocytosis is unlikely reflective of infection.   Patient's sister at bedside, discussion of plan of care and will follow up with gynecology outpatient, possibly Dr. Jones. Will continue current and repeat labs in the AM. Patient with frequent bowel movements, soft, will hold miralax at this time. Patient still requiring frequent cardiac and SPO2 monitoring. Discharge planning discussed with patient and care team. Therapy evaluations ordered- [facility] 10, anticipate SNF at discharge. Patient aware and agreeable to current plan, continue plan as above. I spent 50 minutes in the professional and overall care of this patient.    Patient fully evaluated 10/11, patient resting in bed with HOB, no s/s or c/o acute difficulties at this time. Medications and labs reviewed, cultures no growth at 5 days, AFB cultures in process, creatinine and Bun continue to rise. Nephrology on the case, appreciate input.  Plan discussed with interdisciplinary team, pulmonary plan- agree to discontinue antibiotics from standpoint of pulmonology with Low suspicion of pneumonia. Continues to require supplemental oxygen at this time. Leukocytosis is unlikely reflective of infection. Patient pain medications updated based on renal function - will switch Roxicodone. Long discussion with patient's sister and patient plan of care and will follow up with gynecology outpatient, possibly Dr. Jones. Will continue current and repeat labs in the AM. Patient with frequent bowel movements, soft, will hold miralax at this time. Patient still requiring frequent cardiac and SPO2 monitoring. Discharge planning discussed with patient and care team. Therapy evaluations ordered- [facility] 10, anticipate SNF at discharge. Patient aware and agreeable to current plan, continue plan as above. I spent 50 minutes in the professional and overall care of this patient.    Kim Angulo

## 2024-10-11 NOTE — PROGRESS NOTES
Physical Therapy    Physical Therapy Treatment    Patient Name: Rohini Beaver  MRN: [id number]  Department: [facility]  Room: [address] [place] [address]  Today's Date: 10/11/2024  Time Calculation  Start Time: 1104  Stop Time: 1129  Time Calculation (min): 25 min         Assessment/Plan   PT Assessment  End of Session Communication: Bedside nurse  End of Session Patient Position: Bed, 2 rail up, Alarm on (Supine with HOB elevated to comfort, call light in reach, SCD's donned/activated.)     PT Plan  Treatment/Interventions: Bed mobility, Transfer training, Gait training  PT Plan: Ongoing PT  PT Frequency: 4 times per week  PT Discharge Recommendations: Moderate intensity level of continued care  PT - OK to Discharge: Yes      General Visit Information:   PT  Visit  PT Received On: 10/11/24  General  Prior to Session Communication: Bedside nurse  Patient Position Received: Bed, 2 rail up, Alarm on  General Comment:  (Pt pleasant and willing to participate in PT/OT session.)    Subjective   Precautions:  Precautions  Medical Precautions: Fall precautions  Precautions Comment: Fall, purewick, 2LO2 NC, SCD's, telemetry    Vital Signs (Past 2hrs)        Date/Time Vitals Session Patient Position Pulse Resp SpO2 BP MAP (mmHg)    10/11/24 15:52:17 --  --  110  --  94 %  114/59  72                         Objective   Pain:  Pain Assessment  Pain Assessment: 0-10  0-10 (Numeric) Pain Score: 0 - No pain        Postural Control:  Static Sitting Balance  Static Sitting-Comment/Number of Minutes: Pt tolerated static sitting EOB~4-5 min with MaxAx1.     Treatments:  Bed Mobility  Bed Mobility: Yes (Pt performed sup>sit with HOB elevated and sit>sup with MaxAx2 and use of drawsheet/frederick pad.)    Outcome Measures:  [facility] Basic Mobility  Turning from your back to your side while in a flat bed without using bedrails: A lot  Moving from lying on your back to sitting on the side of a flat bed without using bedrails: A lot  Moving to and from bed to chair  (including a wheelchair): Total  Standing up from a chair using your arms (e.g. wheelchair or bedside chair): Total  To walk in hospital room: Total  Climbing 3-5 steps with railing: Total  Basic Mobility - Total Score: 8    Education Documentation  Mobility Training, taught by Jeff Nieves PTA at 10/11/2024  4:24 PM.  Learner: Patient  Readiness: Acceptance  Method: Explanation  Response: Verbalizes Understanding    Education Comments  No comments found.        OP EDUCATION:       Encounter Problems       Encounter Problems (Active)       PT Problem       STG - Pt will transition supine <> sitting with min A x 1  (Progressing)       Start:  10/02/24    Expected End:  10/16/24            STG - Pt will transfer STS with mod A x 1  (Progressing)       Start:  10/02/24    Expected End:  10/16/24            STG - Pt will amb 25' using RW with mod A x 1  (Progressing)       Start:  10/02/24    Expected End:  10/16/24               Pain - Adult

## 2024-10-12 ENCOUNTER — APPOINTMENT (OUTPATIENT)
Dept: RADIOLOGY | Facility: HOSPITAL | Age: 79
DRG: 754 | End: 2024-10-12
Payer: MEDICARE

## 2024-10-12 LAB
ALBUMIN SERPL BCP-MCNC: 2.2 G/DL (ref 3.4–5)
ALP SERPL-CCNC: 160 U/L (ref 33–136)
ALT SERPL W P-5'-P-CCNC: 7 U/L (ref 7–45)
ANION GAP SERPL CALC-SCNC: 18 MMOL/L (ref 10–20)
AST SERPL W P-5'-P-CCNC: 18 U/L (ref 9–39)
BILIRUB SERPL-MCNC: 0.3 MG/DL (ref 0–1.2)
BUN SERPL-MCNC: 60 MG/DL (ref 6–23)
CALCIUM SERPL-MCNC: 8.2 MG/DL (ref 8.6–10.3)
CHLORIDE SERPL-SCNC: 94 MMOL/L (ref 98–107)
CO2 SERPL-SCNC: 23 MMOL/L (ref 21–32)
CREAT SERPL-MCNC: 3.62 MG/DL (ref 0.5–1.05)
EGFRCR SERPLBLD CKD-EPI 2021: 12 ML/MIN/1.73M*2
ERYTHROCYTE [DISTWIDTH] IN BLOOD BY AUTOMATED COUNT: 17 % (ref 11.5–14.5)
GLUCOSE SERPL-MCNC: 87 MG/DL (ref 74–99)
HCT VFR BLD AUTO: 30.5 % (ref 36–46)
HGB BLD-MCNC: 9.6 G/DL (ref 12–16)
MCH RBC QN AUTO: 26.4 PG (ref 26–34)
MCHC RBC AUTO-ENTMCNC: 31.5 G/DL (ref 32–36)
MCV RBC AUTO: 84 FL (ref 80–100)
NRBC BLD-RTO: 0 /100 WBCS (ref 0–0)
PLATELET # BLD AUTO: 454 X10*3/UL (ref 150–450)
POTASSIUM SERPL-SCNC: 4.2 MMOL/L (ref 3.5–5.3)
PROT SERPL-MCNC: 5 G/DL (ref 6.4–8.2)
RBC # BLD AUTO: 3.64 X10*6/UL (ref 4–5.2)
SODIUM SERPL-SCNC: 131 MMOL/L (ref 136–145)
T4 FREE SERPL-MCNC: 0.84 NG/DL (ref 0.61–1.12)
TSH SERPL-ACNC: 2.83 MIU/L (ref 0.44–3.98)
WBC # BLD AUTO: 29.3 X10*3/UL (ref 4.4–11.3)

## 2024-10-12 PROCEDURE — 36415 COLL VENOUS BLD VENIPUNCTURE: CPT | Performed by: INTERNAL MEDICINE

## 2024-10-12 PROCEDURE — 2500000001 HC RX 250 WO HCPCS SELF ADMINISTERED DRUGS (ALT 637 FOR MEDICARE OP): Performed by: INTERNAL MEDICINE

## 2024-10-12 PROCEDURE — 2500000004 HC RX 250 GENERAL PHARMACY W/ HCPCS (ALT 636 FOR OP/ED): Performed by: INTERNAL MEDICINE

## 2024-10-12 PROCEDURE — 2500000005 HC RX 250 GENERAL PHARMACY W/O HCPCS: Performed by: INTERNAL MEDICINE

## 2024-10-12 PROCEDURE — 2500000004 HC RX 250 GENERAL PHARMACY W/ HCPCS (ALT 636 FOR OP/ED): Performed by: NURSE PRACTITIONER

## 2024-10-12 PROCEDURE — 74176 CT ABD & PELVIS W/O CONTRAST: CPT | Performed by: STUDENT IN AN ORGANIZED HEALTH CARE EDUCATION/TRAINING PROGRAM

## 2024-10-12 PROCEDURE — 2500000001 HC RX 250 WO HCPCS SELF ADMINISTERED DRUGS (ALT 637 FOR MEDICARE OP): Performed by: NURSE PRACTITIONER

## 2024-10-12 PROCEDURE — 85027 COMPLETE CBC AUTOMATED: CPT | Performed by: INTERNAL MEDICINE

## 2024-10-12 PROCEDURE — 1200000002 HC GENERAL ROOM WITH TELEMETRY DAILY

## 2024-10-12 PROCEDURE — 80053 COMPREHEN METABOLIC PANEL: CPT | Performed by: INTERNAL MEDICINE

## 2024-10-12 PROCEDURE — 84443 ASSAY THYROID STIM HORMONE: CPT | Performed by: INTERNAL MEDICINE

## 2024-10-12 PROCEDURE — 87493 C DIFF AMPLIFIED PROBE: CPT | Mod: PARLAB | Performed by: INTERNAL MEDICINE

## 2024-10-12 PROCEDURE — 74176 CT ABD & PELVIS W/O CONTRAST: CPT

## 2024-10-12 PROCEDURE — 84439 ASSAY OF FREE THYROXINE: CPT | Performed by: INTERNAL MEDICINE

## 2024-10-12 ASSESSMENT — COGNITIVE AND FUNCTIONAL STATUS - GENERAL
TURNING FROM BACK TO SIDE WHILE IN FLAT BAD: A LOT
DAILY ACTIVITIY SCORE: 12
EATING MEALS: A LOT
DRESSING REGULAR UPPER BODY CLOTHING: A LOT
MOBILITY SCORE: 12
CLIMB 3 TO 5 STEPS WITH RAILING: A LOT
PERSONAL GROOMING: A LOT
MOVING TO AND FROM BED TO CHAIR: A LOT
STANDING UP FROM CHAIR USING ARMS: A LOT
MOVING FROM LYING ON BACK TO SITTING ON SIDE OF FLAT BED WITH BEDRAILS: A LOT
MOVING TO AND FROM BED TO CHAIR: A LOT
DRESSING REGULAR LOWER BODY CLOTHING: A LOT
PERSONAL GROOMING: A LOT
TURNING FROM BACK TO SIDE WHILE IN FLAT BAD: A LOT
STANDING UP FROM CHAIR USING ARMS: A LOT
DRESSING REGULAR LOWER BODY CLOTHING: A LOT
CLIMB 3 TO 5 STEPS WITH RAILING: A LOT
HELP NEEDED FOR BATHING: A LOT
DAILY ACTIVITIY SCORE: 12
MOVING FROM LYING ON BACK TO SITTING ON SIDE OF FLAT BED WITH BEDRAILS: A LOT
TOILETING: A LOT
MOBILITY SCORE: 12
WALKING IN HOSPITAL ROOM: A LOT
WALKING IN HOSPITAL ROOM: A LOT
HELP NEEDED FOR BATHING: A LOT
DRESSING REGULAR UPPER BODY CLOTHING: A LOT
EATING MEALS: A LOT
TOILETING: A LOT

## 2024-10-12 ASSESSMENT — PAIN SCALES - GENERAL
PAINLEVEL_OUTOF10: 0 - NO PAIN
PAINLEVEL_OUTOF10: 8
PAINLEVEL_OUTOF10: 9
PAINLEVEL_OUTOF10: 0 - NO PAIN

## 2024-10-12 ASSESSMENT — PAIN DESCRIPTION - LOCATION
LOCATION: GENERALIZED
LOCATION: GENERALIZED

## 2024-10-12 NOTE — CARE PLAN
Problem: Skin  Goal: Decreased wound size/increased tissue granulation at next dressing change  Outcome: Progressing   The patient's goals for the shift include sleep    The clinical goals for the shift include maintain safety      Problem: Skin  Goal: Participates in plan/prevention/treatment measures  Outcome: Progressing     Problem: Skin  Goal: Prevent/manage excess moisture  Outcome: Progressing     Problem: Skin  Goal: Prevent/minimize sheer/friction injuries  Outcome: Progressing     Problem: Skin  Goal: Promote skin healing  Outcome: Progressing     Problem: Pain  Goal: Turns in bed with improved pain control throughout the shift  Outcome: Progressing

## 2024-10-12 NOTE — PROGRESS NOTES
Subjective   Patient laying comfortably, breathing comfortably. Eager to leave hospital.       Objective     Last Recorded Vitals  /59   Pulse 100   Temp 35.9 °C (96.6 °F)   Resp 20   Wt 104 kg (230 lb)   SpO2 95%   Intake/Output last 3 Shifts:    Intake/Output Summary (Last 24 hours) at 10/12/2024 1203  Last data filed at 10/12/2024 0900  Gross per 24 hour   Intake 200 ml   Output --   Net 200 ml       Admission Weight  Weight: 104 kg (230 lb) (10/01/24 0828)    Daily Weight  10/01/24 : 104 kg (230 lb)      Physical Exam  Constitutional:       Appearance: Normal appearance.   HENT:      Head: Normocephalic and atraumatic.      Mouth/Throat:      Mouth: Mucous membranes are moist.   Eyes:      Extraocular Movements: Extraocular movements intact.   Cardiovascular:      Rate and Rhythm: Normal rate and regular rhythm.   Pulmonary:      Effort: Pulmonary effort is normal.      Breath sounds: Normal breath sounds.   Abdominal:      General: Abdomen is flat. There is distension.      Palpations: Abdomen is soft.      Tenderness: There is abdominal tenderness.   Musculoskeletal:      Right lower leg: No edema.      Left lower leg: No edema.   Skin:     General: Skin is warm and dry.   Neurological:      General: No focal deficit present.      Mental Status: She is alert and oriented to person, place, and time.   Psychiatric:         Mood and Affect: Mood normal.           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  EUSEBIO management per nephrology  Patient will likely need an abdominal PleurX catheter at some point. However, we were unable to perform safely during this hospitalization due to lack of ascitic fluid  Pleural fluid suggests  exudative effusion, likely secondary to malignancy  Okay to discontinue antibiotics from standpoint of pulmonology. Low suspicion of pneumonia. Leukocytosis is unlikely reflective of infection.         This is a preliminary note, please await attending attestation for a finalized plan.     Skyler Escobar MD  Pulmonary critical care consultant  10/12/24  12:03 PM       STAFF PHYSICIAN NOTE OF PERSONAL INVOLVEMENT IN CARE  As the attending physician, I certify that I personally reviewed the patient's history and personally examined the patient to confirm the physical findings described above, and that I reviewed the relevant imaging studies and available reports.  I also discussed the differential diagnosis and all of the proposed management plans with the patient and individuals accompanying the patient to this visit.  They had the opportunity to ask questions about the proposed management plans and to have those questions answered.

## 2024-10-12 NOTE — CARE PLAN
The patient's goals for the shift include sleep    The clinical goals for the shift include maintain safety      Problem: Skin  Goal: Decreased wound size/increased tissue granulation at next dressing change  Outcome: Progressing  Flowsheets (Taken 10/11/2024 2340)  Decreased wound size/increased tissue granulation at next dressing change: Promote sleep for wound healing  Goal: Participates in plan/prevention/treatment measures  Outcome: Progressing  Flowsheets (Taken 10/11/2024 2340)  Participates in plan/prevention/treatment measures: Elevate heels  Goal: Prevent/manage excess moisture  Outcome: Progressing  Flowsheets (Taken 10/11/2024 2340)  Prevent/manage excess moisture: Cleanse incontinence/protect with barrier cream  Goal: Prevent/minimize sheer/friction injuries  Outcome: Progressing  Flowsheets (Taken 10/11/2024 2340)  Prevent/minimize sheer/friction injuries:   Increase activity/out of bed for meals   Turn/reposition every 2 hours/use positioning/transfer devices  Goal: Promote/optimize nutrition  Outcome: Progressing  Flowsheets (Taken 10/11/2024 2340)  Promote/optimize nutrition:   Assist with feeding   Consume > 50% meals/supplements  Goal: Promote skin healing  Outcome: Progressing  Flowsheets (Taken 10/11/2024 2340)  Promote skin healing:   Turn/reposition every 2 hours/use positioning/transfer devices   Assess skin/pad under line(s)/device(s)     Problem: Pain  Goal: Takes deep breaths with improved pain control throughout the shift  Outcome: Progressing  Goal: Turns in bed with improved pain control throughout the shift  Outcome: Progressing  Goal: Walks with improved pain control throughout the shift  Outcome: Progressing  Goal: Performs ADL's with improved pain control throughout shift  Outcome: Progressing  Goal: Participates in PT with improved pain control throughout the shift  Outcome: Progressing  Goal: Free from opioid side effects throughout the shift  Outcome: Progressing  Goal: Free from  acute confusion related to pain meds throughout the shift  Outcome: Progressing     Problem: Pain - Adult  Goal: Verbalizes/displays adequate comfort level or baseline comfort level  Outcome: Progressing     Problem: Safety - Adult  Goal: Free from fall injury  Outcome: Progressing     Problem: Discharge Planning  Goal: Discharge to home or other facility with appropriate resources  Outcome: Progressing     Problem: Chronic Conditions and Co-morbidities  Goal: Patient's chronic conditions and co-morbidity symptoms are monitored and maintained or improved  Outcome: Progressing

## 2024-10-12 NOTE — PROGRESS NOTES
"  Subjective   Patient continues to have abdominal distention.  Chemistries continue to downtrend today BUN/creatinine 60 and 3.62.  WBC count elevated 29,300 hemoglobin is 9.6.    Objective     Physical Exam  General Appearance; alert oriented  Skin pallor  HEENT; pupils react to light and accommodation  Tender maxillary sinuses  Tongue; well-hydrated  Neck; no jugular vein distention, no thyromegaly, no adenopathy, no bruit  Lungs; bibasilar crackles on expiration  Heart; no rubs no gallops, heart rate is regular  Abdomen; there is tympanic note to percussion with distention no rebound no guarding no bruit  ; no CVA tenderness  Musculoskeletal; decreased muscle tone  1+ edema of the legs  Neurological; no tremors no clonus  Last Recorded Vitals  Blood pressure 132/59, pulse 100, temperature 35.9 °C (96.6 °F), resp. rate 20, height 1.626 m (5' 4\"), weight 104 kg (230 lb), SpO2 95%.  Intake/Output last 3 Shifts:  I/O last 3 completed shifts:  In: 599.5 (5.7 mL/kg) [IV Piggyback:599.5]  Out: - (0 mL/kg)   Weight: 104.3 kg     Relevant Results    Results for orders placed or performed during the hospital encounter of 10/01/24 (from the past 24 hour(s))   Comprehensive metabolic panel   Result Value Ref Range    Glucose 87 74 - 99 mg/dL    Sodium 131 (L) 136 - 145 mmol/L    Potassium 4.2 3.5 - 5.3 mmol/L    Chloride 94 (L) 98 - 107 mmol/L    Bicarbonate 23 21 - 32 mmol/L    Anion Gap 18 10 - 20 mmol/L    Urea Nitrogen 60 (H) 6 - 23 mg/dL    Creatinine 3.62 (H) 0.50 - 1.05 mg/dL    eGFR 12 (L) >60 mL/min/1.73m*2    Calcium 8.2 (L) 8.6 - 10.3 mg/dL    Albumin 2.2 (L) 3.4 - 5.0 g/dL    Alkaline Phosphatase 160 (H) 33 - 136 U/L    Total Protein 5.0 (L) 6.4 - 8.2 g/dL    AST 18 9 - 39 U/L    Bilirubin, Total 0.3 0.0 - 1.2 mg/dL    ALT 7 7 - 45 U/L   Thyroxine, Free   Result Value Ref Range    Thyroxine, Free 0.84 0.61 - 1.12 ng/dL   CBC   Result Value Ref Range    WBC 29.3 (H) 4.4 - 11.3 x10*3/uL    nRBC 0.0 0.0 - 0.0 " /100 WBCs    RBC 3.64 (L) 4.00 - 5.20 x10*6/uL    Hemoglobin 9.6 (L) 12.0 - 16.0 g/dL    Hematocrit 30.5 (L) 36.0 - 46.0 %    MCV 84 80 - 100 fL    MCH 26.4 26.0 - 34.0 pg    MCHC 31.5 (L) 32.0 - 36.0 g/dL    RDW 17.0 (H) 11.5 - 14.5 %    Platelets 454 (H) 150 - 450 x10*3/uL                   Assessment/Plan     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      Elder Wells MD

## 2024-10-12 NOTE — PROGRESS NOTES
"Rohini Beaver is a 79 y.o. female on day 11 of admission presenting with Hyponatremia.      Subjective    Patient fully evaluated 10/3, awake, resting in bed. Patient with Moderate hiatal hernia with partial intrathoracic stomach and the peritoneal fat adjacent to the stomach within the hernia demonstrates evidence of probable carcinomatosis and ascites, Patient tolerated paracentesis on 10/01 well,order placed for repeat paracentesis therapeutic non diagnostic with IR.continue current and repeat labs in the AM. Patient aware and agreeable to current plan, continue plan as above. I spent 50 minutes in the professional and overall care of this patient.   Objective     Last Recorded Vitals  /59 (BP Location: Right arm, Patient Position: Lying)   Pulse 100   Temp 35.9 °C (96.6 °F) (Temporal)   Resp 20   Wt 104 kg (230 lb)   SpO2 95%   Intake/Output last 3 Shifts:    Intake/Output Summary (Last 24 hours) at 10/12/2024 1442  Last data filed at 10/12/2024 0900  Gross per 24 hour   Intake 200 ml   Output --   Net 200 ml       Admission Weight  Weight: 104 kg (230 lb) (10/01/24 0828)    Daily Weight  10/01/24 : 104 kg (230 lb)    Image Results  US thoracentesis  Narrative: Interpreted By:  Parrish Vargas,   STUDY:  US THORACENTESIS;  10/8/2024 3:18 pm      INDICATION:  Signs/Symptoms:Diagnostic and therapeutic right sided thoracentesis.          COMPARISON:  None.      ACCESSION NUMBER(S):  [id number]      ORDERING CLINICIAN:  SHAKIRA PAEZ      TECHNIQUE:  INTERVENTIONALIST(S):  Parrish Vargas MD      The history and physical exam pertinent to the procedure were  reviewed and no updates were made.\"      CONSENT:  The patient/patient's POA/next of kin was informed of the nature of  the proposed procedure. The purposes, alternatives, risks, and  benefits were explained and discussed. All questions were answered  and consent was obtained.      SEDATION:  None      MEDICATION/CONTRAST:  No additional      TIME " OUT:  A time out was performed immediately prior to procedure start with  the interventional team, correctly identifying the patient name, date  of birth, MRN, procedure, anatomy (including marking of site and  side), patient position, procedure consent form, relevant laboratory  and imaging test results, antibiotic administration, safety  precautions, and procedure-specific equipment needs.      FINDINGS:  The patient was placed in the sitting position.      The pleural space was examined with grey scale ultrasound, and the  most accessible fluid identified and marked for thoracentesis.      The skin was prepped and draped in usual manner. Local anesthesia  with Lidocaine was administered and a  right-sided thoracentesis was  performed.  A 5 Hungarian One-Step thoracentesis needle/catheter was  then placed where marked.  Approximately 550 mL of yellowish colored  fluid was removed.  The needle/catheter was then withdrawn.      The patient tolerated the procedure well and there were no immediate  complications. Specimen(s) sent to the laboratory and pathology for  further evaluation, per the requesting team.      Impression: Uneventful  right-sided thoracentesis, as detailed above.      I personally performed and/or directly supervised this study and was  present for the entire procedure.      I personally reviewed the study and resident interpretation. I agree  with the findings as stated.      Performed and dictated at [facility].      MACRO:  None      Signed by: Parrish Vargas 10/9/2024 1:17 PM  Dictation workstation:   [id number]  XR chest 1 view  Narrative: Interpreted By:  Devin Garsia,   STUDY:  XR CHEST 1 VIEW;  10/8/2024 4:57 pm      INDICATION:  Signs/Symptoms:Reassess after thoracentesis.      COMPARISON:  10/08/2024 at 7:00 a.m.      ACCESSION NUMBER(S):  [id number]      ORDERING CLINICIAN:  SHAKIRA PAEZ      FINDINGS:  CARDIOMEDIASTINAL SILHOUETTE AND VASCULATURE:       Cardiac size:  The right cardiac margin is obscured.  Aortic shadow:  Within normal limits.      Mediastinal contours: Within normal limits.      Pulmonary vasculature:  Unremarkable, with resolution of prominence  and cephalization on the prior exam that was probably due to  congestion.      LUNGS:  There is opacification of the right lower lung probably from a  combination of elevated hemidiaphragm with effusion and atelectasis.  This appears fairly similar to the previous exam. Is no evidence of  right pneumothorax. Left retrocardiac opacity appears slightly  improved, also probably due to atelectasis.      ABDOMEN AND OTHER FINDINGS:  No remarkable upper abdominal findings.      BONES:  No acute osseous changes.      Impression: 1.  No complication such as pneumothorax after reported thoracentesis.      Signed by: Devin Garsia 10/9/2024 10:16 AM  Dictation workstation:   [id number]      Physical Exam    Relevant Results               Assessment/Plan   This patient currently has cardiac telemetry ordered; if you would like to modify or discontinue the telemetry order, click here to go to the orders activity to modify/discontinue the order.          Jd Rhodes MD   Physician  Internal Medicine     H&P      Addendum     Date of Service: 10/1/2024  2:54 PM     Addendum       Expand All Collapse All    History Of Present Illness  Rohini Beaver is a 79-year-old female with past medical history of recently diagnosed ovarian cancer with peritoneal carcinomatosis s/p paracentesis x 4 (last one done 9/19/24), obesity, hyperlipidemia, osteoarthritis, and skin cancer s/p Mohs procedure.  Patient presents to the hospital with weakness and inability to care for herself.  She reports that she was at [facility] skilled nursing facility, however had a brief hospitalization at [facility] and upon discharge decided to go home rather than go back to [facility].  She lives with her 86-year-old sister and  has a couple friends who assist with appointments, however limited support system.  Sister unable to care for due to age.  Today she was in urgent reclining chair and was able to get up.  ROS additionally positive for cold sweats, distended and tender abdomen, edema, pressure injury to coccyx/gluteal fold, and decreased activity tolerance.  Denies history of heart disease.  She reports that she is scheduled for outpatient appointment with her oncologist tomorrow to determine ongoing plan.      ER course: Hemodynamically stable, afebrile, SpO2 90s on room air.  WBC 28.9, Hgb 11.0/Hct34.1 (Hgb 15.1 4/4/2023), platelets 446. Glucose 107, Sodium 126, Chloride 94, calcium 8.5, albumin 2.4, alkaline phosphatase 231.  Lactate 1.4.  BNP 74.  High-sensitivity troponin 7.  UA unremarkable. CT chest showed moderate to large bilateral pleural effusion with adjacent presumed compressive atelectasis, prominent main pulmonary artery which may indicate pulmonary hypertension, mildly prominent mediastinal lymph nodes measuring up to 10 mm in short axis concerning for metastatic disease.  Moderate hiatal hernia with partial intrathoracic stomach and the peritoneal fat adjacent to the stomach within the hernia demonstrates evidence of probable carcinomatosis and ascites.  Redemonstration of large volume ascites with regions of significant anterior omental caking and peritoneal carcinomatosis commensurate with patient's provided diagnosis of ovarian cancer. Regions of wall thickening of the colon extending from the splenic flexure to the sigmoid.  Extensive region of scalloping of the right hepatic lobe peripherally with appearance of large subcapsular collection along the right hepatic margin. Blood culture in process     Past medical history: As above  Past surgical history: Cholecystectomy, lumbar laminectomy, left shoulder arthroscopy, right total knee replacement, corneal transplant  Social history: No history of smoking, alcohol  abuse, illicit drug use.  Lives with her elderly sister who is 86 years old.  Limited support system.   Family history: Mother-cancer (unknown type)     Past Medical History  Medical History        Past Medical History:   Diagnosis Date    Bilateral primary osteoarthritis of knee      HLD (hyperlipidemia)      Lumbosacral radiculopathy      Meralgia paraesthetica      Physical deconditioning              Surgical History  Surgical History         Past Surgical History:   Procedure Laterality Date    ARTHROPLASTY Left       Left thumb carpometacarpal arthroplasty with ligament reconstruction and tendon interposition    BREAST BIOPSY   1999     Benign    CHOLECYSTECTOMY        COLONOSCOPY        CORNEAL TRANSPLANT        DILATION AND CURETTAGE OF UTERUS        LUMBAR FUSION        SHOULDER ARTHROSCOPY Left      SKIN LESION EXCISION        TOTAL KNEE ARTHROPLASTY Left 2019    TOTAL KNEE ARTHROPLASTY Right 2017    TRIGGER FINGER RELEASE Right              Social History  She reports that she has never smoked. She has never used smokeless tobacco. She reports that she does not currently use alcohol. She reports that she does not use drugs.     Family History  Family History          Family History   Problem Relation Name Age of Onset    Colon cancer Mother        Lung cancer Father        Other (Mesothelioma) Brother        Brain cancer Mother's Brother                Allergies  Patient has no known allergies.     Review of Systems     10 point ROS negative except as noted above in HPI      Physical Exam  Vitals reviewed.   Constitutional:       General: She is awake. She is not in acute distress.     Appearance: She is obese. She is ill-appearing. She is not toxic-appearing.   HENT:      Head: Normocephalic and atraumatic.      Nose: Nose normal.      Mouth/Throat:      Mouth: Mucous membranes are moist.      Pharynx: Oropharynx is clear.   Eyes:      Conjunctiva/sclera: Conjunctivae normal.   Cardiovascular:      Rate  and Rhythm: Normal rate and regular rhythm.      Pulses: Normal pulses.      Heart sounds: No murmur heard.  Pulmonary:      Effort: Pulmonary effort is normal. No respiratory distress.      Breath sounds: Decreased air movement present. Decreased breath sounds present.      Comments: Posterior bilateral breath sounds are diminished  Abdominal:      General: There is distension.      Palpations: Abdomen is soft.      Tenderness: There is abdominal tenderness. There is no guarding.      Comments: Bowel sounds hypoactive, abdomen distended, tender to palpation, dullness noted to the sides.   Musculoskeletal:         General: No swelling, deformity or signs of injury. Normal range of motion.      Cervical back: Neck supple.      Right lower leg: Edema present.      Left lower leg: Edema present.      Comments: Generalized edema/anasarca   Skin:     General: Skin is warm and dry.      Capillary Refill: Capillary refill takes less than 2 seconds.      Findings: No ecchymosis or wound.      Comments: Wound on coccyx, exam deferred  due to patient discomfort    Neurological:      General: No focal deficit present.      Mental Status: She is alert and oriented to person, place, and time.   Psychiatric:         Mood and Affect: Mood normal.         Behavior: Behavior is cooperative.                  Last Recorded Vitals  Blood pressure 117/58, pulse 100, temperature 36.2 °C (97.2 °F), temperature source Temporal, resp. rate (!) 22, weight 104 kg (230 lb), SpO2 94%.     Relevant Results              Results for orders placed or performed during the hospital encounter of 10/01/24 (from the past 24 hour(s))   CBC and Auto Differential   Result Value Ref Range     WBC 28.9 (H) 4.4 - 11.3 x10*3/uL     nRBC 0.0 0.0 - 0.0 /100 WBCs     RBC 4.15 4.00 - 5.20 x10*6/uL     Hemoglobin 11.0 (L) 12.0 - 16.0 g/dL     Hematocrit 34.1 (L) 36.0 - 46.0 %     MCV 82 80 - 100 fL     MCH 26.5 26.0 - 34.0 pg     MCHC 32.3 32.0 - 36.0 g/dL     RDW  15.7 (H) 11.5 - 14.5 %     Platelets 446 150 - 450 x10*3/uL     Neutrophils % 89.1 40.0 - 80.0 %     Immature Granulocytes %, Automated 1.0 (H) 0.0 - 0.9 %     Lymphocytes % 4.3 13.0 - 44.0 %     Monocytes % 4.8 2.0 - 10.0 %     Eosinophils % 0.5 0.0 - 6.0 %     Basophils % 0.3 0.0 - 2.0 %     Neutrophils Absolute 25.74 (H) 1.60 - 5.50 x10*3/uL     Immature Granulocytes Absolute, Automated 0.30 0.00 - 0.50 x10*3/uL     Lymphocytes Absolute 1.23 0.80 - 3.00 x10*3/uL     Monocytes Absolute 1.38 (H) 0.05 - 0.80 x10*3/uL     Eosinophils Absolute 0.14 0.00 - 0.40 x10*3/uL     Basophils Absolute 0.09 0.00 - 0.10 x10*3/uL   Comprehensive metabolic panel   Result Value Ref Range     Glucose 107 (H) 74 - 99 mg/dL     Sodium 126 (L) 136 - 145 mmol/L     Potassium 5.2 3.5 - 5.3 mmol/L     Chloride 94 (L) 98 - 107 mmol/L     Bicarbonate 24 21 - 32 mmol/L     Anion Gap 13 10 - 20 mmol/L     Urea Nitrogen 18 6 - 23 mg/dL     Creatinine 0.61 0.50 - 1.05 mg/dL     eGFR >90 >60 mL/min/1.73m*2     Calcium 8.5 (L) 8.6 - 10.3 mg/dL     Albumin 2.4 (L) 3.4 - 5.0 g/dL     Alkaline Phosphatase 231 (H) 33 - 136 U/L     Total Protein 5.7 (L) 6.4 - 8.2 g/dL     AST 23 9 - 39 U/L     Bilirubin, Total 0.3 0.0 - 1.2 mg/dL     ALT 8 7 - 45 U/L   Magnesium   Result Value Ref Range     Magnesium 1.68 1.60 - 2.40 mg/dL   Troponin I, High Sensitivity   Result Value Ref Range     Troponin I, High Sensitivity 7 0 - 13 ng/L   B-Type Natriuretic Peptide   Result Value Ref Range     BNP 74 0 - 99 pg/mL   Sars-CoV-2 PCR   Result Value Ref Range     Coronavirus 2019, PCR Not Detected Not Detected   Lactate   Result Value Ref Range     Lactate 1.4 0.4 - 2.0 mmol/L   Urinalysis with Reflex Culture and Microscopic   Result Value Ref Range     Color, Urine Light-Yellow Light-Yellow, Yellow, Dark-Yellow     Appearance, Urine Clear Clear     Specific Gravity, Urine >1.050 (N) 1.005 - 1.035     pH, Urine 6.0 5.0, 5.5, 6.0, 6.5, 7.0, 7.5, 8.0     Protein, Urine 20  (TRACE) NEGATIVE, 10 (TRACE), 20 (TRACE) mg/dL     Glucose, Urine Normal Normal mg/dL     Blood, Urine NEGATIVE NEGATIVE     Ketones, Urine NEGATIVE NEGATIVE mg/dL     Bilirubin, Urine NEGATIVE NEGATIVE     Urobilinogen, Urine Normal Normal mg/dL     Nitrite, Urine NEGATIVE NEGATIVE     Leukocyte Esterase, Urine NEGATIVE NEGATIVE   Urinalysis Microscopic   Result Value Ref Range     WBC, Urine 1-5 1-5, NONE /HPF     RBC, Urine 1-2 NONE, 1-2, 3-5 /HPF     Squamous Epithelial Cells, Urine 1-9 (SPARSE) Reference range not established. /HPF     Mucus, Urine FEW Reference range not established. /LPF   Protime-INR   Result Value Ref Range     Protime 13.0 (H) 9.8 - 12.8 seconds     INR 1.2 (H) 0.9 - 1.1   APTT   Result Value Ref Range     aPTT 24 (L) 27 - 38 seconds      CT chest abdomen pelvis w IV contrast     Result Date: 10/1/2024  Interpreted By:  Oscar Aldrich, STUDY: CT CHEST ABDOMEN PELVIS W IV CONTRAST;  10/1/2024 11:13 am   INDICATION: Signs/Symptoms:leukocytosis, ascites, recent ovarian cancer diagnosis.   COMPARISON: CT abdomen pelvis 08/08/2024   ACCESSION NUMBER(S): [id number]   ORDERING CLINICIAN: ASHLEY FAIRBANKS   TECHNIQUE: CT of the chest, abdomen, and pelvis was performed.  Contiguous axial images were obtained at 3 mm slice thickness through the chest, abdomen and pelvis. Coronal and sagittal reconstructions at 3 mm slice thickness were performed. ml of contrast  were administered intravenously without immediate complication.   FINDINGS: CHEST:   LUNG/PLEURA/LARGE AIRWAYS: No endobronchial lesion is seen.   Moderate to large bilateral pleural effusions with adjacent consolidative opacification of the bilateral lower lobes suggestive of compressive atelectasis. No pneumothorax. Mild asymmetric scattered reticular changes suggestive of chronic lung findings.     VESSELS: The thoracic aorta is unremarkable with respect course, caliber, and contour.   Prominent main pulmonary artery measuring up to 3.2  cm in anterior-posterior dimension measured on sagittal plane which may indicate sequela of pulmonary hypertension.   No significant coronary atherosclerotic calcifications are seen.   HEART: Heart size within normal limits. No pericardial effusion.   MEDIASTINUM AND OLIVE: Mildly prominent mediastinal lymph nodes measuring up to 10 mm in short axis   Moderate hiatal hernia with partial intrathoracic stomach and ascites and region of nodularity of the peritoneal fat within hiatal hernia suggestive carcinomatosis.   CHEST WALL AND LOWER NECK: No acute osseous abnormality. Multilevel presumed degenerative changes throughout the imaged spine. No acute soft tissue abnormality.   ABDOMEN:   LIVER: There is been interval scalloping of the right hepatic margins with new regions of irregularity along the right hepatic capsule diffusely which is new since comparison imaging from 08/08/2024 there is a new elongated subcapsular collection measuring up to approximately 11.4 x 2.1 cm, difficult to measure given curvilinear projection extending over the right hepatic lobe margin (series 202, images 40-70). Similar appearing subcentimeter left hepatic lobe hypodense lesion.   BILE DUCTS: No obvious new intrahepatic biliary dilatation. Mild prominence of the common bile duct, nonspecific in a cholecystectomy patient.   GALLBLADDER: Absent.   PANCREAS: Unremarkable.   SPLEEN: Unchanged.   ADRENAL GLANDS: Unchanged.   KIDNEYS AND URETERS: Similar appearing subcentimeter right renal lower pole calculus. No hydronephrosis. No obstructing urolithiasis.   PELVIS:   BLADDER: Unremarkable.   REPRODUCTIVE ORGANS: Uterus appears grossly unchanged.   BOWEL: Moderate hiatal hernia with wall thickening of the stomach in the hernia. No new pathologic distention of bowel. Wall thickening of the colon within the splenic flexure descending colon and sigmoid colon, nonspecific.     VESSELS: No evidence of abdominal aortic aneurysm.    PERITONEUM/RETROPERITONEUM/LYMPH NODES: Large volume ascites with extensive regions of anterior omental caking and peritoneal carcinomatosis. No obvious free intraperitoneal gas. Given the presence of extensive omental caking and peritoneal carcinomatosis, superimposed peritoneal enhancement 4 other causes cannot be excluded.   BONE AND SOFT TISSUE: No acute osseous abnormality. Osseous structures appear stable. No acute abnormality of the abdominal wall soft tissues.        CHEST: 1.  Moderate to large bilateral pleural effusions with adjacent presumed compressive atelectasis. 2. Prominent main pulmonary artery which may indicate pulmonary hypertension. 3. Mildly prominent mediastinal lymph nodes measuring up to 10 mm in short axis concerning for metastatic disease. 4. Moderate hiatal hernia with partial intrathoracic stomach. The peritoneal fat adjacent to the stomach within the hernia demonstrates evidence of probable carcinomatosis and ascites.   ABDOMEN-PELVIS: 1.  Redemonstration of large volume ascites with regions of significant anterior omental caking and peritoneal carcinomatosis commensurate with patient's provided diagnosis of ovarian cancer. 2. Regions of wall thickening of the colon extending from the splenic flexure to the sigmoid which may indicate a nonspecific colitis. 3. Since the previous examination on 08/08/2024, there are now extensive regions of scalloping of the right hepatic lobe peripherally with the appearance of a large subcapsular collection along the right hepatic margin as above. The sterility of this collection cannot be assessed via CT and clinical correlation is advised for exclusion superimposed infection within this region.     Signed by: Oscar Aldrich 10/1/2024 12:14 PM Dictation workstation:   [id number]     XR chest 1 view     Result Date: 10/1/2024  Interpreted By:  Samuel Jaramillo, STUDY: XR CHEST 1 VIEW; 10/1/2024 8:44 am   INDICATION: Signs/Symptoms:weakness, edema in  legs and abdomen   COMPARISON: April 2023.   ACCESSION NUMBER(S): [id number]   ORDERING CLINICIAN: ASHLEY FAIRBANKS   FINDINGS: The study is limited due to rotation and poor inspiratory effort, with resultant crowding of the pulmonary vasculature. The cardiac silhouette is within normal limits for the technique. Moderate size hiatal hernia is again seen. There is no pneumothorax, confluent infiltrates or significant effusion. Degenerative changes involve the spine and shoulders; metallic anchors again noted over the left humeral head related to previous rotator cuff repair.        Limited study. Hiatal hernia. No acute cardiopulmonary disease.   Signed by: Samuel Jaramillo 10/1/2024 9:22 AM Dictation workstation:   [id number]     US guided abdominal paracentesis     Result Date: 9/20/2024  Interpreted By:  Peri Lawrence and Kamau Nyokabi STUDY: US GUIDED ABDOMINAL PARACENTESIS; US GUIDED PERCUTANEOUS ABDOMINAL RETROPERITONEUM BIOPSY;  9/19/2024 11:13 am   INDICATION: Signs/Symptoms:ascites; Signs/Symptoms:ascites, evaluate ovarian cancer.   COMPARISON: CT abdomen and pelvis 08/08/2024   ACCESSION NUMBER(S): [id number]; [id number]   ORDERING CLINICIAN: JEANETTE ARIAS   TECHNIQUE: INTERVENTIONALIST(S): Dr. Peri Lawrence MD   CONSENT: The patient was informed of the nature of the proposed procedure. The purposes, alternatives, risks, and benefits were explained and discussed. All questions were answered and consent was obtained.   SEDATION: 1% local lidocaine was used for anesthetic.   MEDICATION/CONTRAST: No additional.   TIME OUT:   A time out was performed immediately prior to procedure start with the interventional team, correctly identifying the patient name, date of birth, MRN, procedure, anatomy (including marking of site and side), patient position, procedure consent form, relevant laboratory and imaging test results, antibiotic administration, safety precautions, and procedure-specific equipment  needs.   COMPLICATIONS: No immediate adverse events identified.   FINDINGS: The patient was placed in the supine position. Limited sonographic images of the lower abdominal wall were obtained for purposes of needle guidance, which demonstrated omental soft tissue mass which was seen on prior CT 08/08/2024. The area of concern was prepped and draped under sterile technique.   1% lidocaine was injected subcutaneously. Additional lidocaine was administered into deeper tissues surrounding the targeted area for biopsy using a 22G spinal needle. A small incision was made. Using the same access site a 18 gauge core biopsy needle was passed via a 17 gauge coaxial introducer needle to obtain a total of 1 core sample.   Postprocedure images demonstrate no evidence for hemorrhage. The patient tolerated the procedure well and there were no immediate complications. 1 core specimen was sent to pathology.     Subsequently the right lower quadrant was visualized under ultrasound which demonstrated moderate volume ascites seen on prior CT 08/08/2024. 1% lidocaine was injected subcutaneously at this site. A 19 gauge Yueh was used to target the fluid collection under ultrasound guidance. Once the site was accessed, the inner needle was removed from the catheter. The Yueh catheter was connected to drainage container. Estimated 1000 cc of serosanguineous colored fluid was removed. Post paracentesis imaging demonstrated decreased ascitic fluid. The Yueh catheter was removed. The access site was bandaged.        1. Status post ultrasound guided core needle biopsy of omental mass seen on CT 08/08/2024. 1 core specimens sent to pathology. 2. Successful paracentesis under ultrasound guidance with removal of 1 L of serosanguineous fluid.     I was present for and/or performed the critical portions of the procedure and immediately available throughout the entire procedure.   I personally reviewed the image(s) / study and interpretation. I  agree with the findings as stated.   Performed and dictated at [facility].   MACRO: None   Signed by: Peri Lawrence 9/20/2024 10:06 AM Dictation workstation:   [id number]     US guided percutaneous abdominal retroperitoneum biopsy     Result Date: 9/20/2024  Interpreted By:  Peri Lawrence and Kamau Nyokabi STUDY: US GUIDED ABDOMINAL PARACENTESIS; US GUIDED PERCUTANEOUS ABDOMINAL RETROPERITONEUM BIOPSY;  9/19/2024 11:13 am   INDICATION: Signs/Symptoms:ascites; Signs/Symptoms:ascites, evaluate ovarian cancer.   COMPARISON: CT abdomen and pelvis 08/08/2024   ACCESSION NUMBER(S): [id number]; [id number]   ORDERING CLINICIAN: JEANETTE ARIAS   TECHNIQUE: INTERVENTIONALIST(S): Dr. Peri Lawrence MD   CONSENT: The patient was informed of the nature of the proposed procedure. The purposes, alternatives, risks, and benefits were explained and discussed. All questions were answered and consent was obtained.   SEDATION: 1% local lidocaine was used for anesthetic.   MEDICATION/CONTRAST: No additional.   TIME OUT:   A time out was performed immediately prior to procedure start with the interventional team, correctly identifying the patient name, date of birth, MRN, procedure, anatomy (including marking of site and side), patient position, procedure consent form, relevant laboratory and imaging test results, antibiotic administration, safety precautions, and procedure-specific equipment needs.   COMPLICATIONS: No immediate adverse events identified.   FINDINGS: The patient was placed in the supine position. Limited sonographic images of the lower abdominal wall were obtained for purposes of needle guidance, which demonstrated omental soft tissue mass which was seen on prior CT 08/08/2024. The area of concern was prepped and draped under sterile technique.   1% lidocaine was injected subcutaneously. Additional lidocaine was administered into deeper tissues surrounding the  targeted area for biopsy using a 22G spinal needle. A small incision was made. Using the same access site a 18 gauge core biopsy needle was passed via a 17 gauge coaxial introducer needle to obtain a total of 1 core sample.   Postprocedure images demonstrate no evidence for hemorrhage. The patient tolerated the procedure well and there were no immediate complications. 1 core specimen was sent to pathology.     Subsequently the right lower quadrant was visualized under ultrasound which demonstrated moderate volume ascites seen on prior CT 08/08/2024. 1% lidocaine was injected subcutaneously at this site. A 19 gauge Yueh was used to target the fluid collection under ultrasound guidance. Once the site was accessed, the inner needle was removed from the catheter. The Yueh catheter was connected to drainage container. Estimated 1000 cc of serosanguineous colored fluid was removed. Post paracentesis imaging demonstrated decreased ascitic fluid. The Yueh catheter was removed. The access site was bandaged.        1. Status post ultrasound guided core needle biopsy of omental mass seen on CT 08/08/2024. 1 core specimens sent to pathology. 2. Successful paracentesis under ultrasound guidance with removal of 1 L of serosanguineous fluid.     I was present for and/or performed the critical portions of the procedure and immediately available throughout the entire procedure.   I personally reviewed the image(s) / study and interpretation. I agree with the findings as stated.   Performed and dictated at [facility].   MACRO: None   Signed by: Peri Lawrence 9/20/2024 10:06 AM Dictation workstation:   [id number]            Assessment/Plan        Assessment & Plan  Hyponatremia        79-year-old female with past medical history of recently diagnosed ovarian cancer with peritoneal carcinomatosis s/p paracentesis x 4 (last one done 9/19/24), obesity, hyperlipidemia, osteoarthritis, and skin  cancer s/p Mohs procedure.  Patient presents to the hospital with weakness and inability to care for herself.  Patient found to be hyponatremic and with evidence on CT imaging of possible abscess of the liver and ascites secondary to malignancy.  Hospitalized for further evaluation management..     #Ovarian cancer with peritoneal carcinomatosis  #Ascites  #Bilateral pleural effusions  # Suspected liver abscess  #Abdominal pain     Telemetry monitoring  Consult to IR for paracentesis and possible drainage of liver abscess  Consult to pulmonology for bilateral pleural effusions  Antiemetics as needed  Analgesics as needed  Patient needs to be followed up with by her gynecological oncologist to determine optimal plan going forward for treatment of her ovarian cancer     #Hyponatremia  #Generalized edema/anasarca  #Hypoalbuminemia  Patient is fluid overloaded and has significant third spacing, suspect hypervolemia hyponatremia.  Will check urine electrolytes, urine osmolality, and serum osmolality  Fluid restriction of 1500 ml for the time being.  Consider starting diuretics, defer to attending  Repeat labs in a.m.     #debility  PT/OT  Social work for discharge planning     Chronic issues:  #Obesity  #Hyperlipidemia  #Osteoarthritis     Continue with patient's home medications as appropriate     #DVT prophylaxis  SCDs  Lovenox subcutaneous     I spent 75 minutes in the professional and overall care of this patient.                 Revision History         Patient fully evaluated 10/2, awake, resting in bed. Patient with Moderate hiatal hernia with partial intrathoracic stomach and the peritoneal fat adjacent to the stomach within the hernia demonstrates evidence of probable carcinomatosis and ascites, Patient tolerated paracentesis on 10/01, awaiting culture results.No s/s or c/o acute difficulties at this time. Medications and labs reviewed.  Plan discussed with interdisciplinary team, per pulmonology - Plan:  Patient  has bilateral pleural effusion in the context of malignant ascites/ovarian cancer I explained to the patient the pleural effusion most likely related to recurrent ascites, patient is requiring so far 5 steps malignant ascites in the last month most likely beneficial approaches intra-abdominal Pleurx catheter Abdominal Pleurx catheter would be the most effective way of preventing pleural effusions and ascites.  Pleural effusions are secondary to ascites, both of which are due to patient's underlying cancer Continue with goals of care discussions prior to interventional radiology consult Follow hepatic fluid analysis Continue IV antibiotics Zosyn, continue current and repeat labs in the AM. Patient still requiring frequent cardiac and SPO2 monitoring. Discharge planning discussed with patient and care team. Therapy evaluations ordered-  [facility], anticipate SNF/HHC at discharge. Patient aware and agreeable to current plan, continue plan as above. I spent 50 minutes in the professional and overall care of this patient.      Assessment & Plan  Hyponatremia    Patient fully evaluated 10/3, awake, resting in bed. Patient with Moderate hiatal hernia with partial intrathoracic stomach and the peritoneal fat adjacent to the stomach within the hernia demonstrates evidence of probable carcinomatosis and ascites, Patient tolerated paracentesis on 10/01 well,order placed for repeat paracentesis therapeutic non diagnostic with IR.continue current and repeat labs in the AM. Patient aware and agreeable to current plan, continue plan as above.    Patient fully evaluated on October 4.  Patient awaiting therapeutic paracentesis.  Patient probably will need albumin afterwards.  Continue to monitor response with above treatments recheck labs in AM.  I spent 50 minutes in the professional and overall care of this patient.      Patient fully evaluated 10/06, head of bed elevated, no s/s or c/o acute difficulties at this time. Sister at  bedside and agreeable to plan. Attempted therapeutic paracentesis by IR, aborted procedure due to insufficient fluid accumulation.  Medications and labs reviewed, cultures blood no growth at 4 days, AFB Culture Culture in progress and will be examined weekly. A result will be issued either when positive or after 8 weeks incubation. AFB Stain -No acid fast bacilli seen.  Plan discussed with interdisciplinary team, continue current and repeat labs in the AM. Per pulmonary - Day of consult, patient is breathing on room air. Vital stable. CT chest showed bilateral large pleural effusions. Dicussed need for Abdominal Pleurx catheter. Patient with likely carcinomatosis and plan to discharge to [facility] and will follow up with gynecology in 7 days,     Patient still requiring frequent cardiac and SPO2 monitoring. Discharge planning discussed with patient and care team. Therapy evaluations ordered- [address] at discharge. Patient aware and agreeable to current plan, continue plan as above. I spent 50 minutes in the professional and overall care of this patient.    Patient fully evaluated 10/07, head of bed elevated, no s/s or c/o acute difficulties at this time. Medications and labs reviewed, sodium 125, nephrology consulted.  Cultures blood no growth at 4 days, AFB Culture Culture in progress and will be examined weekly. A result will be issued either when positive or after 8 weeks incubation. AFB Stain -No acid fast bacilli seen.  Plan discussed with interdisciplinary team, continue current and repeat labs in the AM, new supplemental oxygen requirements, will repeat chest xray in AM, maintain HOB elevated to alleviate postural dyspnea. Vitals stable. Patient with likely carcinomatosis and plan to discharge to [facility] and will follow up with gynecology in 7 days, atient still requiring frequent cardiac and SPO2 monitoring. Discharge planning discussed with patient and care team.  Therapy evaluations ordered- [address] at discharge. Patient aware and agreeable to current plan, continue plan as above. I spent 50 minutes in the professional and overall care of this patient.      Patient fully evaluated 10/08, head of bed elevated, no s/s or c/o acute difficulties at this time. Medications and labs reviewed, sodium low again today at 125, nephrology consulted. Awaiting hepatic fluid analysis -AFB Culture -Culture in progress and will be examined weekly. A result will be issued either when positive or after 8 weeks incubation. AFB Stain -No acid fast bacilli seen.  Plan discussed with interdisciplinary team, per nephrology - Hyponatremia  Acute kidney injury   Ascites  Malnutrition  Anemia  Pleural effusion  Plan;  Discontinue potential nephrotoxins  Nutritional/iron/renal indices/urinary indices  DuoNeb aerosols  Appreciate nephrology input. Patient requiring supplemental oxygen at this time, continue to maintain HOB elevated as tolerated. Patient seen by pulmonology - Consult IR for right sided diagnostic and therapeutic thoracentesis  Unable to safely perform paracentesis due to lack of fluid  Patient has bilateral pleural effusion in the context of malignant ascites/ovarian cancer - Pleural effusions are secondary to ascites, both of which are due to patient's underlying cancer. Continue with goals of care discussions prior to interventional radiology consult. Continue IV antibiotics zosyn, probiotics added. Continue current plan and repeat labs in the AM, repeat chest xray in AM, maintain HOB elevated to alleviate postural dyspnea. Vitals stable. Patient with likely carcinomatosis and plan to discharge to SNF - [facility] and will follow up with gynecology in 7 days, atient still requiring frequent cardiac and SPO2 monitoring. Discharge planning discussed with patient and care team. Therapy evaluations ordered- [place] [id number], [facility] at discharge. Patient aware and agreeable to current  plan, continue plan as above. I spent 50 minutes in the professional and overall care of this patient.    Patient fully evaluated 10/09, patient resting in bed with HOB, no s/s or c/o acute difficulties at this time. Medications and labs reviewed, cultures no growth at 4 days, AFB cultures in process. Plan discussed with interdisciplinary team, pulmonary plan - Bilateral pleural effusion  Abdominal ascites  Ovarian carcinoma w/ peritoneal carcinomatosis carcinomatosis  HLD  Patient clear for discharge to SNF from standpoint of pulmonology  Patient will likely need an abdominal PleurX catheter at some point. However, we were unable to perform safely during this hospitalization due to lack of ascitic fluid  Pleural fluid suggests exudative effusion, likely secondary to malignancy  Okay to discontinue antibiotics from standpoint of pulmonology. Low suspicion of pneumonia. Leukocytosis is unlikely reflective of infection.   Will continue current and repeat labs in the AM. Patient still requiring frequent cardiac and SPO2 monitoring. Discharge planning discussed with patient and care team. Therapy evaluations ordered- [facility], anticipate SNF at discharge. Patient aware and agreeable to current plan, continue plan as above. I spent 50 minutes in the professional and overall care of this patient.    Patient fully evaluated 10/10, patient resting in bed with HOB, no s/s or c/o acute difficulties at this time. Medications and labs reviewed, cultures no growth at 5 days, AFB cultures in process. Plan discussed with interdisciplinary team, pulmonary plan- Bilateral pleural effusion  Abdominal ascites Ovarian carcinoma w/ peritoneal carcinomatosis carcinomatosis HLD Patient clear for discharge to SNF from standpoint.   Patient will likely need an abdominal PleurX catheter at some point. However, we were unable to perform safely during this hospitalization due to lack of ascitic fluid Pleural fluid suggests exudative effusion,  likely secondary to malignancy Okay to discontinue antibiotics from standpoint of pulmonology. Low suspicion of pneumonia. Leukocytosis is unlikely reflective of infection.   Patient's sister at bedside, discussion of plan of care and will follow up with gynecology outpatient, possibly Dr. Jones. Will continue current and repeat labs in the AM. Patient with frequent bowel movements, soft, will hold miralax at this time. Patient still requiring frequent cardiac and SPO2 monitoring. Discharge planning discussed with patient and care team. Therapy evaluations ordered- [facility] 10, anticipate SNF at discharge. Patient aware and agreeable to current plan, continue plan as above. I spent 50 minutes in the professional and overall care of this patient.    Patient fully evaluated 10/11, patient resting in bed with HOB, no s/s or c/o acute difficulties at this time. Medications and labs reviewed, cultures no growth at 5 days, AFB cultures in process, creatinine and Bun continue to rise. Nephrology on the case, appreciate input.  Plan discussed with interdisciplinary team, pulmonary plan- agree to discontinue antibiotics from standpoint of pulmonology with Low suspicion of pneumonia. Continues to require supplemental oxygen at this time. Leukocytosis is unlikely reflective of infection. Patient pain medications updated based on renal function - will switch Roxicodone. Long discussion with patient's sister and patient plan of care and will follow up with gynecology outpatient, possibly Dr. Jones. Will continue current and repeat labs in the AM. Patient with frequent bowel movements, soft, will hold miralax at this time. Patient still requiring frequent cardiac and SPO2 monitoring. Discharge planning discussed with patient and care team. Therapy evaluations ordered- [facility] 10, anticipate SNF at discharge. Patient aware and agreeable to current plan, continue plan as above.    Patient fully evaluated on October 12 and continues  to have significant decline in renal function.  Oncology reconsulted.  I believe the patient is significant third spacing secondary to peritoneal carcinomatosis.  Recheck labs in AM.  Appreciate pulmonary and nephrology consultations.  I spent 50 minutes in the professional and overall care of this patient.    Jd Rhodes MD

## 2024-10-13 VITALS
DIASTOLIC BLOOD PRESSURE: 58 MMHG | OXYGEN SATURATION: 94 % | BODY MASS INDEX: 39.27 KG/M2 | SYSTOLIC BLOOD PRESSURE: 120 MMHG | HEART RATE: 104 BPM | WEIGHT: 230 LBS | RESPIRATION RATE: 20 BRPM | HEIGHT: 64 IN | TEMPERATURE: 98.2 F

## 2024-10-13 LAB
ALBUMIN SERPL BCP-MCNC: 2.1 G/DL (ref 3.4–5)
ALBUMIN SERPL BCP-MCNC: 2.1 G/DL (ref 3.4–5)
ALP SERPL-CCNC: 163 U/L (ref 33–136)
ALP SERPL-CCNC: 173 U/L (ref 33–136)
ALT SERPL W P-5'-P-CCNC: 6 U/L (ref 7–45)
ALT SERPL W P-5'-P-CCNC: 6 U/L (ref 7–45)
ANION GAP SERPL CALC-SCNC: 17 MMOL/L (ref 10–20)
ANION GAP SERPL CALC-SCNC: 17 MMOL/L (ref 10–20)
AST SERPL W P-5'-P-CCNC: 17 U/L (ref 9–39)
AST SERPL W P-5'-P-CCNC: 17 U/L (ref 9–39)
BACTERIA BLD CULT: NORMAL
BASOPHILS # BLD AUTO: 0.13 X10*3/UL (ref 0–0.1)
BASOPHILS NFR BLD AUTO: 0.4 %
BILIRUB SERPL-MCNC: 0.3 MG/DL (ref 0–1.2)
BILIRUB SERPL-MCNC: 0.3 MG/DL (ref 0–1.2)
BUN SERPL-MCNC: 64 MG/DL (ref 6–23)
BUN SERPL-MCNC: 65 MG/DL (ref 6–23)
C DIF TOX TCDA+TCDB STL QL NAA+PROBE: NOT DETECTED
CALCIUM SERPL-MCNC: 8 MG/DL (ref 8.6–10.3)
CALCIUM SERPL-MCNC: 8.1 MG/DL (ref 8.6–10.3)
CHLORIDE SERPL-SCNC: 94 MMOL/L (ref 98–107)
CHLORIDE SERPL-SCNC: 95 MMOL/L (ref 98–107)
CO2 SERPL-SCNC: 23 MMOL/L (ref 21–32)
CO2 SERPL-SCNC: 23 MMOL/L (ref 21–32)
CREAT SERPL-MCNC: 3.94 MG/DL (ref 0.5–1.05)
CREAT SERPL-MCNC: 3.98 MG/DL (ref 0.5–1.05)
EGFRCR SERPLBLD CKD-EPI 2021: 11 ML/MIN/1.73M*2
EGFRCR SERPLBLD CKD-EPI 2021: 11 ML/MIN/1.73M*2
EOSINOPHIL # BLD AUTO: 0.51 X10*3/UL (ref 0–0.4)
EOSINOPHIL NFR BLD AUTO: 1.7 %
ERYTHROCYTE [DISTWIDTH] IN BLOOD BY AUTOMATED COUNT: 17.2 % (ref 11.5–14.5)
ERYTHROCYTE [DISTWIDTH] IN BLOOD BY AUTOMATED COUNT: 17.4 % (ref 11.5–14.5)
GLUCOSE SERPL-MCNC: 104 MG/DL (ref 74–99)
GLUCOSE SERPL-MCNC: 94 MG/DL (ref 74–99)
HCT VFR BLD AUTO: 29.4 % (ref 36–46)
HCT VFR BLD AUTO: 29.8 % (ref 36–46)
HGB BLD-MCNC: 9.2 G/DL (ref 12–16)
HGB BLD-MCNC: 9.2 G/DL (ref 12–16)
IMM GRANULOCYTES # BLD AUTO: 0.53 X10*3/UL (ref 0–0.5)
IMM GRANULOCYTES NFR BLD AUTO: 1.8 % (ref 0–0.9)
LACTATE SERPL-SCNC: 1.1 MMOL/L (ref 0.4–2)
LYMPHOCYTES # BLD AUTO: 1.18 X10*3/UL (ref 0.8–3)
LYMPHOCYTES NFR BLD AUTO: 4 %
MCH RBC QN AUTO: 26.1 PG (ref 26–34)
MCH RBC QN AUTO: 26.3 PG (ref 26–34)
MCHC RBC AUTO-ENTMCNC: 30.9 G/DL (ref 32–36)
MCHC RBC AUTO-ENTMCNC: 31.3 G/DL (ref 32–36)
MCV RBC AUTO: 84 FL (ref 80–100)
MCV RBC AUTO: 84 FL (ref 80–100)
MONOCYTES # BLD AUTO: 1.24 X10*3/UL (ref 0.05–0.8)
MONOCYTES NFR BLD AUTO: 4.2 %
NEUTROPHILS # BLD AUTO: 26.02 X10*3/UL (ref 1.6–5.5)
NEUTROPHILS NFR BLD AUTO: 87.9 %
NRBC BLD-RTO: 0 /100 WBCS (ref 0–0)
NRBC BLD-RTO: 0 /100 WBCS (ref 0–0)
OVALOCYTES BLD QL SMEAR: NORMAL
PLATELET # BLD AUTO: 447 X10*3/UL (ref 150–450)
PLATELET # BLD AUTO: 455 X10*3/UL (ref 150–450)
POLYCHROMASIA BLD QL SMEAR: NORMAL
POTASSIUM SERPL-SCNC: 4 MMOL/L (ref 3.5–5.3)
POTASSIUM SERPL-SCNC: 4 MMOL/L (ref 3.5–5.3)
PROT SERPL-MCNC: 4.9 G/DL (ref 6.4–8.2)
PROT SERPL-MCNC: 4.9 G/DL (ref 6.4–8.2)
RBC # BLD AUTO: 3.5 X10*6/UL (ref 4–5.2)
RBC # BLD AUTO: 3.53 X10*6/UL (ref 4–5.2)
RBC MORPH BLD: NORMAL
SODIUM SERPL-SCNC: 130 MMOL/L (ref 136–145)
SODIUM SERPL-SCNC: 131 MMOL/L (ref 136–145)
WBC # BLD AUTO: 28.3 X10*3/UL (ref 4.4–11.3)
WBC # BLD AUTO: 29.6 X10*3/UL (ref 4.4–11.3)

## 2024-10-13 PROCEDURE — 2500000001 HC RX 250 WO HCPCS SELF ADMINISTERED DRUGS (ALT 637 FOR MEDICARE OP): Performed by: INTERNAL MEDICINE

## 2024-10-13 PROCEDURE — 36415 COLL VENOUS BLD VENIPUNCTURE: CPT | Performed by: INTERNAL MEDICINE

## 2024-10-13 PROCEDURE — 85027 COMPLETE CBC AUTOMATED: CPT | Performed by: INTERNAL MEDICINE

## 2024-10-13 PROCEDURE — 84075 ASSAY ALKALINE PHOSPHATASE: CPT | Performed by: INTERNAL MEDICINE

## 2024-10-13 PROCEDURE — 80053 COMPREHEN METABOLIC PANEL: CPT | Performed by: INTERNAL MEDICINE

## 2024-10-13 PROCEDURE — 2500000004 HC RX 250 GENERAL PHARMACY W/ HCPCS (ALT 636 FOR OP/ED): Performed by: INTERNAL MEDICINE

## 2024-10-13 PROCEDURE — 87040 BLOOD CULTURE FOR BACTERIA: CPT | Mod: PARLAB | Performed by: INTERNAL MEDICINE

## 2024-10-13 PROCEDURE — 2500000005 HC RX 250 GENERAL PHARMACY W/O HCPCS: Performed by: INTERNAL MEDICINE

## 2024-10-13 PROCEDURE — 83605 ASSAY OF LACTIC ACID: CPT | Performed by: INTERNAL MEDICINE

## 2024-10-13 PROCEDURE — 2500000004 HC RX 250 GENERAL PHARMACY W/ HCPCS (ALT 636 FOR OP/ED): Performed by: NURSE PRACTITIONER

## 2024-10-13 PROCEDURE — 2500000002 HC RX 250 W HCPCS SELF ADMINISTERED DRUGS (ALT 637 FOR MEDICARE OP, ALT 636 FOR OP/ED): Performed by: INTERNAL MEDICINE

## 2024-10-13 PROCEDURE — 85025 COMPLETE CBC W/AUTO DIFF WBC: CPT | Performed by: INTERNAL MEDICINE

## 2024-10-13 PROCEDURE — 2500000001 HC RX 250 WO HCPCS SELF ADMINISTERED DRUGS (ALT 637 FOR MEDICARE OP): Performed by: NURSE PRACTITIONER

## 2024-10-13 PROCEDURE — 99221 1ST HOSP IP/OBS SF/LOW 40: CPT | Performed by: INTERNAL MEDICINE

## 2024-10-13 PROCEDURE — 1200000002 HC GENERAL ROOM WITH TELEMETRY DAILY

## 2024-10-13 PROCEDURE — S0109 METHADONE ORAL 5MG: HCPCS | Performed by: INTERNAL MEDICINE

## 2024-10-13 RX ORDER — TEMAZEPAM 7.5 MG/1
7.5 CAPSULE ORAL NIGHTLY PRN
Status: DISCONTINUED | OUTPATIENT
Start: 2024-10-13 | End: 2024-10-14 | Stop reason: HOSPADM

## 2024-10-13 RX ORDER — METHADONE HYDROCHLORIDE 5 MG/5ML
5 SOLUTION ORAL EVERY 12 HOURS
Status: DISCONTINUED | OUTPATIENT
Start: 2024-10-13 | End: 2024-10-14 | Stop reason: HOSPADM

## 2024-10-13 RX ORDER — CYCLOBENZAPRINE HCL 10 MG
5 TABLET ORAL 3 TIMES DAILY
Status: DISCONTINUED | OUTPATIENT
Start: 2024-10-13 | End: 2024-10-14 | Stop reason: HOSPADM

## 2024-10-13 ASSESSMENT — COGNITIVE AND FUNCTIONAL STATUS - GENERAL
CLIMB 3 TO 5 STEPS WITH RAILING: A LOT
DRESSING REGULAR LOWER BODY CLOTHING: A LOT
HELP NEEDED FOR BATHING: A LOT
DRESSING REGULAR UPPER BODY CLOTHING: A LOT
DRESSING REGULAR LOWER BODY CLOTHING: A LOT
MOBILITY SCORE: 12
STANDING UP FROM CHAIR USING ARMS: A LOT
TOILETING: A LOT
MOBILITY SCORE: 12
EATING MEALS: A LOT
TURNING FROM BACK TO SIDE WHILE IN FLAT BAD: A LOT
MOVING TO AND FROM BED TO CHAIR: A LOT
DAILY ACTIVITIY SCORE: 12
MOVING TO AND FROM BED TO CHAIR: A LOT
DRESSING REGULAR UPPER BODY CLOTHING: A LOT
CLIMB 3 TO 5 STEPS WITH RAILING: A LOT
MOVING FROM LYING ON BACK TO SITTING ON SIDE OF FLAT BED WITH BEDRAILS: A LOT
WALKING IN HOSPITAL ROOM: A LOT
PERSONAL GROOMING: A LOT
STANDING UP FROM CHAIR USING ARMS: A LOT
EATING MEALS: A LOT
PERSONAL GROOMING: A LOT
TURNING FROM BACK TO SIDE WHILE IN FLAT BAD: A LOT
TOILETING: A LOT
WALKING IN HOSPITAL ROOM: A LOT
HELP NEEDED FOR BATHING: A LOT
DAILY ACTIVITIY SCORE: 12
MOVING FROM LYING ON BACK TO SITTING ON SIDE OF FLAT BED WITH BEDRAILS: A LOT

## 2024-10-13 ASSESSMENT — PAIN SCALES - GENERAL
PAINLEVEL_OUTOF10: 0 - NO PAIN
PAINLEVEL_OUTOF10: 8
PAINLEVEL_OUTOF10: 8
PAINLEVEL_OUTOF10: 9
PAINLEVEL_OUTOF10: 8
PAINLEVEL_OUTOF10: 0 - NO PAIN
PAINLEVEL_OUTOF10: 0 - NO PAIN

## 2024-10-13 ASSESSMENT — PAIN DESCRIPTION - LOCATION
LOCATION: ABDOMEN
LOCATION: GENERALIZED
LOCATION: GENERALIZED

## 2024-10-13 ASSESSMENT — PAIN DESCRIPTION - DESCRIPTORS: DESCRIPTORS: ACHING

## 2024-10-13 ASSESSMENT — PAIN DESCRIPTION - ORIENTATION: ORIENTATION: RIGHT

## 2024-10-13 NOTE — CONSULTS
Reason For Consult       History Of Present Illness  Rohini Beaver is a 79 y.o. female presenting with known history of ovarian carcinoma with peritoneal carcinomatosis abdominal distention, ascites requiring frequent drainage was admitted profound weakness tiredness, failure to thrive.  At admission the patient was noted to have abdominal distention with free fluid elevated creatinine, and abdominal pain.  At interview on October 13, 2024 sister was present at bedside they both narrated entire history.  The patient was initially diagnosed in August 2024 and evaluated by GYN oncology chemotherapy was recommended.  However, due to intercurrent illness the patient was admitted discharged currently at SNF and did not receive any treatment.  Now admitted with above complaints..     Past Medical History  She has a past medical history of Bilateral primary osteoarthritis of knee, HLD (hyperlipidemia), Lumbosacral radiculopathy, Meralgia paraesthetica, Obesity, and Physical deconditioning.    Surgical History  She has a past surgical history that includes Total knee arthroplasty (Left, 2019); Cholecystectomy; Corneal transplant; Lumbar fusion; Dilation and curettage of uterus; Shoulder arthroscopy (Left); Colonoscopy; Skin lesion excision; Trigger finger release (Right); Arthroplasty (Left); Breast biopsy (1999); and Total knee arthroplasty (Right, 2017).     Social History  She reports that she has never smoked. She has never used smokeless tobacco. She reports that she does not currently use alcohol. She reports that she does not use drugs.    Family History  Family History   Problem Relation Name Age of Onset    Colon cancer Mother      Lung cancer Father      Other (Mesothelioma) Brother      Brain cancer Mother's Brother          Allergies  Patient has no known allergies.    Review of Systems  As above all others negative.     Physical Exam  Obese    Abdominal distention with discomfort, free fluid present.    "  Last Recorded Vitals  Blood pressure 110/56, pulse 103, temperature 36.3 °C (97.3 °F), resp. rate 20, height 1.626 m (5' 4\"), weight 104 kg (230 lb), SpO2 93%.    Relevant Results  As noted     Assessment/Plan   The patient is a 79-year-old woman diagnosed with ovarian carcinoma and peritoneal carcinomatosis with ascites in August 2024.  Initially evaluated by GYN oncology and chemotherapy was recommended but due to intercurrent illness and admissions the patient did not/could not receive any treatment.  Currently at [facility] and was admitted because of profound weakness tiredness failure to thrive pain and noted to have elevated creatinine.    Physical examination revealed elderly woman in pain requesting assistance.  Performance status is 3-4.  Elevated creatinine at 3.14 mg/dL.  Personally discussed with Dr. Rhodes.  Could consider initial chemotherapy to site to reduce then consider surgery.  However, advanced age, poor performance status, elevated creatinine preclude chemotherapy.  Consider evaluation by GYN oncology.    Meanwhile consider palliative care pain control/symptom control drainage of ascites consider catheter placement to drain fluid.    Consider methadone in view of elevated creatinine.    Thank you for allowing me to participate in care of your patient if you have any questions please feel free to call me.              Royce Alexandre MD    "

## 2024-10-13 NOTE — PROGRESS NOTES
Rohini Beaver is a 79 y.o. female on day 12 of admission presenting with Hyponatremia.      Subjective    Patient fully evaluated 10/3, awake, resting in bed. Patient with Moderate hiatal hernia with partial intrathoracic stomach and the peritoneal fat adjacent to the stomach within the hernia demonstrates evidence of probable carcinomatosis and ascites, Patient tolerated paracentesis on 10/01 well,order placed for repeat paracentesis therapeutic non diagnostic with IR.continue current and repeat labs in the AM. Patient aware and agreeable to current plan, continue plan as above. I spent 50 minutes in the professional and overall care of this patient.   Objective     Last Recorded Vitals  /56   Pulse 103   Temp 36.3 °C (97.3 °F)   Resp 20   Wt 104 kg (230 lb)   SpO2 93%   Intake/Output last 3 Shifts:    Intake/Output Summary (Last 24 hours) at 10/13/2024 1452  Last data filed at 10/13/2024 0500  Gross per 24 hour   Intake 120 ml   Output --   Net 120 ml       Admission Weight  Weight: 104 kg (230 lb) (10/01/24 0828)    Daily Weight  10/01/24 : 104 kg (230 lb)    Image Results  CT abdomen pelvis wo IV contrast  Narrative: Interpreted By:  Deep Scott,   STUDY:  CT ABDOMEN PELVIS WO IV CONTRAST;  10/12/2024 1:30 pm      INDICATION:  Signs/Symptoms:Ascites /Abdominal Distention/Renal Failure.          COMPARISON:  CT scan 10/01/2024.      ACCESSION NUMBER(S):  [id number]      ORDERING CLINICIAN:  ALICIA GRAHAM      TECHNIQUE:  CT of the abdomen and pelvis was performed. Contiguous axial images  were obtained at 3 mm slice thickness through the abdomen and pelvis.  Coronal and sagittal reconstructions at 3 mm slice thickness were  performed.  No intravenous contrast was administered; positive oral  contrast was given.      FINDINGS:  Please note that the evaluation of vessels, lymph nodes and organs is  limited without intravenous contrast.      LOWER CHEST:  Small to moderate bilateral pleural  effusion with surrounding  atelectasis.      ABDOMEN:      LIVER:  Nodular surface with a scalloping of the right hepatic capsule  secondary to the adjacent malignant ascites. No definite parenchymal  lesions.      BILE DUCTS:  The intrahepatic and extrahepatic ducts are not dilated.      GALLBLADDER:  Surgically absent      PANCREAS:  No duct dilatation      SPLEEN:  No splenomegaly.      ADRENAL GLANDS:  No nodule      KIDNEYS AND URETERS:  The kidneys are normal in size and unremarkable in appearance.  No  hydroureteronephrosis or nephroureterolithiasis is identified.      PELVIS:      BLADDER:  Unremarkable      REPRODUCTIVE ORGANS:  Uterus is unremarkable      BOWEL:  Moderate hiatal hernia. No bowel dilatation. Few uncomplicated  colonic diverticulosis.      Moderate abdominopelvic ascites. Diffuse omental caking and  peritoneal carcinomatosis.      VESSELS:  Multifocal vascular calcifications and atherosclerotic changes.      PERITONEUM/RETROPERITONEUM/LYMPH NODES:  Multiple subcentimeter retroperitoneal, iliac and pelvic lymph nodes.      ABDOMINAL WALL:  Subcutaneous edema.      BONES:  Multilevel degenerative spine changes and facet joint disease. Prior  lower lumbar spine fixation.      Impression: Overall findings are grossly unchanged from 10/01/2024 CT scan.  1. Persistent moderate abdominopelvic ascites with diffuse peritoneal  carcinomatosis/omental caking.  2. 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  Underlying cirrhosis could not be entirely excluded as well.  3. Moderate bilateral pleural effusion with surrounding atelectasis.          MACRO:  None      Signed by: Deep Scott 10/13/2024 7:50 AM  Dictation workstation:   [id number]      Physical Exam    Relevant Results               Assessment/Plan   This patient currently has cardiac telemetry ordered; if you would like to modify or discontinue the  telemetry order, click here to go to the orders activity to modify/discontinue the order.          Jd Rhodes MD   Physician  Internal Medicine     H&P      Addendum     Date of Service: 10/1/2024  2:54 PM     Addendum       Expand All Collapse All    History Of Present Illness  Rohini Beaver is a 79-year-old female with past medical history of recently diagnosed ovarian cancer with peritoneal carcinomatosis s/p paracentesis x 4 (last one done 9/19/24), obesity, hyperlipidemia, osteoarthritis, and skin cancer s/p Mohs procedure.  Patient presents to the hospital with weakness and inability to care for herself.  She reports that she was at [facility], however had a brief hospitalization at [facility] and upon discharge decided to go home rather than go back to [facility].  She lives with her 86-year-old sister and has a couple friends who assist with appointments, however limited support system.  Sister unable to care for due to age.  Today she was in urgent reclining chair and was able to get up.  ROS additionally positive for cold sweats, distended and tender abdomen, edema, pressure injury to coccyx/gluteal fold, and decreased activity tolerance.  Denies history of heart disease.  She reports that she is scheduled for outpatient appointment with her oncologist tomorrow to determine ongoing plan.      ER course: Hemodynamically stable, afebrile, SpO2 90s on room air.  WBC 28.9, Hgb 11.0/Hct34.1 (Hgb 15.1 4/4/2023), platelets 446. Glucose 107, Sodium 126, Chloride 94, calcium 8.5, albumin 2.4, alkaline phosphatase 231.  Lactate 1.4.  BNP 74.  High-sensitivity troponin 7.  UA unremarkable. CT chest showed moderate to large bilateral pleural effusion with adjacent presumed compressive atelectasis, prominent main pulmonary artery which may indicate pulmonary hypertension, mildly prominent mediastinal lymph nodes measuring up to 10 mm in short axis concerning for  metastatic disease.  Moderate hiatal hernia with partial intrathoracic stomach and the peritoneal fat adjacent to the stomach within the hernia demonstrates evidence of probable carcinomatosis and ascites.  Redemonstration of large volume ascites with regions of significant anterior omental caking and peritoneal carcinomatosis commensurate with patient's provided diagnosis of ovarian cancer. Regions of wall thickening of the colon extending from the splenic flexure to the sigmoid.  Extensive region of scalloping of the right hepatic lobe peripherally with appearance of large subcapsular collection along the right hepatic margin. Blood culture in process     Past medical history: As above  Past surgical history: Cholecystectomy, lumbar laminectomy, left shoulder arthroscopy, right total knee replacement, corneal transplant  Social history: No history of smoking, alcohol abuse, illicit drug use.  Lives with her elderly sister who is 86 years old.  Limited support system.   Family history: Mother-cancer (unknown type)     Past Medical History  Medical History        Past Medical History:   Diagnosis Date    Bilateral primary osteoarthritis of knee      HLD (hyperlipidemia)      Lumbosacral radiculopathy      Meralgia paraesthetica      Physical deconditioning              Surgical History  Surgical History         Past Surgical History:   Procedure Laterality Date    ARTHROPLASTY Left       Left thumb carpometacarpal arthroplasty with ligament reconstruction and tendon interposition    BREAST BIOPSY   1999     Benign    CHOLECYSTECTOMY        COLONOSCOPY        CORNEAL TRANSPLANT        DILATION AND CURETTAGE OF UTERUS        LUMBAR FUSION        SHOULDER ARTHROSCOPY Left      SKIN LESION EXCISION        TOTAL KNEE ARTHROPLASTY Left 2019    TOTAL KNEE ARTHROPLASTY Right 2017    TRIGGER FINGER RELEASE Right              Social History  She reports that she has never smoked. She has never used smokeless tobacco. She  reports that she does not currently use alcohol. She reports that she does not use drugs.     Family History  Family History          Family History   Problem Relation Name Age of Onset    Colon cancer Mother        Lung cancer Father        Other (Mesothelioma) Brother        Brain cancer Mother's Brother                Allergies  Patient has no known allergies.     Review of Systems     10 point ROS negative except as noted above in HPI      Physical Exam  Vitals reviewed.   Constitutional:       General: She is awake. She is not in acute distress.     Appearance: She is obese. She is ill-appearing. She is not toxic-appearing.   HENT:      Head: Normocephalic and atraumatic.      Nose: Nose normal.      Mouth/Throat:      Mouth: Mucous membranes are moist.      Pharynx: Oropharynx is clear.   Eyes:      Conjunctiva/sclera: Conjunctivae normal.   Cardiovascular:      Rate and Rhythm: Normal rate and regular rhythm.      Pulses: Normal pulses.      Heart sounds: No murmur heard.  Pulmonary:      Effort: Pulmonary effort is normal. No respiratory distress.      Breath sounds: Decreased air movement present. Decreased breath sounds present.      Comments: Posterior bilateral breath sounds are diminished  Abdominal:      General: There is distension.      Palpations: Abdomen is soft.      Tenderness: There is abdominal tenderness. There is no guarding.      Comments: Bowel sounds hypoactive, abdomen distended, tender to palpation, dullness noted to the sides.   Musculoskeletal:         General: No swelling, deformity or signs of injury. Normal range of motion.      Cervical back: Neck supple.      Right lower leg: Edema present.      Left lower leg: Edema present.      Comments: Generalized edema/anasarca   Skin:     General: Skin is warm and dry.      Capillary Refill: Capillary refill takes less than 2 seconds.      Findings: No ecchymosis or wound.      Comments: Wound on coccyx, exam deferred  due to patient  discomfort    Neurological:      General: No focal deficit present.      Mental Status: She is alert and oriented to person, place, and time.   Psychiatric:         Mood and Affect: Mood normal.         Behavior: Behavior is cooperative.                  Last Recorded Vitals  Blood pressure 117/58, pulse 100, temperature 36.2 °C (97.2 °F), temperature source Temporal, resp. rate (!) 22, weight 104 kg (230 lb), SpO2 94%.     Relevant Results              Results for orders placed or performed during the hospital encounter of 10/01/24 (from the past 24 hour(s))   CBC and Auto Differential   Result Value Ref Range     WBC 28.9 (H) 4.4 - 11.3 x10*3/uL     nRBC 0.0 0.0 - 0.0 /100 WBCs     RBC 4.15 4.00 - 5.20 x10*6/uL     Hemoglobin 11.0 (L) 12.0 - 16.0 g/dL     Hematocrit 34.1 (L) 36.0 - 46.0 %     MCV 82 80 - 100 fL     MCH 26.5 26.0 - 34.0 pg     MCHC 32.3 32.0 - 36.0 g/dL     RDW 15.7 (H) 11.5 - 14.5 %     Platelets 446 150 - 450 x10*3/uL     Neutrophils % 89.1 40.0 - 80.0 %     Immature Granulocytes %, Automated 1.0 (H) 0.0 - 0.9 %     Lymphocytes % 4.3 13.0 - 44.0 %     Monocytes % 4.8 2.0 - 10.0 %     Eosinophils % 0.5 0.0 - 6.0 %     Basophils % 0.3 0.0 - 2.0 %     Neutrophils Absolute 25.74 (H) 1.60 - 5.50 x10*3/uL     Immature Granulocytes Absolute, Automated 0.30 0.00 - 0.50 x10*3/uL     Lymphocytes Absolute 1.23 0.80 - 3.00 x10*3/uL     Monocytes Absolute 1.38 (H) 0.05 - 0.80 x10*3/uL     Eosinophils Absolute 0.14 0.00 - 0.40 x10*3/uL     Basophils Absolute 0.09 0.00 - 0.10 x10*3/uL   Comprehensive metabolic panel   Result Value Ref Range     Glucose 107 (H) 74 - 99 mg/dL     Sodium 126 (L) 136 - 145 mmol/L     Potassium 5.2 3.5 - 5.3 mmol/L     Chloride 94 (L) 98 - 107 mmol/L     Bicarbonate 24 21 - 32 mmol/L     Anion Gap 13 10 - 20 mmol/L     Urea Nitrogen 18 6 - 23 mg/dL     Creatinine 0.61 0.50 - 1.05 mg/dL     eGFR >90 >60 mL/min/1.73m*2     Calcium 8.5 (L) 8.6 - 10.3 mg/dL     Albumin 2.4 (L) 3.4 - 5.0  g/dL     Alkaline Phosphatase 231 (H) 33 - 136 U/L     Total Protein 5.7 (L) 6.4 - 8.2 g/dL     AST 23 9 - 39 U/L     Bilirubin, Total 0.3 0.0 - 1.2 mg/dL     ALT 8 7 - 45 U/L   Magnesium   Result Value Ref Range     Magnesium 1.68 1.60 - 2.40 mg/dL   Troponin I, High Sensitivity   Result Value Ref Range     Troponin I, High Sensitivity 7 0 - 13 ng/L   B-Type Natriuretic Peptide   Result Value Ref Range     BNP 74 0 - 99 pg/mL   Sars-CoV-2 PCR   Result Value Ref Range     Coronavirus 2019, PCR Not Detected Not Detected   Lactate   Result Value Ref Range     Lactate 1.4 0.4 - 2.0 mmol/L   Urinalysis with Reflex Culture and Microscopic   Result Value Ref Range     Color, Urine Light-Yellow Light-Yellow, Yellow, Dark-Yellow     Appearance, Urine Clear Clear     Specific Gravity, Urine >1.050 (N) 1.005 - 1.035     pH, Urine 6.0 5.0, 5.5, 6.0, 6.5, 7.0, 7.5, 8.0     Protein, Urine 20 (TRACE) NEGATIVE, 10 (TRACE), 20 (TRACE) mg/dL     Glucose, Urine Normal Normal mg/dL     Blood, Urine NEGATIVE NEGATIVE     Ketones, Urine NEGATIVE NEGATIVE mg/dL     Bilirubin, Urine NEGATIVE NEGATIVE     Urobilinogen, Urine Normal Normal mg/dL     Nitrite, Urine NEGATIVE NEGATIVE     Leukocyte Esterase, Urine NEGATIVE NEGATIVE   Urinalysis Microscopic   Result Value Ref Range     WBC, Urine 1-5 1-5, NONE /HPF     RBC, Urine 1-2 NONE, 1-2, 3-5 /HPF     Squamous Epithelial Cells, Urine 1-9 (SPARSE) Reference range not established. /HPF     Mucus, Urine FEW Reference range not established. /LPF   Protime-INR   Result Value Ref Range     Protime 13.0 (H) 9.8 - 12.8 seconds     INR 1.2 (H) 0.9 - 1.1   APTT   Result Value Ref Range     aPTT 24 (L) 27 - 38 seconds      CT chest abdomen pelvis w IV contrast     Result Date: 10/1/2024  Interpreted By:  Oscar Aldrich, STUDY: CT CHEST ABDOMEN PELVIS W IV CONTRAST;  10/1/2024 11:13 am   INDICATION: Signs/Symptoms:leukocytosis, ascites, recent ovarian cancer diagnosis.   COMPARISON: CT abdomen pelvis  08/08/2024   ACCESSION NUMBER(S): [id number]   ORDERING CLINICIAN: ASHLEY FAIRBANKS   TECHNIQUE: CT of the chest, abdomen, and pelvis was performed.  Contiguous axial images were obtained at 3 mm slice thickness through the chest, abdomen and pelvis. Coronal and sagittal reconstructions at 3 mm slice thickness were performed. ml of contrast  were administered intravenously without immediate complication.   FINDINGS: CHEST:   LUNG/PLEURA/LARGE AIRWAYS: No endobronchial lesion is seen.   Moderate to large bilateral pleural effusions with adjacent consolidative opacification of the bilateral lower lobes suggestive of compressive atelectasis. No pneumothorax. Mild asymmetric scattered reticular changes suggestive of chronic lung findings.     VESSELS: The thoracic aorta is unremarkable with respect course, caliber, and contour.   Prominent main pulmonary artery measuring up to 3.2 cm in anterior-posterior dimension measured on sagittal plane which may indicate sequela of pulmonary hypertension.   No significant coronary atherosclerotic calcifications are seen.   HEART: Heart size within normal limits. No pericardial effusion.   MEDIASTINUM AND OLIVE: Mildly prominent mediastinal lymph nodes measuring up to 10 mm in short axis   Moderate hiatal hernia with partial intrathoracic stomach and ascites and region of nodularity of the peritoneal fat within hiatal hernia suggestive carcinomatosis.   CHEST WALL AND LOWER NECK: No acute osseous abnormality. Multilevel presumed degenerative changes throughout the imaged spine. No acute soft tissue abnormality.   ABDOMEN:   LIVER: There is been interval scalloping of the right hepatic margins with new regions of irregularity along the right hepatic capsule diffusely which is new since comparison imaging from 08/08/2024 there is a new elongated subcapsular collection measuring up to approximately 11.4 x 2.1 cm, difficult to measure given curvilinear projection extending over the  right hepatic lobe margin (series 202, images 40-70). Similar appearing subcentimeter left hepatic lobe hypodense lesion.   BILE DUCTS: No obvious new intrahepatic biliary dilatation. Mild prominence of the common bile duct, nonspecific in a cholecystectomy patient.   GALLBLADDER: Absent.   PANCREAS: Unremarkable.   SPLEEN: Unchanged.   ADRENAL GLANDS: Unchanged.   KIDNEYS AND URETERS: Similar appearing subcentimeter right renal lower pole calculus. No hydronephrosis. No obstructing urolithiasis.   PELVIS:   BLADDER: Unremarkable.   REPRODUCTIVE ORGANS: Uterus appears grossly unchanged.   BOWEL: Moderate hiatal hernia with wall thickening of the stomach in the hernia. No new pathologic distention of bowel. Wall thickening of the colon within the splenic flexure descending colon and sigmoid colon, nonspecific.     VESSELS: No evidence of abdominal aortic aneurysm.   PERITONEUM/RETROPERITONEUM/LYMPH NODES: Large volume ascites with extensive regions of anterior omental caking and peritoneal carcinomatosis. No obvious free intraperitoneal gas. Given the presence of extensive omental caking and peritoneal carcinomatosis, superimposed peritoneal enhancement 4 other causes cannot be excluded.   BONE AND SOFT TISSUE: No acute osseous abnormality. Osseous structures appear stable. No acute abnormality of the abdominal wall soft tissues.        CHEST: 1.  Moderate to large bilateral pleural effusions with adjacent presumed compressive atelectasis. 2. Prominent main pulmonary artery which may indicate pulmonary hypertension. 3. Mildly prominent mediastinal lymph nodes measuring up to 10 mm in short axis concerning for metastatic disease. 4. Moderate hiatal hernia with partial intrathoracic stomach. The peritoneal fat adjacent to the stomach within the hernia demonstrates evidence of probable carcinomatosis and ascites.   ABDOMEN-PELVIS: 1.  Redemonstration of large volume ascites with regions of significant anterior omental  caking and peritoneal carcinomatosis commensurate with patient's provided diagnosis of ovarian cancer. 2. Regions of wall thickening of the colon extending from the splenic flexure to the sigmoid which may indicate a nonspecific colitis. 3. Since the previous examination on 08/08/2024, there are now extensive regions of scalloping of the right hepatic lobe peripherally with the appearance of a large subcapsular collection along the right hepatic margin as above. The sterility of this collection cannot be assessed via CT and clinical correlation is advised for exclusion superimposed infection within this region.     Signed by: Oscar Aldrich 10/1/2024 12:14 PM Dictation workstation:   [id number]     XR chest 1 view     Result Date: 10/1/2024  Interpreted By:  Samuel Jaramillo, STUDY: XR CHEST 1 VIEW; 10/1/2024 8:44 am   INDICATION: Signs/Symptoms:weakness, edema in legs and abdomen   COMPARISON: April 2023.   ACCESSION NUMBER(S): [id number]   ORDERING CLINICIAN: ASHLEY FAIRBANKS   FINDINGS: The study is limited due to rotation and poor inspiratory effort, with resultant crowding of the pulmonary vasculature. The cardiac silhouette is within normal limits for the technique. Moderate size hiatal hernia is again seen. There is no pneumothorax, confluent infiltrates or significant effusion. Degenerative changes involve the spine and shoulders; metallic anchors again noted over the left humeral head related to previous rotator cuff repair.        Limited study. Hiatal hernia. No acute cardiopulmonary disease.   Signed by: Samuel Jaramillo 10/1/2024 9:22 AM Dictation workstation:   [id number]     US guided abdominal paracentesis     Result Date: 9/20/2024  Interpreted By:  Peri Lawrence and Kamau Nyokabi STUDY: US GUIDED ABDOMINAL PARACENTESIS; US GUIDED PERCUTANEOUS ABDOMINAL RETROPERITONEUM BIOPSY;  9/19/2024 11:13 am   INDICATION: Signs/Symptoms:ascites; Signs/Symptoms:ascites, evaluate ovarian cancer.    COMPARISON: CT abdomen and pelvis 08/08/2024   ACCESSION NUMBER(S): [id number]; [id number]   ORDERING CLINICIAN: JEANETTE ARIAS   TECHNIQUE: INTERVENTIONALIST(S): Dr. Peri Lawrence MD   CONSENT: The patient was informed of the nature of the proposed procedure. The purposes, alternatives, risks, and benefits were explained and discussed. All questions were answered and consent was obtained.   SEDATION: 1% local lidocaine was used for anesthetic.   MEDICATION/CONTRAST: No additional.   TIME OUT:   A time out was performed immediately prior to procedure start with the interventional team, correctly identifying the patient name, date of birth, MRN, procedure, anatomy (including marking of site and side), patient position, procedure consent form, relevant laboratory and imaging test results, antibiotic administration, safety precautions, and procedure-specific equipment needs.   COMPLICATIONS: No immediate adverse events identified.   FINDINGS: The patient was placed in the supine position. Limited sonographic images of the lower abdominal wall were obtained for purposes of needle guidance, which demonstrated omental soft tissue mass which was seen on prior CT 08/08/2024. The area of concern was prepped and draped under sterile technique.   1% lidocaine was injected subcutaneously. Additional lidocaine was administered into deeper tissues surrounding the targeted area for biopsy using a 22G spinal needle. A small incision was made. Using the same access site a 18 gauge core biopsy needle was passed via a 17 gauge coaxial introducer needle to obtain a total of 1 core sample.   Postprocedure images demonstrate no evidence for hemorrhage. The patient tolerated the procedure well and there were no immediate complications. 1 core specimen was sent to pathology.     Subsequently the right lower quadrant was visualized under ultrasound which demonstrated moderate volume ascites seen on prior CT 08/08/2024. 1% lidocaine was  injected subcutaneously at this site. A 19 gauge Yueh was used to target the fluid collection under ultrasound guidance. Once the site was accessed, the inner needle was removed from the catheter. The Yueh catheter was connected to drainage container. Estimated 1000 cc of serosanguineous colored fluid was removed. Post paracentesis imaging demonstrated decreased ascitic fluid. The Yueh catheter was removed. The access site was bandaged.        1. Status post ultrasound guided core needle biopsy of omental mass seen on CT 08/08/2024. 1 core specimens sent to pathology. 2. Successful paracentesis under ultrasound guidance with removal of 1 L of serosanguineous fluid.     I was present for and/or performed the critical portions of the procedure and immediately available throughout the entire procedure.   I personally reviewed the image(s) / study and interpretation. I agree with the findings as stated.   Performed and dictated at [facility].   MACRO: None   Signed by: Peri Lawrence 9/20/2024 10:06 AM Dictation workstation:   [id number]     US guided percutaneous abdominal retroperitoneum biopsy     Result Date: 9/20/2024  Interpreted By:  Peri Lawrence and Kamau Nyokabi STUDY: US GUIDED ABDOMINAL PARACENTESIS; US GUIDED PERCUTANEOUS ABDOMINAL RETROPERITONEUM BIOPSY;  9/19/2024 11:13 am   INDICATION: Signs/Symptoms:ascites; Signs/Symptoms:ascites, evaluate ovarian cancer.   COMPARISON: CT abdomen and pelvis 08/08/2024   ACCESSION NUMBER(S): [id number]; [id number]   ORDERING CLINICIAN: JEANETTE ARIAS   TECHNIQUE: INTERVENTIONALIST(S): Dr. Peri Lawrence MD   CONSENT: The patient was informed of the nature of the proposed procedure. The purposes, alternatives, risks, and benefits were explained and discussed. All questions were answered and consent was obtained.   SEDATION: 1% local lidocaine was used for anesthetic.   MEDICATION/CONTRAST: No additional.   TIME OUT:    A time out was performed immediately prior to procedure start with the interventional team, correctly identifying the patient name, date of birth, MRN, procedure, anatomy (including marking of site and side), patient position, procedure consent form, relevant laboratory and imaging test results, antibiotic administration, safety precautions, and procedure-specific equipment needs.   COMPLICATIONS: No immediate adverse events identified.   FINDINGS: The patient was placed in the supine position. Limited sonographic images of the lower abdominal wall were obtained for purposes of needle guidance, which demonstrated omental soft tissue mass which was seen on prior CT 08/08/2024. The area of concern was prepped and draped under sterile technique.   1% lidocaine was injected subcutaneously. Additional lidocaine was administered into deeper tissues surrounding the targeted area for biopsy using a 22G spinal needle. A small incision was made. Using the same access site a 18 gauge core biopsy needle was passed via a 17 gauge coaxial introducer needle to obtain a total of 1 core sample.   Postprocedure images demonstrate no evidence for hemorrhage. The patient tolerated the procedure well and there were no immediate complications. 1 core specimen was sent to pathology.     Subsequently the right lower quadrant was visualized under ultrasound which demonstrated moderate volume ascites seen on prior CT 08/08/2024. 1% lidocaine was injected subcutaneously at this site. A 19 gauge Yueh was used to target the fluid collection under ultrasound guidance. Once the site was accessed, the inner needle was removed from the catheter. The Yueh catheter was connected to drainage container. Estimated 1000 cc of serosanguineous colored fluid was removed. Post paracentesis imaging demonstrated decreased ascitic fluid. The Yueh catheter was removed. The access site was bandaged.        1. Status post ultrasound guided core needle biopsy of  omental mass seen on CT 08/08/2024. 1 core specimens sent to pathology. 2. Successful paracentesis under ultrasound guidance with removal of 1 L of serosanguineous fluid.     I was present for and/or performed the critical portions of the procedure and immediately available throughout the entire procedure.   I personally reviewed the image(s) / study and interpretation. I agree with the findings as stated.   Performed and dictated at [facility].   MACRO: None   Signed by: Peri Lawrence 9/20/2024 10:06 AM Dictation workstation:   [id number]            Assessment/Plan        Assessment & Plan  Hyponatremia        79-year-old female with past medical history of recently diagnosed ovarian cancer with peritoneal carcinomatosis s/p paracentesis x 4 (last one done 9/19/24), obesity, hyperlipidemia, osteoarthritis, and skin cancer s/p Mohs procedure.  Patient presents to the hospital with weakness and inability to care for herself.  Patient found to be hyponatremic and with evidence on CT imaging of possible abscess of the liver and ascites secondary to malignancy.  Hospitalized for further evaluation management..     #Ovarian cancer with peritoneal carcinomatosis  #Ascites  #Bilateral pleural effusions  # Suspected liver abscess  #Abdominal pain     Telemetry monitoring  Consult to IR for paracentesis and possible drainage of liver abscess  Consult to pulmonology for bilateral pleural effusions  Antiemetics as needed  Analgesics as needed  Patient needs to be followed up with by her gynecological oncologist to determine optimal plan going forward for treatment of her ovarian cancer     #Hyponatremia  #Generalized edema/anasarca  #Hypoalbuminemia  Patient is fluid overloaded and has significant third spacing, suspect hypervolemia hyponatremia.  Will check urine electrolytes, urine osmolality, and serum osmolality  Fluid restriction of 1500 ml for the time being.  Consider  starting diuretics, defer to attending  Repeat labs in a.m.     #debility  PT/OT  Social work for discharge planning     Chronic issues:  #Obesity  #Hyperlipidemia  #Osteoarthritis     Continue with patient's home medications as appropriate     #DVT prophylaxis  SCDs  Lovenox subcutaneous     I spent 75 minutes in the professional and overall care of this patient.                 Revision History         Patient fully evaluated 10/2, awake, resting in bed. Patient with Moderate hiatal hernia with partial intrathoracic stomach and the peritoneal fat adjacent to the stomach within the hernia demonstrates evidence of probable carcinomatosis and ascites, Patient tolerated paracentesis on 10/01, awaiting culture results.No s/s or c/o acute difficulties at this time. Medications and labs reviewed.  Plan discussed with interdisciplinary team, per pulmonology - Plan:  Patient has bilateral pleural effusion in the context of malignant ascites/ovarian cancer I explained to the patient the pleural effusion most likely related to recurrent ascites, patient is requiring so far 5 steps malignant ascites in the last month most likely beneficial approaches intra-abdominal Pleurx catheter Abdominal Pleurx catheter would be the most effective way of preventing pleural effusions and ascites.  Pleural effusions are secondary to ascites, both of which are due to patient's underlying cancer Continue with goals of care discussions prior to interventional radiology consult Follow hepatic fluid analysis Continue IV antibiotics Zosyn, continue current and repeat labs in the AM. Patient still requiring frequent cardiac and SPO2 monitoring. Discharge planning discussed with patient and care team. Therapy evaluations ordered-  [facility], anticipate SNF/HHC at discharge. Patient aware and agreeable to current plan, continue plan as above. I spent 50 minutes in the professional and overall care of this patient.      Assessment &  Plan  Hyponatremia    Patient fully evaluated 10/3, awake, resting in bed. Patient with Moderate hiatal hernia with partial intrathoracic stomach and the peritoneal fat adjacent to the stomach within the hernia demonstrates evidence of probable carcinomatosis and ascites, Patient tolerated paracentesis on 10/01 well,order placed for repeat paracentesis therapeutic non diagnostic with IR.continue current and repeat labs in the AM. Patient aware and agreeable to current plan, continue plan as above.    Patient fully evaluated on October 4.  Patient awaiting therapeutic paracentesis.  Patient probably will need albumin afterwards.  Continue to monitor response with above treatments recheck labs in AM.  I spent 50 minutes in the professional and overall care of this patient.      Patient fully evaluated 10/06, head of bed elevated, no s/s or c/o acute difficulties at this time. Sister at bedside and agreeable to plan. Attempted therapeutic paracentesis by IR, aborted procedure due to insufficient fluid accumulation.  Medications and labs reviewed, cultures blood no growth at 4 days, AFB Culture Culture in progress and will be examined weekly. A result will be issued either when positive or after 8 weeks incubation. AFB Stain -No acid fast bacilli seen.  Plan discussed with interdisciplinary team, continue current and repeat labs in the AM. Per pulmonary - Day of consult, patient is breathing on room air. Vital stable. CT chest showed bilateral large pleural effusions. Dicussed need for Abdominal Pleurx catheter. Patient with likely carcinomatosis and plan to discharge to SNF - [facility] and will follow up with gynecology in 7 days,     Patient still requiring frequent cardiac and SPO2 monitoring. Discharge planning discussed with patient and care team. Therapy evaluations ordered- [facility], SNF at discharge. Patient aware and agreeable to current plan, continue plan as above. I spent 50 minutes in the  professional and overall care of this patient.    Patient fully evaluated 10/07, head of bed elevated, no s/s or c/o acute difficulties at this time. Medications and labs reviewed, sodium 125, nephrology consulted.  Cultures blood no growth at 4 days, AFB Culture Culture in progress and will be examined weekly. A result will be issued either when positive or after 8 weeks incubation. AFB Stain -No acid fast bacilli seen.  Plan discussed with interdisciplinary team, continue current and repeat labs in the AM, new supplemental oxygen requirements, will repeat chest xray in AM, maintain HOB elevated to alleviate postural dyspnea. Vitals stable. Patient with likely carcinomatosis and plan to discharge to SNF - [facility] and will follow up with gynecology in 7 days, atient still requiring frequent cardiac and SPO2 monitoring. Discharge planning discussed with patient and care team. Therapy evaluations ordered- [place] [id number], [facility] at discharge. Patient aware and agreeable to current plan, continue plan as above. I spent 50 minutes in the professional and overall care of this patient.      Patient fully evaluated 10/08, head of bed elevated, no s/s or c/o acute difficulties at this time. Medications and labs reviewed, sodium low again today at 125, nephrology consulted. Awaiting hepatic fluid analysis -AFB Culture -Culture in progress and will be examined weekly. A result will be issued either when positive or after 8 weeks incubation. AFB Stain -No acid fast bacilli seen.  Plan discussed with interdisciplinary team, per nephrology - Hyponatremia  Acute kidney injury   Ascites  Malnutrition  Anemia  Pleural effusion  Plan;  Discontinue potential nephrotoxins  Nutritional/iron/renal indices/urinary indices  DuoNeb aerosols  Appreciate nephrology input. Patient requiring supplemental oxygen at this time, continue to maintain HOB elevated as tolerated. Patient seen by pulmonology - Consult IR for right sided diagnostic  and therapeutic thoracentesis  Unable to safely perform paracentesis due to lack of fluid  Patient has bilateral pleural effusion in the context of malignant ascites/ovarian cancer - Pleural effusions are secondary to ascites, both of which are due to patient's underlying cancer. Continue with goals of care discussions prior to interventional radiology consult. Continue IV antibiotics zosyn, probiotics added. Continue current plan and repeat labs in the AM, repeat chest xray in AM, maintain HOB elevated to alleviate postural dyspnea. Vitals stable. Patient with likely carcinomatosis and plan to discharge to SNF - [facility] and will follow up with gynecology in 7 days, atient still requiring frequent cardiac and SPO2 monitoring. Discharge planning discussed with patient and care team. Therapy evaluations ordered- [place] [id number], [facility] at discharge. Patient aware and agreeable to current plan, continue plan as above. I spent 50 minutes in the professional and overall care of this patient.    Patient fully evaluated 10/09, patient resting in bed with HOB, no s/s or c/o acute difficulties at this time. Medications and labs reviewed, cultures no growth at 4 days, AFB cultures in process. Plan discussed with interdisciplinary team, pulmonary plan - Bilateral pleural effusion  Abdominal ascites  Ovarian carcinoma w/ peritoneal carcinomatosis carcinomatosis  HLD  Patient clear for discharge to SNF from standpoint of pulmonology  Patient will likely need an abdominal PleurX catheter at some point. However, we were unable to perform safely during this hospitalization due to lack of ascitic fluid  Pleural fluid suggests exudative effusion, likely secondary to malignancy  Okay to discontinue antibiotics from standpoint of pulmonology. Low suspicion of pneumonia. Leukocytosis is unlikely reflective of infection.   Will continue current and repeat labs in the AM. Patient still requiring frequent cardiac and SPO2 monitoring.  Discharge planning discussed with patient and care team. Therapy evaluations ordered- [facility], anticipate SNF at discharge. Patient aware and agreeable to current plan, continue plan as above. I spent 50 minutes in the professional and overall care of this patient.    Patient fully evaluated 10/10, patient resting in bed with HOB, no s/s or c/o acute difficulties at this time. Medications and labs reviewed, cultures no growth at 5 days, AFB cultures in process. Plan discussed with interdisciplinary team, pulmonary plan- Bilateral pleural effusion  Abdominal ascites Ovarian carcinoma w/ peritoneal carcinomatosis carcinomatosis HLD Patient clear for discharge to SNF from standpoint.   Patient will likely need an abdominal PleurX catheter at some point. However, we were unable to perform safely during this hospitalization due to lack of ascitic fluid Pleural fluid suggests exudative effusion, likely secondary to malignancy Okay to discontinue antibiotics from standpoint of pulmonology. Low suspicion of pneumonia. Leukocytosis is unlikely reflective of infection.   Patient's sister at bedside, discussion of plan of care and will follow up with gynecology outpatient, possibly Dr. Jones. Will continue current and repeat labs in the AM. Patient with frequent bowel movements, soft, will hold miralax at this time. Patient still requiring frequent cardiac and SPO2 monitoring. Discharge planning discussed with patient and care team. Therapy evaluations ordered- [facility], anticipate SNF at discharge. Patient aware and agreeable to current plan, continue plan as above. I spent 50 minutes in the professional and overall care of this patient.    Patient fully evaluated 10/11, patient resting in bed with HOB, no s/s or c/o acute difficulties at this time. Medications and labs reviewed, cultures no growth at 5 days, AFB cultures in process, creatinine and Bun continue to rise. Nephrology on the case, appreciate input.  Plan  discussed with interdisciplinary team, pulmonary plan- agree to discontinue antibiotics from standpoint of pulmonology with Low suspicion of pneumonia. Continues to require supplemental oxygen at this time. Leukocytosis is unlikely reflective of infection. Patient pain medications updated based on renal function - will switch Roxicodone. Long discussion with patient's sister and patient plan of care and will follow up with gynecology outpatient, possibly Dr. Jones. Will continue current and repeat labs in the AM. Patient with frequent bowel movements, soft, will hold miralax at this time. Patient still requiring frequent cardiac and SPO2 monitoring. Discharge planning discussed with patient and care team. Therapy evaluations ordered- [facility], anticipate SNF at discharge. Patient aware and agreeable to current plan, continue plan as above.    Patient fully evaluated on October 12 and continues to have significant decline in renal function.  Oncology reconsulted.  I believe the patient is significant third spacing secondary to peritoneal carcinomatosis.  Recheck labs in AM.  Appreciate pulmonary and nephrology consultations.  I spent 50 minutes in the professional and overall care of this patient.    Patient fully evaluated 10/13, head of bed elevated, visible difficulties noted at this time. Medications and labs reviewed-  Cultures-  in process  WBC- 29.6  Hemoglobin- 9.2  Imaging- CT abdomen pelvis wo IV contrast   Impression:    Overall findings are grossly unchanged from 10/01/2024 CT scan.  1. Persistent moderate abdominopelvic ascites with diffuse peritoneal  carcinomatosis/omental caking.  2. 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  Underlying cirrhosis could not be entirely excluded as well.  3. Moderate bilateral pleural effusion with surrounding atelectasis.  Supplemental oxygen requiring at this time.     Goals  of care- long discussion with sister, patient, and interdisciplinary team, patient with worsening renal impairment secondary to  ovarian carcinoma with peritoneal carcinomatosis abdominal distention, ascites requiring frequent drainage was admitted profound weakness tiredness, failure to thrive. Prognosis poor, plan discussed and will focus on comfort on measures at this time, planned call out to gyn/onc doctor tomorrow morning. Addition of methadone for pain management. Patient seen by Dr. Alexandre - The patient is a 79-year-old woman diagnosed with ovarian carcinoma and peritoneal carcinomatosis with ascites in August 2024.  Initially evaluated by GYN oncology and chemotherapy was recommended but due to intercurrent illness and admissions the patient did not/could not receive any treatment.  Currently at SNF and was admitted because of profound weakness tiredness failure to thrive pain and noted to have elevated creatinine.  Physical examination revealed elderly woman in pain requesting assistance.  Performance status is 3-4.  Elevated creatinine at 3.14 mg/dL.  Personally discussed with Dr. Rhodes.  Could consider initial chemotherapy to site to reduce then consider surgery.  However, advanced age, poor performance status, elevated creatinine preclude chemotherapy.  Consider evaluation by GYN oncology.  Meanwhile consider palliative care pain control/symptom control drainage of ascites consider catheter placement to drain fluid.  Consider methadone in view of elevated creatinine.  Thank you for allowing me to participate in care of your patient if you have any questions please feel free to call me.  Will continue current and repeat,  labs in the AM. Patient still requiring frequent cardiac and SPO2 monitoring. Continue aggressive pulmonary hygiene. Discharge planning discussed with patient and care team. Therapy evaluations ordered. [facility]-        , anticipate C/SNF at discharge. Patient aware and agreeable to  current plan, continue plan as above. I spent a total of 50 minutes on the date of the service which included preparing to see the patient, face-to-face patient care, completing clinical documentation, obtaining and/or reviewing separately obtained history, performing a medically appropriate examination, counseling and educating the patient/family/caregiver, ordering medications, tests, or procedures, communicating with other HCPs (not separately reported), independently interpreting results (not separately reported), communicating results to the patient/family/caregiver, and care coordination (not separately reported).     Kim Angulo

## 2024-10-13 NOTE — PROGRESS NOTES
Subjective   Patient laying comfortably, breathing comfortably. Eager to leave hospital.       Objective     Last Recorded Vitals  /56   Pulse 103   Temp 36.3 °C (97.3 °F)   Resp 20   Wt 104 kg (230 lb)   SpO2 93%   Intake/Output last 3 Shifts:    Intake/Output Summary (Last 24 hours) at 10/13/2024 1131  Last data filed at 10/13/2024 0500  Gross per 24 hour   Intake 120 ml   Output --   Net 120 ml       Admission Weight  Weight: 104 kg (230 lb) (10/01/24 0828)    Daily Weight  10/01/24 : 104 kg (230 lb)      Physical Exam  Constitutional:       Appearance: Normal appearance.   HENT:      Head: Normocephalic and atraumatic.      Mouth/Throat:      Mouth: Mucous membranes are moist.   Eyes:      Extraocular Movements: Extraocular movements intact.   Cardiovascular:      Rate and Rhythm: Normal rate and regular rhythm.   Pulmonary:      Effort: Pulmonary effort is normal.      Breath sounds: Normal breath sounds.   Abdominal:      General: Abdomen is flat. There is distension.      Palpations: Abdomen is soft.      Tenderness: There is abdominal tenderness.   Musculoskeletal:      Right lower leg: No edema.      Left lower leg: No edema.   Skin:     General: Skin is warm and dry.   Neurological:      General: No focal deficit present.      Mental Status: She is alert and oriented to person, place, and time.   Psychiatric:         Mood and Affect: Mood normal.           Assessment/Plan       Repeated CAT scan done in the last 24 hours showed:    Bilateral pleural effusion  EUSEBIO  Abdominal ascites  Ovarian carcinoma w/ peritoneal carcinomatosis carcinomatosis  HLD     Plan:  Persistent bilateral pleural effusion in the context of malignancy patient will benefit most likely from Pleurx catheter insertion  Patient is declining renal function which may exacerbate pleural fluid reaccumulation in addition to third spacing  He has localized ascites on the abdominal CT as well, IR was consulted   oxygen demand is down  to low-flow-room air   discussed with RN to reduce furthermore, check PT OT   patient clear for discharge to SNF from standpoint of pulmonology.  EUSEBIO management per nephrology  Patient will likely need an abdominal PleurX catheter at some point. However, we were unable to perform safely during this hospitalization due to lack of ascitic fluid  Pleural fluid suggests exudative effusion, likely secondary to malignancy  Okay to discontinue antibiotics from standpoint of pulmonology. Low suspicion of pneumonia. Leukocytosis is unlikely reflective of infection.         This is a preliminary note, please await attending attestation for a finalized plan.     Skyler Escobar MD  Pulmonary critical care consultant  10/13/24  11:31 AM       STAFF PHYSICIAN NOTE OF PERSONAL INVOLVEMENT IN CARE  As the attending physician, I certify that I personally reviewed the patient's history and personally examined the patient to confirm the physical findings described above, and that I reviewed the relevant imaging studies and available reports.  I also discussed the differential diagnosis and all of the proposed management plans with the patient and individuals accompanying the patient to this visit.  They had the opportunity to ask questions about the proposed management plans and to have those questions answered.

## 2024-10-13 NOTE — PROGRESS NOTES
"  Subjective   Oncology note acknowledge.  Renal chemistries deteriorating for BUN and creatinine 65 and 3.91 today.  Complains of muscular aches and inability to sleep.  CT scan abdomen pelvis without contrast as follows  Patient and family contemplating hospice  IMPRESSION:  Overall findings are grossly unchanged from 10/01/2024 CT scan.  1. Persistent moderate abdominopelvic ascites with diffuse peritoneal  carcinomatosis/omental caking.  2. 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  Underlying cirrhosis could not be entirely excluded as well.  3. Moderate bilateral pleural effusion with surrounding atelectasis.  Objective     Physical Exam  General Appearance; alert oriented  Skin pallor  HEENT; pupils react to light and accommodation  Tender maxillary sinuses  Tongue; well-hydrated  Neck; no jugular vein distention, no thyromegaly, no adenopathy, no bruit  Lungs; bibasilar crackles on expiration  Heart; no rubs no gallops, heart rate is regular  Abdomen; there is tympanic note to percussion with distention no rebound no guarding no bruit  ; no CVA tenderness  Musculoskeletal; decreased muscle tone  1+ edema of the legs  Neurological; no tremors no clonus  Last Recorded Vitals  Blood pressure 110/56, pulse 103, temperature 36.3 °C (97.3 °F), resp. rate 20, height 1.626 m (5' 4\"), weight 104 kg (230 lb), SpO2 93%.  Intake/Output last 3 Shifts:  I/O last 3 completed shifts:  In: 320 (3.1 mL/kg) [P.O.:320]  Out: - (0 mL/kg)   Weight: 104.3 kg     Relevant Results    Results for orders placed or performed during the hospital encounter of 10/01/24 (from the past 24 hour(s))   C. difficile, PCR    Specimen: Stool   Result Value Ref Range    C. difficile, PCR Not Detected Not Detected   CBC   Result Value Ref Range    WBC 28.3 (H) 4.4 - 11.3 x10*3/uL    nRBC 0.0 0.0 - 0.0 /100 WBCs    RBC 3.50 (L) 4.00 - 5.20 x10*6/uL    " Hemoglobin 9.2 (L) 12.0 - 16.0 g/dL    Hematocrit 29.4 (L) 36.0 - 46.0 %    MCV 84 80 - 100 fL    MCH 26.3 26.0 - 34.0 pg    MCHC 31.3 (L) 32.0 - 36.0 g/dL    RDW 17.2 (H) 11.5 - 14.5 %    Platelets 455 (H) 150 - 450 x10*3/uL   Comprehensive metabolic panel   Result Value Ref Range    Glucose 94 74 - 99 mg/dL    Sodium 131 (L) 136 - 145 mmol/L    Potassium 4.0 3.5 - 5.3 mmol/L    Chloride 95 (L) 98 - 107 mmol/L    Bicarbonate 23 21 - 32 mmol/L    Anion Gap 17 10 - 20 mmol/L    Urea Nitrogen 65 (H) 6 - 23 mg/dL    Creatinine 3.94 (H) 0.50 - 1.05 mg/dL    eGFR 11 (L) >60 mL/min/1.73m*2    Calcium 8.1 (L) 8.6 - 10.3 mg/dL    Albumin 2.1 (L) 3.4 - 5.0 g/dL    Alkaline Phosphatase 173 (H) 33 - 136 U/L    Total Protein 4.9 (L) 6.4 - 8.2 g/dL    AST 17 9 - 39 U/L    Bilirubin, Total 0.3 0.0 - 1.2 mg/dL    ALT 6 (L) 7 - 45 U/L                   Assessment/Plan     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      Elder Wells MD

## 2024-10-13 NOTE — CARE PLAN
The patient's goals for the shift include sleep    The clinical goals for the shift include assist with turning    Over the shift, the patient did not make progress toward the following goals. Barriers to progression include ***. Recommendations to address these barriers include ***.

## 2024-10-13 NOTE — CARE PLAN
The patient's goals for the shift include sleep    The clinical goals for the shift include assist with turning  Problem: Skin  Goal: Prevent/minimize sheer/friction injuries  Outcome: Progressing     Problem: Skin  Goal: Promote/optimize nutrition  Outcome: Progressing     Problem: Skin  Goal: Promote skin healing  Outcome: Progressing     Problem: Pain  Goal: Takes deep breaths with improved pain control throughout the shift  Outcome: Progressing     Problem: Chronic Conditions and Co-morbidities  Goal: Patient's chronic conditions and co-morbidity symptoms are monitored and maintained or improved  Outcome: Progressing

## 2024-10-13 NOTE — CARE PLAN
The patient's goals for the shift include sleep    The clinical goals for the shift include assist with turning    Over the shift, the patient did not make progress toward the following goals. Barriers to progression include pain. Recommendations to address these barriers include medicate and reposition as needed.

## 2024-10-14 ENCOUNTER — HOSPITAL ENCOUNTER (INPATIENT)
Facility: HOSPITAL | Age: 79
LOS: 3 days | Discharge: ANOTHER HOSPITAL | End: 2024-10-17
Attending: INTERNAL MEDICINE | Admitting: INTERNAL MEDICINE
Payer: OTHER MISCELLANEOUS

## 2024-10-14 ENCOUNTER — APPOINTMENT (OUTPATIENT)
Dept: RADIOLOGY | Facility: HOSPITAL | Age: 79
DRG: 754 | End: 2024-10-14
Payer: MEDICARE

## 2024-10-14 ENCOUNTER — TELEPHONE (OUTPATIENT)
Dept: GYNECOLOGIC ONCOLOGY | Facility: HOSPITAL | Age: 79
End: 2024-10-14
Payer: MEDICARE

## 2024-10-14 VITALS
TEMPERATURE: 97 F | SYSTOLIC BLOOD PRESSURE: 121 MMHG | HEART RATE: 110 BPM | OXYGEN SATURATION: 95 % | WEIGHT: 230 LBS | DIASTOLIC BLOOD PRESSURE: 59 MMHG | BODY MASS INDEX: 39.27 KG/M2 | RESPIRATION RATE: 16 BRPM | HEIGHT: 64 IN

## 2024-10-14 DIAGNOSIS — E87.1 HYPONATREMIA: Primary | ICD-10-CM

## 2024-10-14 LAB
ALBUMIN SERPL BCP-MCNC: 2.2 G/DL (ref 3.4–5)
ALP SERPL-CCNC: 165 U/L (ref 33–136)
ALT SERPL W P-5'-P-CCNC: 6 U/L (ref 7–45)
ANION GAP SERPL CALC-SCNC: 21 MMOL/L (ref 10–20)
AST SERPL W P-5'-P-CCNC: 17 U/L (ref 9–39)
BILIRUB SERPL-MCNC: 0.3 MG/DL (ref 0–1.2)
BUN SERPL-MCNC: 64 MG/DL (ref 6–23)
CALCIUM SERPL-MCNC: 8 MG/DL (ref 8.6–10.3)
CHLORIDE SERPL-SCNC: 94 MMOL/L (ref 98–107)
CO2 SERPL-SCNC: 22 MMOL/L (ref 21–32)
CREAT SERPL-MCNC: 4.01 MG/DL (ref 0.5–1.05)
EGFRCR SERPLBLD CKD-EPI 2021: 11 ML/MIN/1.73M*2
ERYTHROCYTE [DISTWIDTH] IN BLOOD BY AUTOMATED COUNT: 17.5 % (ref 11.5–14.5)
GLUCOSE SERPL-MCNC: 83 MG/DL (ref 74–99)
HCT VFR BLD AUTO: 30.3 % (ref 36–46)
HGB BLD-MCNC: 9.5 G/DL (ref 12–16)
LAB AP ASR DISCLAIMER: NORMAL
LABORATORY COMMENT REPORT: NORMAL
LABORATORY COMMENT REPORT: NORMAL
MCH RBC QN AUTO: 26.2 PG (ref 26–34)
MCHC RBC AUTO-ENTMCNC: 31.4 G/DL (ref 32–36)
MCV RBC AUTO: 84 FL (ref 80–100)
NRBC BLD-RTO: 0 /100 WBCS (ref 0–0)
PATH REPORT.COMMENTS IMP SPEC: NORMAL
PATH REPORT.FINAL DX SPEC: NORMAL
PATH REPORT.GROSS SPEC: NORMAL
PATH REPORT.RELEVANT HX SPEC: NORMAL
PATH REPORT.TOTAL CANCER: NORMAL
PLATELET # BLD AUTO: 436 X10*3/UL (ref 150–450)
POTASSIUM SERPL-SCNC: 4.3 MMOL/L (ref 3.5–5.3)
PROT SERPL-MCNC: 5 G/DL (ref 6.4–8.2)
RBC # BLD AUTO: 3.62 X10*6/UL (ref 4–5.2)
SODIUM SERPL-SCNC: 133 MMOL/L (ref 136–145)
WBC # BLD AUTO: 29.2 X10*3/UL (ref 4.4–11.3)

## 2024-10-14 PROCEDURE — 2500000004 HC RX 250 GENERAL PHARMACY W/ HCPCS (ALT 636 FOR OP/ED)

## 2024-10-14 PROCEDURE — 0W9G3ZZ DRAINAGE OF PERITONEAL CAVITY, PERCUTANEOUS APPROACH: ICD-10-PCS | Performed by: RADIOLOGY

## 2024-10-14 PROCEDURE — 2500000004 HC RX 250 GENERAL PHARMACY W/ HCPCS (ALT 636 FOR OP/ED): Performed by: INTERNAL MEDICINE

## 2024-10-14 PROCEDURE — 84075 ASSAY ALKALINE PHOSPHATASE: CPT | Performed by: INTERNAL MEDICINE

## 2024-10-14 PROCEDURE — 2500000001 HC RX 250 WO HCPCS SELF ADMINISTERED DRUGS (ALT 637 FOR MEDICARE OP): Performed by: NURSE PRACTITIONER

## 2024-10-14 PROCEDURE — S0109 METHADONE ORAL 5MG: HCPCS | Performed by: INTERNAL MEDICINE

## 2024-10-14 PROCEDURE — 2500000002 HC RX 250 W HCPCS SELF ADMINISTERED DRUGS (ALT 637 FOR MEDICARE OP, ALT 636 FOR OP/ED): Performed by: INTERNAL MEDICINE

## 2024-10-14 PROCEDURE — 2500000005 HC RX 250 GENERAL PHARMACY W/O HCPCS: Performed by: NURSE PRACTITIONER

## 2024-10-14 PROCEDURE — 1150000001 HC HOSPICE PRIVATE ROOM DAILY

## 2024-10-14 PROCEDURE — 2500000004 HC RX 250 GENERAL PHARMACY W/ HCPCS (ALT 636 FOR OP/ED): Performed by: NURSE PRACTITIONER

## 2024-10-14 PROCEDURE — 2500000001 HC RX 250 WO HCPCS SELF ADMINISTERED DRUGS (ALT 637 FOR MEDICARE OP): Performed by: INTERNAL MEDICINE

## 2024-10-14 PROCEDURE — 2500000004 HC RX 250 GENERAL PHARMACY W/ HCPCS (ALT 636 FOR OP/ED): Performed by: RADIOLOGY

## 2024-10-14 PROCEDURE — 36415 COLL VENOUS BLD VENIPUNCTURE: CPT | Performed by: INTERNAL MEDICINE

## 2024-10-14 PROCEDURE — 49083 ABD PARACENTESIS W/IMAGING: CPT

## 2024-10-14 PROCEDURE — 2500000005 HC RX 250 GENERAL PHARMACY W/O HCPCS: Performed by: INTERNAL MEDICINE

## 2024-10-14 PROCEDURE — 85027 COMPLETE CBC AUTOMATED: CPT | Performed by: INTERNAL MEDICINE

## 2024-10-14 RX ORDER — TEMAZEPAM 7.5 MG/1
7.5 CAPSULE ORAL NIGHTLY PRN
Status: ON HOLD
Start: 2024-10-14

## 2024-10-14 RX ORDER — TORSEMIDE 10 MG/1
50 TABLET ORAL DAILY
Status: ON HOLD
Start: 2024-10-15

## 2024-10-14 RX ORDER — HALOPERIDOL 5 MG/ML
1 INJECTION INTRAMUSCULAR EVERY 4 HOURS PRN
Status: DISCONTINUED | OUTPATIENT
Start: 2024-10-14 | End: 2024-10-17 | Stop reason: HOSPADM

## 2024-10-14 RX ORDER — IPRATROPIUM BROMIDE AND ALBUTEROL SULFATE 2.5; .5 MG/3ML; MG/3ML
3 SOLUTION RESPIRATORY (INHALATION) EVERY 2 HOUR PRN
Qty: 180 ML | Refills: 11 | Status: ON HOLD | OUTPATIENT
Start: 2024-10-14

## 2024-10-14 RX ORDER — TORSEMIDE 20 MG/1
50 TABLET ORAL DAILY
Status: DISCONTINUED | OUTPATIENT
Start: 2024-10-14 | End: 2024-10-14 | Stop reason: HOSPADM

## 2024-10-14 RX ORDER — GLYCOPYRROLATE 0.2 MG/ML
0.2 INJECTION INTRAMUSCULAR; INTRAVENOUS EVERY 4 HOURS PRN
Status: DISCONTINUED | OUTPATIENT
Start: 2024-10-14 | End: 2024-10-17 | Stop reason: HOSPADM

## 2024-10-14 RX ORDER — HYDROMORPHONE HYDROCHLORIDE 0.2 MG/ML
0.2 INJECTION INTRAMUSCULAR; INTRAVENOUS; SUBCUTANEOUS
Status: DISCONTINUED | OUTPATIENT
Start: 2024-10-14 | End: 2024-10-17 | Stop reason: HOSPADM

## 2024-10-14 RX ORDER — METHADONE HYDROCHLORIDE 5 MG/5ML
5 SOLUTION ORAL EVERY 12 HOURS
Status: ON HOLD
Start: 2024-10-15 | End: 2024-10-17

## 2024-10-14 RX ORDER — LORAZEPAM 2 MG/ML
0.5 INJECTION INTRAMUSCULAR EVERY 4 HOURS PRN
Status: DISCONTINUED | OUTPATIENT
Start: 2024-10-14 | End: 2024-10-17 | Stop reason: HOSPADM

## 2024-10-14 RX ORDER — CETIRIZINE HYDROCHLORIDE 10 MG/1
10 TABLET ORAL DAILY
Status: ON HOLD
Start: 2024-10-15 | End: 2024-11-14

## 2024-10-14 RX ORDER — CYCLOBENZAPRINE HCL 5 MG
5 TABLET ORAL 3 TIMES DAILY
Status: ON HOLD
Start: 2024-10-14

## 2024-10-14 RX ORDER — HYDROMORPHONE HYDROCHLORIDE 0.2 MG/ML
0.2 INJECTION INTRAMUSCULAR; INTRAVENOUS; SUBCUTANEOUS
Status: DISCONTINUED | OUTPATIENT
Start: 2024-10-14 | End: 2024-10-16

## 2024-10-14 RX ORDER — LIDOCAINE HYDROCHLORIDE 10 MG/ML
INJECTION, SOLUTION EPIDURAL; INFILTRATION; INTRACAUDAL; PERINEURAL
Status: COMPLETED | OUTPATIENT
Start: 2024-10-14 | End: 2024-10-14

## 2024-10-14 RX ORDER — ACETAMINOPHEN 325 MG/1
650 TABLET ORAL EVERY 6 HOURS PRN
Qty: 30 TABLET | Refills: 0 | Status: ON HOLD | OUTPATIENT
Start: 2024-10-14

## 2024-10-14 ASSESSMENT — PAIN - FUNCTIONAL ASSESSMENT
PAIN_FUNCTIONAL_ASSESSMENT: 0-10

## 2024-10-14 ASSESSMENT — COGNITIVE AND FUNCTIONAL STATUS - GENERAL
TOILETING: TOTAL
MOVING TO AND FROM BED TO CHAIR: TOTAL
DRESSING REGULAR LOWER BODY CLOTHING: TOTAL
PERSONAL GROOMING: TOTAL
MOVING TO AND FROM BED TO CHAIR: TOTAL
WALKING IN HOSPITAL ROOM: TOTAL
MOBILITY SCORE: 6
DAILY ACTIVITIY SCORE: 6
DRESSING REGULAR UPPER BODY CLOTHING: TOTAL
WALKING IN HOSPITAL ROOM: TOTAL
TURNING FROM BACK TO SIDE WHILE IN FLAT BAD: TOTAL
DRESSING REGULAR UPPER BODY CLOTHING: TOTAL
DAILY ACTIVITIY SCORE: 6
EATING MEALS: TOTAL
CLIMB 3 TO 5 STEPS WITH RAILING: TOTAL
PERSONAL GROOMING: TOTAL
EATING MEALS: TOTAL
STANDING UP FROM CHAIR USING ARMS: TOTAL
TOILETING: TOTAL
STANDING UP FROM CHAIR USING ARMS: TOTAL
DRESSING REGULAR LOWER BODY CLOTHING: TOTAL
HELP NEEDED FOR BATHING: TOTAL
HELP NEEDED FOR BATHING: TOTAL
MOBILITY SCORE: 6
TURNING FROM BACK TO SIDE WHILE IN FLAT BAD: TOTAL
CLIMB 3 TO 5 STEPS WITH RAILING: TOTAL
MOVING FROM LYING ON BACK TO SITTING ON SIDE OF FLAT BED WITH BEDRAILS: TOTAL
MOVING FROM LYING ON BACK TO SITTING ON SIDE OF FLAT BED WITH BEDRAILS: TOTAL

## 2024-10-14 ASSESSMENT — PAIN DESCRIPTION - LOCATION: LOCATION: ABDOMEN

## 2024-10-14 ASSESSMENT — PAIN SCALES - GENERAL
PAINLEVEL_OUTOF10: 0 - NO PAIN
PAINLEVEL_OUTOF10: 10 - WORST POSSIBLE PAIN
PAINLEVEL_OUTOF10: 0 - NO PAIN
PAINLEVEL_OUTOF10: 8
PAINLEVEL_OUTOF10: 7
PAINLEVEL_OUTOF10: 0 - NO PAIN
PAINLEVEL_OUTOF10: 0 - NO PAIN
PAINLEVEL_OUTOF10: 9

## 2024-10-14 ASSESSMENT — PAIN SCALES - WONG BAKER: WONGBAKER_NUMERICALRESPONSE: HURTS WHOLE LOT

## 2024-10-14 ASSESSMENT — PAIN DESCRIPTION - DESCRIPTORS: DESCRIPTORS: ACHING

## 2024-10-14 NOTE — PROGRESS NOTES
SW notified by [facility] that consents were signed and plan is to make patient GIP at the hospital for pain control. Care Transition team to follow and assist as needed. KRISTEN Kat

## 2024-10-14 NOTE — PROGRESS NOTES
Physical Therapy                 Therapy Communication Note    Patient Name: Rohini Beaver  MRN: [id number]  Department: [facility] 9  Room: [address] [place] [address]  Today's Date: 10/14/2024     Discipline: Physical Therapy    Missed Visit Reason: Missed Visit Reason: Patient in a medical procedure    Missed Time: Attempt    Comment:Attempted @1326 patient heading to procedure at this time. No PT services given. Will continue to see patient per POC as medically able and appropriate.

## 2024-10-14 NOTE — CARE PLAN
The patient's goals for the shift include sleep    The clinical goals for the shift include make comfortable    Over the shift, the patient did not make progress toward the following goals. Barriers to progression include ***. Recommendations to address these barriers include ***.

## 2024-10-14 NOTE — PROCEDURES
Interventional Radiology Brief Postprocedure Note    Attending: Lynette Vargas MD    Assistant:   Staff Role   Dalia Coello, RN Radiology Nurse   Lynette Vargas MD Radiologist       Diagnosis:   1. Hyponatremia  AFB Culture/Smear    AFB Culture/Smear      2. Abdominal fluid collection  AFB Culture/Smear    AFB Culture/Smear      3. Malignant ascites (CMS-HCC)  AFB Culture/Smear    AFB Culture/Smear      4. Pleural effusion  AFB Culture/Smear    AFB Culture/Smear          Description of procedure: US guided abdominal paracentesis    Timeout:  Yes    Procedure Area: Procedure Area     Anesthesia:   Local/Topical    Complications: None    Estimated Blood Loss: none    Medications (Filter: Administrations occurring from 1412 to 1412 on 10/14/24) As of 10/14/24 1412      None          No specimens collected      See detailed result report with images in PACS.    The patient tolerated the procedure well without incident or complication and is in stable condition.

## 2024-10-14 NOTE — PROGRESS NOTES
"This MSW and MSW/TCC, Erica DIXON Met with patient's sister , Eirca briefly about long term care and discharge prior to patient returning from being off the floor. Patient's sister indicated she was exploring Hospice care and questioned if she was able to manage patient's care at home due to her own physical limitations. [facility] arrived to the room and indicated she was already talking with patient's sister prior to MSW team arriving. Pastoral care also arrived from [facility] facilitated by patient's sister. This MSW team provided education and support regarding Hospice care and offered additional support and assistance as needed. Floor TCC indicated that attending physician had requested [facility] and outreached to complete a referral. This MSW team completed referral process to [facility] per patient/CG choice. Patient's sister reported there is Medicare plan \"F \"in place per sister which reportedly covers \"everything\". Patient's sister provided with contact number for MSW team for future support as needed.   "

## 2024-10-14 NOTE — PROGRESS NOTES
"  Subjective    She refers complaints of Knee pain;  And difficulty moving her right and left knee    Objective     Physical Exam  General Appearance; alert oriented  Skin pallor  HEENT; pupils react to light and accommodation  Tender maxillary sinuses  Tongue; well-hydrated  Neck; no jugular vein distention, no thyromegaly, no adenopathy, no bruit  Lungs; bibasilar crackles on expiration  Heart; no rubs no gallops, heart rate is regular  Abdomen; there is tympanic note to percussion with distention no rebound no guarding no bruit  ; no CVA tenderness  Musculoskeletal; decreased muscle tone  1+ edema of the legs  Knees on physical examination is close edema, bursal tenderness and decreased range of motion arthralgias  Neurological; no tremors no clonus  Last Recorded Vitals  Blood pressure 94/54, pulse 102, temperature 35.6 °C (96.1 °F), resp. rate 20, height 1.626 m (5' 4\"), weight 104 kg (230 lb), SpO2 96%.  Intake/Output last 3 Shifts:  I/O last 3 completed shifts:  In: 360 (3.5 mL/kg) [P.O.:360]  Out: 200 (1.9 mL/kg) [Urine:200 (0.1 mL/kg/hr)]  Weight: 104.3 kg     Relevant Results    Results for orders placed or performed during the hospital encounter of 10/01/24 (from the past 24 hour(s))   Blood Culture    Specimen: Peripheral Venipuncture; Blood culture   Result Value Ref Range    Blood Culture Loaded on Instrument - Culture in progress    CBC and Auto Differential   Result Value Ref Range    WBC 29.6 (H) 4.4 - 11.3 x10*3/uL    nRBC 0.0 0.0 - 0.0 /100 WBCs    RBC 3.53 (L) 4.00 - 5.20 x10*6/uL    Hemoglobin 9.2 (L) 12.0 - 16.0 g/dL    Hematocrit 29.8 (L) 36.0 - 46.0 %    MCV 84 80 - 100 fL    MCH 26.1 26.0 - 34.0 pg    MCHC 30.9 (L) 32.0 - 36.0 g/dL    RDW 17.4 (H) 11.5 - 14.5 %    Platelets 447 150 - 450 x10*3/uL    Neutrophils % 87.9 40.0 - 80.0 %    Immature Granulocytes %, Automated 1.8 (H) 0.0 - 0.9 %    Lymphocytes % 4.0 13.0 - 44.0 %    Monocytes % 4.2 2.0 - 10.0 %    Eosinophils % 1.7 0.0 - 6.0 %    " Basophils % 0.4 0.0 - 2.0 %    Neutrophils Absolute 26.02 (H) 1.60 - 5.50 x10*3/uL    Immature Granulocytes Absolute, Automated 0.53 (H) 0.00 - 0.50 x10*3/uL    Lymphocytes Absolute 1.18 0.80 - 3.00 x10*3/uL    Monocytes Absolute 1.24 (H) 0.05 - 0.80 x10*3/uL    Eosinophils Absolute 0.51 (H) 0.00 - 0.40 x10*3/uL    Basophils Absolute 0.13 (H) 0.00 - 0.10 x10*3/uL   Comprehensive Metabolic Panel   Result Value Ref Range    Glucose 104 (H) 74 - 99 mg/dL    Sodium 130 (L) 136 - 145 mmol/L    Potassium 4.0 3.5 - 5.3 mmol/L    Chloride 94 (L) 98 - 107 mmol/L    Bicarbonate 23 21 - 32 mmol/L    Anion Gap 17 10 - 20 mmol/L    Urea Nitrogen 64 (H) 6 - 23 mg/dL    Creatinine 3.98 (H) 0.50 - 1.05 mg/dL    eGFR 11 (L) >60 mL/min/1.73m*2    Calcium 8.0 (L) 8.6 - 10.3 mg/dL    Albumin 2.1 (L) 3.4 - 5.0 g/dL    Alkaline Phosphatase 163 (H) 33 - 136 U/L    Total Protein 4.9 (L) 6.4 - 8.2 g/dL    AST 17 9 - 39 U/L    Bilirubin, Total 0.3 0.0 - 1.2 mg/dL    ALT 6 (L) 7 - 45 U/L   Lactate   Result Value Ref Range    Lactate 1.1 0.4 - 2.0 mmol/L   Blood Culture    Specimen: Peripheral Venipuncture; Blood culture   Result Value Ref Range    Blood Culture Loaded on Instrument - Culture in progress    Blood Culture    Specimen: Peripheral Venipuncture; Blood culture   Result Value Ref Range    Blood Culture Loaded on Instrument - Culture in progress    Morphology   Result Value Ref Range    RBC Morphology See Below     Polychromasia Mild     Ovalocytes Few    CBC   Result Value Ref Range    WBC 29.2 (H) 4.4 - 11.3 x10*3/uL    nRBC 0.0 0.0 - 0.0 /100 WBCs    RBC 3.62 (L) 4.00 - 5.20 x10*6/uL    Hemoglobin 9.5 (L) 12.0 - 16.0 g/dL    Hematocrit 30.3 (L) 36.0 - 46.0 %    MCV 84 80 - 100 fL    MCH 26.2 26.0 - 34.0 pg    MCHC 31.4 (L) 32.0 - 36.0 g/dL    RDW 17.5 (H) 11.5 - 14.5 %    Platelets 436 150 - 450 x10*3/uL   Comprehensive metabolic panel   Result Value Ref Range    Glucose 83 74 - 99 mg/dL    Sodium 133 (L) 136 - 145 mmol/L     Potassium 4.3 3.5 - 5.3 mmol/L    Chloride 94 (L) 98 - 107 mmol/L    Bicarbonate 22 21 - 32 mmol/L    Anion Gap 21 (H) 10 - 20 mmol/L    Urea Nitrogen 64 (H) 6 - 23 mg/dL    Creatinine 4.01 (H) 0.50 - 1.05 mg/dL    eGFR 11 (L) >60 mL/min/1.73m*2    Calcium 8.0 (L) 8.6 - 10.3 mg/dL    Albumin 2.2 (L) 3.4 - 5.0 g/dL    Alkaline Phosphatase 165 (H) 33 - 136 U/L    Total Protein 5.0 (L) 6.4 - 8.2 g/dL    AST 17 9 - 39 U/L    Bilirubin, Total 0.3 0.0 - 1.2 mg/dL    ALT 6 (L) 7 - 45 U/L                   Assessment/Plan   Arthralgias  Knee bursitis  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      Elder Wells MD

## 2024-10-14 NOTE — DISCHARGE SUMMARY
Discharge Diagnosis  Hyponatremia    Issues Requiring Follow-Up  Patient fully evaluated 10/14/24 for   1. Hyponatremia  AFB Culture/Smear    AFB Culture/Smear      2. Abdominal fluid collection  AFB Culture/Smear    AFB Culture/Smear      3. Malignant ascites (CMS-HCC)  AFB Culture/Smear    AFB Culture/Smear      4. Pleural effusion  AFB Culture/Smear    AFB Culture/Smear      Call placed to GYN/ONC Dr. Villalba this AM with long discussion on patient, treatment options, and goals of care. Patient and sister aware of poor prognosis and bleak outcome. Patient with no significant clinical improvement noted, patient medically cleared for discharge today to [facility]. Patient seen resting in bed with head of bed elevated, no s/s or c/o acute difficulties at this time. Medications and labs reviewed, will begin comfort care medications and transition to hospice.    Results for orders placed or performed during the hospital encounter of 10/01/24 (from the past 24 hour(s))   CBC   Result Value Ref Range    WBC 29.2 (H) 4.4 - 11.3 x10*3/uL    nRBC 0.0 0.0 - 0.0 /100 WBCs    RBC 3.62 (L) 4.00 - 5.20 x10*6/uL    Hemoglobin 9.5 (L) 12.0 - 16.0 g/dL    Hematocrit 30.3 (L) 36.0 - 46.0 %    MCV 84 80 - 100 fL    MCH 26.2 26.0 - 34.0 pg    MCHC 31.4 (L) 32.0 - 36.0 g/dL    RDW 17.5 (H) 11.5 - 14.5 %    Platelets 436 150 - 450 x10*3/uL   Comprehensive metabolic panel   Result Value Ref Range    Glucose 83 74 - 99 mg/dL    Sodium 133 (L) 136 - 145 mmol/L    Potassium 4.3 3.5 - 5.3 mmol/L    Chloride 94 (L) 98 - 107 mmol/L    Bicarbonate 22 21 - 32 mmol/L    Anion Gap 21 (H) 10 - 20 mmol/L    Urea Nitrogen 64 (H) 6 - 23 mg/dL    Creatinine 4.01 (H) 0.50 - 1.05 mg/dL    eGFR 11 (L) >60 mL/min/1.73m*2    Calcium 8.0 (L) 8.6 - 10.3 mg/dL    Albumin 2.2 (L) 3.4 - 5.0 g/dL    Alkaline Phosphatase 165 (H) 33 - 136 U/L    Total Protein 5.0 (L) 6.4 - 8.2 g/dL    AST 17 9 - 39 U/L    Bilirubin, Total 0.3 0.0 - 1.2 mg/dL    ALT 6 (L) 7  - 45 U/L       Intake/Output this shift:  No intake/output data recorded.    Vital signs for last 24 hours:  Temp:  [35.6 °C (96.1 °F)-36.8 °C (98.2 °F)] 35.9 °C (96.6 °F)  Heart Rate:  [] 110  Resp:  [16-20] 16  BP: ()/(41-78) 107/43      Plan discussed with interdisciplinary team, ok to discharge to [facility]. Patient aware and agreeable to current plan, continue plan as above. I spent 30 minutes on the date of the service which included preparing to see the patient, face-to-face patient care, completing clinical documentation, obtaining and/or reviewing separately obtained history, performing a medically appropriate examination, counseling and educating the patient/family/caregiver, ordering medications, tests, or procedures, communicating with other HCPs (not separately reported), independently interpreting results (not separately reported), communicating results to the patient/family/caregiver, and care coordination (not separately reported)    Discharge Meds     Medication List      ASK your doctor about these medications     ascorbic acid 500 mg tablet; Commonly known as: Vitamin C   Calcium 600 + D(3) 600 mg-5 mcg (200 unit) tablet; Generic drug: calcium   carbonate-vitamin D3   docusate sodium 100 mg capsule; Commonly known as: Colace; Ask about:   Which instructions should I use?   DULoxetine 30 mg DR capsule; Commonly known as: Cymbalta   multivitamin with minerals tablet   ondansetron 8 mg tablet; Commonly known as: Zofran   oxyCODONE-acetaminophen 5-325 mg tablet; Commonly known as: Percocet;   Take 1 tablet by mouth every 4 hours if needed for severe pain (7 - 10).   pantoprazole 40 mg EC tablet; Commonly known as: ProtoNix   polyethylene glycol 17 gram packet; Commonly known as: Glycolax, Miralax   prednisoLONE acetate 1 % ophthalmic suspension; Commonly known as:   Pred-Forte   Zocor 20 mg tablet; Generic drug: simvastatin       Test Results Pending At Discharge  Pending Labs        Order Current Status    Body Fluid Cell Count With Differential In process    AFB Culture/Smear Preliminary result    Blood Culture Preliminary result    Blood Culture Preliminary result    Blood Culture Preliminary result            Hospital Course         Jd Rhodes MD   Physician  Internal Medicine     Progress Notes      Signed     Date of Service: 10/13/2024  2:52 PM     Signed       Expand All Collapse All    Rohini Beaver is a 79 y.o. female on day 12 of admission presenting with Hyponatremia.           Subjective     Patient fully evaluated 10/3, awake, resting in bed. Patient with Moderate hiatal hernia with partial intrathoracic stomach and the peritoneal fat adjacent to the stomach within the hernia demonstrates evidence of probable carcinomatosis and ascites, Patient tolerated paracentesis on 10/01 well,order placed for repeat paracentesis therapeutic non diagnostic with IR.continue current and repeat labs in the AM. Patient aware and agreeable to current plan, continue plan as above. I spent 50 minutes in the professional and overall care of this patient.      Objective  Last Recorded Vitals  /56   Pulse 103   Temp 36.3 °C (97.3 °F)   Resp 20   Wt 104 kg (230 lb)   SpO2 93%   Intake/Output last 3 Shifts:     Intake/Output Summary (Last 24 hours) at 10/13/2024 1452  Last data filed at 10/13/2024 0500      Gross per 24 hour   Intake 120 ml   Output --   Net 120 ml         Admission Weight  Weight: 104 kg (230 lb) (10/01/24 0828)     Daily Weight  10/01/24 : 104 kg (230 lb)     Image Results  CT abdomen pelvis wo IV contrast  Narrative: Interpreted By:  Deep Scott,   STUDY:  CT ABDOMEN PELVIS WO IV CONTRAST;  10/12/2024 1:30 pm      INDICATION:  Signs/Symptoms:Ascites /Abdominal Distention/Renal Failure.          COMPARISON:  CT scan 10/01/2024.      ACCESSION NUMBER(S):  [id number]      ORDERING CLINICIAN:  ALICIA GRAHAM      TECHNIQUE:  CT of the abdomen and pelvis was  performed. Contiguous axial images  were obtained at 3 mm slice thickness through the abdomen and pelvis.  Coronal and sagittal reconstructions at 3 mm slice thickness were  performed.  No intravenous contrast was administered; positive oral  contrast was given.      FINDINGS:  Please note that the evaluation of vessels, lymph nodes and organs is  limited without intravenous contrast.      LOWER CHEST:  Small to moderate bilateral pleural effusion with surrounding  atelectasis.      ABDOMEN:      LIVER:  Nodular surface with a scalloping of the right hepatic capsule  secondary to the adjacent malignant ascites. No definite parenchymal  lesions.      BILE DUCTS:  The intrahepatic and extrahepatic ducts are not dilated.      GALLBLADDER:  Surgically absent      PANCREAS:  No duct dilatation      SPLEEN:  No splenomegaly.      ADRENAL GLANDS:  No nodule      KIDNEYS AND URETERS:  The kidneys are normal in size and unremarkable in appearance.  No  hydroureteronephrosis or nephroureterolithiasis is identified.      PELVIS:      BLADDER:  Unremarkable      REPRODUCTIVE ORGANS:  Uterus is unremarkable      BOWEL:  Moderate hiatal hernia. No bowel dilatation. Few uncomplicated  colonic diverticulosis.      Moderate abdominopelvic ascites. Diffuse omental caking and  peritoneal carcinomatosis.      VESSELS:  Multifocal vascular calcifications and atherosclerotic changes.      PERITONEUM/RETROPERITONEUM/LYMPH NODES:  Multiple subcentimeter retroperitoneal, iliac and pelvic lymph nodes.      ABDOMINAL WALL:  Subcutaneous edema.      BONES:  Multilevel degenerative spine changes and facet joint disease. Prior  lower lumbar spine fixation.      Impression: Overall findings are grossly unchanged from 10/01/2024 CT scan.  1. Persistent moderate abdominopelvic ascites with diffuse peritoneal  carcinomatosis/omental caking.  2. 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  Underlying cirrhosis could not be entirely excluded as well.  3. Moderate bilateral pleural effusion with surrounding atelectasis.          MACRO:  None      Signed by: Deep Scott 10/13/2024 7:50 AM  Dictation workstation:   [id number]        Physical Exam     Relevant Results                       Assessment/Plan  This patient currently has cardiac telemetry ordered; if you would like to modify or discontinue the telemetry order, click hereto go to the orders activity to modify/discontinue the order.           Jd Rhodes MD   Physician  Internal Medicine     H&P      Addendum     Date of Service: 10/1/2024  2:54 PM      Addendum        Expand All Collapse All    History Of Present Illness  Rohini Beaver is a 79-year-old female with past medical history of recently diagnosed ovarian cancer with peritoneal carcinomatosis s/p paracentesis x 4 (last one done 9/19/24), obesity, hyperlipidemia, osteoarthritis, and skin cancer s/p Mohs procedure.  Patient presents to the hospital with weakness and inability to care for herself.  She reports that she was at [facility] skilled nursing facility, [facility] and upon discharge decided to go home rather than go back to [facility].  She lives with her 86-year-old sister and has a couple friends who assist with appointments, however limited support system.  Sister unable to care for due to age.  Today she was in urgent reclining chair and was able to get up.  ROS additionally positive for cold sweats, distended and tender abdomen, edema, pressure injury to coccyx/gluteal fold, and decreased activity tolerance.  Denies history of heart disease.  She reports that she is scheduled for outpatient appointment with her oncologist tomorrow to determine ongoing plan.      ER course: Hemodynamically stable, afebrile, SpO2 90s on room air.  WBC 28.9, Hgb 11.0/Hct34.1 (Hgb 15.1 4/4/2023),  platelets 446. Glucose 107, Sodium 126, Chloride 94, calcium 8.5, albumin 2.4, alkaline phosphatase 231.  Lactate 1.4.  BNP 74.  High-sensitivity troponin 7.  UA unremarkable. CT chest showed moderate to large bilateral pleural effusion with adjacent presumed compressive atelectasis, prominent main pulmonary artery which may indicate pulmonary hypertension, mildly prominent mediastinal lymph nodes measuring up to 10 mm in short axis concerning for metastatic disease.  Moderate hiatal hernia with partial intrathoracic stomach and the peritoneal fat adjacent to the stomach within the hernia demonstrates evidence of probable carcinomatosis and ascites.  Redemonstration of large volume ascites with regions of significant anterior omental caking and peritoneal carcinomatosis commensurate with patient's provided diagnosis of ovarian cancer. Regions of wall thickening of the colon extending from the splenic flexure to the sigmoid.  Extensive region of scalloping of the right hepatic lobe peripherally with appearance of large subcapsular collection along the right hepatic margin. Blood culture in process     Past medical history: As above  Past surgical history: Cholecystectomy, lumbar laminectomy, left shoulder arthroscopy, right total knee replacement, corneal transplant  Social history: No history of smoking, alcohol abuse, illicit drug use.  Lives with her elderly sister who is 86 years old.  Limited support system.   Family history: Mother-cancer (unknown type)     Past Medical History  Medical History           Past Medical History:   Diagnosis Date    Bilateral primary osteoarthritis of knee      HLD (hyperlipidemia)      Lumbosacral radiculopathy      Meralgia paraesthetica      Physical deconditioning              Surgical History  Surgical History             Past Surgical History:   Procedure Laterality Date    ARTHROPLASTY Left       Left thumb carpometacarpal arthroplasty with ligament reconstruction and tendon  interposition    BREAST BIOPSY   1999     Benign    CHOLECYSTECTOMY        COLONOSCOPY        CORNEAL TRANSPLANT        DILATION AND CURETTAGE OF UTERUS        LUMBAR FUSION        SHOULDER ARTHROSCOPY Left      SKIN LESION EXCISION        TOTAL KNEE ARTHROPLASTY Left 2019    TOTAL KNEE ARTHROPLASTY Right 2017    TRIGGER FINGER RELEASE Right              Social History  She reports that she has never smoked. She has never used smokeless tobacco. She reports that she does not currently use alcohol. She reports that she does not use drugs.     Family History  Family History               Family History   Problem Relation Name Age of Onset    Colon cancer Mother        Lung cancer Father        Other (Mesothelioma) Brother        Brain cancer Mother's Brother                Allergies  Patient has no known allergies.     Review of Systems     10 point ROS negative except as noted above in HPI      Physical Exam  Vitals reviewed.   Constitutional:       General: She is awake. She is not in acute distress.     Appearance: She is obese. She is ill-appearing. She is not toxic-appearing.   HENT:      Head: Normocephalic and atraumatic.      Nose: Nose normal.      Mouth/Throat:      Mouth: Mucous membranes are moist.      Pharynx: Oropharynx is clear.   Eyes:      Conjunctiva/sclera: Conjunctivae normal.   Cardiovascular:      Rate and Rhythm: Normal rate and regular rhythm.      Pulses: Normal pulses.      Heart sounds: No murmur heard.  Pulmonary:      Effort: Pulmonary effort is normal. No respiratory distress.      Breath sounds: Decreased air movement present. Decreased breath sounds present.      Comments: Posterior bilateral breath sounds are diminished  Abdominal:      General: There is distension.      Palpations: Abdomen is soft.      Tenderness: There is abdominal tenderness. There is no guarding.      Comments: Bowel sounds hypoactive, abdomen distended, tender to palpation, dullness noted to the sides.    Musculoskeletal:         General: No swelling, deformity or signs of injury. Normal range of motion.      Cervical back: Neck supple.      Right lower leg: Edema present.      Left lower leg: Edema present.      Comments: Generalized edema/anasarca   Skin:     General: Skin is warm and dry.      Capillary Refill: Capillary refill takes less than 2 seconds.      Findings: No ecchymosis or wound.      Comments: Wound on coccyx, exam deferred  due to patient discomfort    Neurological:      General: No focal deficit present.      Mental Status: She is alert and oriented to person, place, and time.   Psychiatric:         Mood and Affect: Mood normal.         Behavior: Behavior is cooperative.                  Last Recorded Vitals  Blood pressure 117/58, pulse 100, temperature 36.2 °C (97.2 °F), temperature source Temporal, resp. rate (!) 22, weight 104 kg (230 lb), SpO2 94%.     Relevant Results                  Results for orders placed or performed during the hospital encounter of 10/01/24 (from the past 24 hour(s))   CBC and Auto Differential   Result Value Ref Range     WBC 28.9 (H) 4.4 - 11.3 x10*3/uL     nRBC 0.0 0.0 - 0.0 /100 WBCs     RBC 4.15 4.00 - 5.20 x10*6/uL     Hemoglobin 11.0 (L) 12.0 - 16.0 g/dL     Hematocrit 34.1 (L) 36.0 - 46.0 %     MCV 82 80 - 100 fL     MCH 26.5 26.0 - 34.0 pg     MCHC 32.3 32.0 - 36.0 g/dL     RDW 15.7 (H) 11.5 - 14.5 %     Platelets 446 150 - 450 x10*3/uL     Neutrophils % 89.1 40.0 - 80.0 %     Immature Granulocytes %, Automated 1.0 (H) 0.0 - 0.9 %     Lymphocytes % 4.3 13.0 - 44.0 %     Monocytes % 4.8 2.0 - 10.0 %     Eosinophils % 0.5 0.0 - 6.0 %     Basophils % 0.3 0.0 - 2.0 %     Neutrophils Absolute 25.74 (H) 1.60 - 5.50 x10*3/uL     Immature Granulocytes Absolute, Automated 0.30 0.00 - 0.50 x10*3/uL     Lymphocytes Absolute 1.23 0.80 - 3.00 x10*3/uL     Monocytes Absolute 1.38 (H) 0.05 - 0.80 x10*3/uL     Eosinophils Absolute 0.14 0.00 - 0.40 x10*3/uL     Basophils  Absolute 0.09 0.00 - 0.10 x10*3/uL   Comprehensive metabolic panel   Result Value Ref Range     Glucose 107 (H) 74 - 99 mg/dL     Sodium 126 (L) 136 - 145 mmol/L     Potassium 5.2 3.5 - 5.3 mmol/L     Chloride 94 (L) 98 - 107 mmol/L     Bicarbonate 24 21 - 32 mmol/L     Anion Gap 13 10 - 20 mmol/L     Urea Nitrogen 18 6 - 23 mg/dL     Creatinine 0.61 0.50 - 1.05 mg/dL     eGFR >90 >60 mL/min/1.73m*2     Calcium 8.5 (L) 8.6 - 10.3 mg/dL     Albumin 2.4 (L) 3.4 - 5.0 g/dL     Alkaline Phosphatase 231 (H) 33 - 136 U/L     Total Protein 5.7 (L) 6.4 - 8.2 g/dL     AST 23 9 - 39 U/L     Bilirubin, Total 0.3 0.0 - 1.2 mg/dL     ALT 8 7 - 45 U/L   Magnesium   Result Value Ref Range     Magnesium 1.68 1.60 - 2.40 mg/dL   Troponin I, High Sensitivity   Result Value Ref Range     Troponin I, High Sensitivity 7 0 - 13 ng/L   B-Type Natriuretic Peptide   Result Value Ref Range     BNP 74 0 - 99 pg/mL   Sars-CoV-2 PCR   Result Value Ref Range     Coronavirus 2019, PCR Not Detected Not Detected   Lactate   Result Value Ref Range     Lactate 1.4 0.4 - 2.0 mmol/L   Urinalysis with Reflex Culture and Microscopic   Result Value Ref Range     Color, Urine Light-Yellow Light-Yellow, Yellow, Dark-Yellow     Appearance, Urine Clear Clear     Specific Gravity, Urine >1.050 (N) 1.005 - 1.035     pH, Urine 6.0 5.0, 5.5, 6.0, 6.5, 7.0, 7.5, 8.0     Protein, Urine 20 (TRACE) NEGATIVE, 10 (TRACE), 20 (TRACE) mg/dL     Glucose, Urine Normal Normal mg/dL     Blood, Urine NEGATIVE NEGATIVE     Ketones, Urine NEGATIVE NEGATIVE mg/dL     Bilirubin, Urine NEGATIVE NEGATIVE     Urobilinogen, Urine Normal Normal mg/dL     Nitrite, Urine NEGATIVE NEGATIVE     Leukocyte Esterase, Urine NEGATIVE NEGATIVE   Urinalysis Microscopic   Result Value Ref Range     WBC, Urine 1-5 1-5, NONE /HPF     RBC, Urine 1-2 NONE, 1-2, 3-5 /HPF     Squamous Epithelial Cells, Urine 1-9 (SPARSE) Reference range not established. /HPF     Mucus, Urine FEW Reference range not  established. /LPF   Protime-INR   Result Value Ref Range     Protime 13.0 (H) 9.8 - 12.8 seconds     INR 1.2 (H) 0.9 - 1.1   APTT   Result Value Ref Range     aPTT 24 (L) 27 - 38 seconds      CT chest abdomen pelvis w IV contrast     Result Date: 10/1/2024  Interpreted By:  Oscar Aldrich, STUDY: CT CHEST ABDOMEN PELVIS W IV CONTRAST;  10/1/2024 11:13 am   INDICATION: Signs/Symptoms:leukocytosis, ascites, recent ovarian cancer diagnosis.   COMPARISON: CT abdomen pelvis 08/08/2024   ACCESSION NUMBER(S): [id number]   ORDERING CLINICIAN: ASHLEY FAIRBANKS   TECHNIQUE: CT of the chest, abdomen, and pelvis was performed.  Contiguous axial images were obtained at 3 mm slice thickness through the chest, abdomen and pelvis. Coronal and sagittal reconstructions at 3 mm slice thickness were performed. ml of contrast  were administered intravenously without immediate complication.   FINDINGS: CHEST:   LUNG/PLEURA/LARGE AIRWAYS: No endobronchial lesion is seen.   Moderate to large bilateral pleural effusions with adjacent consolidative opacification of the bilateral lower lobes suggestive of compressive atelectasis. No pneumothorax. Mild asymmetric scattered reticular changes suggestive of chronic lung findings.     VESSELS: The thoracic aorta is unremarkable with respect course, caliber, and contour.   Prominent main pulmonary artery measuring up to 3.2 cm in anterior-posterior dimension measured on sagittal plane which may indicate sequela of pulmonary hypertension.   No significant coronary atherosclerotic calcifications are seen.   HEART: Heart size within normal limits. No pericardial effusion.   MEDIASTINUM AND OLIVE: Mildly prominent mediastinal lymph nodes measuring up to 10 mm in short axis   Moderate hiatal hernia with partial intrathoracic stomach and ascites and region of nodularity of the peritoneal fat within hiatal hernia suggestive carcinomatosis.   CHEST WALL AND LOWER NECK: No acute osseous abnormality.  Multilevel presumed degenerative changes throughout the imaged spine. No acute soft tissue abnormality.   ABDOMEN:   LIVER: There is been interval scalloping of the right hepatic margins with new regions of irregularity along the right hepatic capsule diffusely which is new since comparison imaging from 08/08/2024 there is a new elongated subcapsular collection measuring up to approximately 11.4 x 2.1 cm, difficult to measure given curvilinear projection extending over the right hepatic lobe margin (series 202, images 40-70). Similar appearing subcentimeter left hepatic lobe hypodense lesion.   BILE DUCTS: No obvious new intrahepatic biliary dilatation. Mild prominence of the common bile duct, nonspecific in a cholecystectomy patient.   GALLBLADDER: Absent.   PANCREAS: Unremarkable.   SPLEEN: Unchanged.   ADRENAL GLANDS: Unchanged.   KIDNEYS AND URETERS: Similar appearing subcentimeter right renal lower pole calculus. No hydronephrosis. No obstructing urolithiasis.   PELVIS:   BLADDER: Unremarkable.   REPRODUCTIVE ORGANS: Uterus appears grossly unchanged.   BOWEL: Moderate hiatal hernia with wall thickening of the stomach in the hernia. No new pathologic distention of bowel. Wall thickening of the colon within the splenic flexure descending colon and sigmoid colon, nonspecific.     VESSELS: No evidence of abdominal aortic aneurysm.   PERITONEUM/RETROPERITONEUM/LYMPH NODES: Large volume ascites with extensive regions of anterior omental caking and peritoneal carcinomatosis. No obvious free intraperitoneal gas. Given the presence of extensive omental caking and peritoneal carcinomatosis, superimposed peritoneal enhancement 4 other causes cannot be excluded.   BONE AND SOFT TISSUE: No acute osseous abnormality. Osseous structures appear stable. No acute abnormality of the abdominal wall soft tissues.        CHEST: 1.  Moderate to large bilateral pleural effusions with adjacent presumed compressive atelectasis. 2.  Prominent main pulmonary artery which may indicate pulmonary hypertension. 3. Mildly prominent mediastinal lymph nodes measuring up to 10 mm in short axis concerning for metastatic disease. 4. Moderate hiatal hernia with partial intrathoracic stomach. The peritoneal fat adjacent to the stomach within the hernia demonstrates evidence of probable carcinomatosis and ascites.   ABDOMEN-PELVIS: 1.  Redemonstration of large volume ascites with regions of significant anterior omental caking and peritoneal carcinomatosis commensurate with patient's provided diagnosis of ovarian cancer. 2. Regions of wall thickening of the colon extending from the splenic flexure to the sigmoid which may indicate a nonspecific colitis. 3. Since the previous examination on 08/08/2024, there are now extensive regions of scalloping of the right hepatic lobe peripherally with the appearance of a large subcapsular collection along the right hepatic margin as above. The sterility of this collection cannot be assessed via CT and clinical correlation is advised for exclusion superimposed infection within this region.     Signed by: Oscar Aldrich 10/1/2024 12:14 PM Dictation workstation:   [id number]     XR chest 1 view     Result Date: 10/1/2024  Interpreted By:  Samuel Jaramillo, STUDY: XR CHEST 1 VIEW; 10/1/2024 8:44 am   INDICATION: Signs/Symptoms:weakness, edema in legs and abdomen   COMPARISON: April 2023.   ACCESSION NUMBER(S): [id number]   ORDERING CLINICIAN: ASHLEY FAIRBANKS   FINDINGS: The study is limited due to rotation and poor inspiratory effort, with resultant crowding of the pulmonary vasculature. The cardiac silhouette is within normal limits for the technique. Moderate size hiatal hernia is again seen. There is no pneumothorax, confluent infiltrates or significant effusion. Degenerative changes involve the spine and shoulders; metallic anchors again noted over the left humeral head related to previous rotator cuff repair.         Limited study. Hiatal hernia. No acute cardiopulmonary disease.   Signed by: Samuel Jaramillo 10/1/2024 9:22 AM Dictation workstation:   [id number]     US guided abdominal paracentesis     Result Date: 9/20/2024  Interpreted By:  Peri Lawrence and Kamau Nyokabi STUDY: US GUIDED ABDOMINAL PARACENTESIS; US GUIDED PERCUTANEOUS ABDOMINAL RETROPERITONEUM BIOPSY;  9/19/2024 11:13 am   INDICATION: Signs/Symptoms:ascites; Signs/Symptoms:ascites, evaluate ovarian cancer.   COMPARISON: CT abdomen and pelvis 08/08/2024   ACCESSION NUMBER(S): [id number]; [id number]   ORDERING CLINICIAN: JEANETTE ARIAS   TECHNIQUE: INTERVENTIONALIST(S): Dr. Peri Lawrence MD   CONSENT: The patient was informed of the nature of the proposed procedure. The purposes, alternatives, risks, and benefits were explained and discussed. All questions were answered and consent was obtained.   SEDATION: 1% local lidocaine was used for anesthetic.   MEDICATION/CONTRAST: No additional.   TIME OUT:   A time out was performed immediately prior to procedure start with the interventional team, correctly identifying the patient name, date of birth, MRN, procedure, anatomy (including marking of site and side), patient position, procedure consent form, relevant laboratory and imaging test results, antibiotic administration, safety precautions, and procedure-specific equipment needs.   COMPLICATIONS: No immediate adverse events identified.   FINDINGS: The patient was placed in the supine position. Limited sonographic images of the lower abdominal wall were obtained for purposes of needle guidance, which demonstrated omental soft tissue mass which was seen on prior CT 08/08/2024. The area of concern was prepped and draped under sterile technique.   1% lidocaine was injected subcutaneously. Additional lidocaine was administered into deeper tissues surrounding the targeted area for biopsy using a 22G spinal needle. A small incision was made. Using the same  access site a 18 gauge core biopsy needle was passed via a 17 gauge coaxial introducer needle to obtain a total of 1 core sample.   Postprocedure images demonstrate no evidence for hemorrhage. The patient tolerated the procedure well and there were no immediate complications. 1 core specimen was sent to pathology.     Subsequently the right lower quadrant was visualized under ultrasound which demonstrated moderate volume ascites seen on prior CT 08/08/2024. 1% lidocaine was injected subcutaneously at this site. A 19 gauge Yueh was used to target the fluid collection under ultrasound guidance. Once the site was accessed, the inner needle was removed from the catheter. The Yueh catheter was connected to drainage container. Estimated 1000 cc of serosanguineous colored fluid was removed. Post paracentesis imaging demonstrated decreased ascitic fluid. The Yueh catheter was removed. The access site was bandaged.        1. Status post ultrasound guided core needle biopsy of omental mass seen on CT 08/08/2024. 1 core specimens sent to pathology. 2. Successful paracentesis under ultrasound guidance with removal of 1 L of serosanguineous fluid.     I was present for and/or performed the critical portions of the procedure and immediately available throughout the entire procedure.   I personally reviewed the image(s) / study and interpretation. I agree with the findings as stated.   Performed and dictated at [facility].   MACRO: None   Signed by: Peri Lawrence 9/20/2024 10:06 AM Dictation workstation:   [id number]     US guided percutaneous abdominal retroperitoneum biopsy     Result Date: 9/20/2024  Interpreted By:  Peri Lawrence and Kamau Nyokabi STUDY: US GUIDED ABDOMINAL PARACENTESIS; US GUIDED PERCUTANEOUS ABDOMINAL RETROPERITONEUM BIOPSY;  9/19/2024 11:13 am   INDICATION: Signs/Symptoms:ascites; Signs/Symptoms:ascites, evaluate ovarian cancer.   COMPARISON: CT abdomen  and pelvis 08/08/2024   ACCESSION NUMBER(S): [id number]; [id number]   ORDERING CLINICIAN: JEANETTE ARIAS   TECHNIQUE: INTERVENTIONALIST(S): Dr. Peri Lawrence MD   CONSENT: The patient was informed of the nature of the proposed procedure. The purposes, alternatives, risks, and benefits were explained and discussed. All questions were answered and consent was obtained.   SEDATION: 1% local lidocaine was used for anesthetic.   MEDICATION/CONTRAST: No additional.   TIME OUT:   A time out was performed immediately prior to procedure start with the interventional team, correctly identifying the patient name, date of birth, MRN, procedure, anatomy (including marking of site and side), patient position, procedure consent form, relevant laboratory and imaging test results, antibiotic administration, safety precautions, and procedure-specific equipment needs.   COMPLICATIONS: No immediate adverse events identified.   FINDINGS: The patient was placed in the supine position. Limited sonographic images of the lower abdominal wall were obtained for purposes of needle guidance, which demonstrated omental soft tissue mass which was seen on prior CT 08/08/2024. The area of concern was prepped and draped under sterile technique.   1% lidocaine was injected subcutaneously. Additional lidocaine was administered into deeper tissues surrounding the targeted area for biopsy using a 22G spinal needle. A small incision was made. Using the same access site a 18 gauge core biopsy needle was passed via a 17 gauge coaxial introducer needle to obtain a total of 1 core sample.   Postprocedure images demonstrate no evidence for hemorrhage. The patient tolerated the procedure well and there were no immediate complications. 1 core specimen was sent to pathology.     Subsequently the right lower quadrant was visualized under ultrasound which demonstrated moderate volume ascites seen on prior CT 08/08/2024. 1% lidocaine was injected  subcutaneously at this site. A 19 gauge Yueh was used to target the fluid collection under ultrasound guidance. Once the site was accessed, the inner needle was removed from the catheter. The Yueh catheter was connected to drainage container. Estimated 1000 cc of serosanguineous colored fluid was removed. Post paracentesis imaging demonstrated decreased ascitic fluid. The Yueh catheter was removed. The access site was bandaged.        1. Status post ultrasound guided core needle biopsy of omental mass seen on CT 08/08/2024. 1 core specimens sent to pathology. 2. Successful paracentesis under ultrasound guidance with removal of 1 L of serosanguineous fluid.     I was present for and/or performed the critical portions of the procedure and immediately available throughout the entire procedure.   I personally reviewed the image(s) / study and interpretation. I agree with the findings as stated.   Performed and dictated at [facility].   MACRO: None   Signed by: Peri Lawrence 9/20/2024 10:06 AM Dictation workstation:   [id number]            Assessment/Plan        Assessment & Plan  Hyponatremia        79-year-old female with past medical history of recently diagnosed ovarian cancer with peritoneal carcinomatosis s/p paracentesis x 4 (last one done 9/19/24), obesity, hyperlipidemia, osteoarthritis, and skin cancer s/p Mohs procedure.  Patient presents to the hospital with weakness and inability to care for herself.  Patient found to be hyponatremic and with evidence on CT imaging of possible abscess of the liver and ascites secondary to malignancy.  Hospitalized for further evaluation management..     #Ovarian cancer with peritoneal carcinomatosis  #Ascites  #Bilateral pleural effusions  # Suspected liver abscess  #Abdominal pain     Telemetry monitoring  Consult to IR for paracentesis and possible drainage of liver abscess  Consult to pulmonology for bilateral pleural  effusions  Antiemetics as needed  Analgesics as needed  Patient needs to be followed up with by her gynecological oncologist to determine optimal plan going forward for treatment of her ovarian cancer     #Hyponatremia  #Generalized edema/anasarca  #Hypoalbuminemia  Patient is fluid overloaded and has significant third spacing, suspect hypervolemia hyponatremia.  Will check urine electrolytes, urine osmolality, and serum osmolality  Fluid restriction of 1500 ml for the time being.  Consider starting diuretics, defer to attending  Repeat labs in a.m.     #debility  PT/OT  Social work for discharge planning     Chronic issues:  #Obesity  #Hyperlipidemia  #Osteoarthritis     Continue with patient's home medications as appropriate     #DVT prophylaxis  SCDs  Lovenox subcutaneous     I spent 75 minutes in the professional and overall care of this patient.                  Revision History          Patient fully evaluated 10/2, awake, resting in bed. Patient with Moderate hiatal hernia with partial intrathoracic stomach and the peritoneal fat adjacent to the stomach within the hernia demonstrates evidence of probable carcinomatosis and ascites, Patient tolerated paracentesis on 10/01, awaiting culture results.No s/s or c/o acute difficulties at this time. Medications and labs reviewed.  Plan discussed with interdisciplinary team, per pulmonology - Plan:  Patient has bilateral pleural effusion in the context of malignant ascites/ovarian cancer I explained to the patient the pleural effusion most likely related to recurrent ascites, patient is requiring so far 5 steps malignant ascites in the last month most likely beneficial approaches intra-abdominal Pleurx catheter Abdominal Pleurx catheter would be the most effective way of preventing pleural effusions and ascites.  Pleural effusions are secondary to ascites, both of which are due to patient's underlying cancer Continue with goals of care discussions prior to  interventional radiology consult Follow hepatic fluid analysis Continue IV antibiotics Zosyn, continue current and repeat labs in the AM. Patient still requiring frequent cardiac and SPO2 monitoring. Discharge planning discussed with patient and care team. Therapy evaluations ordered-  [facility] 14, anticipate SNF/C at discharge. Patient aware and agreeable to current plan, continue plan as above. I spent 50 minutes in the professional and overall care of this patient.              Assessment & Plan  Hyponatremia     Patient fully evaluated 10/3, awake, resting in bed. Patient with Moderate hiatal hernia with partial intrathoracic stomach and the peritoneal fat adjacent to the stomach within the hernia demonstrates evidence of probable carcinomatosis and ascites, Patient tolerated paracentesis on 10/01 well,order placed for repeat paracentesis therapeutic non diagnostic with IR.continue current and repeat labs in the AM. Patient aware and agreeable to current plan, continue plan as above.     Patient fully evaluated on October 4.  Patient awaiting therapeutic paracentesis.  Patient probably will need albumin afterwards.  Continue to monitor response with above treatments recheck labs in AM.  I spent 50 minutes in the professional and overall care of this patient.       Patient fully evaluated 10/06, head of bed elevated, no s/s or c/o acute difficulties at this time. Sister at bedside and agreeable to plan. Attempted therapeutic paracentesis by IR, aborted procedure due to insufficient fluid accumulation.  Medications and labs reviewed, cultures blood no growth at 4 days, AFB Culture      Culture in progress and will be examined weekly. A result will be issued either when positive or after 8 weeks incubation. AFB Stain -No acid fast bacilli seen.  Plan discussed with interdisciplinary team, continue current and repeat labs in the AM. Per pulmonary - Day of consult, patient is breathing on room air. Vital stable. CT  chest showed bilateral large pleural effusions. Dicussed need for Abdominal Pleurx catheter. Patient with likely carcinomatosis and plan to discharge to [facility] and will follow up with gynecology in 7 days,     Patient still requiring frequent cardiac and SPO2 monitoring. Discharge planning discussed with patient and care team. Therapy evaluations ordered- [place] [id number], [facility] at discharge. Patient aware and agreeable to current plan, continue plan as above. I spent 50 minutes in the professional and overall care of this patient.     Patient fully evaluated 10/07, head of bed elevated, no s/s or c/o acute difficulties at this time. Medications and labs reviewed, sodium 125, nephrology consulted.  Cultures blood no growth at 4 days, AFB Culture      Culture in progress and will be examined weekly. A result will be issued either when positive or after 8 weeks incubation. AFB Stain -No acid fast bacilli seen.  Plan discussed with interdisciplinary team, continue current and repeat labs in the AM, new supplemental oxygen requirements, will repeat chest xray in AM, maintain HOB elevated to alleviate postural dyspnea. Vitals stable. Patient with likely carcinomatosis and plan to discharge to [facility] and will follow up with gynecology in 7 days, atient still requiring frequent cardiac and SPO2 monitoring. Discharge planning discussed with patient and care team. Therapy evaluations ordered- [address] at discharge. Patient aware and agreeable to current plan, continue plan as above. I spent 50 minutes in the professional and overall care of this patient.   Patient fully evaluated 10/08, head of bed elevated, no s/s or c/o acute difficulties at this time. Medications and labs reviewed, sodium low again today at 125, nephrology consulted. Awaiting hepatic fluid analysis -AFB Culture -Culture in progress and will be examined weekly. A result will be issued either when positive or after 8  weeks incubation. AFB Stain -No acid fast bacilli seen.  Plan discussed with interdisciplinary team, per nephrology - Hyponatremia  Acute kidney injury   Ascites  Malnutrition  Anemia  Pleural effusion  Plan;  Discontinue potential nephrotoxins  Nutritional/iron/renal indices/urinary indices  DuoNeb aerosols  Appreciate nephrology input. Patient requiring supplemental oxygen at this time, continue to maintain HOB elevated as tolerated. Patient seen by pulmonology - Consult IR for right sided diagnostic and therapeutic thoracentesis  Unable to safely perform paracentesis due to lack of fluid  Patient has bilateral pleural effusion in the context of malignant ascites/ovarian cancer - Pleural effusions are secondary to ascites, both of which are due to patient's underlying cancer. Continue with goals of care discussions prior to interventional radiology consult. Continue IV antibiotics zosyn, probiotics added. Continue current plan and repeat labs in the AM, repeat chest xray in AM, maintain HOB elevated to alleviate postural dyspnea. Vitals stable. Patient with likely carcinomatosis and plan to discharge to SNF - [facility] and will follow up with gynecology in 7 days, atient still requiring frequent cardiac and SPO2 monitoring. Discharge planning discussed with patient and care team. Therapy evaluations ordered- [place] [id number], [facility] at discharge. Patient aware and agreeable to current plan, continue plan as above. I spent 50 minutes in the professional and overall care of this patient.     Patient fully evaluated 10/09, patient resting in bed with HOB, no s/s or c/o acute difficulties at this time. Medications and labs reviewed, cultures no growth at 4 days, AFB cultures in process. Plan discussed with interdisciplinary team, pulmonary plan - Bilateral pleural effusion  Abdominal ascites  Ovarian carcinoma w/ peritoneal carcinomatosis carcinomatosis  HLD  Patient clear for discharge to SNF from standpoint of  pulmonology  Patient will likely need an abdominal PleurX catheter at some point. However, we were unable to perform safely during this hospitalization due to lack of ascitic fluid  Pleural fluid suggests exudative effusion, likely secondary to malignancy  Okay to discontinue antibiotics from standpoint of pulmonology. Low suspicion of pneumonia. Leukocytosis is unlikely reflective of infection.   Will continue current and repeat labs in the AM. Patient still requiring frequent cardiac and SPO2 monitoring. Discharge planning discussed with patient and care team. Therapy evaluations ordered- [facility] 10, anticipate SNF at discharge. Patient aware and agreeable to current plan, continue plan as above. I spent 50 minutes in the professional and overall care of this patient.     Patient fully evaluated 10/10, patient resting in bed with HOB, no s/s or c/o acute difficulties at this time. Medications and labs reviewed, cultures no growth at 5 days, AFB cultures in process. Plan discussed with interdisciplinary team, pulmonary plan- Bilateral pleural effusion  Abdominal ascites Ovarian carcinoma w/ peritoneal carcinomatosis carcinomatosis HLD Patient clear for discharge to SNF from standpoint.   Patient will likely need an abdominal PleurX catheter at some point. However, we were unable to perform safely during this hospitalization due to lack of ascitic fluid Pleural fluid suggests exudative effusion, likely secondary to malignancy Okay to discontinue antibiotics from standpoint of pulmonology. Low suspicion of pneumonia. Leukocytosis is unlikely reflective of infection.   Patient's sister at bedside, discussion of plan of care and will follow up with gynecology outpatient, possibly Dr. Jones. Will continue current and repeat labs in the AM. Patient with frequent bowel movements, soft, will hold miralax at this time. Patient still requiring frequent cardiac and SPO2 monitoring. Discharge planning discussed with patient  and care team. Therapy evaluations ordered- [facility] 10, anticipate SNF at discharge. Patient aware and agreeable to current plan, continue plan as above. I spent 50 minutes in the professional and overall care of this patient.     Patient fully evaluated 10/11, patient resting in bed with HOB, no s/s or c/o acute difficulties at this time. Medications and labs reviewed, cultures no growth at 5 days, AFB cultures in process, creatinine and Bun continue to rise. Nephrology on the case, appreciate input.  Plan discussed with interdisciplinary team, pulmonary plan- agree to discontinue antibiotics from standpoint of pulmonology with Low suspicion of pneumonia. Continues to require supplemental oxygen at this time. Leukocytosis is unlikely reflective of infection. Patient pain medications updated based on renal function - will switch Roxicodone. Long discussion with patient's sister and patient plan of care and will follow up with gynecology outpatient, possibly Dr. Jones. Will continue current and repeat labs in the AM. Patient with frequent bowel movements, soft, will hold miralax at this time. Patient still requiring frequent cardiac and SPO2 monitoring. Discharge planning discussed with patient and care team. Therapy evaluations ordered- [facility] 10, anticipate SNF at discharge. Patient aware and agreeable to current plan, continue plan as above.     Patient fully evaluated on October 12 and continues to have significant decline in renal function.  Oncology reconsulted.  I believe the patient is significant third spacing secondary to peritoneal carcinomatosis.  Recheck labs in AM.  Appreciate pulmonary and nephrology consultations.  I spent 50 minutes in the professional and overall care of this patient.     Patient fully evaluated 10/13, head of bed elevated, visible difficulties noted at this time. Medications and labs reviewed-  Cultures-  in process  WBC- 29.6  Hemoglobin- 9.2  Imaging- CT abdomen pelvis wo IV  contrast   Impression:    Overall findings are grossly unchanged from 10/01/2024 CT scan.  1. Persistent moderate abdominopelvic ascites with diffuse peritoneal  carcinomatosis/omental caking.  2. 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  Underlying cirrhosis could not be entirely excluded as well.  3. Moderate bilateral pleural effusion with surrounding atelectasis.  Supplemental oxygen requiring at this time.      Goals of care- long discussion with sister, patient, and interdisciplinary team, patient with worsening renal impairment secondary to  ovarian carcinoma with peritoneal carcinomatosis abdominal distention, ascites requiring frequent drainage was admitted profound weakness tiredness, failure to thrive. Prognosis poor, plan discussed and will focus on comfort on measures at this time, planned call out to gyn/onc doctor tomorrow morning. Addition of methadone for pain management. Patient seen by Dr. Alexandre - The patient is a 79-year-old woman diagnosed with ovarian carcinoma and peritoneal carcinomatosis with ascites in August 2024.  Initially evaluated by GYN oncology and chemotherapy was recommended but due to intercurrent illness and admissions the patient did not/could not receive any treatment.  Currently at SNF and was admitted because of profound weakness tiredness failure to thrive pain and noted to have elevated creatinine.  Physical examination revealed elderly woman in pain requesting assistance.  Performance status is 3-4.  Elevated creatinine at 3.14 mg/dL.  Personally discussed with Dr. Rhodes.  Could consider initial chemotherapy to site to reduce then consider surgery.  However, advanced age, poor performance status, elevated creatinine preclude chemotherapy.  Consider evaluation by GYN oncology.  Meanwhile consider palliative care pain control/symptom control drainage of ascites consider catheter  placement to drain fluid.  Consider methadone in view of elevated creatinine.  Thank you for allowing me to participate in care of your patient if you have any questions please feel free to call me.  Will continue current and repeat,  labs in the AM. Patient still requiring frequent cardiac and SPO2 monitoring. Continue aggressive pulmonary hygiene. Discharge planning discussed with patient and care team. Therapy evaluations ordered. [facility]-        , anticipate HHC/SNF at discharge. Patient aware and agreeable to current plan, continue plan as above. I spent a total of 50 minutes on the date of the service which included preparing to see the patient, face-to-face patient care, completing clinical documentation, obtaining and/or reviewing separately obtained history, performing a medically appropriate examination, counseling and educating the patient/family/caregiver, ordering medications, tests, or procedures, communicating with other HCPs (not separately reported), independently interpreting results (not separately reported), communicating results to the patient/family/caregiver, and care coordination (not separately reported).                    Revision History         Pertinent Physical Exam At Time of Discharge  Physical Exam  Patient fully evaluated 10/14/24 for   1. Hyponatremia  AFB Culture/Smear    AFB Culture/Smear      2. Abdominal fluid collection  AFB Culture/Smear    AFB Culture/Smear      3. Malignant ascites (CMS-HCC)  AFB Culture/Smear    AFB Culture/Smear      4. Pleural effusion  AFB Culture/Smear    AFB Culture/Smear      Call placed to GYN/ONC Dr. Villalba this AM with long discussion on patient, treatment options, and goals of care. Patient and sister aware of poor prognosis and bleak outcome. Patient with no significant clinical improvement noted, patient medically cleared for discharge today to [facility]. Patient seen resting in bed with head of bed elevated, no s/s or c/o acute  difficulties at this time. Medications and labs reviewed, will begin comfort care medications and transition to hospice.    Results for orders placed or performed during the hospital encounter of 10/01/24 (from the past 24 hour(s))   CBC   Result Value Ref Range    WBC 29.2 (H) 4.4 - 11.3 x10*3/uL    nRBC 0.0 0.0 - 0.0 /100 WBCs    RBC 3.62 (L) 4.00 - 5.20 x10*6/uL    Hemoglobin 9.5 (L) 12.0 - 16.0 g/dL    Hematocrit 30.3 (L) 36.0 - 46.0 %    MCV 84 80 - 100 fL    MCH 26.2 26.0 - 34.0 pg    MCHC 31.4 (L) 32.0 - 36.0 g/dL    RDW 17.5 (H) 11.5 - 14.5 %    Platelets 436 150 - 450 x10*3/uL   Comprehensive metabolic panel   Result Value Ref Range    Glucose 83 74 - 99 mg/dL    Sodium 133 (L) 136 - 145 mmol/L    Potassium 4.3 3.5 - 5.3 mmol/L    Chloride 94 (L) 98 - 107 mmol/L    Bicarbonate 22 21 - 32 mmol/L    Anion Gap 21 (H) 10 - 20 mmol/L    Urea Nitrogen 64 (H) 6 - 23 mg/dL    Creatinine 4.01 (H) 0.50 - 1.05 mg/dL    eGFR 11 (L) >60 mL/min/1.73m*2    Calcium 8.0 (L) 8.6 - 10.3 mg/dL    Albumin 2.2 (L) 3.4 - 5.0 g/dL    Alkaline Phosphatase 165 (H) 33 - 136 U/L    Total Protein 5.0 (L) 6.4 - 8.2 g/dL    AST 17 9 - 39 U/L    Bilirubin, Total 0.3 0.0 - 1.2 mg/dL    ALT 6 (L) 7 - 45 U/L       Intake/Output this shift:  No intake/output data recorded.    Vital signs for last 24 hours:  Temp:  [35.6 °C (96.1 °F)-36.8 °C (98.2 °F)] 35.9 °C (96.6 °F)  Heart Rate:  [] 110  Resp:  [16-20] 16  BP: ()/(41-78) 107/43      Plan discussed with interdisciplinary team, ok to discharge to [facility]. Patient aware and agreeable to current plan, continue plan as above. I spent 30 minutes on the date of the service which included preparing to see the patient, face-to-face patient care, completing clinical documentation, obtaining and/or reviewing separately obtained history, performing a medically appropriate examination, counseling and educating the patient/family/caregiver, ordering medications, tests, or  procedures, communicating with other HCPs (not separately reported), independently interpreting results (not separately reported), communicating results to the patient/family/caregiver, and care coordination (not separately reported  Outpatient Follow-Up  Future Appointments   Date Time Provider Department Center   10/16/2024  7:40 AM Sofia Villalba MD MPH [id number] [place]         Kim Angulo

## 2024-10-14 NOTE — PROGRESS NOTES
Dr Rhodes spoke with pt's sister and plan is for possible hospice, preference was [facility], pt may need GIP.    Updated SW.    Doreen Johns RN [facility]

## 2024-10-14 NOTE — TELEPHONE ENCOUNTER
Patients sister called to update that patient remains admitted to [facility].    Sister states that she has been talking to team at [facility] and discussing possible admission with hospice.   Sister states she does not think patient will be receiving chemo this week as scheduled on 10/16/24.   Message routed to Dr. Villalba.

## 2024-10-15 PROCEDURE — 2500000004 HC RX 250 GENERAL PHARMACY W/ HCPCS (ALT 636 FOR OP/ED)

## 2024-10-15 PROCEDURE — 1150000001 HC HOSPICE PRIVATE ROOM DAILY

## 2024-10-15 RX ORDER — BISACODYL 10 MG/1
10 SUPPOSITORY RECTAL DAILY PRN
Status: DISCONTINUED | OUTPATIENT
Start: 2024-10-15 | End: 2024-10-17 | Stop reason: HOSPADM

## 2024-10-15 SDOH — ECONOMIC STABILITY: FOOD INSECURITY: WITHIN THE PAST 12 MONTHS, YOU WORRIED THAT YOUR FOOD WOULD RUN OUT BEFORE YOU GOT THE MONEY TO BUY MORE.: NEVER TRUE

## 2024-10-15 SDOH — SOCIAL STABILITY: SOCIAL INSECURITY: WITHIN THE LAST YEAR, HAVE YOU BEEN AFRAID OF YOUR PARTNER OR EX-PARTNER?: NO

## 2024-10-15 SDOH — SOCIAL STABILITY: SOCIAL INSECURITY: WITHIN THE LAST YEAR, HAVE YOU BEEN HUMILIATED OR EMOTIONALLY ABUSED IN OTHER WAYS BY YOUR PARTNER OR EX-PARTNER?: NO

## 2024-10-15 SDOH — SOCIAL STABILITY: SOCIAL INSECURITY: WERE YOU ABLE TO COMPLETE ALL THE BEHAVIORAL HEALTH SCREENINGS?: YES

## 2024-10-15 SDOH — ECONOMIC STABILITY: FOOD INSECURITY: WITHIN THE PAST 12 MONTHS, THE FOOD YOU BOUGHT JUST DIDN'T LAST AND YOU DIDN'T HAVE MONEY TO GET MORE.: NEVER TRUE

## 2024-10-15 SDOH — SOCIAL STABILITY: SOCIAL INSECURITY: DOES ANYONE TRY TO KEEP YOU FROM HAVING/CONTACTING OTHER FRIENDS OR DOING THINGS OUTSIDE YOUR HOME?: NO

## 2024-10-15 SDOH — ECONOMIC STABILITY: INCOME INSECURITY: IN THE PAST 12 MONTHS HAS THE ELECTRIC, GAS, OIL, OR WATER COMPANY THREATENED TO SHUT OFF SERVICES IN YOUR HOME?: NO

## 2024-10-15 SDOH — SOCIAL STABILITY: SOCIAL INSECURITY: HAVE YOU HAD ANY THOUGHTS OF HARMING ANYONE ELSE?: NO

## 2024-10-15 SDOH — SOCIAL STABILITY: SOCIAL INSECURITY: ABUSE: ADULT

## 2024-10-15 SDOH — SOCIAL STABILITY: SOCIAL INSECURITY: DO YOU FEEL UNSAFE GOING BACK TO THE PLACE WHERE YOU ARE LIVING?: NO

## 2024-10-15 SDOH — SOCIAL STABILITY: SOCIAL INSECURITY: DO YOU FEEL ANYONE HAS EXPLOITED OR TAKEN ADVANTAGE OF YOU FINANCIALLY OR OF YOUR PERSONAL PROPERTY?: NO

## 2024-10-15 SDOH — SOCIAL STABILITY: SOCIAL INSECURITY: HAS ANYONE EVER THREATENED TO HURT YOUR FAMILY OR YOUR PETS?: NO

## 2024-10-15 SDOH — SOCIAL STABILITY: SOCIAL INSECURITY: ARE THERE ANY APPARENT SIGNS OF INJURIES/BEHAVIORS THAT COULD BE RELATED TO ABUSE/NEGLECT?: NO

## 2024-10-15 SDOH — SOCIAL STABILITY: SOCIAL INSECURITY: ARE YOU OR HAVE YOU BEEN THREATENED OR ABUSED PHYSICALLY, EMOTIONALLY, OR SEXUALLY BY ANYONE?: NO

## 2024-10-15 SDOH — SOCIAL STABILITY: SOCIAL INSECURITY: HAVE YOU HAD THOUGHTS OF HARMING ANYONE ELSE?: NO

## 2024-10-15 ASSESSMENT — LIFESTYLE VARIABLES
HOW OFTEN DO YOU HAVE 6 OR MORE DRINKS ON ONE OCCASION: NEVER
AUDIT-C TOTAL SCORE: 0
HOW OFTEN DO YOU HAVE A DRINK CONTAINING ALCOHOL: NEVER
SKIP TO QUESTIONS 9-10: 1
HOW MANY STANDARD DRINKS CONTAINING ALCOHOL DO YOU HAVE ON A TYPICAL DAY: PATIENT DOES NOT DRINK
AUDIT-C TOTAL SCORE: 0

## 2024-10-15 ASSESSMENT — COGNITIVE AND FUNCTIONAL STATUS - GENERAL
STANDING UP FROM CHAIR USING ARMS: TOTAL
PATIENT BASELINE BEDBOUND: YES
TURNING FROM BACK TO SIDE WHILE IN FLAT BAD: TOTAL
MOVING TO AND FROM BED TO CHAIR: TOTAL
PERSONAL GROOMING: A LOT
DAILY ACTIVITIY SCORE: 8
MOVING FROM LYING ON BACK TO SITTING ON SIDE OF FLAT BED WITH BEDRAILS: TOTAL
DRESSING REGULAR UPPER BODY CLOTHING: TOTAL
CLIMB 3 TO 5 STEPS WITH RAILING: TOTAL
MOBILITY SCORE: 6
WALKING IN HOSPITAL ROOM: TOTAL
EATING MEALS: A LOT
TOILETING: TOTAL
HELP NEEDED FOR BATHING: TOTAL
DRESSING REGULAR LOWER BODY CLOTHING: TOTAL

## 2024-10-15 ASSESSMENT — PAIN - FUNCTIONAL ASSESSMENT
PAIN_FUNCTIONAL_ASSESSMENT: 0-10

## 2024-10-15 ASSESSMENT — ACTIVITIES OF DAILY LIVING (ADL)
BATHING: DEPENDENT
JUDGMENT_ADEQUATE_SAFELY_COMPLETE_DAILY_ACTIVITIES: YES
GROOMING: DEPENDENT
DRESSING YOURSELF: DEPENDENT
ASSISTIVE_DEVICE: EYEGLASSES
FEEDING YOURSELF: NEEDS ASSISTANCE
HEARING - LEFT EAR: FUNCTIONAL
TOILETING: DEPENDENT
LACK_OF_TRANSPORTATION: NO
HEARING - RIGHT EAR: FUNCTIONAL
PATIENT'S MEMORY ADEQUATE TO SAFELY COMPLETE DAILY ACTIVITIES?: YES
WALKS IN HOME: DEPENDENT
ADEQUATE_TO_COMPLETE_ADL: YES

## 2024-10-15 ASSESSMENT — PAIN SCALES - GENERAL
PAINLEVEL_OUTOF10: 0 - NO PAIN
PAINLEVEL_OUTOF10: 0 - NO PAIN
PAINLEVEL_OUTOF10: 6
PAINLEVEL_OUTOF10: 6
PAINLEVEL_OUTOF10: 0 - NO PAIN
PAINLEVEL_OUTOF10: 4
PAINLEVEL_OUTOF10: 3
PAINLEVEL_OUTOF10: 6
PAINLEVEL_OUTOF10: 2

## 2024-10-15 ASSESSMENT — PAIN DESCRIPTION - DESCRIPTORS
DESCRIPTORS: ACHING

## 2024-10-15 ASSESSMENT — PAIN DESCRIPTION - LOCATION: LOCATION: GENERALIZED

## 2024-10-15 NOTE — CARE PLAN
The patient's goals for the shift include      The clinical goals for the shift include pain control, comfort care

## 2024-10-15 NOTE — PROGRESS NOTES
[facility] PRACHI [place] reached out to SW and reports patient no longer appropriate for GIP but still appropriate for hospice. She will reach out to the sister. Patient's sister was in patients room. SW spoke with patient's sister Erica. She reports received a call that she is no longer appropriate for hospice. SW reports will clarify. Sister is now considering SNF but financially not able to afford private pay and verbalize not wanting to spend money down in a facility to be medicaid appropriate. She was hoping could be skilled. Reviewed with her that her sister is currently still under GIP but will clarify with [facility]. Reviewed with sister would need to meet skilled level of care for medicare to cover SNF. Private duty at home was also reviewed but sister since cannot financially afford. SW informed patient's sister will look into clarifying options with [facility] and attending. PRACHI reviewed with [facility] who will follow-up with attending Dr. Rhodes. PRACHI updated [facility] SW and she will also follow-up with patients sister as still following the case and patient is appropriate for hospice. Care Transition team to follow and assist as needed. KRISTEN Kat

## 2024-10-15 NOTE — H&P
History Of Present Illness  Rohini Beaver is a 79 y.o. female presenting with terminal Ovarian carcinoma with peritoneal carcinomatosis.      Past Medical History  She has a past medical history of Bilateral primary osteoarthritis of knee, HLD (hyperlipidemia), Lumbosacral radiculopathy, Meralgia paraesthetica, Obesity, and Physical deconditioning.    Surgical History  She has a past surgical history that includes Total knee arthroplasty (Left, 2019); Cholecystectomy; Corneal transplant; Lumbar fusion; Dilation and curettage of uterus; Shoulder arthroscopy (Left); Colonoscopy; Skin lesion excision; Trigger finger release (Right); Arthroplasty (Left); Breast biopsy (1999); and Total knee arthroplasty (Right, 2017).     Social History  She reports that she has never smoked. She has never used smokeless tobacco. She reports that she does not currently use alcohol. She reports that she does not use drugs.    Family History  Family History   Problem Relation Name Age of Onset    Colon cancer Mother      Lung cancer Father      Other (Mesothelioma) Brother      Brain cancer Mother's Brother          Allergies  Patient has no known allergies.    Review of Systems     Physical Exam     Last Recorded Vitals  /56   Pulse 107   Temp 36.7 °C (98.1 °F)   Resp 20   Wt 104 kg (230 lb)   SpO2 94%     Relevant Results              Jd Rhodes MD   Physician  Internal Medicine     Discharge Summary      Signed     Date of Service: 10/14/2024  3:07 PM   Related encounter: ED to Hosp-Admission (Discharged) from 10/1/2024 in [facility] with Jd Rhodes MD and Boy Alford MD     Signed         Discharge Diagnosis  Hyponatremia     Issues Requiring Follow-Up  Patient fully evaluated 10/14/24 for   1. Hyponatremia  AFB Culture/Smear     AFB Culture/Smear       2. Abdominal fluid collection  AFB Culture/Smear     AFB Culture/Smear       3. Malignant ascites (CMS-HCC)  AFB Culture/Smear     AFB  Culture/Smear       4. Pleural effusion  AFB Culture/Smear     AFB Culture/Smear       Call placed to GYN/ONC Dr. Villalba this AM with long discussion on patient, treatment options, and goals of care. Patient and sister aware of poor prognosis and bleak outcome. Patient with no significant clinical improvement noted, patient medically cleared for discharge today to [facility]. Patient seen resting in bed with head of bed elevated, no s/s or c/o acute difficulties at this time. Medications and labs reviewed, will begin comfort care medications and transition to hospice.           Results for orders placed or performed during the hospital encounter of 10/01/24 (from the past 24 hour(s))   CBC   Result Value Ref Range     WBC 29.2 (H) 4.4 - 11.3 x10*3/uL     nRBC 0.0 0.0 - 0.0 /100 WBCs     RBC 3.62 (L) 4.00 - 5.20 x10*6/uL     Hemoglobin 9.5 (L) 12.0 - 16.0 g/dL     Hematocrit 30.3 (L) 36.0 - 46.0 %     MCV 84 80 - 100 fL     MCH 26.2 26.0 - 34.0 pg     MCHC 31.4 (L) 32.0 - 36.0 g/dL     RDW 17.5 (H) 11.5 - 14.5 %     Platelets 436 150 - 450 x10*3/uL   Comprehensive metabolic panel   Result Value Ref Range     Glucose 83 74 - 99 mg/dL     Sodium 133 (L) 136 - 145 mmol/L     Potassium 4.3 3.5 - 5.3 mmol/L     Chloride 94 (L) 98 - 107 mmol/L     Bicarbonate 22 21 - 32 mmol/L     Anion Gap 21 (H) 10 - 20 mmol/L     Urea Nitrogen 64 (H) 6 - 23 mg/dL     Creatinine 4.01 (H) 0.50 - 1.05 mg/dL     eGFR 11 (L) >60 mL/min/1.73m*2     Calcium 8.0 (L) 8.6 - 10.3 mg/dL     Albumin 2.2 (L) 3.4 - 5.0 g/dL     Alkaline Phosphatase 165 (H) 33 - 136 U/L     Total Protein 5.0 (L) 6.4 - 8.2 g/dL     AST 17 9 - 39 U/L     Bilirubin, Total 0.3 0.0 - 1.2 mg/dL     ALT 6 (L) 7 - 45 U/L         Intake/Output this shift:  No intake/output data recorded.     Vital signs for last 24 hours:  Temp:  [35.6 °C (96.1 °F)-36.8 °C (98.2 °F)] 35.9 °C (96.6 °F)  Heart Rate:  [] 110  Resp:  [16-20] 16  BP: ()/(41-78) 107/43         Plan discussed with interdisciplinary team, ok to discharge to [facility]. Patient aware and agreeable to current plan, continue plan as above. I spent 30 minutes on the date of the service which included preparing to see the patient, face-to-face patient care, completing clinical documentation, obtaining and/or reviewing separately obtained history, performing a medically appropriate examination, counseling and educating the patient/family/caregiver, ordering medications, tests, or procedures, communicating with other HCPs (not separately reported), independently interpreting results (not separately reported), communicating results to the patient/family/caregiver, and care coordination (not separately reported)     Discharge Meds     Medication List      ASK your doctor about these medications     ascorbic acid 500 mg tablet; Commonly known as: Vitamin C   Calcium 600 + D(3) 600 mg-5 mcg (200 unit) tablet; Generic drug: calcium   carbonate-vitamin D3   docusate sodium 100 mg capsule; Commonly known as: Colace; Ask about:   Which instructions should I use?   DULoxetine 30 mg DR capsule; Commonly known as: Cymbalta   multivitamin with minerals tablet   ondansetron 8 mg tablet; Commonly known as: Zofran   oxyCODONE-acetaminophen 5-325 mg tablet; Commonly known as: Percocet;   Take 1 tablet by mouth every 4 hours if needed for severe pain (7 - 10).   pantoprazole 40 mg EC tablet; Commonly known as: ProtoNix   polyethylene glycol 17 gram packet; Commonly known as: Glycolax, Miralax   prednisoLONE acetate 1 % ophthalmic suspension; Commonly known as:   Pred-Forte   Zocor 20 mg tablet; Generic drug: simvastatin        Test Results Pending At Discharge  Pending Labs         Order Current Status     Body Fluid Cell Count With Differential In process     AFB Culture/Smear Preliminary result     Blood Culture Preliminary result     Blood Culture Preliminary result     Blood Culture Preliminary result                 Hospital Course         Jd Rhodes MD   Physician  Internal Medicine     Progress Notes      Signed     Date of Service: 10/13/2024  2:52 PM      Signed        Expand All Collapse All    Rohini Beaver is a 79 y.o. female on day 12 of admission presenting with Hyponatremia.           Subjective     Patient fully evaluated 10/3, awake, resting in bed. Patient with Moderate hiatal hernia with partial intrathoracic stomach and the peritoneal fat adjacent to the stomach within the hernia demonstrates evidence of probable carcinomatosis and ascites, Patient tolerated paracentesis on 10/01 well,order placed for repeat paracentesis therapeutic non diagnostic with IR.continue current and repeat labs in the AM. Patient aware and agreeable to current plan, continue plan as above. I spent 50 minutes in the professional and overall care of this patient.      Objective  Last Recorded Vitals  /56   Pulse 103   Temp 36.3 °C (97.3 °F)   Resp 20   Wt 104 kg (230 lb)   SpO2 93%   Intake/Output last 3 Shifts:     Intake/Output Summary (Last 24 hours) at 10/13/2024 1452  Last data filed at 10/13/2024 0500        Gross per 24 hour   Intake 120 ml   Output --   Net 120 ml         Admission Weight  Weight: 104 kg (230 lb) (10/01/24 0828)     Daily Weight  10/01/24 : 104 kg (230 lb)     Image Results  CT abdomen pelvis wo IV contrast  Narrative: Interpreted By:  Deep Scott,   STUDY:  CT ABDOMEN PELVIS WO IV CONTRAST;  10/12/2024 1:30 pm      INDICATION:  Signs/Symptoms:Ascites /Abdominal Distention/Renal Failure.          COMPARISON:  CT scan 10/01/2024.      ACCESSION NUMBER(S):  [id number]      ORDERING CLINICIAN:  ALICIA GRAHAM      TECHNIQUE:  CT of the abdomen and pelvis was performed. Contiguous axial images  were obtained at 3 mm slice thickness through the abdomen and pelvis.  Coronal and sagittal reconstructions at 3 mm slice thickness were  performed.  No intravenous contrast was administered;  positive oral  contrast was given.      FINDINGS:  Please note that the evaluation of vessels, lymph nodes and organs is  limited without intravenous contrast.      LOWER CHEST:  Small to moderate bilateral pleural effusion with surrounding  atelectasis.      ABDOMEN:      LIVER:  Nodular surface with a scalloping of the right hepatic capsule  secondary to the adjacent malignant ascites. No definite parenchymal  lesions.      BILE DUCTS:  The intrahepatic and extrahepatic ducts are not dilated.      GALLBLADDER:  Surgically absent      PANCREAS:  No duct dilatation      SPLEEN:  No splenomegaly.      ADRENAL GLANDS:  No nodule      KIDNEYS AND URETERS:  The kidneys are normal in size and unremarkable in appearance.  No  hydroureteronephrosis or nephroureterolithiasis is identified.      PELVIS:      BLADDER:  Unremarkable      REPRODUCTIVE ORGANS:  Uterus is unremarkable      BOWEL:  Moderate hiatal hernia. No bowel dilatation. Few uncomplicated  colonic diverticulosis.      Moderate abdominopelvic ascites. Diffuse omental caking and  peritoneal carcinomatosis.      VESSELS:  Multifocal vascular calcifications and atherosclerotic changes.      PERITONEUM/RETROPERITONEUM/LYMPH NODES:  Multiple subcentimeter retroperitoneal, iliac and pelvic lymph nodes.      ABDOMINAL WALL:  Subcutaneous edema.      BONES:  Multilevel degenerative spine changes and facet joint disease. Prior  lower lumbar spine fixation.      Impression: Overall findings are grossly unchanged from 10/01/2024 CT scan.  1. Persistent moderate abdominopelvic ascites with diffuse peritoneal  carcinomatosis/omental caking.  2. 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  Underlying cirrhosis could not be entirely excluded as well.  3. Moderate bilateral pleural effusion with surrounding atelectasis.          MACRO:  None      Signed by: Deep Scott 10/13/2024 7:50  AM  Dictation workstation:   [id number]        Physical Exam     Relevant Results                       Assessment/Plan  This patient currently has cardiac telemetry ordered; if you would like to modify or discontinue the telemetry order, click hereto go to the orders activity to modify/discontinue the order.           Jd Rhodes MD   Physician  Internal Medicine     H&P      Addendum     Date of Service: 10/1/2024  2:54 PM      Addendum        Expand All Collapse All    History Of Present Illness  Rohini Beaver is a 79-year-old female with past medical history of recently diagnosed ovarian cancer with peritoneal carcinomatosis s/p paracentesis x 4 (last one done 9/19/24), obesity, hyperlipidemia, osteoarthritis, and skin cancer s/p Mohs procedure.  Patient presents to the hospital with weakness and inability to care for herself.  She reports that she was at [facility] skilled nursing facility, however had a brief hospitalization at [facility] and upon discharge decided to go home rather than go back to [facility].  She lives with her 86-year-old sister and has a couple friends who assist with appointments, however limited support system.  Sister unable to care for due to age.  Today she was in urgent reclining chair and was able to get up.  ROS additionally positive for cold sweats, distended and tender abdomen, edema, pressure injury to coccyx/gluteal fold, and decreased activity tolerance.  Denies history of heart disease.  She reports that she is scheduled for outpatient appointment with her oncologist tomorrow to determine ongoing plan.      ER course: Hemodynamically stable, afebrile, SpO2 90s on room air.  WBC 28.9, Hgb 11.0/Hct34.1 (Hgb 15.1 4/4/2023), platelets 446. Glucose 107, Sodium 126, Chloride 94, calcium 8.5, albumin 2.4, alkaline phosphatase 231.  Lactate 1.4.  BNP 74.  High-sensitivity troponin 7.  UA unremarkable. CT chest showed moderate to large bilateral pleural  effusion with adjacent presumed compressive atelectasis, prominent main pulmonary artery which may indicate pulmonary hypertension, mildly prominent mediastinal lymph nodes measuring up to 10 mm in short axis concerning for metastatic disease.  Moderate hiatal hernia with partial intrathoracic stomach and the peritoneal fat adjacent to the stomach within the hernia demonstrates evidence of probable carcinomatosis and ascites.  Redemonstration of large volume ascites with regions of significant anterior omental caking and peritoneal carcinomatosis commensurate with patient's provided diagnosis of ovarian cancer. Regions of wall thickening of the colon extending from the splenic flexure to the sigmoid.  Extensive region of scalloping of the right hepatic lobe peripherally with appearance of large subcapsular collection along the right hepatic margin. Blood culture in process     Past medical history: As above  Past surgical history: Cholecystectomy, lumbar laminectomy, left shoulder arthroscopy, right total knee replacement, corneal transplant  Social history: No history of smoking, alcohol abuse, illicit drug use.  Lives with her elderly sister who is 86 years old.  Limited support system.   Family history: Mother-cancer (unknown type)     Past Medical History  Medical History           Past Medical History:   Diagnosis Date    Bilateral primary osteoarthritis of knee      HLD (hyperlipidemia)      Lumbosacral radiculopathy      Meralgia paraesthetica      Physical deconditioning              Surgical History  Surgical History             Past Surgical History:   Procedure Laterality Date    ARTHROPLASTY Left       Left thumb carpometacarpal arthroplasty with ligament reconstruction and tendon interposition    BREAST BIOPSY   1999     Benign    CHOLECYSTECTOMY        COLONOSCOPY        CORNEAL TRANSPLANT        DILATION AND CURETTAGE OF UTERUS        LUMBAR FUSION        SHOULDER ARTHROSCOPY Left      SKIN LESION  EXCISION        TOTAL KNEE ARTHROPLASTY Left 2019    TOTAL KNEE ARTHROPLASTY Right 2017    TRIGGER FINGER RELEASE Right              Social History  She reports that she has never smoked. She has never used smokeless tobacco. She reports that she does not currently use alcohol. She reports that she does not use drugs.     Family History  Family History               Family History   Problem Relation Name Age of Onset    Colon cancer Mother        Lung cancer Father        Other (Mesothelioma) Brother        Brain cancer Mother's Brother                Allergies  Patient has no known allergies.     Review of Systems     10 point ROS negative except as noted above in HPI      Physical Exam  Vitals reviewed.   Constitutional:       General: She is awake. She is not in acute distress.     Appearance: She is obese. She is ill-appearing. She is not toxic-appearing.   HENT:      Head: Normocephalic and atraumatic.      Nose: Nose normal.      Mouth/Throat:      Mouth: Mucous membranes are moist.      Pharynx: Oropharynx is clear.   Eyes:      Conjunctiva/sclera: Conjunctivae normal.   Cardiovascular:      Rate and Rhythm: Normal rate and regular rhythm.      Pulses: Normal pulses.      Heart sounds: No murmur heard.  Pulmonary:      Effort: Pulmonary effort is normal. No respiratory distress.      Breath sounds: Decreased air movement present. Decreased breath sounds present.      Comments: Posterior bilateral breath sounds are diminished  Abdominal:      General: There is distension.      Palpations: Abdomen is soft.      Tenderness: There is abdominal tenderness. There is no guarding.      Comments: Bowel sounds hypoactive, abdomen distended, tender to palpation, dullness noted to the sides.   Musculoskeletal:         General: No swelling, deformity or signs of injury. Normal range of motion.      Cervical back: Neck supple.      Right lower leg: Edema present.      Left lower leg: Edema present.      Comments:  Generalized edema/anasarca   Skin:     General: Skin is warm and dry.      Capillary Refill: Capillary refill takes less than 2 seconds.      Findings: No ecchymosis or wound.      Comments: Wound on coccyx, exam deferred  due to patient discomfort    Neurological:      General: No focal deficit present.      Mental Status: She is alert and oriented to person, place, and time.   Psychiatric:         Mood and Affect: Mood normal.         Behavior: Behavior is cooperative.                  Last Recorded Vitals  Blood pressure 117/58, pulse 100, temperature 36.2 °C (97.2 °F), temperature source Temporal, resp. rate (!) 22, weight 104 kg (230 lb), SpO2 94%.     Relevant Results                  Results for orders placed or performed during the hospital encounter of 10/01/24 (from the past 24 hour(s))   CBC and Auto Differential   Result Value Ref Range     WBC 28.9 (H) 4.4 - 11.3 x10*3/uL     nRBC 0.0 0.0 - 0.0 /100 WBCs     RBC 4.15 4.00 - 5.20 x10*6/uL     Hemoglobin 11.0 (L) 12.0 - 16.0 g/dL     Hematocrit 34.1 (L) 36.0 - 46.0 %     MCV 82 80 - 100 fL     MCH 26.5 26.0 - 34.0 pg     MCHC 32.3 32.0 - 36.0 g/dL     RDW 15.7 (H) 11.5 - 14.5 %     Platelets 446 150 - 450 x10*3/uL     Neutrophils % 89.1 40.0 - 80.0 %     Immature Granulocytes %, Automated 1.0 (H) 0.0 - 0.9 %     Lymphocytes % 4.3 13.0 - 44.0 %     Monocytes % 4.8 2.0 - 10.0 %     Eosinophils % 0.5 0.0 - 6.0 %     Basophils % 0.3 0.0 - 2.0 %     Neutrophils Absolute 25.74 (H) 1.60 - 5.50 x10*3/uL     Immature Granulocytes Absolute, Automated 0.30 0.00 - 0.50 x10*3/uL     Lymphocytes Absolute 1.23 0.80 - 3.00 x10*3/uL     Monocytes Absolute 1.38 (H) 0.05 - 0.80 x10*3/uL     Eosinophils Absolute 0.14 0.00 - 0.40 x10*3/uL     Basophils Absolute 0.09 0.00 - 0.10 x10*3/uL   Comprehensive metabolic panel   Result Value Ref Range     Glucose 107 (H) 74 - 99 mg/dL     Sodium 126 (L) 136 - 145 mmol/L     Potassium 5.2 3.5 - 5.3 mmol/L     Chloride 94 (L) 98 - 107  mmol/L     Bicarbonate 24 21 - 32 mmol/L     Anion Gap 13 10 - 20 mmol/L     Urea Nitrogen 18 6 - 23 mg/dL     Creatinine 0.61 0.50 - 1.05 mg/dL     eGFR >90 >60 mL/min/1.73m*2     Calcium 8.5 (L) 8.6 - 10.3 mg/dL     Albumin 2.4 (L) 3.4 - 5.0 g/dL     Alkaline Phosphatase 231 (H) 33 - 136 U/L     Total Protein 5.7 (L) 6.4 - 8.2 g/dL     AST 23 9 - 39 U/L     Bilirubin, Total 0.3 0.0 - 1.2 mg/dL     ALT 8 7 - 45 U/L   Magnesium   Result Value Ref Range     Magnesium 1.68 1.60 - 2.40 mg/dL   Troponin I, High Sensitivity   Result Value Ref Range     Troponin I, High Sensitivity 7 0 - 13 ng/L   B-Type Natriuretic Peptide   Result Value Ref Range     BNP 74 0 - 99 pg/mL   Sars-CoV-2 PCR   Result Value Ref Range     Coronavirus 2019, PCR Not Detected Not Detected   Lactate   Result Value Ref Range     Lactate 1.4 0.4 - 2.0 mmol/L   Urinalysis with Reflex Culture and Microscopic   Result Value Ref Range     Color, Urine Light-Yellow Light-Yellow, Yellow, Dark-Yellow     Appearance, Urine Clear Clear     Specific Gravity, Urine >1.050 (N) 1.005 - 1.035     pH, Urine 6.0 5.0, 5.5, 6.0, 6.5, 7.0, 7.5, 8.0     Protein, Urine 20 (TRACE) NEGATIVE, 10 (TRACE), 20 (TRACE) mg/dL     Glucose, Urine Normal Normal mg/dL     Blood, Urine NEGATIVE NEGATIVE     Ketones, Urine NEGATIVE NEGATIVE mg/dL     Bilirubin, Urine NEGATIVE NEGATIVE     Urobilinogen, Urine Normal Normal mg/dL     Nitrite, Urine NEGATIVE NEGATIVE     Leukocyte Esterase, Urine NEGATIVE NEGATIVE   Urinalysis Microscopic   Result Value Ref Range     WBC, Urine 1-5 1-5, NONE /HPF     RBC, Urine 1-2 NONE, 1-2, 3-5 /HPF     Squamous Epithelial Cells, Urine 1-9 (SPARSE) Reference range not established. /HPF     Mucus, Urine FEW Reference range not established. /LPF   Protime-INR   Result Value Ref Range     Protime 13.0 (H) 9.8 - 12.8 seconds     INR 1.2 (H) 0.9 - 1.1   APTT   Result Value Ref Range     aPTT 24 (L) 27 - 38 seconds      CT chest abdomen pelvis w IV  contrast     Result Date: 10/1/2024  Interpreted By:  Oscar Aldrich, STUDY: CT CHEST ABDOMEN PELVIS W IV CONTRAST;  10/1/2024 11:13 am   INDICATION: Signs/Symptoms:leukocytosis, ascites, recent ovarian cancer diagnosis.   COMPARISON: CT abdomen pelvis 08/08/2024   ACCESSION NUMBER(S): [id number]   ORDERING CLINICIAN: ASHLEY FAIRBANKS   TECHNIQUE: CT of the chest, abdomen, and pelvis was performed.  Contiguous axial images were obtained at 3 mm slice thickness through the chest, abdomen and pelvis. Coronal and sagittal reconstructions at 3 mm slice thickness were performed. ml of contrast  were administered intravenously without immediate complication.   FINDINGS: CHEST:   LUNG/PLEURA/LARGE AIRWAYS: No endobronchial lesion is seen.   Moderate to large bilateral pleural effusions with adjacent consolidative opacification of the bilateral lower lobes suggestive of compressive atelectasis. No pneumothorax. Mild asymmetric scattered reticular changes suggestive of chronic lung findings.     VESSELS: The thoracic aorta is unremarkable with respect course, caliber, and contour.   Prominent main pulmonary artery measuring up to 3.2 cm in anterior-posterior dimension measured on sagittal plane which may indicate sequela of pulmonary hypertension.   No significant coronary atherosclerotic calcifications are seen.   HEART: Heart size within normal limits. No pericardial effusion.   MEDIASTINUM AND OLIVE: Mildly prominent mediastinal lymph nodes measuring up to 10 mm in short axis   Moderate hiatal hernia with partial intrathoracic stomach and ascites and region of nodularity of the peritoneal fat within hiatal hernia suggestive carcinomatosis.   CHEST WALL AND LOWER NECK: No acute osseous abnormality. Multilevel presumed degenerative changes throughout the imaged spine. No acute soft tissue abnormality.   ABDOMEN:   LIVER: There is been interval scalloping of the right hepatic margins with new regions of irregularity along the  right hepatic capsule diffusely which is new since comparison imaging from 08/08/2024 there is a new elongated subcapsular collection measuring up to approximately 11.4 x 2.1 cm, difficult to measure given curvilinear projection extending over the right hepatic lobe margin (series 202, images 40-70). Similar appearing subcentimeter left hepatic lobe hypodense lesion.   BILE DUCTS: No obvious new intrahepatic biliary dilatation. Mild prominence of the common bile duct, nonspecific in a cholecystectomy patient.   GALLBLADDER: Absent.   PANCREAS: Unremarkable.   SPLEEN: Unchanged.   ADRENAL GLANDS: Unchanged.   KIDNEYS AND URETERS: Similar appearing subcentimeter right renal lower pole calculus. No hydronephrosis. No obstructing urolithiasis.   PELVIS:   BLADDER: Unremarkable.   REPRODUCTIVE ORGANS: Uterus appears grossly unchanged.   BOWEL: Moderate hiatal hernia with wall thickening of the stomach in the hernia. No new pathologic distention of bowel. Wall thickening of the colon within the splenic flexure descending colon and sigmoid colon, nonspecific.     VESSELS: No evidence of abdominal aortic aneurysm.   PERITONEUM/RETROPERITONEUM/LYMPH NODES: Large volume ascites with extensive regions of anterior omental caking and peritoneal carcinomatosis. No obvious free intraperitoneal gas. Given the presence of extensive omental caking and peritoneal carcinomatosis, superimposed peritoneal enhancement 4 other causes cannot be excluded.   BONE AND SOFT TISSUE: No acute osseous abnormality. Osseous structures appear stable. No acute abnormality of the abdominal wall soft tissues.        CHEST: 1.  Moderate to large bilateral pleural effusions with adjacent presumed compressive atelectasis. 2. Prominent main pulmonary artery which may indicate pulmonary hypertension. 3. Mildly prominent mediastinal lymph nodes measuring up to 10 mm in short axis concerning for metastatic disease. 4. Moderate hiatal hernia with partial  intrathoracic stomach. The peritoneal fat adjacent to the stomach within the hernia demonstrates evidence of probable carcinomatosis and ascites.   ABDOMEN-PELVIS: 1.  Redemonstration of large volume ascites with regions of significant anterior omental caking and peritoneal carcinomatosis commensurate with patient's provided diagnosis of ovarian cancer. 2. Regions of wall thickening of the colon extending from the splenic flexure to the sigmoid which may indicate a nonspecific colitis. 3. Since the previous examination on 08/08/2024, there are now extensive regions of scalloping of the right hepatic lobe peripherally with the appearance of a large subcapsular collection along the right hepatic margin as above. The sterility of this collection cannot be assessed via CT and clinical correlation is advised for exclusion superimposed infection within this region.     Signed by: Oscar Aldrich 10/1/2024 12:14 PM Dictation workstation:   [id number]     XR chest 1 view     Result Date: 10/1/2024  Interpreted By:  Samuel Jaramillo, STUDY: XR CHEST 1 VIEW; 10/1/2024 8:44 am   INDICATION: Signs/Symptoms:weakness, edema in legs and abdomen   COMPARISON: April 2023.   ACCESSION NUMBER(S): [id number]   ORDERING CLINICIAN: ASHLEY FAIRBANKS   FINDINGS: The study is limited due to rotation and poor inspiratory effort, with resultant crowding of the pulmonary vasculature. The cardiac silhouette is within normal limits for the technique. Moderate size hiatal hernia is again seen. There is no pneumothorax, confluent infiltrates or significant effusion. Degenerative changes involve the spine and shoulders; metallic anchors again noted over the left humeral head related to previous rotator cuff repair.        Limited study. Hiatal hernia. No acute cardiopulmonary disease.   Signed by: Samuel Jaramillo 10/1/2024 9:22 AM Dictation workstation:   [id number]     US guided abdominal paracentesis     Result Date: 9/20/2024  Interpreted By:   Peri Lawrence  and Ion Whitehead STUDY: US GUIDED ABDOMINAL PARACENTESIS; US GUIDED PERCUTANEOUS ABDOMINAL RETROPERITONEUM BIOPSY;  9/19/2024 11:13 am   INDICATION: Signs/Symptoms:ascites; Signs/Symptoms:ascites, evaluate ovarian cancer.   COMPARISON: CT abdomen and pelvis 08/08/2024   ACCESSION NUMBER(S): [id number]; [id number]   ORDERING CLINICIAN: JEANETTE ARIAS   TECHNIQUE: INTERVENTIONALIST(S): Dr. Peri Lawrence MD   CONSENT: The patient was informed of the nature of the proposed procedure. The purposes, alternatives, risks, and benefits were explained and discussed. All questions were answered and consent was obtained.   SEDATION: 1% local lidocaine was used for anesthetic.   MEDICATION/CONTRAST: No additional.   TIME OUT:   A time out was performed immediately prior to procedure start with the interventional team, correctly identifying the patient name, date of birth, MRN, procedure, anatomy (including marking of site and side), patient position, procedure consent form, relevant laboratory and imaging test results, antibiotic administration, safety precautions, and procedure-specific equipment needs.   COMPLICATIONS: No immediate adverse events identified.   FINDINGS: The patient was placed in the supine position. Limited sonographic images of the lower abdominal wall were obtained for purposes of needle guidance, which demonstrated omental soft tissue mass which was seen on prior CT 08/08/2024. The area of concern was prepped and draped under sterile technique.   1% lidocaine was injected subcutaneously. Additional lidocaine was administered into deeper tissues surrounding the targeted area for biopsy using a 22G spinal needle. A small incision was made. Using the same access site a 18 gauge core biopsy needle was passed via a 17 gauge coaxial introducer needle to obtain a total of 1 core sample.   Postprocedure images demonstrate no evidence for hemorrhage. The patient tolerated the procedure  well and there were no immediate complications. 1 core specimen was sent to pathology.     Subsequently the right lower quadrant was visualized under ultrasound which demonstrated moderate volume ascites seen on prior CT 08/08/2024. 1% lidocaine was injected subcutaneously at this site. A 19 gauge Yueh was used to target the fluid collection under ultrasound guidance. Once the site was accessed, the inner needle was removed from the catheter. The Yueh catheter was connected to drainage container. Estimated 1000 cc of serosanguineous colored fluid was removed. Post paracentesis imaging demonstrated decreased ascitic fluid. The Yueh catheter was removed. The access site was bandaged.        1. Status post ultrasound guided core needle biopsy of omental mass seen on CT 08/08/2024. 1 core specimens sent to pathology. 2. Successful paracentesis under ultrasound guidance with removal of 1 L of serosanguineous fluid.     I was present for and/or performed the critical portions of the procedure and immediately available throughout the entire procedure.   I personally reviewed the image(s) / study and interpretation. I agree with the findings as stated.   Performed and dictated at [facility].   MACRO: None   Signed by: Peri Lawrence 9/20/2024 10:06 AM Dictation workstation:   [id number]     US guided percutaneous abdominal retroperitoneum biopsy     Result Date: 9/20/2024  Interpreted By:  Peri Lawrence and Kamau Nyokabi STUDY: US GUIDED ABDOMINAL PARACENTESIS; US GUIDED PERCUTANEOUS ABDOMINAL RETROPERITONEUM BIOPSY;  9/19/2024 11:13 am   INDICATION: Signs/Symptoms:ascites; Signs/Symptoms:ascites, evaluate ovarian cancer.   COMPARISON: CT abdomen and pelvis 08/08/2024   ACCESSION NUMBER(S): [id number]; [id number]   ORDERING CLINICIAN: JEANETTE ARIAS   TECHNIQUE: INTERVENTIONALIST(S): Dr. Peri Lawrence MD   CONSENT: The patient was informed of the nature of the  proposed procedure. The purposes, alternatives, risks, and benefits were explained and discussed. All questions were answered and consent was obtained.   SEDATION: 1% local lidocaine was used for anesthetic.   MEDICATION/CONTRAST: No additional.   TIME OUT:   A time out was performed immediately prior to procedure start with the interventional team, correctly identifying the patient name, date of birth, MRN, procedure, anatomy (including marking of site and side), patient position, procedure consent form, relevant laboratory and imaging test results, antibiotic administration, safety precautions, and procedure-specific equipment needs.   COMPLICATIONS: No immediate adverse events identified.   FINDINGS: The patient was placed in the supine position. Limited sonographic images of the lower abdominal wall were obtained for purposes of needle guidance, which demonstrated omental soft tissue mass which was seen on prior CT 08/08/2024. The area of concern was prepped and draped under sterile technique.   1% lidocaine was injected subcutaneously. Additional lidocaine was administered into deeper tissues surrounding the targeted area for biopsy using a 22G spinal needle. A small incision was made. Using the same access site a 18 gauge core biopsy needle was passed via a 17 gauge coaxial introducer needle to obtain a total of 1 core sample.   Postprocedure images demonstrate no evidence for hemorrhage. The patient tolerated the procedure well and there were no immediate complications. 1 core specimen was sent to pathology.     Subsequently the right lower quadrant was visualized under ultrasound which demonstrated moderate volume ascites seen on prior CT 08/08/2024. 1% lidocaine was injected subcutaneously at this site. A 19 gauge Yueh was used to target the fluid collection under ultrasound guidance. Once the site was accessed, the inner needle was removed from the catheter. The Yueh catheter was connected to drainage  container. Estimated 1000 cc of serosanguineous colored fluid was removed. Post paracentesis imaging demonstrated decreased ascitic fluid. The Yueh catheter was removed. The access site was bandaged.        1. Status post ultrasound guided core needle biopsy of omental mass seen on CT 08/08/2024. 1 core specimens sent to pathology. 2. Successful paracentesis under ultrasound guidance with removal of 1 L of serosanguineous fluid.     I was present for and/or performed the critical portions of the procedure and immediately available throughout the entire procedure.   I personally reviewed the image(s) / study and interpretation. I agree with the findings as stated.   Performed and dictated at [facility].   MACRO: None   Signed by: Peri Lawrence 9/20/2024 10:06 AM Dictation workstation:   [id number]            Assessment/Plan        Assessment & Plan  Hyponatremia        79-year-old female with past medical history of recently diagnosed ovarian cancer with peritoneal carcinomatosis s/p paracentesis x 4 (last one done 9/19/24), obesity, hyperlipidemia, osteoarthritis, and skin cancer s/p Mohs procedure.  Patient presents to the hospital with weakness and inability to care for herself.  Patient found to be hyponatremic and with evidence on CT imaging of possible abscess of the liver and ascites secondary to malignancy.  Hospitalized for further evaluation management..     #Ovarian cancer with peritoneal carcinomatosis  #Ascites  #Bilateral pleural effusions  # Suspected liver abscess  #Abdominal pain     Telemetry monitoring  Consult to IR for paracentesis and possible drainage of liver abscess  Consult to pulmonology for bilateral pleural effusions  Antiemetics as needed  Analgesics as needed  Patient needs to be followed up with by her gynecological oncologist to determine optimal plan going forward for treatment of her ovarian cancer     #Hyponatremia  #Generalized  edema/anasarca  #Hypoalbuminemia  Patient is fluid overloaded and has significant third spacing, suspect hypervolemia hyponatremia.  Will check urine electrolytes, urine osmolality, and serum osmolality  Fluid restriction of 1500 ml for the time being.  Consider starting diuretics, defer to attending  Repeat labs in a.m.     #debility  PT/OT  Social work for discharge planning     Chronic issues:  #Obesity  #Hyperlipidemia  #Osteoarthritis     Continue with patient's home medications as appropriate     #DVT prophylaxis  SCDs  Lovenox subcutaneous     I spent 75 minutes in the professional and overall care of this patient.                  Revision History          Patient fully evaluated 10/2, awake, resting in bed. Patient with Moderate hiatal hernia with partial intrathoracic stomach and the peritoneal fat adjacent to the stomach within the hernia demonstrates evidence of probable carcinomatosis and ascites, Patient tolerated paracentesis on 10/01, awaiting culture results.No s/s or c/o acute difficulties at this time. Medications and labs reviewed.  Plan discussed with interdisciplinary team, per pulmonology - Plan:  Patient has bilateral pleural effusion in the context of malignant ascites/ovarian cancer I explained to the patient the pleural effusion most likely related to recurrent ascites, patient is requiring so far 5 steps malignant ascites in the last month most likely beneficial approaches intra-abdominal Pleurx catheter Abdominal Pleurx catheter would be the most effective way of preventing pleural effusions and ascites.  Pleural effusions are secondary to ascites, both of which are due to patient's underlying cancer Continue with goals of care discussions prior to interventional radiology consult Follow hepatic fluid analysis Continue IV antibiotics Zosyn, continue current and repeat labs in the AM. Patient still requiring frequent cardiac and SPO2 monitoring. Discharge planning discussed with patient  and care team. Therapy evaluations ordered-  [facility], anticipate SNF/[facility] at discharge. Patient aware and agreeable to current plan, continue plan as above. I spent 50 minutes in the professional and overall care of this patient.              Assessment & Plan  Hyponatremia     Patient fully evaluated 10/3, awake, resting in bed. Patient with Moderate hiatal hernia with partial intrathoracic stomach and the peritoneal fat adjacent to the stomach within the hernia demonstrates evidence of probable carcinomatosis and ascites, Patient tolerated paracentesis on 10/01 well,order placed for repeat paracentesis therapeutic non diagnostic with IR.continue current and repeat labs in the AM. Patient aware and agreeable to current plan, continue plan as above.     Patient fully evaluated on October 4.  Patient awaiting therapeutic paracentesis.  Patient probably will need albumin afterwards.  Continue to monitor response with above treatments recheck labs in AM.  I spent 50 minutes in the professional and overall care of this patient.       Patient fully evaluated 10/06, head of bed elevated, no s/s or c/o acute difficulties at this time. Sister at bedside and agreeable to plan. Attempted therapeutic paracentesis by IR, aborted procedure due to insufficient fluid accumulation.  Medications and labs reviewed, cultures blood no growth at 4 days, AFB Culture      Culture in progress and will be examined weekly. A result will be issued either when positive or after 8 weeks incubation. AFB Stain -No acid fast bacilli seen.  Plan discussed with interdisciplinary team, continue current and repeat labs in the AM. Per pulmonary - Day of consult, patient is breathing on room air. Vital stable. CT chest showed bilateral large pleural effusions. Dicussed need for Abdominal Pleurx catheter. Patient with likely carcinomatosis and plan to discharge to SNF - [facility] and will follow up with gynecology in 7 days,     Patient still  requiring frequent cardiac and SPO2 monitoring. Discharge planning discussed with patient and care team. Therapy evaluations ordered- [place] [address], [facility] at discharge. Patient aware and agreeable to current plan, continue plan as above. I spent 50 minutes in the professional and overall care of this patient.     Patient fully evaluated 10/07, head of bed elevated, no s/s or c/o acute difficulties at this time. Medications and labs reviewed, sodium 125, nephrology consulted.  Cultures blood no growth at 4 days, AFB Culture      Culture in progress and will be examined weekly. A result will be issued either when positive or after 8 weeks incubation. AFB Stain -No acid fast bacilli seen.  Plan discussed with interdisciplinary team, continue current and repeat labs in the AM, new supplemental oxygen requirements, will repeat chest xray in AM, maintain HOB elevated to alleviate postural dyspnea. Vitals stable. Patient with likely carcinomatosis and plan to discharge to SNF - [facility] and will follow up with gynecology in 7 days, atient still requiring frequent cardiac and SPO2 monitoring. Discharge planning discussed with patient and care team. Therapy evaluations ordered- [place] [address], [facility] at discharge. Patient aware and agreeable to current plan, continue plan as above. I spent 50 minutes in the professional and overall care of this patient.   Patient fully evaluated 10/08, head of bed elevated, no s/s or c/o acute difficulties at this time. Medications and labs reviewed, sodium low again today at 125, nephrology consulted. Awaiting hepatic fluid analysis -AFB Culture -Culture in progress and will be examined weekly. A result will be issued either when positive or after 8 weeks incubation. AFB Stain -No acid fast bacilli seen.  Plan discussed with interdisciplinary team, per nephrology - Hyponatremia  Acute kidney injury   Ascites  Malnutrition  Anemia  Pleural effusion  Plan;  Discontinue potential  nephrotoxins  Nutritional/iron/renal indices/urinary indices  DuoNeb aerosols  Appreciate nephrology input. Patient requiring supplemental oxygen at this time, continue to maintain HOB elevated as tolerated. Patient seen by pulmonology - Consult IR for right sided diagnostic and therapeutic thoracentesis  Unable to safely perform paracentesis due to lack of fluid  Patient has bilateral pleural effusion in the context of malignant ascites/ovarian cancer - Pleural effusions are secondary to ascites, both of which are due to patient's underlying cancer. Continue with goals of care discussions prior to interventional radiology consult. Continue IV antibiotics zosyn, probiotics added. Continue current plan and repeat labs in the AM, repeat chest xray in AM, maintain HOB elevated to alleviate postural dyspnea. Vitals stable. Patient with likely carcinomatosis and plan to discharge to SNF - [facility] and will follow up with gynecology in 7 days, atient still requiring frequent cardiac and SPO2 monitoring. Discharge planning discussed with patient and care team. Therapy evaluations ordered- [facility], SNF at discharge. Patient aware and agreeable to current plan, continue plan as above. I spent 50 minutes in the professional and overall care of this patient.     Patient fully evaluated 10/09, patient resting in bed with HOB, no s/s or c/o acute difficulties at this time. Medications and labs reviewed, cultures no growth at 4 days, AFB cultures in process. Plan discussed with interdisciplinary team, pulmonary plan - Bilateral pleural effusion  Abdominal ascites  Ovarian carcinoma w/ peritoneal carcinomatosis carcinomatosis  HLD  Patient clear for discharge to SNF from standpoint of pulmonology  Patient will likely need an abdominal PleurX catheter at some point. However, we were unable to perform safely during this hospitalization due to lack of ascitic fluid  Pleural fluid suggests exudative effusion, likely  secondary to malignancy  Okay to discontinue antibiotics from standpoint of pulmonology. Low suspicion of pneumonia. Leukocytosis is unlikely reflective of infection.   Will continue current and repeat labs in the AM. Patient still requiring frequent cardiac and SPO2 monitoring. Discharge planning discussed with patient and care team. Therapy evaluations ordered- [facility], anticipate SNF at discharge. Patient aware and agreeable to current plan, continue plan as above. I spent 50 minutes in the professional and overall care of this patient.     Patient fully evaluated 10/10, patient resting in bed with HOB, no s/s or c/o acute difficulties at this time. Medications and labs reviewed, cultures no growth at 5 days, AFB cultures in process. Plan discussed with interdisciplinary team, pulmonary plan- Bilateral pleural effusion  Abdominal ascites Ovarian carcinoma w/ peritoneal carcinomatosis carcinomatosis HLD Patient clear for discharge to SNF from standpoint.   Patient will likely need an abdominal PleurX catheter at some point. However, we were unable to perform safely during this hospitalization due to lack of ascitic fluid Pleural fluid suggests exudative effusion, likely secondary to malignancy Okay to discontinue antibiotics from standpoint of pulmonology. Low suspicion of pneumonia. Leukocytosis is unlikely reflective of infection.   Patient's sister at bedside, discussion of plan of care and will follow up with gynecology outpatient, possibly Dr. Jones. Will continue current and repeat labs in the AM. Patient with frequent bowel movements, soft, will hold miralax at this time. Patient still requiring frequent cardiac and SPO2 monitoring. Discharge planning discussed with patient and care team. Therapy evaluations ordered- [facility], anticipate SNF at discharge. Patient aware and agreeable to current plan, continue plan as above. I spent 50 minutes in the professional and overall care of this patient.      Patient fully evaluated 10/11, patient resting in bed with HOB, no s/s or c/o acute difficulties at this time. Medications and labs reviewed, cultures no growth at 5 days, AFB cultures in process, creatinine and Bun continue to rise. Nephrology on the case, appreciate input.  Plan discussed with interdisciplinary team, pulmonary plan- agree to discontinue antibiotics from standpoint of pulmonology with Low suspicion of pneumonia. Continues to require supplemental oxygen at this time. Leukocytosis is unlikely reflective of infection. Patient pain medications updated based on renal function - will switch Roxicodone. Long discussion with patient's sister and patient plan of care and will follow up with gynecology outpatient, possibly Dr. Jones. Will continue current and repeat labs in the AM. Patient with frequent bowel movements, soft, will hold miralax at this time. Patient still requiring frequent cardiac and SPO2 monitoring. Discharge planning discussed with patient and care team. Therapy evaluations ordered- [facility] 10, anticipate SNF at discharge. Patient aware and agreeable to current plan, continue plan as above.     Patient fully evaluated on October 12 and continues to have significant decline in renal function.  Oncology reconsulted.  I believe the patient is significant third spacing secondary to peritoneal carcinomatosis.  Recheck labs in AM.  Appreciate pulmonary and nephrology consultations.  I spent 50 minutes in the professional and overall care of this patient.     Patient fully evaluated 10/13, head of bed elevated, visible difficulties noted at this time. Medications and labs reviewed-  Cultures-  in process  WBC- 29.6  Hemoglobin- 9.2  Imaging- CT abdomen pelvis wo IV contrast   Impression:    Overall findings are grossly unchanged from 10/01/2024 CT scan.  1. Persistent moderate abdominopelvic ascites with diffuse peritoneal  carcinomatosis/omental caking.  2. 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  Underlying cirrhosis could not be entirely excluded as well.  3. Moderate bilateral pleural effusion with surrounding atelectasis.  Supplemental oxygen requiring at this time.      Goals of care- long discussion with sister, patient, and interdisciplinary team, patient with worsening renal impairment secondary to  ovarian carcinoma with peritoneal carcinomatosis abdominal distention, ascites requiring frequent drainage was admitted profound weakness tiredness, failure to thrive. Prognosis poor, plan discussed and will focus on comfort on measures at this time, planned call out to gyn/onc doctor tomorrow morning. Addition of methadone for pain management. Patient seen by Dr. Alexandre - The patient is a 79-year-old woman diagnosed with ovarian carcinoma and peritoneal carcinomatosis with ascites in August 2024.  Initially evaluated by GYN oncology and chemotherapy was recommended but due to intercurrent illness and admissions the patient did not/could not receive any treatment.  Currently at SNF and was admitted because of profound weakness tiredness failure to thrive pain and noted to have elevated creatinine.  Physical examination revealed elderly woman in pain requesting assistance.  Performance status is 3-4.  Elevated creatinine at 3.14 mg/dL.  Personally discussed with Dr. Rhodes.  Could consider initial chemotherapy to site to reduce then consider surgery.  However, advanced age, poor performance status, elevated creatinine preclude chemotherapy.  Consider evaluation by GYN oncology.  Meanwhile consider palliative care pain control/symptom control drainage of ascites consider catheter placement to drain fluid.  Consider methadone in view of elevated creatinine.  Thank you for allowing me to participate in care of your patient if you have any questions please feel free to call me.  Will continue current and  repeat,  labs in the AM. Patient still requiring frequent cardiac and SPO2 monitoring. Continue aggressive pulmonary hygiene. Discharge planning discussed with patient and care team. Therapy evaluations ordered. [facility]-        , anticipate HHC/SNF at discharge. Patient aware and agreeable to current plan, continue plan as above. I spent a total of 50 minutes on the date of the service which included preparing to see the patient, face-to-face patient care, completing clinical documentation, obtaining and/or reviewing separately obtained history, performing a medically appropriate examination, counseling and educating the patient/family/caregiver, ordering medications, tests, or procedures, communicating with other HCPs (not separately reported), independently interpreting results (not separately reported), communicating results to the patient/family/caregiver, and care coordination (not separately reported).                      Revision History          Pertinent Physical Exam At Time of Discharge  Physical Exam  Patient fully evaluated 10/14/24 for   1. Hyponatremia  AFB Culture/Smear     AFB Culture/Smear       2. Abdominal fluid collection  AFB Culture/Smear     AFB Culture/Smear       3. Malignant ascites (CMS-HCC)  AFB Culture/Smear     AFB Culture/Smear       4. Pleural effusion  AFB Culture/Smear     AFB Culture/Smear       Call placed to GYN/ONC Dr. Villalba this AM with long discussion on patient, treatment options, and goals of care. Patient and sister aware of poor prognosis and bleak outcome. Patient with no significant clinical improvement noted, patient medically cleared for discharge today to [facility]. Patient seen resting in bed with head of bed elevated, no s/s or c/o acute difficulties at this time. Medications and labs reviewed, will begin comfort care medications and transition to hospice.           Results for orders placed or performed during the hospital encounter of 10/01/24 (from the  past 24 hour(s))   CBC   Result Value Ref Range     WBC 29.2 (H) 4.4 - 11.3 x10*3/uL     nRBC 0.0 0.0 - 0.0 /100 WBCs     RBC 3.62 (L) 4.00 - 5.20 x10*6/uL     Hemoglobin 9.5 (L) 12.0 - 16.0 g/dL     Hematocrit 30.3 (L) 36.0 - 46.0 %     MCV 84 80 - 100 fL     MCH 26.2 26.0 - 34.0 pg     MCHC 31.4 (L) 32.0 - 36.0 g/dL     RDW 17.5 (H) 11.5 - 14.5 %     Platelets 436 150 - 450 x10*3/uL   Comprehensive metabolic panel   Result Value Ref Range     Glucose 83 74 - 99 mg/dL     Sodium 133 (L) 136 - 145 mmol/L     Potassium 4.3 3.5 - 5.3 mmol/L     Chloride 94 (L) 98 - 107 mmol/L     Bicarbonate 22 21 - 32 mmol/L     Anion Gap 21 (H) 10 - 20 mmol/L     Urea Nitrogen 64 (H) 6 - 23 mg/dL     Creatinine 4.01 (H) 0.50 - 1.05 mg/dL     eGFR 11 (L) >60 mL/min/1.73m*2     Calcium 8.0 (L) 8.6 - 10.3 mg/dL     Albumin 2.2 (L) 3.4 - 5.0 g/dL     Alkaline Phosphatase 165 (H) 33 - 136 U/L     Total Protein 5.0 (L) 6.4 - 8.2 g/dL     AST 17 9 - 39 U/L     Bilirubin, Total 0.3 0.0 - 1.2 mg/dL     ALT 6 (L) 7 - 45 U/L         Intake/Output this shift:  No intake/output data recorded.     Vital signs for last 24 hours:  Temp:  [35.6 °C (96.1 °F)-36.8 °C (98.2 °F)] 35.9 °C (96.6 °F)  Heart Rate:  [] 110  Resp:  [16-20] 16  BP: ()/(41-78) 107/43        Plan discussed with interdisciplinary team, ok to discharge to [facility]. Patient aware and agreeable to current plan, continue plan as above. I spent 30 minutes on the date of the service which included preparing to see the patient, face-to-face patient care, completing clinical documentation, obtaining and/or reviewing separately obtained history, performing a medically appropriate examination, counseling and educating the patient/family/caregiver, ordering medications, tests, or procedures, communicating with other HCPs (not separately reported), independently interpreting results (not separately reported), communicating results to the patient/family/caregiver, and  care coordination (not separately reported  Outpatient Follow-Up         Future Appointments   Date Time Provider Department Center   10/16/2024  7:40 AM Sofia Villalba MD MPH [id number] [place]            Kim Angulo                Revision History          Assessment/Plan   Assessment & Plan      Patient fully evaluated  10/15/24,  Hospice [facility], head of bed elevated, no s/s or c/o acute difficulties at this time. Medications and labs reviewed-    Scheduled medications     Continuous medications     PRN medications  PRN medications: glycopyrrolate, haloperidol lactate, HYDROmorphone, HYDROmorphone, HYDROmorphone, LORazepam     No results found for this or any previous visit (from the past 24 hour(s)).    Plan discussed with interdisciplinary team, continue current and maintain comfort. Continue oral and pulmonary hygiene. Plan discussed with interdisciplinary team, ok to discharge to [facility]. Patient aware and agreeable to current plan, continue plan as above. I spent 30 minutes on the date of the service which included preparing to see the patient, face-to-face patient care, completing clinical documentation, obtaining and/or reviewing separately obtained history, performing a medically appropriate examination, counseling and educating the patient/family/caregiver, ordering medications, tests, or procedures, communicating with other HCPs (not separately reported), independently interpreting results (not separately reported), communicating results to the patient/family/caregiver, and care coordination (not separately reported       Kim Angulo

## 2024-10-16 ENCOUNTER — APPOINTMENT (OUTPATIENT)
Dept: RADIOLOGY | Facility: HOSPITAL | Age: 79
End: 2024-10-16
Payer: OTHER MISCELLANEOUS

## 2024-10-16 ENCOUNTER — OFFICE VISIT (OUTPATIENT)
Dept: GYNECOLOGIC ONCOLOGY | Facility: CLINIC | Age: 79
End: 2024-10-16
Payer: MEDICARE

## 2024-10-16 ENCOUNTER — APPOINTMENT (OUTPATIENT)
Dept: HEMATOLOGY/ONCOLOGY | Facility: CLINIC | Age: 79
End: 2024-10-16
Payer: MEDICARE

## 2024-10-16 ENCOUNTER — HOSPITAL ENCOUNTER (INPATIENT)
Facility: HOSPITAL | Age: 79
End: 2024-10-16
Attending: INTERNAL MEDICINE | Admitting: INTERNAL MEDICINE
Payer: MEDICARE

## 2024-10-16 DIAGNOSIS — Z51.5 END OF LIFE CARE: ICD-10-CM

## 2024-10-16 DIAGNOSIS — C54.1 ENDOMETRIAL CANCER (MULTI): Primary | ICD-10-CM

## 2024-10-16 LAB
ACID FAST STN SPEC: NORMAL
MYCOBACTERIUM SPEC CULT: NORMAL

## 2024-10-16 PROCEDURE — 2500000004 HC RX 250 GENERAL PHARMACY W/ HCPCS (ALT 636 FOR OP/ED)

## 2024-10-16 PROCEDURE — 1150000001 HC HOSPICE PRIVATE ROOM DAILY

## 2024-10-16 PROCEDURE — 71045 X-RAY EXAM CHEST 1 VIEW: CPT

## 2024-10-16 RX ORDER — BISACODYL 10 MG/1
10 SUPPOSITORY RECTAL DAILY PRN
Start: 2024-10-16

## 2024-10-16 RX ORDER — GLYCOPYRROLATE 0.2 MG/ML
0.2 INJECTION INTRAMUSCULAR; INTRAVENOUS EVERY 4 HOURS PRN
Status: ON HOLD
Start: 2024-10-16 | End: 2024-10-17

## 2024-10-16 RX ORDER — LORAZEPAM 2 MG/ML
0.5 INJECTION INTRAMUSCULAR EVERY 4 HOURS PRN
Status: ON HOLD
Start: 2024-10-16 | End: 2024-10-17

## 2024-10-16 RX ORDER — HALOPERIDOL 5 MG/ML
1 INJECTION INTRAMUSCULAR EVERY 4 HOURS PRN
Status: ON HOLD
Start: 2024-10-16 | End: 2024-10-17

## 2024-10-16 RX ORDER — HYDROMORPHONE HYDROCHLORIDE 0.2 MG/ML
0.2 INJECTION INTRAMUSCULAR; INTRAVENOUS; SUBCUTANEOUS
Status: ON HOLD
Start: 2024-10-16 | End: 2024-10-17

## 2024-10-16 ASSESSMENT — PAIN SCALES - GENERAL
PAINLEVEL_OUTOF10: 0 - NO PAIN
PAINLEVEL_OUTOF10: 0 - NO PAIN
PAINLEVEL_OUTOF10: 6
PAINLEVEL_OUTOF10: 1
PAINLEVEL_OUTOF10: 3
PAINLEVEL_OUTOF10: 3
PAINLEVEL_OUTOF10: 7
PAINLEVEL_OUTOF10: 9
PAINLEVEL_OUTOF10: 0 - NO PAIN
PAINLEVEL_OUTOF10: 2
PAINLEVEL_OUTOF10: 6
PAINLEVEL_OUTOF10: 6

## 2024-10-16 ASSESSMENT — PAIN - FUNCTIONAL ASSESSMENT
PAIN_FUNCTIONAL_ASSESSMENT: 0-10

## 2024-10-16 ASSESSMENT — PAIN DESCRIPTION - LOCATION: LOCATION: GENERALIZED

## 2024-10-16 ASSESSMENT — PAIN DESCRIPTION - DESCRIPTORS
DESCRIPTORS: DISCOMFORT
DESCRIPTORS: ACHING

## 2024-10-16 NOTE — PROGRESS NOTES
Spoke with Dr Rhodes regarding changing admission status of pt back to Inpatient.  Per [facility] pt is not GIP, spoke with hospice RN Lisbeth and she told me to contact [facility] and have the hospice order for GIP dc'd.  At this time plan is for  skilled care, will ask for therapy order also.   Doreen Johns RN [facility]

## 2024-10-16 NOTE — PROGRESS NOTES
Consent:  Verbal consent was requested and obtained from patient on this date for a telehealth visit.    Patient ID: Rohini Beaver is a 79 y.o. female.  Referring Physician: No referring provider defined for this encounter.  Primary Care Provider: Jose Enrique Wooten MD    Subjective    Spoke with sister, Mara. Patient was transitioned to hospice with [facility]. Remains inpatient while waiting for a bed. In and out of consciousness. She is mostly comfortable. Was unable to start treatment. Prolonged hospitalization prior to transitioning to hospice.     Objective    BSA: There is no height or weight on file to calculate BSA.  There were no vitals taken for this visit.       Performance Status:  Bedridden    Assessment/Plan    80 y/o F with advanced stage high grade endometrial cancer now transitioned to hospice    Oncology History Overview Note   8/15/24 paracentesis 50 mL   CA-125 (8/16/24) elevated to 1625; CEA 17.6   8/31/24 paracentesis cytology adenocarcinoma + ER, GATA3 and negative TTF-1 and calretinin nonspecific for primary site   9/19/24 Omental biopsy high grade carcinoma, consistent with mullerian origin PAX8, ER and p16 positive negative WT1 suggest endometrial primary HER2 negative     Peritoneal carcinomatosis (Multi) (Resolved)   9/4/2024 Initial Diagnosis    Peritoneal carcinomatosis (Multi)     Ovarian cancer (Multi) (Resolved)   10/2/2024 Initial Diagnosis    Ovarian cancer (Multi)     Endometrial cancer (Multi)   10/2/2024 Initial Diagnosis    Endometrial cancer (Multi)     10/16/2024 - 10/16/2024 Chemotherapy    Pembrolizumab + PACLitaxel / CARBOplatin, 21 Day Cycles followed by Pembrolizumab, 42 Day Cycles - Gyn        Cancer Staging   No matching staging information was found for the patient.       Diagnoses and all orders for this visit:  Endometrial cancer (Multi)  - Transition to Hospice, remains hospitalized  - Code status: DNR-CC  - Support provided to family, encourage to  call with any questions or concerns     RTC PRN    Sofia Villalba MD MPH    I spent 11 minutes virtually or in a telephone with this patient and/or family. More than 50% of the time was spent in counseling and/or coordination of care.

## 2024-10-16 NOTE — DISCHARGE SUMMARY
Discharge Diagnosis  Hyponatremia    Issues Requiring Follow-Up  Patient fully evaluated 10/14/24 for   1.  Hyponatremia   AFB Culture/Smear  AFB Culture/Smear  2.  Abdominal fluid collection   AFB Culture/Smear  AFB Culture/Smear  3.  Malignant ascites (CMS-HCC)   AFB Culture/Smear  AFB Culture/Smear  4.  Pleural effusion   AFB Culture/Smear  AFB Culture/Smear  Call placed to GYN/ONC Dr. Villalba this AM with long discussion on patient, treatment options, and goals of care. Patient and sister aware of poor prognosis and bleak outcome. Patient with no significant clinical improvement noted, patient medically cleared for discharge today to [facility]. Patient seen resting in bed with head of bed elevated, no s/s or c/o acute difficulties at this time. Medications and labs reviewed, will begin comfort care medications and transition to hospice.    Results for orders placed or performed during the hospital encounter of 10/01/24 (from the past 24 hour(s))  CBC  Result  Value  Ref Range  WBC  29.2 (H)  4.4 - 11.3 x10*3/uL  nRBC  0.0  0.0 - 0.0 /100 WBCs  RBC  3.62 (L)  4.00 - 5.20 x10*6/uL  Hemoglobin  9.5 (L)  12.0 - 16.0 g/dL  Hematocrit  30.3 (L)  36.0 - 46.0 %  MCV  84  80 - 100 fL  MCH  26.2  26.0 - 34.0 pg  MCHC  31.4 (L)  32.0 - 36.0 g/dL  RDW  17.5 (H)  11.5 - 14.5 %  Platelets  436  150 - 450 x10*3/uL  Comprehensive metabolic panel  Result  Value  Ref Range  Glucose  83  74 - 99 mg/dL  Sodium  133 (L)  136 - 145 mmol/L  Potassium  4.3  3.5 - 5.3 mmol/L  Chloride  94 (L)  98 - 107 mmol/L  Bicarbonate  22  21 - 32 mmol/L  Anion Gap  21 (H)  10 - 20 mmol/L  Urea Nitrogen  64 (H)  6 - 23 mg/dL  Creatinine  4.01 (H)  0.50 - 1.05 mg/dL  eGFR  11 (L)  >60 mL/min/1.73m*2  Calcium  8.0 (L)  8.6 - 10.3 mg/dL  Albumin  2.2 (L)  3.4 - 5.0 g/dL  Alkaline Phosphatase  165 (H)  33 - 136 U/L  Total Protein  5.0 (L)  6.4 - 8.2 g/dL  AST  17  9 - 39 U/L  Bilirubin, Total  0.3  0.0 - 1.2 mg/dL  ALT  6 (L)  7 - 45  U/L      Intake/Output this shift:  No intake/output data recorded.    Vital signs for last 24 hours:  Temp:  [35.6 °C (96.1 °F)-36.8 °C (98.2 °F)] 35.9 °C (96.6 °F)  Heart Rate:  [] 110  Resp:  [16-20] 16  BP: ()/(41-78) 107/43      Plan discussed with interdisciplinary team, ok to discharge to [facility]. Patient aware and agreeable to current plan, continue plan as above. I spent 30 minutes on the date of the service which included preparing to see the patient, face-to-face patient care, completing clinical documentation, obtaining and/or reviewing separately obtained history, performing a medically appropriate examination, counseling and educating the patient/family/caregiver, ordering medications, tests, or procedures, communicating with other HCPs (not separately reported), independently interpreting results (not separately reported), communicating results to the patient/family/caregiver, and care coordination (not separately reported)    Discharge Meds     Medication List      START taking these medications     bisacodyl 10 mg suppository; Commonly known as: Dulcolax; Insert 1   suppository (10 mg) into the rectum once daily as needed for constipation.   glycopyrrolate 200 mcg/mL injection; Commonly known as: Robinul; Infuse   1 mL (0.2 mg) into a venous catheter every 4 hours if needed (excess   secretions).   haloperidol lactate 5 mg/mL injection; Commonly known as: Haldol; Infuse   0.2 mL (1 mg) into a venous catheter every 4 hours if needed for agitation   (delirium).   HYDROmorphone PF 0.2 mg/mL injection; Commonly known as: Dilaudid;   Infuse 1 mL (0.2 mg) into a venous catheter every 15 minutes if needed   (moderate pain (4 - 6)).   LORazepam 2 mg/mL injection; Commonly known as: Ativan; Infuse 0.25 mL   (0.5 mg) over 5 minutes into a venous catheter every 4 hours if needed   (anxiety, restlessness, insomnia).     CHANGE how you take these medications     HYDROmorphone 0.5 mg/0.5  mL solution injection; Commonly known as:   Dilaudid; Infuse 0.4 mL (0.4 mg) into a venous catheter every 15 minutes   if needed (severe pain).; What changed: when to take this, reasons to take   this     CONTINUE taking these medications     acetaminophen 325 mg tablet; Commonly known as: Tylenol; Take 2 tablets   (650 mg) by mouth every 6 hours if needed for mild pain (1 - 3).   cetirizine 10 mg tablet; Commonly known as: ZyrTEC; Take 1 tablet (10   mg) by mouth once daily.   cyclobenzaprine 5 mg tablet; Commonly known as: Flexeril; Take 1 tablet   (5 mg) by mouth 3 times a day.   docusate sodium 100 mg capsule; Commonly known as: Colace   ipratropium-albuteroL 0.5-2.5 mg/3 mL nebulizer solution; Commonly known   as: Duo-Neb; Take 3 mL by nebulization every 2 hours if needed for   wheezing or shortness of breath.   methadone 5 mg/5 mL solution; Commonly known as: Dolophine; Take 5 mL (5   mg) by mouth every 12 hours.   ondansetron 8 mg tablet; Commonly known as: Zofran   oxygen gas therapy; Commonly known as: O2; Inhale 2 L/min every 12   hours.   polyethylene glycol 17 gram packet; Commonly known as: Glycolax, Miralax   prednisoLONE acetate 1 % ophthalmic suspension; Commonly known as:   Pred-Forte   temazepam 7.5 mg capsule; Commonly known as: Restoril; Take 1 capsule   (7.5 mg) by mouth as needed at bedtime for sleep.   torsemide 10 mg tablet; Commonly known as: Demadex; Take 5 tablets (50   mg) by mouth once daily.       Test Results Pending At Discharge  Pending Labs       No current pending labs.            Hospital Course         Jd Rhodes MD   Physician  Internal Medicine     Progress Notes      Signed     Date of Service: 10/13/2024  2:52 PM  Signed  Expand All Collapse All    Rohini Beaver is a 79 y.o. female on day 12 of admission presenting with Hyponatremia.           Subjective     Patient fully evaluated 10/3, awake, resting in bed. Patient with Moderate hiatal hernia with partial  intrathoracic stomach and the peritoneal fat adjacent to the stomach within the hernia demonstrates evidence of probable carcinomatosis and ascites, Patient tolerated paracentesis on 10/01 well,order placed for repeat paracentesis therapeutic non diagnostic with IR.continue current and repeat labs in the AM. Patient aware and agreeable to current plan, continue plan as above. I spent 50 minutes in the professional and overall care of this patient.      Objective  Last Recorded Vitals  /56   Pulse 103   Temp 36.3 °C (97.3 °F)   Resp 20   Wt 104 kg (230 lb)   SpO2 93%   Intake/Output last 3 Shifts:     Intake/Output Summary (Last 24 hours) at 10/13/2024 1452  Last data filed at 10/13/2024 0500  Gross per 24 hour  Intake  120 ml  Output  --  Net  120 ml        Admission Weight  Weight: 104 kg (230 lb) (10/01/24 0828)     Daily Weight  10/01/24 : 104 kg (230 lb)     Image Results  CT abdomen pelvis wo IV contrast  Narrative: Interpreted By:  Deep Scott,   STUDY:  CT ABDOMEN PELVIS WO IV CONTRAST;  10/12/2024 1:30 pm      INDICATION:  Signs/Symptoms:Ascites /Abdominal Distention/Renal Failure.          COMPARISON:  CT scan 10/01/2024.      ACCESSION NUMBER(S):  [id number]      ORDERING CLINICIAN:  ALICIA GRAHAM      TECHNIQUE:  CT of the abdomen and pelvis was performed. Contiguous axial images  were obtained at 3 mm slice thickness through the abdomen and pelvis.  Coronal and sagittal reconstructions at 3 mm slice thickness were  performed.  No intravenous contrast was administered; positive oral  contrast was given.      FINDINGS:  Please note that the evaluation of vessels, lymph nodes and organs is  limited without intravenous contrast.      LOWER CHEST:  Small to moderate bilateral pleural effusion with surrounding  atelectasis.      ABDOMEN:      LIVER:  Nodular surface with a scalloping of the right hepatic capsule  secondary to the adjacent malignant ascites. No definite  parenchymal  lesions.      BILE DUCTS:  The intrahepatic and extrahepatic ducts are not dilated.      GALLBLADDER:  Surgically absent      PANCREAS:  No duct dilatation      SPLEEN:  No splenomegaly.      ADRENAL GLANDS:  No nodule      KIDNEYS AND URETERS:  The kidneys are normal in size and unremarkable in appearance.  No  hydroureteronephrosis or nephroureterolithiasis is identified.      PELVIS:      BLADDER:  Unremarkable      REPRODUCTIVE ORGANS:  Uterus is unremarkable      BOWEL:  Moderate hiatal hernia. No bowel dilatation. Few uncomplicated  colonic diverticulosis.      Moderate abdominopelvic ascites. Diffuse omental caking and  peritoneal carcinomatosis.      VESSELS:  Multifocal vascular calcifications and atherosclerotic changes.      PERITONEUM/RETROPERITONEUM/LYMPH NODES:  Multiple subcentimeter retroperitoneal, iliac and pelvic lymph nodes.      ABDOMINAL WALL:  Subcutaneous edema.      BONES:  Multilevel degenerative spine changes and facet joint disease. Prior  lower lumbar spine fixation.      Impression: Overall findings are grossly unchanged from 10/01/2024 CT scan.  1. Persistent moderate abdominopelvic ascites with diffuse peritoneal  carcinomatosis/omental caking.  2. 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  Underlying cirrhosis could not be entirely excluded as well.  3. Moderate bilateral pleural effusion with surrounding atelectasis.          MACRO:  None      Signed by: Deep Scott 10/13/2024 7:50 AM  Dictation workstation:   [id number]        Physical Exam     Relevant Results                       Assessment/Plan  This patient currently has cardiac telemetry ordered; if you would like to modify or discontinue the telemetry order, click hereto go to the orders activity to modify/discontinue the order.           Jd Rhodes MD   Physician  Internal Medicine     H&P      Addendum      Date of Service: 10/1/2024  2:54 PM     Addendum     Expand All Collapse All    History Of Present Illness  Rohini Beaver is a 79-year-old female with past medical history of recently diagnosed ovarian cancer with peritoneal carcinomatosis s/p paracentesis x 4 (last one done 9/19/24), obesity, hyperlipidemia, osteoarthritis, and skin cancer s/p Mohs procedure.  Patient presents to the hospital with weakness and inability to care for herself.  She reports that she was at [facility] skilled nursing facility, however had a brief hospitalization at [facility] and upon discharge decided to go home rather than go back to [facility].  She lives with her 86-year-old sister and has a couple friends who assist with appointments, however limited support system.  Sister unable to care for due to age.  Today she was in urgent reclining chair and was able to get up.  ROS additionally positive for cold sweats, distended and tender abdomen, edema, pressure injury to coccyx/gluteal fold, and decreased activity tolerance.  Denies history of heart disease.  She reports that she is scheduled for outpatient appointment with her oncologist tomorrow to determine ongoing plan.      ER course: Hemodynamically stable, afebrile, SpO2 90s on room air.  WBC 28.9, Hgb 11.0/Hct34.1 (Hgb 15.1 4/4/2023), platelets 446. Glucose 107, Sodium 126, Chloride 94, calcium 8.5, albumin 2.4, alkaline phosphatase 231.  Lactate 1.4.  BNP 74.  High-sensitivity troponin 7.  UA unremarkable. CT chest showed moderate to large bilateral pleural effusion with adjacent presumed compressive atelectasis, prominent main pulmonary artery which may indicate pulmonary hypertension, mildly prominent mediastinal lymph nodes measuring up to 10 mm in short axis concerning for metastatic disease.  Moderate hiatal hernia with partial intrathoracic stomach and the peritoneal fat adjacent to the stomach within the hernia demonstrates evidence of probable  carcinomatosis and ascites.  Redemonstration of large volume ascites with regions of significant anterior omental caking and peritoneal carcinomatosis commensurate with patient's provided diagnosis of ovarian cancer. Regions of wall thickening of the colon extending from the splenic flexure to the sigmoid.  Extensive region of scalloping of the right hepatic lobe peripherally with appearance of large subcapsular collection along the right hepatic margin. Blood culture in process     Past medical history: As above  Past surgical history: Cholecystectomy, lumbar laminectomy, left shoulder arthroscopy, right total knee replacement, corneal transplant  Social history: No history of smoking, alcohol abuse, illicit drug use.  Lives with her elderly sister who is 86 years old.  Limited support system.   Family history: Mother-cancer (unknown type)     Past Medical History  Medical History           Past Medical History:  Diagnosis  Date    Bilateral primary osteoarthritis of knee       HLD (hyperlipidemia)       Lumbosacral radiculopathy       Meralgia paraesthetica       Physical deconditioning           Surgical History  Surgical History              Past Surgical History:  Procedure  Laterality  Date    ARTHROPLASTY  Left        Left thumb carpometacarpal arthroplasty with ligament reconstruction and tendon interposition    BREAST BIOPSY     1999     Benign    CHOLECYSTECTOMY          COLONOSCOPY          CORNEAL TRANSPLANT          DILATION AND CURETTAGE OF UTERUS          LUMBAR FUSION          SHOULDER ARTHROSCOPY  Left       SKIN LESION EXCISION          TOTAL KNEE ARTHROPLASTY  Left  2019    TOTAL KNEE ARTHROPLASTY  Right  2017    TRIGGER FINGER RELEASE  Right           Social History  She reports that she has never smoked. She has never used smokeless tobacco. She reports that she does not currently use alcohol. She reports that she does not use drugs.     Family History  Family History                 Family  History  Problem  Relation  Name  Age of Onset    Colon cancer  Mother          Lung cancer  Father          Other (Mesothelioma)  Brother          Brain cancer  Mother's Brother              Allergies  Patient has no known allergies.     Review of Systems     10 point ROS negative except as noted above in HPI      Physical Exam  Vitals reviewed.   Constitutional:       General: She is awake. She is not in acute distress.     Appearance: She is obese. She is ill-appearing. She is not toxic-appearing.   HENT:      Head: Normocephalic and atraumatic.      Nose: Nose normal.      Mouth/Throat:      Mouth: Mucous membranes are moist.      Pharynx: Oropharynx is clear.   Eyes:      Conjunctiva/sclera: Conjunctivae normal.   Cardiovascular:      Rate and Rhythm: Normal rate and regular rhythm.      Pulses: Normal pulses.      Heart sounds: No murmur heard.  Pulmonary:      Effort: Pulmonary effort is normal. No respiratory distress.      Breath sounds: Decreased air movement present. Decreased breath sounds present.      Comments: Posterior bilateral breath sounds are diminished  Abdominal:      General: There is distension.      Palpations: Abdomen is soft.      Tenderness: There is abdominal tenderness. There is no guarding.      Comments: Bowel sounds hypoactive, abdomen distended, tender to palpation, dullness noted to the sides.   Musculoskeletal:         General: No swelling, deformity or signs of injury. Normal range of motion.      Cervical back: Neck supple.      Right lower leg: Edema present.      Left lower leg: Edema present.      Comments: Generalized edema/anasarca   Skin:     General: Skin is warm and dry.      Capillary Refill: Capillary refill takes less than 2 seconds.      Findings: No ecchymosis or wound.      Comments: Wound on coccyx, exam deferred  due to patient discomfort    Neurological:      General: No focal deficit present.      Mental Status: She is alert and oriented to person, place, and  time.   Psychiatric:         Mood and Affect: Mood normal.         Behavior: Behavior is cooperative.                  Last Recorded Vitals  Blood pressure 117/58, pulse 100, temperature 36.2 °C (97.2 °F), temperature source Temporal, resp. rate (!) 22, weight 104 kg (230 lb), SpO2 94%.     Relevant Results                    Results for orders placed or performed during the hospital encounter of 10/01/24 (from the past 24 hour(s))  CBC and Auto Differential  Result  Value  Ref Range     WBC  28.9 (H)  4.4 - 11.3 x10*3/uL     nRBC  0.0  0.0 - 0.0 /100 WBCs     RBC  4.15  4.00 - 5.20 x10*6/uL     Hemoglobin  11.0 (L)  12.0 - 16.0 g/dL     Hematocrit  34.1 (L)  36.0 - 46.0 %     MCV  82  80 - 100 fL     MCH  26.5  26.0 - 34.0 pg     MCHC  32.3  32.0 - 36.0 g/dL     RDW  15.7 (H)  11.5 - 14.5 %     Platelets  446  150 - 450 x10*3/uL     Neutrophils %  89.1  40.0 - 80.0 %     Immature Granulocytes %, Automated  1.0 (H)  0.0 - 0.9 %     Lymphocytes %  4.3  13.0 - 44.0 %     Monocytes %  4.8  2.0 - 10.0 %     Eosinophils %  0.5  0.0 - 6.0 %     Basophils %  0.3  0.0 - 2.0 %     Neutrophils Absolute  25.74 (H)  1.60 - 5.50 x10*3/uL     Immature Granulocytes Absolute, Automated  0.30  0.00 - 0.50 x10*3/uL     Lymphocytes Absolute  1.23  0.80 - 3.00 x10*3/uL     Monocytes Absolute  1.38 (H)  0.05 - 0.80 x10*3/uL     Eosinophils Absolute  0.14  0.00 - 0.40 x10*3/uL     Basophils Absolute  0.09  0.00 - 0.10 x10*3/uL  Comprehensive metabolic panel  Result  Value  Ref Range     Glucose  107 (H)  74 - 99 mg/dL     Sodium  126 (L)  136 - 145 mmol/L     Potassium  5.2  3.5 - 5.3 mmol/L     Chloride  94 (L)  98 - 107 mmol/L     Bicarbonate  24  21 - 32 mmol/L     Anion Gap  13  10 - 20 mmol/L     Urea Nitrogen  18  6 - 23 mg/dL     Creatinine  0.61  0.50 - 1.05 mg/dL     eGFR  >90  >60 mL/min/1.73m*2     Calcium  8.5 (L)  8.6 - 10.3 mg/dL     Albumin  2.4 (L)  3.4 - 5.0 g/dL     Alkaline Phosphatase  231 (H)  33 - 136 U/L     Total  Protein  5.7 (L)  6.4 - 8.2 g/dL     AST  23  9 - 39 U/L     Bilirubin, Total  0.3  0.0 - 1.2 mg/dL     ALT  8  7 - 45 U/L  Magnesium  Result  Value  Ref Range     Magnesium  1.68  1.60 - 2.40 mg/dL  Troponin I, High Sensitivity  Result  Value  Ref Range     Troponin I, High Sensitivity  7  0 - 13 ng/L  B-Type Natriuretic Peptide  Result  Value  Ref Range     BNP  74  0 - 99 pg/mL  Sars-CoV-2 PCR  Result  Value  Ref Range     Coronavirus 2019, PCR  Not Detected  Not Detected  Lactate  Result  Value  Ref Range     Lactate  1.4  0.4 - 2.0 mmol/L  Urinalysis with Reflex Culture and Microscopic  Result  Value  Ref Range     Color, Urine  Light-Yellow  Light-Yellow, Yellow, Dark-Yellow     Appearance, Urine  Clear  Clear     Specific Gravity, Urine  >1.050 (N)  1.005 - 1.035     pH, Urine  6.0  5.0, 5.5, 6.0, 6.5, 7.0, 7.5, 8.0     Protein, Urine  20 (TRACE)  NEGATIVE, 10 (TRACE), 20 (TRACE) mg/dL     Glucose, Urine  Normal  Normal mg/dL     Blood, Urine  NEGATIVE  NEGATIVE     Ketones, Urine  NEGATIVE  NEGATIVE mg/dL     Bilirubin, Urine  NEGATIVE  NEGATIVE     Urobilinogen, Urine  Normal  Normal mg/dL     Nitrite, Urine  NEGATIVE  NEGATIVE     Leukocyte Esterase, Urine  NEGATIVE  NEGATIVE  Urinalysis Microscopic  Result  Value  Ref Range     WBC, Urine  1-5  1-5, NONE /HPF     RBC, Urine  1-2  NONE, 1-2, 3-5 /HPF     Squamous Epithelial Cells, Urine  1-9 (SPARSE)  Reference range not established. /HPF     Mucus, Urine  FEW  Reference range not established. /LPF  Protime-INR  Result  Value  Ref Range     Protime  13.0 (H)  9.8 - 12.8 seconds     INR  1.2 (H)  0.9 - 1.1  APTT  Result  Value  Ref Range     aPTT  24 (L)  27 - 38 seconds     CT chest abdomen pelvis w IV contrast     Result Date: 10/1/2024  Interpreted By:  Oscar Aldrich, STUDY: CT CHEST ABDOMEN PELVIS W IV CONTRAST;  10/1/2024 11:13 am   INDICATION: Signs/Symptoms:leukocytosis, ascites, recent ovarian cancer diagnosis.   COMPARISON: CT abdomen pelvis  08/08/2024   ACCESSION NUMBER(S): [id number]   ORDERING CLINICIAN: ASHLEY FAIRBANKS   TECHNIQUE: CT of the chest, abdomen, and pelvis was performed.  Contiguous axial images were obtained at 3 mm slice thickness through the chest, abdomen and pelvis. Coronal and sagittal reconstructions at 3 mm slice thickness were performed. ml of contrast  were administered intravenously without immediate complication.   FINDINGS: CHEST:   LUNG/PLEURA/LARGE AIRWAYS: No endobronchial lesion is seen.   Moderate to large bilateral pleural effusions with adjacent consolidative opacification of the bilateral lower lobes suggestive of compressive atelectasis. No pneumothorax. Mild asymmetric scattered reticular changes suggestive of chronic lung findings.     VESSELS: The thoracic aorta is unremarkable with respect course, caliber, and contour.   Prominent main pulmonary artery measuring up to 3.2 cm in anterior-posterior dimension measured on sagittal plane which may indicate sequela of pulmonary hypertension.   No significant coronary atherosclerotic calcifications are seen.   HEART: Heart size within normal limits. No pericardial effusion.   MEDIASTINUM AND OLIVE: Mildly prominent mediastinal lymph nodes measuring up to 10 mm in short axis   Moderate hiatal hernia with partial intrathoracic stomach and ascites and region of nodularity of the peritoneal fat within hiatal hernia suggestive carcinomatosis.   CHEST WALL AND LOWER NECK: No acute osseous abnormality. Multilevel presumed degenerative changes throughout the imaged spine. No acute soft tissue abnormality.   ABDOMEN:   LIVER: There is been interval scalloping of the right hepatic margins with new regions of irregularity along the right hepatic capsule diffusely which is new since comparison imaging from 08/08/2024 there is a new elongated subcapsular collection measuring up to approximately 11.4 x 2.1 cm, difficult to measure given curvilinear projection extending over the  right hepatic lobe margin (series 202, images 40-70). Similar appearing subcentimeter left hepatic lobe hypodense lesion.   BILE DUCTS: No obvious new intrahepatic biliary dilatation. Mild prominence of the common bile duct, nonspecific in a cholecystectomy patient.   GALLBLADDER: Absent.   PANCREAS: Unremarkable.   SPLEEN: Unchanged.   ADRENAL GLANDS: Unchanged.   KIDNEYS AND URETERS: Similar appearing subcentimeter right renal lower pole calculus. No hydronephrosis. No obstructing urolithiasis.   PELVIS:   BLADDER: Unremarkable.   REPRODUCTIVE ORGANS: Uterus appears grossly unchanged.   BOWEL: Moderate hiatal hernia with wall thickening of the stomach in the hernia. No new pathologic distention of bowel. Wall thickening of the colon within the splenic flexure descending colon and sigmoid colon, nonspecific.     VESSELS: No evidence of abdominal aortic aneurysm.   PERITONEUM/RETROPERITONEUM/LYMPH NODES: Large volume ascites with extensive regions of anterior omental caking and peritoneal carcinomatosis. No obvious free intraperitoneal gas. Given the presence of extensive omental caking and peritoneal carcinomatosis, superimposed peritoneal enhancement 4 other causes cannot be excluded.   BONE AND SOFT TISSUE: No acute osseous abnormality. Osseous structures appear stable. No acute abnormality of the abdominal wall soft tissues.        CHEST: 1.  Moderate to large bilateral pleural effusions with adjacent presumed compressive atelectasis. 2. Prominent main pulmonary artery which may indicate pulmonary hypertension. 3. Mildly prominent mediastinal lymph nodes measuring up to 10 mm in short axis concerning for metastatic disease. 4. Moderate hiatal hernia with partial intrathoracic stomach. The peritoneal fat adjacent to the stomach within the hernia demonstrates evidence of probable carcinomatosis and ascites.   ABDOMEN-PELVIS: 1.  Redemonstration of large volume ascites with regions of significant anterior omental  caking and peritoneal carcinomatosis commensurate with patient's provided diagnosis of ovarian cancer. 2. Regions of wall thickening of the colon extending from the splenic flexure to the sigmoid which may indicate a nonspecific colitis. 3. Since the previous examination on 08/08/2024, there are now extensive regions of scalloping of the right hepatic lobe peripherally with the appearance of a large subcapsular collection along the right hepatic margin as above. The sterility of this collection cannot be assessed via CT and clinical correlation is advised for exclusion superimposed infection within this region.     Signed by: Oscar Aldrich 10/1/2024 12:14 PM Dictation workstation:   [id number]     XR chest 1 view     Result Date: 10/1/2024  Interpreted By:  Samuel Jaramillo, STUDY: XR CHEST 1 VIEW; 10/1/2024 8:44 am   INDICATION: Signs/Symptoms:weakness, edema in legs and abdomen   COMPARISON: April 2023.   ACCESSION NUMBER(S): [id number]   ORDERING CLINICIAN: ASHLEY FAIRBANKS   FINDINGS: The study is limited due to rotation and poor inspiratory effort, with resultant crowding of the pulmonary vasculature. The cardiac silhouette is within normal limits for the technique. Moderate size hiatal hernia is again seen. There is no pneumothorax, confluent infiltrates or significant effusion. Degenerative changes involve the spine and shoulders; metallic anchors again noted over the left humeral head related to previous rotator cuff repair.        Limited study. Hiatal hernia. No acute cardiopulmonary disease.   Signed by: Samuel Jaramillo 10/1/2024 9:22 AM Dictation workstation:   [id number]     US guided abdominal paracentesis     Result Date: 9/20/2024  Interpreted By:  Peri Lawrence and Kamau Nyokabi STUDY: US GUIDED ABDOMINAL PARACENTESIS; US GUIDED PERCUTANEOUS ABDOMINAL RETROPERITONEUM BIOPSY;  9/19/2024 11:13 am   INDICATION: Signs/Symptoms:ascites; Signs/Symptoms:ascites, evaluate ovarian cancer.    COMPARISON: CT abdomen and pelvis 08/08/2024   ACCESSION NUMBER(S): [id number]; [id number]   ORDERING CLINICIAN: JEANETTE ARIAS   TECHNIQUE: INTERVENTIONALIST(S): Dr. Peri Lawrence MD   CONSENT: The patient was informed of the nature of the proposed procedure. The purposes, alternatives, risks, and benefits were explained and discussed. All questions were answered and consent was obtained.   SEDATION: 1% local lidocaine was used for anesthetic.   MEDICATION/CONTRAST: No additional.   TIME OUT:   A time out was performed immediately prior to procedure start with the interventional team, correctly identifying the patient name, date of birth, MRN, procedure, anatomy (including marking of site and side), patient position, procedure consent form, relevant laboratory and imaging test results, antibiotic administration, safety precautions, and procedure-specific equipment needs.   COMPLICATIONS: No immediate adverse events identified.   FINDINGS: The patient was placed in the supine position. Limited sonographic images of the lower abdominal wall were obtained for purposes of needle guidance, which demonstrated omental soft tissue mass which was seen on prior CT 08/08/2024. The area of concern was prepped and draped under sterile technique.   1% lidocaine was injected subcutaneously. Additional lidocaine was administered into deeper tissues surrounding the targeted area for biopsy using a 22G spinal needle. A small incision was made. Using the same access site a 18 gauge core biopsy needle was passed via a 17 gauge coaxial introducer needle to obtain a total of 1 core sample.   Postprocedure images demonstrate no evidence for hemorrhage. The patient tolerated the procedure well and there were no immediate complications. 1 core specimen was sent to pathology.     Subsequently the right lower quadrant was visualized under ultrasound which demonstrated moderate volume ascites seen on prior CT 08/08/2024. 1% lidocaine was  injected subcutaneously at this site. A 19 gauge Yueh was used to target the fluid collection under ultrasound guidance. Once the site was accessed, the inner needle was removed from the catheter. The Yueh catheter was connected to drainage container. Estimated 1000 cc of serosanguineous colored fluid was removed. Post paracentesis imaging demonstrated decreased ascitic fluid. The Yueh catheter was removed. The access site was bandaged.        1. Status post ultrasound guided core needle biopsy of omental mass seen on CT 08/08/2024. 1 core specimens sent to pathology. 2. Successful paracentesis under ultrasound guidance with removal of 1 L of serosanguineous fluid.     I was present for and/or performed the critical portions of the procedure and immediately available throughout the entire procedure.   I personally reviewed the image(s) / study and interpretation. I agree with the findings as stated.   Performed and dictated at [facility].   MACRO: None   Signed by: Peri Lawrence 9/20/2024 10:06 AM Dictation workstation:   [id number]     US guided percutaneous abdominal retroperitoneum biopsy     Result Date: 9/20/2024  Interpreted By:  Peri Lawrence and Kamau Nyokabi STUDY: US GUIDED ABDOMINAL PARACENTESIS; US GUIDED PERCUTANEOUS ABDOMINAL RETROPERITONEUM BIOPSY;  9/19/2024 11:13 am   INDICATION: Signs/Symptoms:ascites; Signs/Symptoms:ascites, evaluate ovarian cancer.   COMPARISON: CT abdomen and pelvis 08/08/2024   ACCESSION NUMBER(S): [id number]; [id number]   ORDERING CLINICIAN: JEANETTE ARIAS   TECHNIQUE: INTERVENTIONALIST(S): Dr. Peri Lawrence MD   CONSENT: The patient was informed of the nature of the proposed procedure. The purposes, alternatives, risks, and benefits were explained and discussed. All questions were answered and consent was obtained.   SEDATION: 1% local lidocaine was used for anesthetic.   MEDICATION/CONTRAST: No additional.   TIME OUT:    A time out was performed immediately prior to procedure start with the interventional team, correctly identifying the patient name, date of birth, MRN, procedure, anatomy (including marking of site and side), patient position, procedure consent form, relevant laboratory and imaging test results, antibiotic administration, safety precautions, and procedure-specific equipment needs.   COMPLICATIONS: No immediate adverse events identified.   FINDINGS: The patient was placed in the supine position. Limited sonographic images of the lower abdominal wall were obtained for purposes of needle guidance, which demonstrated omental soft tissue mass which was seen on prior CT 08/08/2024. The area of concern was prepped and draped under sterile technique.   1% lidocaine was injected subcutaneously. Additional lidocaine was administered into deeper tissues surrounding the targeted area for biopsy using a 22G spinal needle. A small incision was made. Using the same access site a 18 gauge core biopsy needle was passed via a 17 gauge coaxial introducer needle to obtain a total of 1 core sample.   Postprocedure images demonstrate no evidence for hemorrhage. The patient tolerated the procedure well and there were no immediate complications. 1 core specimen was sent to pathology.     Subsequently the right lower quadrant was visualized under ultrasound which demonstrated moderate volume ascites seen on prior CT 08/08/2024. 1% lidocaine was injected subcutaneously at this site. A 19 gauge Yueh was used to target the fluid collection under ultrasound guidance. Once the site was accessed, the inner needle was removed from the catheter. The Yueh catheter was connected to drainage container. Estimated 1000 cc of serosanguineous colored fluid was removed. Post paracentesis imaging demonstrated decreased ascitic fluid. The Yueh catheter was removed. The access site was bandaged.        1. Status post ultrasound guided core needle biopsy of  omental mass seen on CT 08/08/2024. 1 core specimens sent to pathology. 2. Successful paracentesis under ultrasound guidance with removal of 1 L of serosanguineous fluid.     I was present for and/or performed the critical portions of the procedure and immediately available throughout the entire procedure.   I personally reviewed the image(s) / study and interpretation. I agree with the findings as stated.   Performed and dictated at [facility].   MACRO: None   Signed by: Peri Lawrence 9/20/2024 10:06 AM Dictation workstation:   [id number]            Assessment/Plan        Assessment & Plan  Hyponatremia        79-year-old female with past medical history of recently diagnosed ovarian cancer with peritoneal carcinomatosis s/p paracentesis x 4 (last one done 9/19/24), obesity, hyperlipidemia, osteoarthritis, and skin cancer s/p Mohs procedure.  Patient presents to the hospital with weakness and inability to care for herself.  Patient found to be hyponatremic and with evidence on CT imaging of possible abscess of the liver and ascites secondary to malignancy.  Hospitalized for further evaluation management..     #Ovarian cancer with peritoneal carcinomatosis  #Ascites  #Bilateral pleural effusions  # Suspected liver abscess  #Abdominal pain     Telemetry monitoring  Consult to IR for paracentesis and possible drainage of liver abscess  Consult to pulmonology for bilateral pleural effusions  Antiemetics as needed  Analgesics as needed  Patient needs to be followed up with by her gynecological oncologist to determine optimal plan going forward for treatment of her ovarian cancer     #Hyponatremia  #Generalized edema/anasarca  #Hypoalbuminemia  Patient is fluid overloaded and has significant third spacing, suspect hypervolemia hyponatremia.  Will check urine electrolytes, urine osmolality, and serum osmolality  Fluid restriction of 1500 ml for the time being.  Consider  starting diuretics, defer to attending  Repeat labs in a.m.     #debility  PT/OT  Social work for discharge planning     Chronic issues:  #Obesity  #Hyperlipidemia  #Osteoarthritis     Continue with patient's home medications as appropriate     #DVT prophylaxis  SCDs  Lovenox subcutaneous     I spent 75 minutes in the professional and overall care of this patient.        Revision History          Patient fully evaluated 10/2, awake, resting in bed. Patient with Moderate hiatal hernia with partial intrathoracic stomach and the peritoneal fat adjacent to the stomach within the hernia demonstrates evidence of probable carcinomatosis and ascites, Patient tolerated paracentesis on 10/01, awaiting culture results.No s/s or c/o acute difficulties at this time. Medications and labs reviewed.  Plan discussed with interdisciplinary team, per pulmonology - Plan:  Patient has bilateral pleural effusion in the context of malignant ascites/ovarian cancer I explained to the patient the pleural effusion most likely related to recurrent ascites, patient is requiring so far 5 steps malignant ascites in the last month most likely beneficial approaches intra-abdominal Pleurx catheter Abdominal Pleurx catheter would be the most effective way of preventing pleural effusions and ascites.  Pleural effusions are secondary to ascites, both of which are due to patient's underlying cancer Continue with goals of care discussions prior to interventional radiology consult Follow hepatic fluid analysis Continue IV antibiotics Zosyn, continue current and repeat labs in the AM. Patient still requiring frequent cardiac and SPO2 monitoring. Discharge planning discussed with patient and care team. Therapy evaluations ordered-  [facility] 14, anticipate SNF/HHC at discharge. Patient aware and agreeable to current plan, continue plan as above. I spent 50 minutes in the professional and overall care of this patient.              Assessment &  Plan  Hyponatremia     Patient fully evaluated 10/3, awake, resting in bed. Patient with Moderate hiatal hernia with partial intrathoracic stomach and the peritoneal fat adjacent to the stomach within the hernia demonstrates evidence of probable carcinomatosis and ascites, Patient tolerated paracentesis on 10/01 well,order placed for repeat paracentesis therapeutic non diagnostic with IR.continue current and repeat labs in the AM. Patient aware and agreeable to current plan, continue plan as above.     Patient fully evaluated on October 4.  Patient awaiting therapeutic paracentesis.  Patient probably will need albumin afterwards.  Continue to monitor response with above treatments recheck labs in AM.  I spent 50 minutes in the professional and overall care of this patient.       Patient fully evaluated 10/06, head of bed elevated, no s/s or c/o acute difficulties at this time. Sister at bedside and agreeable to plan. Attempted therapeutic paracentesis by IR, aborted procedure due to insufficient fluid accumulation.  Medications and labs reviewed, cultures blood no growth at 4 days, AFB Culture      Culture in progress and will be examined weekly. A result will be issued either when positive or after 8 weeks incubation. AFB Stain -No acid fast bacilli seen.  Plan discussed with interdisciplinary team, continue current and repeat labs in the AM. Per pulmonary - Day of consult, patient is breathing on room air. Vital stable. CT chest showed bilateral large pleural effusions. Dicussed need for Abdominal Pleurx catheter. Patient with likely carcinomatosis and plan to discharge to SNF - [facility] and will follow up with gynecology in 7 days,     Patient still requiring frequent cardiac and SPO2 monitoring. Discharge planning discussed with patient and care team. Therapy evaluations ordered- [facility] 13, SNF at discharge. Patient aware and agreeable to current plan, continue plan as above. I spent 50 minutes in  the professional and overall care of this patient.     Patient fully evaluated 10/07, head of bed elevated, no s/s or c/o acute difficulties at this time. Medications and labs reviewed, sodium 125, nephrology consulted.  Cultures blood no growth at 4 days, AFB Culture      Culture in progress and will be examined weekly. A result will be issued either when positive or after 8 weeks incubation. AFB Stain -No acid fast bacilli seen.  Plan discussed with interdisciplinary team, continue current and repeat labs in the AM, new supplemental oxygen requirements, will repeat chest xray in AM, maintain HOB elevated to alleviate postural dyspnea. Vitals stable. Patient with likely carcinomatosis and plan to discharge to SNF - [facility] and will follow up with gynecology in 7 days, atient still requiring frequent cardiac and SPO2 monitoring. Discharge planning discussed with patient and care team. Therapy evaluations ordered- [place] [id number], [facility] at discharge. Patient aware and agreeable to current plan, continue plan as above. I spent 50 minutes in the professional and overall care of this patient.   Patient fully evaluated 10/08, head of bed elevated, no s/s or c/o acute difficulties at this time. Medications and labs reviewed, sodium low again today at 125, nephrology consulted. Awaiting hepatic fluid analysis -AFB Culture -Culture in progress and will be examined weekly. A result will be issued either when positive or after 8 weeks incubation. AFB Stain -No acid fast bacilli seen.  Plan discussed with interdisciplinary team, per nephrology - Hyponatremia  Acute kidney injury   Ascites  Malnutrition  Anemia  Pleural effusion  Plan;  Discontinue potential nephrotoxins  Nutritional/iron/renal indices/urinary indices  DuoNeb aerosols  Appreciate nephrology input. Patient requiring supplemental oxygen at this time, continue to maintain HOB elevated as tolerated. Patient seen by pulmonology - Consult IR for right sided  diagnostic and therapeutic thoracentesis  Unable to safely perform paracentesis due to lack of fluid  Patient has bilateral pleural effusion in the context of malignant ascites/ovarian cancer - Pleural effusions are secondary to ascites, both of which are due to patient's underlying cancer. Continue with goals of care discussions prior to interventional radiology consult. Continue IV antibiotics zosyn, probiotics added. Continue current plan and repeat labs in the AM, repeat chest xray in AM, maintain HOB elevated to alleviate postural dyspnea. Vitals stable. Patient with likely carcinomatosis and plan to discharge to SNF - [facility] and will follow up with gynecology in 7 days, atient still requiring frequent cardiac and SPO2 monitoring. Discharge planning discussed with patient and care team. Therapy evaluations ordered- [place] [id number], [facility] at discharge. Patient aware and agreeable to current plan, continue plan as above. I spent 50 minutes in the professional and overall care of this patient.     Patient fully evaluated 10/09, patient resting in bed with HOB, no s/s or c/o acute difficulties at this time. Medications and labs reviewed, cultures no growth at 4 days, AFB cultures in process. Plan discussed with interdisciplinary team, pulmonary plan - Bilateral pleural effusion  Abdominal ascites  Ovarian carcinoma w/ peritoneal carcinomatosis carcinomatosis  HLD  Patient clear for discharge to SNF from standpoint of pulmonology  Patient will likely need an abdominal PleurX catheter at some point. However, we were unable to perform safely during this hospitalization due to lack of ascitic fluid  Pleural fluid suggests exudative effusion, likely secondary to malignancy  Okay to discontinue antibiotics from standpoint of pulmonology. Low suspicion of pneumonia. Leukocytosis is unlikely reflective of infection.   Will continue current and repeat labs in the AM. Patient still requiring frequent cardiac and SPO2  monitoring. Discharge planning discussed with patient and care team. Therapy evaluations ordered- [facility] 10, anticipate SNF at discharge. Patient aware and agreeable to current plan, continue plan as above. I spent 50 minutes in the professional and overall care of this patient.     Patient fully evaluated 10/10, patient resting in bed with HOB, no s/s or c/o acute difficulties at this time. Medications and labs reviewed, cultures no growth at 5 days, AFB cultures in process. Plan discussed with interdisciplinary team, pulmonary plan- Bilateral pleural effusion  Abdominal ascites Ovarian carcinoma w/ peritoneal carcinomatosis carcinomatosis HLD Patient clear for discharge to SNF from standpoint.   Patient will likely need an abdominal PleurX catheter at some point. However, we were unable to perform safely during this hospitalization due to lack of ascitic fluid Pleural fluid suggests exudative effusion, likely secondary to malignancy Okay to discontinue antibiotics from standpoint of pulmonology. Low suspicion of pneumonia. Leukocytosis is unlikely reflective of infection.   Patient's sister at bedside, discussion of plan of care and will follow up with gynecology outpatient, possibly Dr. Jones. Will continue current and repeat labs in the AM. Patient with frequent bowel movements, soft, will hold miralax at this time. Patient still requiring frequent cardiac and SPO2 monitoring. Discharge planning discussed with patient and care team. Therapy evaluations ordered- [facility] 10, anticipate SNF at discharge. Patient aware and agreeable to current plan, continue plan as above. I spent 50 minutes in the professional and overall care of this patient.     Patient fully evaluated 10/11, patient resting in bed with HOB, no s/s or c/o acute difficulties at this time. Medications and labs reviewed, cultures no growth at 5 days, AFB cultures in process, creatinine and Bun continue to rise. Nephrology on the case, appreciate  input.  Plan discussed with interdisciplinary team, pulmonary plan- agree to discontinue antibiotics from standpoint of pulmonology with Low suspicion of pneumonia. Continues to require supplemental oxygen at this time. Leukocytosis is unlikely reflective of infection. Patient pain medications updated based on renal function - will switch Roxicodone. Long discussion with patient's sister and patient plan of care and will follow up with gynecology outpatient, possibly Dr. Jones. Will continue current and repeat labs in the AM. Patient with frequent bowel movements, soft, will hold miralax at this time. Patient still requiring frequent cardiac and SPO2 monitoring. Discharge planning discussed with patient and care team. Therapy evaluations ordered- [facility] 10, anticipate SNF at discharge. Patient aware and agreeable to current plan, continue plan as above.     Patient fully evaluated on October 12 and continues to have significant decline in renal function.  Oncology reconsulted.  I believe the patient is significant third spacing secondary to peritoneal carcinomatosis.  Recheck labs in AM.  Appreciate pulmonary and nephrology consultations.  I spent 50 minutes in the professional and overall care of this patient.     Patient fully evaluated 10/13, head of bed elevated, visible difficulties noted at this time. Medications and labs reviewed-  Cultures-  in process  WBC- 29.6  Hemoglobin- 9.2  Imaging- CT abdomen pelvis wo IV contrast   Impression:    Overall findings are grossly unchanged from 10/01/2024 CT scan.  1. Persistent moderate abdominopelvic ascites with diffuse peritoneal  carcinomatosis/omental caking.  2. 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  Underlying cirrhosis could not be entirely excluded as well.  3. Moderate bilateral pleural effusion with surrounding atelectasis.  Supplemental oxygen requiring at this  time.      Goals of care- long discussion with sister, patient, and interdisciplinary team, patient with worsening renal impairment secondary to  ovarian carcinoma with peritoneal carcinomatosis abdominal distention, ascites requiring frequent drainage was admitted profound weakness tiredness, failure to thrive. Prognosis poor, plan discussed and will focus on comfort on measures at this time, planned call out to gyn/onc doctor tomorrow morning. Addition of methadone for pain management. Patient seen by Dr. Alexandre - The patient is a 79-year-old woman diagnosed with ovarian carcinoma and peritoneal carcinomatosis with ascites in August 2024.  Initially evaluated by GYN oncology and chemotherapy was recommended but due to intercurrent illness and admissions the patient did not/could not receive any treatment.  Currently at SNF and was admitted because of profound weakness tiredness failure to thrive pain and noted to have elevated creatinine.  Physical examination revealed elderly woman in pain requesting assistance.  Performance status is 3-4.  Elevated creatinine at 3.14 mg/dL.  Personally discussed with Dr. Rhodes.  Could consider initial chemotherapy to site to reduce then consider surgery.  However, advanced age, poor performance status, elevated creatinine preclude chemotherapy.  Consider evaluation by GYN oncology.  Meanwhile consider palliative care pain control/symptom control drainage of ascites consider catheter placement to drain fluid.  Consider methadone in view of elevated creatinine.  Thank you for allowing me to participate in care of your patient if you have any questions please feel free to call me.  Will continue current and repeat,  labs in the AM. Patient still requiring frequent cardiac and SPO2 monitoring. Continue aggressive pulmonary hygiene. Discharge planning discussed with patient and care team. Therapy evaluations ordered. [facility]-        , anticipate C/SNF at discharge. Patient aware and  agreeable to current plan, continue plan as above. I spent a total of 50 minutes on the date of the service which included preparing to see the patient, face-to-face patient care, completing clinical documentation, obtaining and/or reviewing separately obtained history, performing a medically appropriate examination, counseling and educating the patient/family/caregiver, ordering medications, tests, or procedures, communicating with other HCPs (not separately reported), independently interpreting results (not separately reported), communicating results to the patient/family/caregiver, and care coordination (not separately reported).        Revision History         Pertinent Physical Exam At Time of Discharge  Physical Exam  Patient fully evaluated 10/16/24 for   1. Hyponatremia  HYDROmorphone (Dilaudid) 0.5 mg/0.5 mL solution injection    HYDROmorphone PF (Dilaudid) 0.2 mg/mL injection    bisacodyl (Dulcolax) 10 mg suppository    glycopyrrolate (Robinul) 200 mcg/mL injection    haloperidol lactate (Haldol) 5 mg/mL injection    LORazepam (Ativan) 2 mg/mL injection      Call placed to GYN/ONC Dr. Villalba this AM with long discussion on patient, treatment options, and goals of care. Patient and sister aware of poor prognosis and bleak outcome. Patient with no significant clinical improvement noted, patient medically cleared for discharge today to [facility]. Patient seen resting in bed with head of bed elevated, no s/s or c/o acute difficulties at this time. Medications and labs reviewed, will begin comfort care medications and transition to hospice.    No results found for this or any previous visit (from the past 24 hours).      Intake/Output this shift:  I/O this shift:  In: 320 [P.O.:320]  Out: -     Vital signs for last 24 hours:  Temp:  [36.3 °C (97.3 °F)-37 °C (98.6 °F)] 37 °C (98.6 °F)  Heart Rate:  [102-106] 102  Resp:  [18] 18  BP: ()/(51-55) 89/55      Plan discussed with interdisciplinary  team, ok to discharge to [facility]. Patient aware and agreeable to current plan, continue plan as above. I spent 30 minutes on the date of the service which included preparing to see the patient, face-to-face patient care, completing clinical documentation, obtaining and/or reviewing separately obtained history, performing a medically appropriate examination, counseling and educating the patient/family/caregiver, ordering medications, tests, or procedures, communicating with other HCPs (not separately reported), independently interpreting results (not separately reported), communicating results to the patient/family/caregiver, and care coordination (not separately reported  Outpatient Follow-Up  No future appointments.    Patient fully evaluated on October 16 and found to note be GIP appropriate.  Patient to discharge to skilled nursing.    Jd Rhodes MD

## 2024-10-16 NOTE — PROGRESS NOTES
Nutrition Progress Note    Pt discharged to hospice.  She is on a regular diet for pleasure.  Nutrition signing off.  Please re consult if there are any changes.

## 2024-10-16 NOTE — CARE PLAN
The patient's goals for the shift include  remain comfortable    The clinical goals for the shift include pain management; comfort care    Barriers to progression include disease process. Recommendations to address these barriers include administer medications and treatments as ordered.      Problem: Pain - Adult  Goal: Verbalizes/displays adequate comfort level or baseline comfort level  Outcome: Progressing     Problem: Safety - Adult  Goal: Free from fall injury  Outcome: Progressing     Problem: Discharge Planning  Goal: Discharge to home or other facility with appropriate resources  Outcome: Progressing     Problem: Chronic Conditions and Co-morbidities  Goal: Patient's chronic conditions and co-morbidity symptoms are monitored and maintained or improved  Outcome: Progressing     Problem: Skin  Goal: Decreased wound size/increased tissue granulation at next dressing change  Outcome: Progressing  Flowsheets (Taken 10/15/2024 2228)  Decreased wound size/increased tissue granulation at next dressing change: Protective dressings over bony prominences  Goal: Participates in plan/prevention/treatment measures  Outcome: Progressing  Flowsheets (Taken 10/15/2024 2228)  Participates in plan/prevention/treatment measures: Elevate heels  Goal: Prevent/manage excess moisture  Outcome: Progressing  Flowsheets (Taken 10/15/2024 2228)  Prevent/manage excess moisture: Cleanse incontinence/protect with barrier cream  Goal: Prevent/minimize sheer/friction injuries  Outcome: Progressing  Flowsheets (Taken 10/15/2024 2228)  Prevent/minimize sheer/friction injuries: HOB 30 degrees or less  Goal: Promote/optimize nutrition  Outcome: Progressing  Flowsheets (Taken 10/15/2024 2228)  Promote/optimize nutrition: Assist with feeding  Goal: Promote skin healing  Outcome: Progressing  Flowsheets (Taken 10/15/2024 2228)  Promote skin healing: Turn/reposition every 2 hours/use positioning/transfer devices

## 2024-10-17 ENCOUNTER — HOSPITAL ENCOUNTER (INPATIENT)
Facility: HOSPITAL | Age: 79
DRG: 683 | End: 2024-10-17
Attending: INTERNAL MEDICINE | Admitting: INTERNAL MEDICINE
Payer: MEDICARE

## 2024-10-17 VITALS
TEMPERATURE: 97.2 F | RESPIRATION RATE: 16 BRPM | HEIGHT: 64 IN | WEIGHT: 230 LBS | SYSTOLIC BLOOD PRESSURE: 93 MMHG | OXYGEN SATURATION: 96 % | DIASTOLIC BLOOD PRESSURE: 54 MMHG | HEART RATE: 99 BPM | BODY MASS INDEX: 39.27 KG/M2

## 2024-10-17 DIAGNOSIS — N17.9 ACUTE KIDNEY INJURY (CMS-HCC): Primary | ICD-10-CM

## 2024-10-17 DIAGNOSIS — N17.9 AKI (ACUTE KIDNEY INJURY) (CMS-HCC): ICD-10-CM

## 2024-10-17 DIAGNOSIS — C54.1 ENDOMETRIAL CANCER (MULTI): ICD-10-CM

## 2024-10-17 LAB
ALBUMIN SERPL BCP-MCNC: 2 G/DL (ref 3.4–5)
ALP SERPL-CCNC: 171 U/L (ref 33–136)
ALT SERPL W P-5'-P-CCNC: 10 U/L (ref 7–45)
ANION GAP SERPL CALC-SCNC: 18 MMOL/L (ref 10–20)
AST SERPL W P-5'-P-CCNC: 29 U/L (ref 9–39)
BACTERIA BLD CULT: NORMAL
BILIRUB SERPL-MCNC: 0.3 MG/DL (ref 0–1.2)
BUN SERPL-MCNC: 79 MG/DL (ref 6–23)
CALCIUM SERPL-MCNC: 7.4 MG/DL (ref 8.6–10.3)
CHLORIDE SERPL-SCNC: 95 MMOL/L (ref 98–107)
CO2 SERPL-SCNC: 21 MMOL/L (ref 21–32)
CREAT SERPL-MCNC: 4.17 MG/DL (ref 0.5–1.05)
EGFRCR SERPLBLD CKD-EPI 2021: 10 ML/MIN/1.73M*2
ERYTHROCYTE [DISTWIDTH] IN BLOOD BY AUTOMATED COUNT: 18.5 % (ref 11.5–14.5)
GLUCOSE SERPL-MCNC: 95 MG/DL (ref 74–99)
HCT VFR BLD AUTO: 31.3 % (ref 36–46)
HGB BLD-MCNC: 9.3 G/DL (ref 12–16)
MCH RBC QN AUTO: 25.9 PG (ref 26–34)
MCHC RBC AUTO-ENTMCNC: 29.7 G/DL (ref 32–36)
MCV RBC AUTO: 87 FL (ref 80–100)
NRBC BLD-RTO: 0 /100 WBCS (ref 0–0)
PLATELET # BLD AUTO: 338 X10*3/UL (ref 150–450)
POTASSIUM SERPL-SCNC: 4.6 MMOL/L (ref 3.5–5.3)
PROT SERPL-MCNC: 4.9 G/DL (ref 6.4–8.2)
RBC # BLD AUTO: 3.59 X10*6/UL (ref 4–5.2)
SODIUM SERPL-SCNC: 129 MMOL/L (ref 136–145)
WBC # BLD AUTO: 27.3 X10*3/UL (ref 4.4–11.3)

## 2024-10-17 PROCEDURE — 1210000001 HC SEMI-PRIVATE ROOM DAILY

## 2024-10-17 PROCEDURE — 85027 COMPLETE CBC AUTOMATED: CPT | Performed by: INTERNAL MEDICINE

## 2024-10-17 PROCEDURE — 2500000001 HC RX 250 WO HCPCS SELF ADMINISTERED DRUGS (ALT 637 FOR MEDICARE OP): Performed by: INTERNAL MEDICINE

## 2024-10-17 PROCEDURE — S0109 METHADONE ORAL 5MG: HCPCS | Performed by: PHYSICIAN ASSISTANT

## 2024-10-17 PROCEDURE — 36415 COLL VENOUS BLD VENIPUNCTURE: CPT | Performed by: INTERNAL MEDICINE

## 2024-10-17 PROCEDURE — 80053 COMPREHEN METABOLIC PANEL: CPT | Performed by: INTERNAL MEDICINE

## 2024-10-17 PROCEDURE — 2500000004 HC RX 250 GENERAL PHARMACY W/ HCPCS (ALT 636 FOR OP/ED): Performed by: PHYSICIAN ASSISTANT

## 2024-10-17 PROCEDURE — 2500000001 HC RX 250 WO HCPCS SELF ADMINISTERED DRUGS (ALT 637 FOR MEDICARE OP): Performed by: PHYSICIAN ASSISTANT

## 2024-10-17 PROCEDURE — 2500000002 HC RX 250 W HCPCS SELF ADMINISTERED DRUGS (ALT 637 FOR MEDICARE OP, ALT 636 FOR OP/ED): Performed by: PHYSICIAN ASSISTANT

## 2024-10-17 PROCEDURE — 2500000004 HC RX 250 GENERAL PHARMACY W/ HCPCS (ALT 636 FOR OP/ED): Performed by: INTERNAL MEDICINE

## 2024-10-17 PROCEDURE — 2500000005 HC RX 250 GENERAL PHARMACY W/O HCPCS: Performed by: PHYSICIAN ASSISTANT

## 2024-10-17 PROCEDURE — 2500000004 HC RX 250 GENERAL PHARMACY W/ HCPCS (ALT 636 FOR OP/ED)

## 2024-10-17 RX ORDER — MORPHINE SULFATE 10 MG/.5ML
5 SOLUTION ORAL EVERY 4 HOURS PRN
Status: DISCONTINUED | OUTPATIENT
Start: 2024-10-17 | End: 2024-10-22

## 2024-10-17 RX ORDER — TEMAZEPAM 7.5 MG/1
7.5 CAPSULE ORAL NIGHTLY PRN
Status: DISCONTINUED | OUTPATIENT
Start: 2024-10-17 | End: 2024-10-17 | Stop reason: SDUPTHER

## 2024-10-17 RX ORDER — ONDANSETRON 4 MG/1
8 TABLET, FILM COATED ORAL EVERY 8 HOURS PRN
Status: DISCONTINUED | OUTPATIENT
Start: 2024-10-17 | End: 2024-10-23 | Stop reason: HOSPADM

## 2024-10-17 RX ORDER — METHADONE HYDROCHLORIDE 10 MG/1
5 TABLET ORAL EVERY 12 HOURS
Status: DISCONTINUED | OUTPATIENT
Start: 2024-10-17 | End: 2024-10-19

## 2024-10-17 RX ORDER — ACETAMINOPHEN 650 MG/1
650 SUPPOSITORY RECTAL EVERY 4 HOURS PRN
Status: DISCONTINUED | OUTPATIENT
Start: 2024-10-17 | End: 2024-10-23 | Stop reason: HOSPADM

## 2024-10-17 RX ORDER — HEPARIN SODIUM 5000 [USP'U]/ML
5000 INJECTION, SOLUTION INTRAVENOUS; SUBCUTANEOUS EVERY 8 HOURS
Status: DISCONTINUED | OUTPATIENT
Start: 2024-10-17 | End: 2024-10-23 | Stop reason: HOSPADM

## 2024-10-17 RX ORDER — POLYETHYLENE GLYCOL 3350 17 G/17G
17 POWDER, FOR SOLUTION ORAL DAILY
Status: DISCONTINUED | OUTPATIENT
Start: 2024-10-17 | End: 2024-10-23 | Stop reason: HOSPADM

## 2024-10-17 RX ORDER — CETIRIZINE HYDROCHLORIDE 10 MG/1
10 TABLET ORAL DAILY
Status: DISCONTINUED | OUTPATIENT
Start: 2024-10-17 | End: 2024-10-23 | Stop reason: HOSPADM

## 2024-10-17 RX ORDER — ACETAMINOPHEN 325 MG/1
650 TABLET ORAL EVERY 4 HOURS PRN
Status: DISCONTINUED | OUTPATIENT
Start: 2024-10-17 | End: 2024-10-23 | Stop reason: HOSPADM

## 2024-10-17 RX ORDER — CYCLOBENZAPRINE HCL 10 MG
5 TABLET ORAL 3 TIMES DAILY
Status: DISCONTINUED | OUTPATIENT
Start: 2024-10-17 | End: 2024-10-23 | Stop reason: HOSPADM

## 2024-10-17 RX ORDER — IPRATROPIUM BROMIDE AND ALBUTEROL SULFATE 2.5; .5 MG/3ML; MG/3ML
3 SOLUTION RESPIRATORY (INHALATION) EVERY 2 HOUR PRN
Status: DISCONTINUED | OUTPATIENT
Start: 2024-10-17 | End: 2024-10-23 | Stop reason: HOSPADM

## 2024-10-17 RX ORDER — PREDNISOLONE ACETATE 10 MG/ML
1 SUSPENSION/ DROPS OPHTHALMIC EVERY OTHER DAY
Status: DISCONTINUED | OUTPATIENT
Start: 2024-10-17 | End: 2024-10-23 | Stop reason: HOSPADM

## 2024-10-17 RX ORDER — DOCUSATE SODIUM 100 MG/1
200 CAPSULE, LIQUID FILLED ORAL DAILY
Status: DISCONTINUED | OUTPATIENT
Start: 2024-10-17 | End: 2024-10-23 | Stop reason: HOSPADM

## 2024-10-17 RX ORDER — BISACODYL 10 MG/1
10 SUPPOSITORY RECTAL DAILY PRN
Status: DISCONTINUED | OUTPATIENT
Start: 2024-10-17 | End: 2024-10-23 | Stop reason: HOSPADM

## 2024-10-17 RX ORDER — CLONAZEPAM 0.5 MG/1
0.5 TABLET ORAL NIGHTLY
Status: DISCONTINUED | OUTPATIENT
Start: 2024-10-17 | End: 2024-10-23 | Stop reason: HOSPADM

## 2024-10-17 RX ORDER — ACETAMINOPHEN 160 MG/5ML
650 SOLUTION ORAL EVERY 4 HOURS PRN
Status: DISCONTINUED | OUTPATIENT
Start: 2024-10-17 | End: 2024-10-23 | Stop reason: HOSPADM

## 2024-10-17 SDOH — SOCIAL STABILITY: SOCIAL INSECURITY: HAVE YOU HAD THOUGHTS OF HARMING ANYONE ELSE?: NO

## 2024-10-17 SDOH — SOCIAL STABILITY: SOCIAL INSECURITY: WERE YOU ABLE TO COMPLETE ALL THE BEHAVIORAL HEALTH SCREENINGS?: YES

## 2024-10-17 SDOH — ECONOMIC STABILITY: INCOME INSECURITY: IN THE PAST 12 MONTHS HAS THE ELECTRIC, GAS, OIL, OR WATER COMPANY THREATENED TO SHUT OFF SERVICES IN YOUR HOME?: NO

## 2024-10-17 SDOH — ECONOMIC STABILITY: HOUSING INSECURITY: IN THE LAST 12 MONTHS, WAS THERE A TIME WHEN YOU WERE NOT ABLE TO PAY THE MORTGAGE OR RENT ON TIME?: NO

## 2024-10-17 SDOH — SOCIAL STABILITY: SOCIAL INSECURITY: DOES ANYONE TRY TO KEEP YOU FROM HAVING/CONTACTING OTHER FRIENDS OR DOING THINGS OUTSIDE YOUR HOME?: NO

## 2024-10-17 SDOH — SOCIAL STABILITY: SOCIAL INSECURITY: HAS ANYONE EVER THREATENED TO HURT YOUR FAMILY OR YOUR PETS?: NO

## 2024-10-17 SDOH — ECONOMIC STABILITY: FOOD INSECURITY: HOW HARD IS IT FOR YOU TO PAY FOR THE VERY BASICS LIKE FOOD, HOUSING, MEDICAL CARE, AND HEATING?: NOT VERY HARD

## 2024-10-17 SDOH — ECONOMIC STABILITY: HOUSING INSECURITY: AT ANY TIME IN THE PAST 12 MONTHS, WERE YOU HOMELESS OR LIVING IN A SHELTER (INCLUDING NOW)?: NO

## 2024-10-17 SDOH — SOCIAL STABILITY: SOCIAL INSECURITY: WITHIN THE LAST YEAR, HAVE YOU BEEN AFRAID OF YOUR PARTNER OR EX-PARTNER?: NO

## 2024-10-17 SDOH — SOCIAL STABILITY: SOCIAL INSECURITY: ABUSE: ADULT

## 2024-10-17 SDOH — ECONOMIC STABILITY: FOOD INSECURITY: WITHIN THE PAST 12 MONTHS, YOU WORRIED THAT YOUR FOOD WOULD RUN OUT BEFORE YOU GOT THE MONEY TO BUY MORE.: NEVER TRUE

## 2024-10-17 SDOH — SOCIAL STABILITY: SOCIAL INSECURITY: DO YOU FEEL ANYONE HAS EXPLOITED OR TAKEN ADVANTAGE OF YOU FINANCIALLY OR OF YOUR PERSONAL PROPERTY?: NO

## 2024-10-17 SDOH — ECONOMIC STABILITY: HOUSING INSECURITY: IN THE PAST 12 MONTHS, HOW MANY TIMES HAVE YOU MOVED WHERE YOU WERE LIVING?: 2

## 2024-10-17 SDOH — SOCIAL STABILITY: SOCIAL INSECURITY: WITHIN THE LAST YEAR, HAVE YOU BEEN HUMILIATED OR EMOTIONALLY ABUSED IN OTHER WAYS BY YOUR PARTNER OR EX-PARTNER?: NO

## 2024-10-17 SDOH — ECONOMIC STABILITY: FOOD INSECURITY: WITHIN THE PAST 12 MONTHS, THE FOOD YOU BOUGHT JUST DIDN'T LAST AND YOU DIDN'T HAVE MONEY TO GET MORE.: NEVER TRUE

## 2024-10-17 SDOH — SOCIAL STABILITY: SOCIAL INSECURITY: ARE THERE ANY APPARENT SIGNS OF INJURIES/BEHAVIORS THAT COULD BE RELATED TO ABUSE/NEGLECT?: NO

## 2024-10-17 SDOH — SOCIAL STABILITY: SOCIAL INSECURITY: ARE YOU OR HAVE YOU BEEN THREATENED OR ABUSED PHYSICALLY, EMOTIONALLY, OR SEXUALLY BY ANYONE?: NO

## 2024-10-17 SDOH — ECONOMIC STABILITY: TRANSPORTATION INSECURITY: IN THE PAST 12 MONTHS, HAS LACK OF TRANSPORTATION KEPT YOU FROM MEDICAL APPOINTMENTS OR FROM GETTING MEDICATIONS?: NO

## 2024-10-17 SDOH — SOCIAL STABILITY: SOCIAL INSECURITY: DO YOU FEEL UNSAFE GOING BACK TO THE PLACE WHERE YOU ARE LIVING?: NO

## 2024-10-17 ASSESSMENT — LIFESTYLE VARIABLES
AUDIT-C TOTAL SCORE: 0
AUDIT-C TOTAL SCORE: 0
HOW OFTEN DO YOU HAVE A DRINK CONTAINING ALCOHOL: NEVER
HOW OFTEN DO YOU HAVE 6 OR MORE DRINKS ON ONE OCCASION: NEVER
HOW MANY STANDARD DRINKS CONTAINING ALCOHOL DO YOU HAVE ON A TYPICAL DAY: PATIENT DOES NOT DRINK
SKIP TO QUESTIONS 9-10: 1

## 2024-10-17 ASSESSMENT — PAIN - FUNCTIONAL ASSESSMENT
PAIN_FUNCTIONAL_ASSESSMENT: 0-10

## 2024-10-17 ASSESSMENT — PAIN SCALES - GENERAL
PAINLEVEL_OUTOF10: 4
PAINLEVEL_OUTOF10: 10 - WORST POSSIBLE PAIN
PAINLEVEL_OUTOF10: 0 - NO PAIN
PAINLEVEL_OUTOF10: 3
PAINLEVEL_OUTOF10: 0 - NO PAIN
PAINLEVEL_OUTOF10: 10 - WORST POSSIBLE PAIN
PAINLEVEL_OUTOF10: 6
PAINLEVEL_OUTOF10: 0 - NO PAIN
PAINLEVEL_OUTOF10: 6

## 2024-10-17 ASSESSMENT — ACTIVITIES OF DAILY LIVING (ADL)
JUDGMENT_ADEQUATE_SAFELY_COMPLETE_DAILY_ACTIVITIES: NO
ADEQUATE_TO_COMPLETE_ADL: YES
PATIENT'S MEMORY ADEQUATE TO SAFELY COMPLETE DAILY ACTIVITIES?: NO
BATHING: DEPENDENT
HEARING - LEFT EAR: FUNCTIONAL
TOILETING: DEPENDENT
GROOMING: NEEDS ASSISTANCE
DRESSING YOURSELF: DEPENDENT
WALKS IN HOME: DEPENDENT
FEEDING YOURSELF: NEEDS ASSISTANCE
HEARING - RIGHT EAR: FUNCTIONAL
LACK_OF_TRANSPORTATION: NO

## 2024-10-17 ASSESSMENT — COGNITIVE AND FUNCTIONAL STATUS - GENERAL
PATIENT BASELINE BEDBOUND: UNABLE TO ASSESS AT THIS TIME
MOVING FROM LYING ON BACK TO SITTING ON SIDE OF FLAT BED WITH BEDRAILS: A LOT
MOBILITY SCORE: 11
DAILY ACTIVITIY SCORE: 15
TURNING FROM BACK TO SIDE WHILE IN FLAT BAD: A LOT
WALKING IN HOSPITAL ROOM: A LOT
DRESSING REGULAR LOWER BODY CLOTHING: A LOT
MOVING TO AND FROM BED TO CHAIR: A LOT
HELP NEEDED FOR BATHING: A LOT
STANDING UP FROM CHAIR USING ARMS: A LOT
TOILETING: A LOT
CLIMB 3 TO 5 STEPS WITH RAILING: TOTAL
PERSONAL GROOMING: A LITTLE
DRESSING REGULAR UPPER BODY CLOTHING: A LOT

## 2024-10-17 ASSESSMENT — PATIENT HEALTH QUESTIONNAIRE - PHQ9
SUM OF ALL RESPONSES TO PHQ9 QUESTIONS 1 & 2: 0
2. FEELING DOWN, DEPRESSED OR HOPELESS: NOT AT ALL
1. LITTLE INTEREST OR PLEASURE IN DOING THINGS: NOT AT ALL

## 2024-10-17 ASSESSMENT — PAIN DESCRIPTION - DESCRIPTORS
DESCRIPTORS: ACHING
DESCRIPTORS: ACHING

## 2024-10-17 ASSESSMENT — PAIN DESCRIPTION - LOCATION
LOCATION: GENERALIZED
LOCATION: GENERALIZED

## 2024-10-17 NOTE — PROGRESS NOTES
"Occupational Therapy                      Therapy Communication Note    Patient Name: Rohini Beaver  MRN: [id number]  Today's Date: 10/17/2024     Discipline: Occupational Therapy    Missed Visit Reason:  Patient adamantly declining therapy services, stating \"I am dying\". Patient to be discharged from O.T. secondary to above.  and RN notified.    Missed Time: Attempt    "

## 2024-10-17 NOTE — PROGRESS NOTES
10/17/24 0713   Discharge Planning   Living Arrangements Family members   Support Systems Family members   Do you have animals or pets at home? No   Who is requesting discharge planning? Provider   Type of Home Care Services Home nursing visits     Pt is not GIP for hospice, plan is to skill pt at this time, pt will need to be inpatient admission status and await for orders and therapy .  Will follow up today.  Doreen Johns RN [facility]

## 2024-10-17 NOTE — CONSULTS
Pt is comfort measures only. Aggressive nutrition interventions not currently indicated. This service remains available as needed. Please reconsult if GOC change.

## 2024-10-17 NOTE — CONSULTS
Reason For Consult  Hyponatremia/    History Of Present Illness  Rohini Beaver is a 79 y.o. 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  Patient renal chemistries has been consistently in the 10 range GFR with hyponatremia, there is no evidence of acidosis hyperkalemia.  Patient refers that she is dying.  He also stated that she could not sleep well last night denies any pain, dyspnea, or chest pain.  Patient denies nocturia or hematuria  Past Medical History  She has a past medical history of Bilateral primary osteoarthritis of knee, HLD (hyperlipidemia), Lumbosacral radiculopathy, Meralgia paraesthetica, Obesity, and Physical deconditioning.  1.  Ovarian cancer   2.  GERD  3.  Adjustment disorder with depressed mood  4.  Chronic neuropathy  5.  Hyperlipidemia  6.  Degenerative joint disease  6.  Obesity  7.  Corneal dystrophy status post corneal procedures  8.  History of skin cancer involving nose.  S/p Mohs procedure.  9.  Osteoarthritis  10.  Meralgia paresthetica  11.  Peritoneal carcinomatosis  12.  Malignant ascites  13.  Obesity  14.  Low back pain  15.  Acute kidney injury  16.  Hyponatremia  17.  Anemia  18.  Renal calculi  Surgical History  She has a past surgical history that includes Total knee arthroplasty (Left, 2019); Cholecystectomy; Corneal transplant; Lumbar fusion; Dilation and curettage of uterus; Shoulder arthroscopy (Left); Colonoscopy; Skin lesion excision; Trigger finger release (Right); Arthroplasty (Left); Breast biopsy (1999); and Total knee arthroplasty (Right, 2017).     Social History  She reports that she has never smoked. She has never used smokeless tobacco. She reports that she does not currently use alcohol. She reports that she does not use drugs.    Family History  Family History   Problem  "Relation Name Age of Onset    Colon cancer Mother      Lung cancer Father      Other (Mesothelioma) Brother      Brain cancer Mother's Brother          Allergies  Patient has no known allergies.    Review of Systems  10 point review of system negative except as noted in the HPI      Physical Exam     General Appearance; alert oriented  Skin pallor  HEENT; pupils react to light and accommodation  Tongue; well-hydrated  Neck; no jugular vein distention, no thyromegaly, no adenopathy, no bruit  Lungs; bibasilar crackles on expiration  Heart; no rubs no gallops, heart rate is regular  Abdomen; there is tympanic note to percussion with distention no rebound no guarding ,positive  fluid wave  ; no CVA tenderness  Musculoskeletal; decreased muscle tone  1+ edema of the legs  Neurological; no tremors no clonus  Last Recorded Vitals  Blood pressure 93/54, pulse 99, temperature 36.2 °C (97.2 °F), temperature source Temporal, resp. rate 18, height 1.626 m (5' 4.02\"), weight 104 kg (229 lb 4.5 oz), SpO2 96%.    Relevant Results  Results for orders placed or performed during the hospital encounter of 10/14/24 (from the past 24 hours)   Comprehensive Metabolic Panel   Result Value Ref Range    Glucose 95 74 - 99 mg/dL    Sodium 129 (L) 136 - 145 mmol/L    Potassium 4.6 3.5 - 5.3 mmol/L    Chloride 95 (L) 98 - 107 mmol/L    Bicarbonate 21 21 - 32 mmol/L    Anion Gap 18 10 - 20 mmol/L    Urea Nitrogen 79 (H) 6 - 23 mg/dL    Creatinine 4.17 (H) 0.50 - 1.05 mg/dL    eGFR 10 (L) >60 mL/min/1.73m*2    Calcium 7.4 (L) 8.6 - 10.3 mg/dL    Albumin 2.0 (L) 3.4 - 5.0 g/dL    Alkaline Phosphatase 171 (H) 33 - 136 U/L    Total Protein 4.9 (L) 6.4 - 8.2 g/dL    AST 29 9 - 39 U/L    Bilirubin, Total 0.3 0.0 - 1.2 mg/dL    ALT 10 7 - 45 U/L   CBC   Result Value Ref Range    WBC 27.3 (H) 4.4 - 11.3 x10*3/uL    nRBC 0.0 0.0 - 0.0 /100 WBCs    RBC 3.59 (L) 4.00 - 5.20 x10*6/uL    Hemoglobin 9.3 (L) 12.0 - 16.0 g/dL    Hematocrit 31.3 (L) 36.0 - " 46.0 %    MCV 87 80 - 100 fL    MCH 25.9 (L) 26.0 - 34.0 pg    MCHC 29.7 (L) 32.0 - 36.0 g/dL    RDW 18.5 (H) 11.5 - 14.5 %    Platelets 338 150 - 450 x10*3/uL    XR chest 1 view    Result Date: 10/16/2024  Interpreted By:  Yamini Hubbard, STUDY: XR CHEST 1 VIEW 10/16/2024 5:49 pm   INDICATION: Signs/Symptoms:sob   COMPARISON: 10/08/2024   ACCESSION NUMBER(S): [id number]   ORDERING CLINICIAN: SHAKIRA PAEZ   TECHNIQUE: AP view   FINDINGS: There is an elevated right diaphragm. There is bilateral lower lobe atelectasis or infiltrates. Small bilateral pleural effusions. There is a retrocardiac density likely a large hiatal hernia. Heart size is normal. Pulmonary vascularity is normal. Surgical clips overlie the left humeral head. There are degenerative changes of both shoulders. A few calcifications are seen medial to the right humeral head.       Elevated right diaphragm with bibasilar atelectasis and possible small bilateral pleural effusions. Hiatal hernia. No change since the previous exam   Signed by: Yamini Hubbard 10/16/2024 8:19 PM Dictation workstation:   [id number]    CT abdomen pelvis wo IV contrast    Result Date: 10/13/2024  Interpreted By:  Deep Scott, STUDY: CT ABDOMEN PELVIS WO IV CONTRAST;  10/12/2024 1:30 pm   INDICATION: Signs/Symptoms:Ascites /Abdominal Distention/Renal Failure.     COMPARISON: CT scan 10/01/2024.   ACCESSION NUMBER(S): [id number]   ORDERING CLINICIAN: ALICIA GRAHAM   TECHNIQUE: CT of the abdomen and pelvis was performed. Contiguous axial images were obtained at 3 mm slice thickness through the abdomen and pelvis. Coronal and sagittal reconstructions at 3 mm slice thickness were performed.  No intravenous contrast was administered; positive oral contrast was given.   FINDINGS: Please note that the evaluation of vessels, lymph nodes and organs is limited without intravenous contrast.   LOWER CHEST: Small to moderate bilateral pleural effusion with surrounding atelectasis.    ABDOMEN:   LIVER: Nodular surface with a scalloping of the right hepatic capsule secondary to the adjacent malignant ascites. No definite parenchymal lesions.   BILE DUCTS: The intrahepatic and extrahepatic ducts are not dilated.   GALLBLADDER: Surgically absent   PANCREAS: No duct dilatation   SPLEEN: No splenomegaly.   ADRENAL GLANDS: No nodule   KIDNEYS AND URETERS: The kidneys are normal in size and unremarkable in appearance.  No hydroureteronephrosis or nephroureterolithiasis is identified.   PELVIS:   BLADDER: Unremarkable   REPRODUCTIVE ORGANS: Uterus is unremarkable   BOWEL: Moderate hiatal hernia. No bowel dilatation. Few uncomplicated colonic diverticulosis.   Moderate abdominopelvic ascites. Diffuse omental caking and peritoneal carcinomatosis.   VESSELS: Multifocal vascular calcifications and atherosclerotic changes.   PERITONEUM/RETROPERITONEUM/LYMPH NODES: Multiple subcentimeter retroperitoneal, iliac and pelvic lymph nodes.   ABDOMINAL WALL: Subcutaneous edema.   BONES: Multilevel degenerative spine changes and facet joint disease. Prior lower lumbar spine fixation.       Overall findings are grossly unchanged from 10/01/2024 CT scan. 1. Persistent moderate abdominopelvic ascites with diffuse peritoneal carcinomatosis/omental caking. 2. 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 Underlying cirrhosis could not be entirely excluded as well. 3. Moderate bilateral pleural effusion with surrounding atelectasis.     MACRO: None   Signed by: Deep Scott 10/13/2024 7:50 AM Dictation workstation:   [id number]    US thoracentesis    Result Date: 10/9/2024  Interpreted By:  Parrish Vargas, STUDY: US THORACENTESIS;  10/8/2024 3:18 pm   INDICATION: Signs/Symptoms:Diagnostic and therapeutic right sided thoracentesis.     COMPARISON: None.   ACCESSION NUMBER(S): [id number]   ORDERING CLINICIAN: SHAKIRA PAEZ   TECHNIQUE:  "INTERVENTIONALIST(S): Parrish Vargas MD   The history and physical exam pertinent to the procedure were reviewed and no updates were made.\"   CONSENT: The patient/patient's POA/next of kin was informed of the nature of the proposed procedure. The purposes, alternatives, risks, and benefits were explained and discussed. All questions were answered and consent was obtained.   SEDATION: None   MEDICATION/CONTRAST: No additional   TIME OUT: A time out was performed immediately prior to procedure start with the interventional team, correctly identifying the patient name, date of birth, MRN, procedure, anatomy (including marking of site and side), patient position, procedure consent form, relevant laboratory and imaging test results, antibiotic administration, safety precautions, and procedure-specific equipment needs.   FINDINGS: The patient was placed in the sitting position.   The pleural space was examined with grey scale ultrasound, and the most accessible fluid identified and marked for thoracentesis.   The skin was prepped and draped in usual manner. Local anesthesia with Lidocaine was administered and a  right-sided thoracentesis was performed.  A 5 Khmer One-Step thoracentesis needle/catheter was then placed where marked.  Approximately 550 mL of yellowish colored fluid was removed.  The needle/catheter was then withdrawn.   The patient tolerated the procedure well and there were no immediate complications. Specimen(s) sent to the laboratory and pathology for further evaluation, per the requesting team.       Uneventful  right-sided thoracentesis, as detailed above.   I personally performed and/or directly supervised this study and was present for the entire procedure.   I personally reviewed the study and resident interpretation. I agree with the findings as stated.   Performed and dictated at [facility].   MACRO: None   Signed by: Parrish Vargas 10/9/2024 1:17 PM Dictation " "workstation:   [id number]    XR chest 1 view    Result Date: 10/9/2024  Interpreted By:  Devin Garsia, STUDY: XR CHEST 1 VIEW;  10/8/2024 4:57 pm   INDICATION: Signs/Symptoms:Reassess after thoracentesis.   COMPARISON: 10/08/2024 at 7:00 a.m.   ACCESSION NUMBER(S): [id number]   ORDERING CLINICIAN: SHAKIRA PAEZ   FINDINGS: CARDIOMEDIASTINAL SILHOUETTE AND VASCULATURE:   Cardiac size:  The right cardiac margin is obscured. Aortic shadow:  Within normal limits.   Mediastinal contours: Within normal limits.   Pulmonary vasculature:  Unremarkable, with resolution of prominence and cephalization on the prior exam that was probably due to congestion.   LUNGS: There is opacification of the right lower lung probably from a combination of elevated hemidiaphragm with effusion and atelectasis. This appears fairly similar to the previous exam. Is no evidence of right pneumothorax. Left retrocardiac opacity appears slightly improved, also probably due to atelectasis.   ABDOMEN AND OTHER FINDINGS: No remarkable upper abdominal findings.   BONES: No acute osseous changes.       1.  No complication such as pneumothorax after reported thoracentesis.   Signed by: Devin Garsia 10/9/2024 10:16 AM Dictation workstation:   [id number]    US guided aspiration of abscess, hematoma, cyst    Result Date: 10/8/2024  Interpreted By:  Parrish Vargas, STUDY: US GUIDED ASPIRATION OF ABSCESS, HEMATOMA, CYST;  10/1/2024 4:49 pm   INDICATION: Signs/Symptoms:concern for liver absess on ct and need therapeutic paracentesis.     COMPARISON: None.   ACCESSION NUMBER(S): [id number]   ORDERING CLINICIAN: ERNESTO RIVERA   TECHNIQUE: INTERVENTIONALIST(S): Parrish Vargas MD   The history and physical exam pertinent to the procedure were reviewed and no updates were made.\"   CONSENT: The patient/patient's POA/next of kin was informed of the nature of the proposed procedure. The purposes, alternatives, risks, and benefits were explained and discussed. All " questions were answered and consent was obtained.   SEDATION: None   MEDICATION/CONTRAST: No additional   TIME OUT: A time out was performed immediately prior to procedure start with the interventional team, correctly identifying the patient name, date of birth, MRN, procedure, anatomy (including marking of site and side), patient position, procedure consent form, relevant laboratory and imaging test results, antibiotic administration, safety precautions, and procedure-specific equipment needs.   COMPLICATIONS: No immediate adverse events identified.   FINDINGS: Sonographic evaluation of the patient demonstrated only small amount of simple appearing fluid adjacent to the liver. This was targeted for drainage. Site prepped and draped in usual sterile fashion. 1% lidocaine for anesthesia. 5 Swiss Yueh catheter advanced into the collection. Stylet removed. 25 cc of slightly cloudy serous fluid was removed. Needle removed. Access site covered with sterile bandage. Patient tolerated procedure without complication.       Aspiration of perihepatic collection which demonstrated thin yellow fluid..   I was present for and/or performed the critical portions of the procedure and immediately available throughout the entire procedure.   I personally reviewed the image(s) / study and interpretation. I agree with the findings as stated.   Performed and dictated at [facility].   MACRO: None   Signed by: Parrish Vargas 10/8/2024 2:06 PM Dictation workstation:   [id number]    XR chest 1 view    Result Date: 10/8/2024  Interpreted By:  Deep Scott, STUDY: XR CHEST 1 VIEW; 10/8/2024 7:09 am   INDICATION: Signs/Symptoms:sob.   COMPARISON: Chest x-ray 10/06/2020   ACCESSION NUMBER(S): [id number]   ORDERING CLINICIAN: SHAKIRA PAEZ   TECHNIQUE: 1 view of the chest was performed.   FINDINGS: Hypoventilatory scan. Minimal improvement of the layering bilateral pleural effusion with surrounding  atelectasis. No pneumothorax.  The cardiomediastinal silhouette is stable. Ossifications surrounding the right shoulder joint likely degenerative. Metallic objects along the left humeral head could be related to prior surgery.       1. Minimal improvement of the layering bilateral pleural effusion with surrounding atelectasis.   Signed by: Deep Scott 10/8/2024 7:38 AM Dictation workstation:   [id number]    XR chest 1 view    Result Date: 10/7/2024  Interpreted By:  Deep Scott, STUDY: XR CHEST 1 VIEW; 10/6/2024 2:36 pm   INDICATION: Signs/Symptoms:Follow-up pleural effusion, hypoxia.   COMPARISON: None   ACCESSION NUMBER(S): [id number]   ORDERING CLINICIAN: FRANKY THIBODEAUX   TECHNIQUE: 1 view of the chest was performed.   FINDINGS: Layering right-sided pleural effusion with surrounding atelectasis. Trace left-sided pleural effusion.. No pneumothorax.  The cardiomediastinal silhouette is within normal limits. Hiatal hernia.       1. Layering right-sided pleural effusion with surrounding atelectasis. Small left-sided pleural effusions surrounding atelectasis. Findings have worsened from 10/03/2024.   Signed by: Deep Scott 10/7/2024 7:50 AM Dictation workstation:   [id number]    US guided abdominal paracentesis    Result Date: 10/4/2024  Interpreted By:  Peri Lawrence, STUDY: US GUIDED ABDOMINAL PARACENTESIS; ;  10/4/2024 3:02 pm   INDICATION: Signs/Symptoms:Paracentesis.     COMPARISON: None.   ACCESSION NUMBER(S): [id number]   ORDERING CLINICIAN: SHAKIRA PAEZ   TECHNIQUE: Multiple grayscale sonographic images of the abdomen were obtained in an attempt to perform paracentesis.   FINDINGS: There is minimal ascites. The risks of performing paracentesis outweighs the benefits. Procedure aborted.       Minimal ascites. The risks of performing paracentesis outweighs the benefits.     MACRO: None   Signed by: Peri Lawrence 10/4/2024 3:06 PM Dictation workstation:   [id number]    XR chest 1  view    Result Date: 10/4/2024  Interpreted By:  Devin Garsia, STUDY: XR CHEST 1 VIEW;  10/3/2024 6:58 pm   INDICATION: Signs/Symptoms:sob.   COMPARISON: 10/01/2024   ACCESSION NUMBER(S): [id number]   ORDERING CLINICIAN: SHAKIRA PAEZ   FINDINGS: CARDIOMEDIASTINAL SILHOUETTE AND VASCULATURE:   Cardiac size:  Within normal limits considering a shallow inspiration. Aortic shadow:  Within normal limits.   Mediastinal contours: Within normal limits.   Pulmonary vasculature:  The central vasculature is unremarkable   LUNGS: There is a retrocardiac lucency indicating small to moderate hiatal hernia. Probable left basilar atelectasis, increased. The lungs otherwise are clear.   ABDOMEN AND OTHER FINDINGS: No remarkable upper abdominal findings.   BONES: No acute osseous changes.       1.  Left basilar atelectasis.   Signed by: Devin Garsia 10/4/2024 8:41 AM Dictation workstation:   [id number]    ECG 12 lead    Result Date: 10/3/2024  Normal sinus rhythm Low voltage QRS Cannot rule out Anterior infarct , age undetermined Abnormal ECG No previous ECGs available See ED provider note for full interpretation and clinical correlation Confirmed by Dayan Velásquez ([id number]) on 10/3/2024 5:39:34 PM    CT chest abdomen pelvis w IV contrast    Result Date: 10/1/2024  Interpreted By:  Oscar Aldrich, STUDY: CT CHEST ABDOMEN PELVIS W IV CONTRAST;  10/1/2024 11:13 am   INDICATION: Signs/Symptoms:leukocytosis, ascites, recent ovarian cancer diagnosis.   COMPARISON: CT abdomen pelvis 08/08/2024   ACCESSION NUMBER(S): [id number]   ORDERING CLINICIAN: ASHLEY FAIRBANKS   TECHNIQUE: CT of the chest, abdomen, and pelvis was performed.  Contiguous axial images were obtained at 3 mm slice thickness through the chest, abdomen and pelvis. Coronal and sagittal reconstructions at 3 mm slice thickness were performed. ml of contrast  were administered intravenously without immediate complication.   FINDINGS: CHEST:   LUNG/PLEURA/LARGE AIRWAYS: No  endobronchial lesion is seen.   Moderate to large bilateral pleural effusions with adjacent consolidative opacification of the bilateral lower lobes suggestive of compressive atelectasis. No pneumothorax. Mild asymmetric scattered reticular changes suggestive of chronic lung findings.     VESSELS: The thoracic aorta is unremarkable with respect course, caliber, and contour.   Prominent main pulmonary artery measuring up to 3.2 cm in anterior-posterior dimension measured on sagittal plane which may indicate sequela of pulmonary hypertension.   No significant coronary atherosclerotic calcifications are seen.   HEART: Heart size within normal limits. No pericardial effusion.   MEDIASTINUM AND OLIVE: Mildly prominent mediastinal lymph nodes measuring up to 10 mm in short axis   Moderate hiatal hernia with partial intrathoracic stomach and ascites and region of nodularity of the peritoneal fat within hiatal hernia suggestive carcinomatosis.   CHEST WALL AND LOWER NECK: No acute osseous abnormality. Multilevel presumed degenerative changes throughout the imaged spine. No acute soft tissue abnormality.   ABDOMEN:   LIVER: There is been interval scalloping of the right hepatic margins with new regions of irregularity along the right hepatic capsule diffusely which is new since comparison imaging from 08/08/2024 there is a new elongated subcapsular collection measuring up to approximately 11.4 x 2.1 cm, difficult to measure given curvilinear projection extending over the right hepatic lobe margin (series 202, images 40-70). Similar appearing subcentimeter left hepatic lobe hypodense lesion.   BILE DUCTS: No obvious new intrahepatic biliary dilatation. Mild prominence of the common bile duct, nonspecific in a cholecystectomy patient.   GALLBLADDER: Absent.   PANCREAS: Unremarkable.   SPLEEN: Unchanged.   ADRENAL GLANDS: Unchanged.   KIDNEYS AND URETERS: Similar appearing subcentimeter right renal lower pole calculus. No  hydronephrosis. No obstructing urolithiasis.   PELVIS:   BLADDER: Unremarkable.   REPRODUCTIVE ORGANS: Uterus appears grossly unchanged.   BOWEL: Moderate hiatal hernia with wall thickening of the stomach in the hernia. No new pathologic distention of bowel. Wall thickening of the colon within the splenic flexure descending colon and sigmoid colon, nonspecific.     VESSELS: No evidence of abdominal aortic aneurysm.   PERITONEUM/RETROPERITONEUM/LYMPH NODES: Large volume ascites with extensive regions of anterior omental caking and peritoneal carcinomatosis. No obvious free intraperitoneal gas. Given the presence of extensive omental caking and peritoneal carcinomatosis, superimposed peritoneal enhancement 4 other causes cannot be excluded.   BONE AND SOFT TISSUE: No acute osseous abnormality. Osseous structures appear stable. No acute abnormality of the abdominal wall soft tissues.       CHEST: 1.  Moderate to large bilateral pleural effusions with adjacent presumed compressive atelectasis. 2. Prominent main pulmonary artery which may indicate pulmonary hypertension. 3. Mildly prominent mediastinal lymph nodes measuring up to 10 mm in short axis concerning for metastatic disease. 4. Moderate hiatal hernia with partial intrathoracic stomach. The peritoneal fat adjacent to the stomach within the hernia demonstrates evidence of probable carcinomatosis and ascites.   ABDOMEN-PELVIS: 1.  Redemonstration of large volume ascites with regions of significant anterior omental caking and peritoneal carcinomatosis commensurate with patient's provided diagnosis of ovarian cancer. 2. Regions of wall thickening of the colon extending from the splenic flexure to the sigmoid which may indicate a nonspecific colitis. 3. Since the previous examination on 08/08/2024, there are now extensive regions of scalloping of the right hepatic lobe peripherally with the appearance of a large subcapsular collection along the right hepatic margin as  above. The sterility of this collection cannot be assessed via CT and clinical correlation is advised for exclusion superimposed infection within this region.     Signed by: Oscar Aldrich 10/1/2024 12:14 PM Dictation workstation:   [id number]    XR chest 1 view    Result Date: 10/1/2024  Interpreted By:  Samuel Jaramillo, STUDY: XR CHEST 1 VIEW; 10/1/2024 8:44 am   INDICATION: Signs/Symptoms:weakness, edema in legs and abdomen   COMPARISON: April 2023.   ACCESSION NUMBER(S): [id number]   ORDERING CLINICIAN: ASHLEY FAIRBANKS   FINDINGS: The study is limited due to rotation and poor inspiratory effort, with resultant crowding of the pulmonary vasculature. The cardiac silhouette is within normal limits for the technique. Moderate size hiatal hernia is again seen. There is no pneumothorax, confluent infiltrates or significant effusion. Degenerative changes involve the spine and shoulders; metallic anchors again noted over the left humeral head related to previous rotator cuff repair.       Limited study. Hiatal hernia. No acute cardiopulmonary disease.   Signed by: Samuel Jaramillo 10/1/2024 9:22 AM Dictation workstation:   [id number]    US guided abdominal paracentesis    Result Date: 9/20/2024  Interpreted By:  Peri Lawrence and Kamau Nyokabi STUDY: US GUIDED ABDOMINAL PARACENTESIS; US GUIDED PERCUTANEOUS ABDOMINAL RETROPERITONEUM BIOPSY;  9/19/2024 11:13 am   INDICATION: Signs/Symptoms:ascites; Signs/Symptoms:ascites, evaluate ovarian cancer.   COMPARISON: CT abdomen and pelvis 08/08/2024   ACCESSION NUMBER(S): [id number]; [id number]   ORDERING CLINICIAN: JEANETTE ARIAS   TECHNIQUE: INTERVENTIONALIST(S): Dr. Peri Lawrence MD   CONSENT: The patient was informed of the nature of the proposed procedure. The purposes, alternatives, risks, and benefits were explained and discussed. All questions were answered and consent was obtained.   SEDATION: 1% local lidocaine was used for anesthetic.    MEDICATION/CONTRAST: No additional.   TIME OUT:   A time out was performed immediately prior to procedure start with the interventional team, correctly identifying the patient name, date of birth, MRN, procedure, anatomy (including marking of site and side), patient position, procedure consent form, relevant laboratory and imaging test results, antibiotic administration, safety precautions, and procedure-specific equipment needs.   COMPLICATIONS: No immediate adverse events identified.   FINDINGS: The patient was placed in the supine position. Limited sonographic images of the lower abdominal wall were obtained for purposes of needle guidance, which demonstrated omental soft tissue mass which was seen on prior CT 08/08/2024. The area of concern was prepped and draped under sterile technique.   1% lidocaine was injected subcutaneously. Additional lidocaine was administered into deeper tissues surrounding the targeted area for biopsy using a 22G spinal needle. A small incision was made. Using the same access site a 18 gauge core biopsy needle was passed via a 17 gauge coaxial introducer needle to obtain a total of 1 core sample.   Postprocedure images demonstrate no evidence for hemorrhage. The patient tolerated the procedure well and there were no immediate complications. 1 core specimen was sent to pathology.     Subsequently the right lower quadrant was visualized under ultrasound which demonstrated moderate volume ascites seen on prior CT 08/08/2024. 1% lidocaine was injected subcutaneously at this site. A 19 gauge Yueh was used to target the fluid collection under ultrasound guidance. Once the site was accessed, the inner needle was removed from the catheter. The Yueh catheter was connected to drainage container. Estimated 1000 cc of serosanguineous colored fluid was removed. Post paracentesis imaging demonstrated decreased ascitic fluid. The Yueh catheter was removed. The access site was bandaged.       1.  Status post ultrasound guided core needle biopsy of omental mass seen on CT 08/08/2024. 1 core specimens sent to pathology. 2. Successful paracentesis under ultrasound guidance with removal of 1 L of serosanguineous fluid.     I was present for and/or performed the critical portions of the procedure and immediately available throughout the entire procedure.   I personally reviewed the image(s) / study and interpretation. I agree with the findings as stated.   Performed and dictated at [facility].   MACRO: None   Signed by: Peri Lawrence 9/20/2024 10:06 AM Dictation workstation:   [id number]    US guided percutaneous abdominal retroperitoneum biopsy    Result Date: 9/20/2024  Interpreted By:  Peri Lawrence and Kamau Nyokabi STUDY: US GUIDED ABDOMINAL PARACENTESIS; US GUIDED PERCUTANEOUS ABDOMINAL RETROPERITONEUM BIOPSY;  9/19/2024 11:13 am   INDICATION: Signs/Symptoms:ascites; Signs/Symptoms:ascites, evaluate ovarian cancer.   COMPARISON: CT abdomen and pelvis 08/08/2024   ACCESSION NUMBER(S): [id number]; [id number]   ORDERING CLINICIAN: JEANETTE ARIAS   TECHNIQUE: INTERVENTIONALIST(S): Dr. Peri Lawrence MD   CONSENT: The patient was informed of the nature of the proposed procedure. The purposes, alternatives, risks, and benefits were explained and discussed. All questions were answered and consent was obtained.   SEDATION: 1% local lidocaine was used for anesthetic.   MEDICATION/CONTRAST: No additional.   TIME OUT:   A time out was performed immediately prior to procedure start with the interventional team, correctly identifying the patient name, date of birth, MRN, procedure, anatomy (including marking of site and side), patient position, procedure consent form, relevant laboratory and imaging test results, antibiotic administration, safety precautions, and procedure-specific equipment needs.   COMPLICATIONS: No immediate adverse events identified.    FINDINGS: The patient was placed in the supine position. Limited sonographic images of the lower abdominal wall were obtained for purposes of needle guidance, which demonstrated omental soft tissue mass which was seen on prior CT 08/08/2024. The area of concern was prepped and draped under sterile technique.   1% lidocaine was injected subcutaneously. Additional lidocaine was administered into deeper tissues surrounding the targeted area for biopsy using a 22G spinal needle. A small incision was made. Using the same access site a 18 gauge core biopsy needle was passed via a 17 gauge coaxial introducer needle to obtain a total of 1 core sample.   Postprocedure images demonstrate no evidence for hemorrhage. The patient tolerated the procedure well and there were no immediate complications. 1 core specimen was sent to pathology.     Subsequently the right lower quadrant was visualized under ultrasound which demonstrated moderate volume ascites seen on prior CT 08/08/2024. 1% lidocaine was injected subcutaneously at this site. A 19 gauge Yueh was used to target the fluid collection under ultrasound guidance. Once the site was accessed, the inner needle was removed from the catheter. The Yueh catheter was connected to drainage container. Estimated 1000 cc of serosanguineous colored fluid was removed. Post paracentesis imaging demonstrated decreased ascitic fluid. The Yueh catheter was removed. The access site was bandaged.       1. Status post ultrasound guided core needle biopsy of omental mass seen on CT 08/08/2024. 1 core specimens sent to pathology. 2. Successful paracentesis under ultrasound guidance with removal of 1 L of serosanguineous fluid.     I was present for and/or performed the critical portions of the procedure and immediately available throughout the entire procedure.   I personally reviewed the image(s) / study and interpretation. I agree with the findings as stated.   Performed and dictated at  [facility].   MACRO: None   Signed by: Peri Lawrence 9/20/2024 10:06 AM Dictation workstation:   [id number]       Assessment/Plan   Hyponatremia  Acute kidney injury  Peritoneal carcinomatosis  Ovarian cancer  Ascites  Malnutrition  Anemia  Pleural effusion  Plan; patient is not a dialysis candidate at this moment we will continue to do supportive care  Will give some medications for her to sleep    I spent 30 minutes in the professional and overall care of this patient.      Elder Wells MD

## 2024-10-17 NOTE — PROGRESS NOTES
10/17/24 1543   Discharge Planning   Type of Residence Skilled nursing facility     Received message from bedside RN that patient's sister wanted to discuss dc plans. Let her know that a TCC already spoke with her sister today but the TCC states this worker took over case now. Called patient's sister and discussed dc planning. Explained that likely both SNF choices she made will accept patient pending bed availability, but did let her know what PT said in their note. Explained that long term plan would be needed because it is unsure how long she will remain in SNF. She voiced understanding. She states patient is her own decision maker but she (her sister) is POA--requested she bring that paperwork in to be put in patient's chart-state she will do so tomorrow afternoon. Nursing updated of conversation. Asked to speak to patient on the phone, but per sister, the patient stated she did not want to talk right now. Will need to confirm with the patient if she is okay with SNF choice of [facility] as FOC.

## 2024-10-17 NOTE — CARE PLAN
The patient's goals for the shift include  comfort    The clinical goals for the shift include pain management and comfort    Barriers to progression include chronic pain, disease process. Recommendations to address these barriers include administer medications and treatments as ordered.      Problem: Pain - Adult  Goal: Verbalizes/displays adequate comfort level or baseline comfort level  Outcome: Progressing     Problem: Safety - Adult  Goal: Free from fall injury  Outcome: Progressing     Problem: Discharge Planning  Goal: Discharge to home or other facility with appropriate resources  Outcome: Progressing     Problem: Chronic Conditions and Co-morbidities  Goal: Patient's chronic conditions and co-morbidity symptoms are monitored and maintained or improved  Outcome: Progressing     Problem: Skin  Goal: Decreased wound size/increased tissue granulation at next dressing change  Outcome: Progressing  Flowsheets (Taken 10/16/2024 2013)  Decreased wound size/increased tissue granulation at next dressing change: Promote sleep for wound healing  Goal: Participates in plan/prevention/treatment measures  Outcome: Progressing  Flowsheets (Taken 10/16/2024 2013)  Participates in plan/prevention/treatment measures: Elevate heels  Goal: Prevent/manage excess moisture  Outcome: Progressing  Flowsheets (Taken 10/16/2024 2013)  Prevent/manage excess moisture: Cleanse incontinence/protect with barrier cream  Goal: Prevent/minimize sheer/friction injuries  Outcome: Progressing  Flowsheets (Taken 10/16/2024 2013)  Prevent/minimize sheer/friction injuries: Use pull sheet  Goal: Promote/optimize nutrition  Outcome: Progressing  Flowsheets (Taken 10/16/2024 2013)  Promote/optimize nutrition: Assist with feeding  Goal: Promote skin healing  Outcome: Progressing  Flowsheets (Taken 10/16/2024 2013)  Promote skin healing: Turn/reposition every 2 hours/use positioning/transfer devices     Problem: Pain  Goal: Takes deep breaths with  improved pain control throughout the shift  Outcome: Progressing  Goal: Turns in bed with improved pain control throughout the shift  Outcome: Progressing  Goal: Free from opioid side effects throughout the shift  Outcome: Progressing  Goal: Free from acute confusion related to pain meds throughout the shift  Outcome: Progressing

## 2024-10-17 NOTE — PROGRESS NOTES
Medication Adjustment    The following medication(s) was/were adjusted for Rohini Beaver per protocol/policy due to altered renal function.    Medication(s) adjusted:     Ampicillin/sulbactam 1.5 gm iv q8h changed to ampicillin/sulbactam 3 gm iv q24h    Estimated Creatinine Clearance: 12.8 mL/min (A) (by C-G formula based on SCr of 4.17 mg/dL (H)).      Juan Jose Ovalle, PharmD

## 2024-10-17 NOTE — PROGRESS NOTES
Requested PT/OT work with patient for rehab needs.             10/17 9630  Spoke with Erica patient sister. Sister expressed desire for patient to go skilled. SNF choices discussed. Patient [facility]OC is [facility], #2 is [facility]. Requested referrals be placed in care port via [facility].

## 2024-10-17 NOTE — CARE PLAN
This house RANJITH was contacted by  MARIAH Radford requesting the patient's admission orders to be placed back into the system.  He informs me that yesterday the patient was tentatively scheduled to be discharged to an inpatient hospice facility but the patient did not meet criteria.  This discharge was canceled, and her admission orders need to be replaced.  The new plan is for the patient to tentatively go to a skilled nursing facility.  I have replaced her admission orders and therapy evaluations.  I noted the patient was previously on methadone 5 mg every 12 hours, which I have ordered.  Also note that the patient has developed a severe acute kidney injury over the course of the last couple weeks.  Her oral torsemide 50 mg daily was discontinued during the admission, so this is not ordered.  She was followed by the nephrologist, so I have reordered this consult.  Patient has new labs pending.  Patient will be followed by the attending physician, Dr. Rhodes, who has been caring for her all along.  Defer additional care to him and her consulting physicians.

## 2024-10-17 NOTE — PROGRESS NOTES
"Physical Therapy                 Therapy Communication Note    Patient Name: Rohini Beaver  MRN: [id number]  Department: [facility]  Room: 733/733-B  Today's Date: 10/17/2024     Discipline: Physical Therapy    Missed Visit Reason: Missed Visit Reason: Other (Comment) (Patient adamantly declining therapy services, stating \"I am dying\".  Patient to be discharged from PT caseload and is not appropriate for acute skilled PT.  and RN notified.)    Missed Time: Attempt    "

## 2024-10-17 NOTE — H&P
History Of Present Illness  Rohini Beaver is a 79-year-old female with past medical history of recently diagnosed ovarian cancer with peritoneal carcinomatosis s/p paracentesis x 4 (last one done 9/19/24), obesity, hyperlipidemia, osteoarthritis, and skin cancer s/p Mohs procedure.  Patient presents to the hospital with weakness and inability to care for herself.  She reports that she was at [facility] skilled nursing [facility], however had a brief hospitalization at [facility] and upon discharge decided to go home rather than go back to [facility].  She lives with her 86-year-old sister and has a couple friends who assist with appointments, however limited support system.  Sister unable to care for due to age.  Today she was in urgent reclining chair and was able to get up.  ROS additionally positive for cold sweats, distended and tender abdomen, edema, pressure injury to coccyx/gluteal fold, and decreased activity tolerance.  Denies history of heart disease.  She reports that she is scheduled for outpatient appointment with her oncologist tomorrow to determine ongoing plan.     ER course: Hemodynamically stable, afebrile, SpO2 90s on room air.  WBC 28.9, Hgb 11.0/Hct34.1 (Hgb 15.1 4/4/2023), platelets 446. Glucose 107, Sodium 126, Chloride 94, calcium 8.5, albumin 2.4, alkaline phosphatase 231.  Lactate 1.4.  BNP 74.  High-sensitivity troponin 7.  UA unremarkable. CT chest showed moderate to large bilateral pleural effusion with adjacent presumed compressive atelectasis, prominent main pulmonary artery which may indicate pulmonary hypertension, mildly prominent mediastinal lymph nodes measuring up to 10 mm in short axis concerning for metastatic disease.  Moderate hiatal hernia with partial intrathoracic stomach and the peritoneal fat adjacent to the stomach within the hernia demonstrates evidence of probable carcinomatosis and ascites.  Redemonstration of large volume ascites with regions of  significant anterior omental caking and peritoneal carcinomatosis commensurate with patient's provided diagnosis of ovarian cancer. Regions of wall thickening of the colon extending from the splenic flexure to the sigmoid.  Extensive region of scalloping of the right hepatic lobe peripherally with appearance of large subcapsular collection along the right hepatic margin. Blood culture in process    Past medical history: As above  Past surgical history: Cholecystectomy, lumbar laminectomy, left shoulder arthroscopy, right total knee replacement, corneal transplant  Social history: No history of smoking, alcohol abuse, illicit drug use.  Lives with her elderly sister who is 86 years old.  Limited support system.   Family history: Mother-cancer (unknown type)    Past Medical History  Past Medical History:   Diagnosis Date    Bilateral primary osteoarthritis of knee     HLD (hyperlipidemia)     Lumbosacral radiculopathy     Meralgia paraesthetica     Obesity     Physical deconditioning        Surgical History  Past Surgical History:   Procedure Laterality Date    ARTHROPLASTY Left     Left thumb carpometacarpal arthroplasty with ligament reconstruction and tendon interposition    BREAST BIOPSY  1999    Benign    CHOLECYSTECTOMY      COLONOSCOPY      CORNEAL TRANSPLANT      DILATION AND CURETTAGE OF UTERUS      LUMBAR FUSION      SHOULDER ARTHROSCOPY Left     SKIN LESION EXCISION      TOTAL KNEE ARTHROPLASTY Left 2019    TOTAL KNEE ARTHROPLASTY Right 2017    TRIGGER FINGER RELEASE Right         Social History  She reports that she has never smoked. She has never used smokeless tobacco. She reports that she does not currently use alcohol. She reports that she does not use drugs.    Family History  Family History   Problem Relation Name Age of Onset    Colon cancer Mother      Lung cancer Father      Other (Mesothelioma) Brother      Brain cancer Mother's Brother          Allergies  Patient has no known  allergies.    Review of Systems    10 point ROS negative except as noted above in HPI     Physical Exam  Vitals reviewed.   Constitutional:       General: She is awake. She is not in acute distress.     Appearance: She is obese. She is ill-appearing. She is not toxic-appearing.   HENT:      Head: Normocephalic and atraumatic.      Nose: Nose normal.      Mouth/Throat:      Mouth: Mucous membranes are moist.      Pharynx: Oropharynx is clear.   Eyes:      Conjunctiva/sclera: Conjunctivae normal.   Cardiovascular:      Rate and Rhythm: Normal rate and regular rhythm.      Pulses: Normal pulses.      Heart sounds: No murmur heard.  Pulmonary:      Effort: Pulmonary effort is normal. No respiratory distress.      Breath sounds: Decreased air movement present. Decreased breath sounds present.      Comments: Posterior bilateral breath sounds are diminished  Abdominal:      General: There is distension.      Palpations: Abdomen is soft.      Tenderness: There is abdominal tenderness. There is no guarding.      Comments: Bowel sounds hypoactive, abdomen distended, tender to palpation, dullness noted to the sides.   Musculoskeletal:         General: No swelling, deformity or signs of injury. Normal range of motion.      Cervical back: Neck supple.      Right lower leg: Edema present.      Left lower leg: Edema present.      Comments: Generalized edema/anasarca   Skin:     General: Skin is warm and dry.      Capillary Refill: Capillary refill takes less than 2 seconds.      Findings: No ecchymosis or wound.      Comments: Wound on coccyx, exam deferred  due to patient discomfort    Neurological:      General: No focal deficit present.      Mental Status: She is alert and oriented to person, place, and time.   Psychiatric:         Mood and Affect: Mood normal.         Behavior: Behavior is cooperative.              Last Recorded Vitals  Blood pressure 93/51, pulse 100, temperature 36.4 °C (97.5 °F), temperature source  "Temporal, resp. rate 18, height 1.626 m (5' 4.02\"), weight 104 kg (229 lb 4.5 oz), SpO2 99%.    Relevant Results      Results for orders placed or performed during the hospital encounter of 10/14/24 (from the past 24 hours)   Comprehensive Metabolic Panel   Result Value Ref Range    Glucose 95 74 - 99 mg/dL    Sodium 129 (L) 136 - 145 mmol/L    Potassium 4.6 3.5 - 5.3 mmol/L    Chloride 95 (L) 98 - 107 mmol/L    Bicarbonate 21 21 - 32 mmol/L    Anion Gap 18 10 - 20 mmol/L    Urea Nitrogen 79 (H) 6 - 23 mg/dL    Creatinine 4.17 (H) 0.50 - 1.05 mg/dL    eGFR 10 (L) >60 mL/min/1.73m*2    Calcium 7.4 (L) 8.6 - 10.3 mg/dL    Albumin 2.0 (L) 3.4 - 5.0 g/dL    Alkaline Phosphatase 171 (H) 33 - 136 U/L    Total Protein 4.9 (L) 6.4 - 8.2 g/dL    AST 29 9 - 39 U/L    Bilirubin, Total 0.3 0.0 - 1.2 mg/dL    ALT 10 7 - 45 U/L   CBC   Result Value Ref Range    WBC 27.3 (H) 4.4 - 11.3 x10*3/uL    nRBC 0.0 0.0 - 0.0 /100 WBCs    RBC 3.59 (L) 4.00 - 5.20 x10*6/uL    Hemoglobin 9.3 (L) 12.0 - 16.0 g/dL    Hematocrit 31.3 (L) 36.0 - 46.0 %    MCV 87 80 - 100 fL    MCH 25.9 (L) 26.0 - 34.0 pg    MCHC 29.7 (L) 32.0 - 36.0 g/dL    RDW 18.5 (H) 11.5 - 14.5 %    Platelets 338 150 - 450 x10*3/uL     CT chest abdomen pelvis w IV contrast    Result Date: 10/1/2024  Interpreted By:  Oscar Aldrich, STUDY: CT CHEST ABDOMEN PELVIS W IV CONTRAST;  10/1/2024 11:13 am   INDICATION: Signs/Symptoms:leukocytosis, ascites, recent ovarian cancer diagnosis.   COMPARISON: CT abdomen pelvis 08/08/2024   ACCESSION NUMBER(S): [id number]   ORDERING CLINICIAN: ASHLEY FAIRBANKS   TECHNIQUE: CT of the chest, abdomen, and pelvis was performed.  Contiguous axial images were obtained at 3 mm slice thickness through the chest, abdomen and pelvis. Coronal and sagittal reconstructions at 3 mm slice thickness were performed. ml of contrast  were administered intravenously without immediate complication.   FINDINGS: CHEST:   LUNG/PLEURA/LARGE AIRWAYS: No endobronchial " lesion is seen.   Moderate to large bilateral pleural effusions with adjacent consolidative opacification of the bilateral lower lobes suggestive of compressive atelectasis. No pneumothorax. Mild asymmetric scattered reticular changes suggestive of chronic lung findings.     VESSELS: The thoracic aorta is unremarkable with respect course, caliber, and contour.   Prominent main pulmonary artery measuring up to 3.2 cm in anterior-posterior dimension measured on sagittal plane which may indicate sequela of pulmonary hypertension.   No significant coronary atherosclerotic calcifications are seen.   HEART: Heart size within normal limits. No pericardial effusion.   MEDIASTINUM AND OLIVE: Mildly prominent mediastinal lymph nodes measuring up to 10 mm in short axis   Moderate hiatal hernia with partial intrathoracic stomach and ascites and region of nodularity of the peritoneal fat within hiatal hernia suggestive carcinomatosis.   CHEST WALL AND LOWER NECK: No acute osseous abnormality. Multilevel presumed degenerative changes throughout the imaged spine. No acute soft tissue abnormality.   ABDOMEN:   LIVER: There is been interval scalloping of the right hepatic margins with new regions of irregularity along the right hepatic capsule diffusely which is new since comparison imaging from 08/08/2024 there is a new elongated subcapsular collection measuring up to approximately 11.4 x 2.1 cm, difficult to measure given curvilinear projection extending over the right hepatic lobe margin (series 202, images 40-70). Similar appearing subcentimeter left hepatic lobe hypodense lesion.   BILE DUCTS: No obvious new intrahepatic biliary dilatation. Mild prominence of the common bile duct, nonspecific in a cholecystectomy patient.   GALLBLADDER: Absent.   PANCREAS: Unremarkable.   SPLEEN: Unchanged.   ADRENAL GLANDS: Unchanged.   KIDNEYS AND URETERS: Similar appearing subcentimeter right renal lower pole calculus. No hydronephrosis. No  obstructing urolithiasis.   PELVIS:   BLADDER: Unremarkable.   REPRODUCTIVE ORGANS: Uterus appears grossly unchanged.   BOWEL: Moderate hiatal hernia with wall thickening of the stomach in the hernia. No new pathologic distention of bowel. Wall thickening of the colon within the splenic flexure descending colon and sigmoid colon, nonspecific.     VESSELS: No evidence of abdominal aortic aneurysm.   PERITONEUM/RETROPERITONEUM/LYMPH NODES: Large volume ascites with extensive regions of anterior omental caking and peritoneal carcinomatosis. No obvious free intraperitoneal gas. Given the presence of extensive omental caking and peritoneal carcinomatosis, superimposed peritoneal enhancement 4 other causes cannot be excluded.   BONE AND SOFT TISSUE: No acute osseous abnormality. Osseous structures appear stable. No acute abnormality of the abdominal wall soft tissues.       CHEST: 1.  Moderate to large bilateral pleural effusions with adjacent presumed compressive atelectasis. 2. Prominent main pulmonary artery which may indicate pulmonary hypertension. 3. Mildly prominent mediastinal lymph nodes measuring up to 10 mm in short axis concerning for metastatic disease. 4. Moderate hiatal hernia with partial intrathoracic stomach. The peritoneal fat adjacent to the stomach within the hernia demonstrates evidence of probable carcinomatosis and ascites.   ABDOMEN-PELVIS: 1.  Redemonstration of large volume ascites with regions of significant anterior omental caking and peritoneal carcinomatosis commensurate with patient's provided diagnosis of ovarian cancer. 2. Regions of wall thickening of the colon extending from the splenic flexure to the sigmoid which may indicate a nonspecific colitis. 3. Since the previous examination on 08/08/2024, there are now extensive regions of scalloping of the right hepatic lobe peripherally with the appearance of a large subcapsular collection along the right hepatic margin as above. The  sterility of this collection cannot be assessed via CT and clinical correlation is advised for exclusion superimposed infection within this region.     Signed by: Oscar Aldrich 10/1/2024 12:14 PM Dictation workstation:   [id number]    XR chest 1 view    Result Date: 10/1/2024  Interpreted By:  Samuel Jaramillo, STUDY: XR CHEST 1 VIEW; 10/1/2024 8:44 am   INDICATION: Signs/Symptoms:weakness, edema in legs and abdomen   COMPARISON: April 2023.   ACCESSION NUMBER(S): [id number]   ORDERING CLINICIAN: ASHLEY FAIRBANKS   FINDINGS: The study is limited due to rotation and poor inspiratory effort, with resultant crowding of the pulmonary vasculature. The cardiac silhouette is within normal limits for the technique. Moderate size hiatal hernia is again seen. There is no pneumothorax, confluent infiltrates or significant effusion. Degenerative changes involve the spine and shoulders; metallic anchors again noted over the left humeral head related to previous rotator cuff repair.       Limited study. Hiatal hernia. No acute cardiopulmonary disease.   Signed by: Samuel Jaramillo 10/1/2024 9:22 AM Dictation workstation:   [id number]    US guided abdominal paracentesis    Result Date: 9/20/2024  Interpreted By:  Peri Lawrence and Kamau Nyokabi STUDY: US GUIDED ABDOMINAL PARACENTESIS; US GUIDED PERCUTANEOUS ABDOMINAL RETROPERITONEUM BIOPSY;  9/19/2024 11:13 am   INDICATION: Signs/Symptoms:ascites; Signs/Symptoms:ascites, evaluate ovarian cancer.   COMPARISON: CT abdomen and pelvis 08/08/2024   ACCESSION NUMBER(S): [id number]; [id number]   ORDERING CLINICIAN: JEANETTE ARIAS   TECHNIQUE: INTERVENTIONALIST(S): Dr. Peri Lawrence MD   CONSENT: The patient was informed of the nature of the proposed procedure. The purposes, alternatives, risks, and benefits were explained and discussed. All questions were answered and consent was obtained.   SEDATION: 1% local lidocaine was used for anesthetic.   MEDICATION/CONTRAST: No  additional.   TIME OUT:   A time out was performed immediately prior to procedure start with the interventional team, correctly identifying the patient name, date of birth, MRN, procedure, anatomy (including marking of site and side), patient position, procedure consent form, relevant laboratory and imaging test results, antibiotic administration, safety precautions, and procedure-specific equipment needs.   COMPLICATIONS: No immediate adverse events identified.   FINDINGS: The patient was placed in the supine position. Limited sonographic images of the lower abdominal wall were obtained for purposes of needle guidance, which demonstrated omental soft tissue mass which was seen on prior CT 08/08/2024. The area of concern was prepped and draped under sterile technique.   1% lidocaine was injected subcutaneously. Additional lidocaine was administered into deeper tissues surrounding the targeted area for biopsy using a 22G spinal needle. A small incision was made. Using the same access site a 18 gauge core biopsy needle was passed via a 17 gauge coaxial introducer needle to obtain a total of 1 core sample.   Postprocedure images demonstrate no evidence for hemorrhage. The patient tolerated the procedure well and there were no immediate complications. 1 core specimen was sent to pathology.     Subsequently the right lower quadrant was visualized under ultrasound which demonstrated moderate volume ascites seen on prior CT 08/08/2024. 1% lidocaine was injected subcutaneously at this site. A 19 gauge Yueh was used to target the fluid collection under ultrasound guidance. Once the site was accessed, the inner needle was removed from the catheter. The Yueh catheter was connected to drainage container. Estimated 1000 cc of serosanguineous colored fluid was removed. Post paracentesis imaging demonstrated decreased ascitic fluid. The Yueh catheter was removed. The access site was bandaged.       1. Status post ultrasound guided  core needle biopsy of omental mass seen on CT 08/08/2024. 1 core specimens sent to pathology. 2. Successful paracentesis under ultrasound guidance with removal of 1 L of serosanguineous fluid.     I was present for and/or performed the critical portions of the procedure and immediately available throughout the entire procedure.   I personally reviewed the image(s) / study and interpretation. I agree with the findings as stated.   Performed and dictated at [facility].   MACRO: None   Signed by: Peri Lawrence 9/20/2024 10:06 AM Dictation workstation:   [id number]    US guided percutaneous abdominal retroperitoneum biopsy    Result Date: 9/20/2024  Interpreted By:  Peri Lawrence and Kamau Nyokabi STUDY: US GUIDED ABDOMINAL PARACENTESIS; US GUIDED PERCUTANEOUS ABDOMINAL RETROPERITONEUM BIOPSY;  9/19/2024 11:13 am   INDICATION: Signs/Symptoms:ascites; Signs/Symptoms:ascites, evaluate ovarian cancer.   COMPARISON: CT abdomen and pelvis 08/08/2024   ACCESSION NUMBER(S): [id number]; [id number]   ORDERING CLINICIAN: JEANETTE ARIAS   TECHNIQUE: INTERVENTIONALIST(S): Dr. Peri Lawrence MD   CONSENT: The patient was informed of the nature of the proposed procedure. The purposes, alternatives, risks, and benefits were explained and discussed. All questions were answered and consent was obtained.   SEDATION: 1% local lidocaine was used for anesthetic.   MEDICATION/CONTRAST: No additional.   TIME OUT:   A time out was performed immediately prior to procedure start with the interventional team, correctly identifying the patient name, date of birth, MRN, procedure, anatomy (including marking of site and side), patient position, procedure consent form, relevant laboratory and imaging test results, antibiotic administration, safety precautions, and procedure-specific equipment needs.   COMPLICATIONS: No immediate adverse events identified.   FINDINGS: The patient was placed in  the supine position. Limited sonographic images of the lower abdominal wall were obtained for purposes of needle guidance, which demonstrated omental soft tissue mass which was seen on prior CT 08/08/2024. The area of concern was prepped and draped under sterile technique.   1% lidocaine was injected subcutaneously. Additional lidocaine was administered into deeper tissues surrounding the targeted area for biopsy using a 22G spinal needle. A small incision was made. Using the same access site a 18 gauge core biopsy needle was passed via a 17 gauge coaxial introducer needle to obtain a total of 1 core sample.   Postprocedure images demonstrate no evidence for hemorrhage. The patient tolerated the procedure well and there were no immediate complications. 1 core specimen was sent to pathology.     Subsequently the right lower quadrant was visualized under ultrasound which demonstrated moderate volume ascites seen on prior CT 08/08/2024. 1% lidocaine was injected subcutaneously at this site. A 19 gauge Yueh was used to target the fluid collection under ultrasound guidance. Once the site was accessed, the inner needle was removed from the catheter. The Yueh catheter was connected to drainage container. Estimated 1000 cc of serosanguineous colored fluid was removed. Post paracentesis imaging demonstrated decreased ascitic fluid. The Yueh catheter was removed. The access site was bandaged.       1. Status post ultrasound guided core needle biopsy of omental mass seen on CT 08/08/2024. 1 core specimens sent to pathology. 2. Successful paracentesis under ultrasound guidance with removal of 1 L of serosanguineous fluid.     I was present for and/or performed the critical portions of the procedure and immediately available throughout the entire procedure.   I personally reviewed the image(s) / study and interpretation. I agree with the findings as stated.   Performed and dictated at [facility]  [place].   MACRO: None   Signed by: Peri Lawrence 9/20/2024 10:06 AM Dictation workstation:   [id number]        Assessment/Plan   Assessment & Plan  EUSEBIO (acute kidney injury) (CMS-HCC)    Acute kidney injury (CMS-HCC)      79-year-old female with past medical history of recently diagnosed ovarian cancer with peritoneal carcinomatosis s/p paracentesis x 4 (last one done 9/19/24), obesity, hyperlipidemia, osteoarthritis, and skin cancer s/p Mohs procedure.  Patient presents to the hospital with weakness and inability to care for herself.  Patient found to be hyponatremic and with evidence on CT imaging of possible abscess of the liver and ascites secondary to malignancy.  Hospitalized for further evaluation management..    #Ovarian cancer with peritoneal carcinomatosis  #Ascites  #Bilateral pleural effusions  # Suspected liver abscess  #Abdominal pain    Telemetry monitoring  Consult to IR for paracentesis and possible drainage of liver abscess  Consult to pulmonology for bilateral pleural effusions  Antiemetics as needed  Analgesics as needed  Patient needs to be followed up with by her gynecological oncologist to determine optimal plan going forward for treatment of her ovarian cancer    #Hyponatremia  #Generalized edema/anasarca  #Hypoalbuminemia  Patient is fluid overloaded and has significant third spacing, suspect hypervolemia hyponatremia.  Will check urine electrolytes, urine osmolality, and serum osmolality  Fluid restriction of 1500 ml for the time being.  Consider starting diuretics, defer to attending  Repeat labs in a.m.    #debility  PT/OT  Social work for discharge planning    Chronic issues:  #Obesity  #Hyperlipidemia  #Osteoarthritis    Continue with patient's home medications as appropriate    #DVT prophylaxis  SCDs  Lovenox subcutaneous       I spent 75 minutes in the professional and overall care of this patient.        Jd Rhodes MD   Physician  Internal Medicine     Discharge Summary       Signed     Date of Service: 10/16/2024  5:13 PM   Related encounter: Admission (Discharged) from 10/14/2024 in [place] [facility] 9 with Jd Rhodes MD     Signed         Discharge Diagnosis  Hyponatremia     Issues Requiring Follow-Up  Patient fully evaluated 10/14/24 for   1.  Hyponatremia   AFB Culture/Smear  AFB Culture/Smear  2.  Abdominal fluid collection   AFB Culture/Smear  AFB Culture/Smear  3.  Malignant ascites (CMS-HCC)   AFB Culture/Smear  AFB Culture/Smear  4.  Pleural effusion   AFB Culture/Smear  AFB Culture/Smear  Call placed to GYN/ONC Dr. Villalba this AM with long discussion on patient, treatment options, and goals of care. Patient and sister aware of poor prognosis and bleak outcome. Patient with no significant clinical improvement noted, patient medically cleared for discharge today to [facility]. Patient seen resting in bed with head of bed elevated, no s/s or c/o acute difficulties at this time. Medications and labs reviewed, will begin comfort care medications and transition to hospice.     Results for orders placed or performed during the hospital encounter of 10/01/24 (from the past 24 hour(s))  CBC  Result  Value  Ref Range  WBC  29.2 (H)  4.4 - 11.3 x10*3/uL  nRBC  0.0  0.0 - 0.0 /100 WBCs  RBC  3.62 (L)  4.00 - 5.20 x10*6/uL  Hemoglobin  9.5 (L)  12.0 - 16.0 g/dL  Hematocrit  30.3 (L)  36.0 - 46.0 %  MCV  84  80 - 100 fL  MCH  26.2  26.0 - 34.0 pg  MCHC  31.4 (L)  32.0 - 36.0 g/dL  RDW  17.5 (H)  11.5 - 14.5 %  Platelets  436  150 - 450 x10*3/uL  Comprehensive metabolic panel  Result  Value  Ref Range  Glucose  83  74 - 99 mg/dL  Sodium  133 (L)  136 - 145 mmol/L  Potassium  4.3  3.5 - 5.3 mmol/L  Chloride  94 (L)  98 - 107 mmol/L  Bicarbonate  22  21 - 32 mmol/L  Anion Gap  21 (H)  10 - 20 mmol/L  Urea Nitrogen  64 (H)  6 - 23 mg/dL  Creatinine  4.01 (H)  0.50 - 1.05 mg/dL  eGFR  11 (L)  >60 mL/min/1.73m*2  Calcium  8.0 (L)  8.6 - 10.3 mg/dL  Albumin  2.2 (L)  3.4  - 5.0 g/dL  Alkaline Phosphatase  165 (H)  33 - 136 U/L  Total Protein  5.0 (L)  6.4 - 8.2 g/dL  AST  17  9 - 39 U/L  Bilirubin, Total  0.3  0.0 - 1.2 mg/dL  ALT  6 (L)  7 - 45 U/L        Intake/Output this shift:  No intake/output data recorded.     Vital signs for last 24 hours:  Temp:  [35.6 °C (96.1 °F)-36.8 °C (98.2 °F)] 35.9 °C (96.6 °F)  Heart Rate:  [] 110  Resp:  [16-20] 16  BP: ()/(41-78) 107/43        Plan discussed with interdisciplinary team, ok to discharge to [facility]. Patient aware and agreeable to current plan, continue plan as above. I spent 30 minutes on the date of the service which included preparing to see the patient, face-to-face patient care, completing clinical documentation, obtaining and/or reviewing separately obtained history, performing a medically appropriate examination, counseling and educating the patient/family/caregiver, ordering medications, tests, or procedures, communicating with other HCPs (not separately reported), independently interpreting results (not separately reported), communicating results to the patient/family/caregiver, and care coordination (not separately reported)     Discharge Meds     Medication List      START taking these medications     bisacodyl 10 mg suppository; Commonly known as: Dulcolax; Insert 1   suppository (10 mg) into the rectum once daily as needed for constipation.   glycopyrrolate 200 mcg/mL injection; Commonly known as: Robinul; Infuse   1 mL (0.2 mg) into a venous catheter every 4 hours if needed (excess   secretions).   haloperidol lactate 5 mg/mL injection; Commonly known as: Haldol; Infuse   0.2 mL (1 mg) into a venous catheter every 4 hours if needed for agitation   (delirium).   HYDROmorphone PF 0.2 mg/mL injection; Commonly known as: Dilaudid;   Infuse 1 mL (0.2 mg) into a venous catheter every 15 minutes if needed   (moderate pain (4 - 6)).   LORazepam 2 mg/mL injection; Commonly known as: Ativan; Infuse 0.25  mL   (0.5 mg) over 5 minutes into a venous catheter every 4 hours if needed   (anxiety, restlessness, insomnia).     CHANGE how you take these medications     HYDROmorphone 0.5 mg/0.5 mL solution injection; Commonly known as:   Dilaudid; Infuse 0.4 mL (0.4 mg) into a venous catheter every 15 minutes   if needed (severe pain).; What changed: when to take this, reasons to take   this     CONTINUE taking these medications     acetaminophen 325 mg tablet; Commonly known as: Tylenol; Take 2 tablets   (650 mg) by mouth every 6 hours if needed for mild pain (1 - 3).   cetirizine 10 mg tablet; Commonly known as: ZyrTEC; Take 1 tablet (10   mg) by mouth once daily.   cyclobenzaprine 5 mg tablet; Commonly known as: Flexeril; Take 1 tablet   (5 mg) by mouth 3 times a day.   docusate sodium 100 mg capsule; Commonly known as: Colace   ipratropium-albuteroL 0.5-2.5 mg/3 mL nebulizer solution; Commonly known   as: Duo-Neb; Take 3 mL by nebulization every 2 hours if needed for   wheezing or shortness of breath.   methadone 5 mg/5 mL solution; Commonly known as: Dolophine; Take 5 mL (5   mg) by mouth every 12 hours.   ondansetron 8 mg tablet; Commonly known as: Zofran   oxygen gas therapy; Commonly known as: O2; Inhale 2 L/min every 12   hours.   polyethylene glycol 17 gram packet; Commonly known as: Glycolax, Miralax   prednisoLONE acetate 1 % ophthalmic suspension; Commonly known as:   Pred-Forte   temazepam 7.5 mg capsule; Commonly known as: Restoril; Take 1 capsule   (7.5 mg) by mouth as needed at bedtime for sleep.   torsemide 10 mg tablet; Commonly known as: Demadex; Take 5 tablets (50   mg) by mouth once daily.        Test Results Pending At Discharge  Pending Labs        No current pending labs.              Hospital Course          Jd Rhodes MD    Physician  Internal Medicine     Progress Notes        Signed     Date of Service: 10/13/2024  2:52 PM  Signed  Expand All Collapse All     Rohini Beaver is a 79 y.o.  female on day 12 of admission presenting with Hyponatremia.           Subjective     Patient fully evaluated 10/3, awake, resting in bed. Patient with Moderate hiatal hernia with partial intrathoracic stomach and the peritoneal fat adjacent to the stomach within the hernia demonstrates evidence of probable carcinomatosis and ascites, Patient tolerated paracentesis on 10/01 well,order placed for repeat paracentesis therapeutic non diagnostic with IR.continue current and repeat labs in the AM. Patient aware and agreeable to current plan, continue plan as above. I spent 50 minutes in the professional and overall care of this patient.      Objective  Last Recorded Vitals  /56   Pulse 103   Temp 36.3 °C (97.3 °F)   Resp 20   Wt 104 kg (230 lb)   SpO2 93%   Intake/Output last 3 Shifts:     Intake/Output Summary (Last 24 hours) at 10/13/2024 1452  Last data filed at 10/13/2024 0500  Gross per 24 hour  Intake  120 ml  Output  --  Net  120 ml        Admission Weight  Weight: 104 kg (230 lb) (10/01/24 0828)     Daily Weight  10/01/24 : 104 kg (230 lb)     Image Results  CT abdomen pelvis wo IV contrast  Narrative: Interpreted By:  Deep Scott,   STUDY:  CT ABDOMEN PELVIS WO IV CONTRAST;  10/12/2024 1:30 pm      INDICATION:  Signs/Symptoms:Ascites /Abdominal Distention/Renal Failure.          COMPARISON:  CT scan 10/01/2024.      ACCESSION NUMBER(S):  [id number]      ORDERING CLINICIAN:  ALICIA GRAHAM      TECHNIQUE:  CT of the abdomen and pelvis was performed. Contiguous axial images  were obtained at 3 mm slice thickness through the abdomen and pelvis.  Coronal and sagittal reconstructions at 3 mm slice thickness were  performed.  No intravenous contrast was administered; positive oral  contrast was given.      FINDINGS:  Please note that the evaluation of vessels, lymph nodes and organs is  limited without intravenous contrast.      LOWER CHEST:  Small to moderate bilateral pleural effusion with  surrounding  atelectasis.      ABDOMEN:      LIVER:  Nodular surface with a scalloping of the right hepatic capsule  secondary to the adjacent malignant ascites. No definite parenchymal  lesions.      BILE DUCTS:  The intrahepatic and extrahepatic ducts are not dilated.      GALLBLADDER:  Surgically absent      PANCREAS:  No duct dilatation      SPLEEN:  No splenomegaly.      ADRENAL GLANDS:  No nodule      KIDNEYS AND URETERS:  The kidneys are normal in size and unremarkable in appearance.  No  hydroureteronephrosis or nephroureterolithiasis is identified.      PELVIS:      BLADDER:  Unremarkable      REPRODUCTIVE ORGANS:  Uterus is unremarkable      BOWEL:  Moderate hiatal hernia. No bowel dilatation. Few uncomplicated  colonic diverticulosis.      Moderate abdominopelvic ascites. Diffuse omental caking and  peritoneal carcinomatosis.      VESSELS:  Multifocal vascular calcifications and atherosclerotic changes.      PERITONEUM/RETROPERITONEUM/LYMPH NODES:  Multiple subcentimeter retroperitoneal, iliac and pelvic lymph nodes.      ABDOMINAL WALL:  Subcutaneous edema.      BONES:  Multilevel degenerative spine changes and facet joint disease. Prior  lower lumbar spine fixation.      Impression: Overall findings are grossly unchanged from 10/01/2024 CT scan.  1. Persistent moderate abdominopelvic ascites with diffuse peritoneal  carcinomatosis/omental caking.  2. 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  Underlying cirrhosis could not be entirely excluded as well.  3. Moderate bilateral pleural effusion with surrounding atelectasis.          MACRO:  None      Signed by: Deep Scott 10/13/2024 7:50 AM  Dictation workstation:   [id number]        Physical Exam     Relevant Results                       Assessment/Plan  This patient currently has cardiac telemetry ordered; if you would like to modify or discontinue the  telemetry order, click hereto go to the orders activity to modify/discontinue the order.            Jd Rhodes MD    Physician  Internal Medicine     H&P        Addendum     Date of Service: 10/1/2024  2:54 PM     Addendum     Expand All Collapse All    History Of Present Illness  Rohini Beaver is a 79-year-old female with past medical history of recently diagnosed ovarian cancer with peritoneal carcinomatosis s/p paracentesis x 4 (last one done 9/19/24), obesity, hyperlipidemia, osteoarthritis, and skin cancer s/p Mohs procedure.  Patient presents to the hospital with weakness and inability to care for herself.  She reports that she was at [facility], however had a brief hospitalization at [facility] and upon discharge decided to go home rather than go back to [facility].  She lives with her 86-year-old sister and has a couple friends who assist with appointments, however limited support system.  Sister unable to care for due to age.  Today she was in urgent reclining chair and was able to get up.  ROS additionally positive for cold sweats, distended and tender abdomen, edema, pressure injury to coccyx/gluteal fold, and decreased activity tolerance.  Denies history of heart disease.  She reports that she is scheduled for outpatient appointment with her oncologist tomorrow to determine ongoing plan.      ER course: Hemodynamically stable, afebrile, SpO2 90s on room air.  WBC 28.9, Hgb 11.0/Hct34.1 (Hgb 15.1 4/4/2023), platelets 446. Glucose 107, Sodium 126, Chloride 94, calcium 8.5, albumin 2.4, alkaline phosphatase 231.  Lactate 1.4.  BNP 74.  High-sensitivity troponin 7.  UA unremarkable. CT chest showed moderate to large bilateral pleural effusion with adjacent presumed compressive atelectasis, prominent main pulmonary artery which may indicate pulmonary hypertension, mildly prominent mediastinal lymph nodes measuring up to 10 mm in short axis concerning for  metastatic disease.  Moderate hiatal hernia with partial intrathoracic stomach and the peritoneal fat adjacent to the stomach within the hernia demonstrates evidence of probable carcinomatosis and ascites.  Redemonstration of large volume ascites with regions of significant anterior omental caking and peritoneal carcinomatosis commensurate with patient's provided diagnosis of ovarian cancer. Regions of wall thickening of the colon extending from the splenic flexure to the sigmoid.  Extensive region of scalloping of the right hepatic lobe peripherally with appearance of large subcapsular collection along the right hepatic margin. Blood culture in process     Past medical history: As above  Past surgical history: Cholecystectomy, lumbar laminectomy, left shoulder arthroscopy, right total knee replacement, corneal transplant  Social history: No history of smoking, alcohol abuse, illicit drug use.  Lives with her elderly sister who is 86 years old.  Limited support system.   Family history: Mother-cancer (unknown type)     Past Medical History  Medical History           Past Medical History:  Diagnosis  Date    Bilateral primary osteoarthritis of knee       HLD (hyperlipidemia)       Lumbosacral radiculopathy       Meralgia paraesthetica       Physical deconditioning           Surgical History  Surgical History              Past Surgical History:  Procedure  Laterality  Date    ARTHROPLASTY  Left        Left thumb carpometacarpal arthroplasty with ligament reconstruction and tendon interposition    BREAST BIOPSY     1999     Benign    CHOLECYSTECTOMY          COLONOSCOPY          CORNEAL TRANSPLANT          DILATION AND CURETTAGE OF UTERUS          LUMBAR FUSION          SHOULDER ARTHROSCOPY  Left       SKIN LESION EXCISION          TOTAL KNEE ARTHROPLASTY  Left  2019    TOTAL KNEE ARTHROPLASTY  Right  2017    TRIGGER FINGER RELEASE  Right           Social History  She reports that she has never smoked. She has never  used smokeless tobacco. She reports that she does not currently use alcohol. She reports that she does not use drugs.     Family History  Family History                 Family History  Problem  Relation  Name  Age of Onset    Colon cancer  Mother          Lung cancer  Father          Other (Mesothelioma)  Brother          Brain cancer  Mother's Brother              Allergies  Patient has no known allergies.     Review of Systems     10 point ROS negative except as noted above in HPI      Physical Exam  Vitals reviewed.   Constitutional:       General: She is awake. She is not in acute distress.     Appearance: She is obese. She is ill-appearing. She is not toxic-appearing.   HENT:      Head: Normocephalic and atraumatic.      Nose: Nose normal.      Mouth/Throat:      Mouth: Mucous membranes are moist.      Pharynx: Oropharynx is clear.   Eyes:      Conjunctiva/sclera: Conjunctivae normal.   Cardiovascular:      Rate and Rhythm: Normal rate and regular rhythm.      Pulses: Normal pulses.      Heart sounds: No murmur heard.  Pulmonary:      Effort: Pulmonary effort is normal. No respiratory distress.      Breath sounds: Decreased air movement present. Decreased breath sounds present.      Comments: Posterior bilateral breath sounds are diminished  Abdominal:      General: There is distension.      Palpations: Abdomen is soft.      Tenderness: There is abdominal tenderness. There is no guarding.      Comments: Bowel sounds hypoactive, abdomen distended, tender to palpation, dullness noted to the sides.   Musculoskeletal:         General: No swelling, deformity or signs of injury. Normal range of motion.      Cervical back: Neck supple.      Right lower leg: Edema present.      Left lower leg: Edema present.      Comments: Generalized edema/anasarca   Skin:     General: Skin is warm and dry.      Capillary Refill: Capillary refill takes less than 2 seconds.      Findings: No ecchymosis or wound.      Comments: Wound on  coccyx, exam deferred  due to patient discomfort    Neurological:      General: No focal deficit present.      Mental Status: She is alert and oriented to person, place, and time.   Psychiatric:         Mood and Affect: Mood normal.         Behavior: Behavior is cooperative.                  Last Recorded Vitals  Blood pressure 117/58, pulse 100, temperature 36.2 °C (97.2 °F), temperature source Temporal, resp. rate (!) 22, weight 104 kg (230 lb), SpO2 94%.     Relevant Results                    Results for orders placed or performed during the hospital encounter of 10/01/24 (from the past 24 hour(s))  CBC and Auto Differential  Result  Value  Ref Range     WBC  28.9 (H)  4.4 - 11.3 x10*3/uL     nRBC  0.0  0.0 - 0.0 /100 WBCs     RBC  4.15  4.00 - 5.20 x10*6/uL     Hemoglobin  11.0 (L)  12.0 - 16.0 g/dL     Hematocrit  34.1 (L)  36.0 - 46.0 %     MCV  82  80 - 100 fL     MCH  26.5  26.0 - 34.0 pg     MCHC  32.3  32.0 - 36.0 g/dL     RDW  15.7 (H)  11.5 - 14.5 %     Platelets  446  150 - 450 x10*3/uL     Neutrophils %  89.1  40.0 - 80.0 %     Immature Granulocytes %, Automated  1.0 (H)  0.0 - 0.9 %     Lymphocytes %  4.3  13.0 - 44.0 %     Monocytes %  4.8  2.0 - 10.0 %     Eosinophils %  0.5  0.0 - 6.0 %     Basophils %  0.3  0.0 - 2.0 %     Neutrophils Absolute  25.74 (H)  1.60 - 5.50 x10*3/uL     Immature Granulocytes Absolute, Automated  0.30  0.00 - 0.50 x10*3/uL     Lymphocytes Absolute  1.23  0.80 - 3.00 x10*3/uL     Monocytes Absolute  1.38 (H)  0.05 - 0.80 x10*3/uL     Eosinophils Absolute  0.14  0.00 - 0.40 x10*3/uL     Basophils Absolute  0.09  0.00 - 0.10 x10*3/uL  Comprehensive metabolic panel  Result  Value  Ref Range     Glucose  107 (H)  74 - 99 mg/dL     Sodium  126 (L)  136 - 145 mmol/L     Potassium  5.2  3.5 - 5.3 mmol/L     Chloride  94 (L)  98 - 107 mmol/L     Bicarbonate  24  21 - 32 mmol/L     Anion Gap  13  10 - 20 mmol/L     Urea Nitrogen  18  6 - 23 mg/dL     Creatinine  0.61  0.50 - 1.05  mg/dL     eGFR  >90  >60 mL/min/1.73m*2     Calcium  8.5 (L)  8.6 - 10.3 mg/dL     Albumin  2.4 (L)  3.4 - 5.0 g/dL     Alkaline Phosphatase  231 (H)  33 - 136 U/L     Total Protein  5.7 (L)  6.4 - 8.2 g/dL     AST  23  9 - 39 U/L     Bilirubin, Total  0.3  0.0 - 1.2 mg/dL     ALT  8  7 - 45 U/L  Magnesium  Result  Value  Ref Range     Magnesium  1.68  1.60 - 2.40 mg/dL  Troponin I, High Sensitivity  Result  Value  Ref Range     Troponin I, High Sensitivity  7  0 - 13 ng/L  B-Type Natriuretic Peptide  Result  Value  Ref Range     BNP  74  0 - 99 pg/mL  Sars-CoV-2 PCR  Result  Value  Ref Range     Coronavirus 2019, PCR  Not Detected  Not Detected  Lactate  Result  Value  Ref Range     Lactate  1.4  0.4 - 2.0 mmol/L  Urinalysis with Reflex Culture and Microscopic  Result  Value  Ref Range     Color, Urine  Light-Yellow  Light-Yellow, Yellow, Dark-Yellow     Appearance, Urine  Clear  Clear     Specific Gravity, Urine  >1.050 (N)  1.005 - 1.035     pH, Urine  6.0  5.0, 5.5, 6.0, 6.5, 7.0, 7.5, 8.0     Protein, Urine  20 (TRACE)  NEGATIVE, 10 (TRACE), 20 (TRACE) mg/dL     Glucose, Urine  Normal  Normal mg/dL     Blood, Urine  NEGATIVE  NEGATIVE     Ketones, Urine  NEGATIVE  NEGATIVE mg/dL     Bilirubin, Urine  NEGATIVE  NEGATIVE     Urobilinogen, Urine  Normal  Normal mg/dL     Nitrite, Urine  NEGATIVE  NEGATIVE     Leukocyte Esterase, Urine  NEGATIVE  NEGATIVE  Urinalysis Microscopic  Result  Value  Ref Range     WBC, Urine  1-5  1-5, NONE /HPF     RBC, Urine  1-2  NONE, 1-2, 3-5 /HPF     Squamous Epithelial Cells, Urine  1-9 (SPARSE)  Reference range not established. /HPF     Mucus, Urine  FEW  Reference range not established. /LPF  Protime-INR  Result  Value  Ref Range     Protime  13.0 (H)  9.8 - 12.8 seconds     INR  1.2 (H)  0.9 - 1.1  APTT  Result  Value  Ref Range     aPTT  24 (L)  27 - 38 seconds     CT chest abdomen pelvis w IV contrast     Result Date: 10/1/2024  Interpreted By:  Oscar Aldrich, STUDY: CT  CHEST ABDOMEN PELVIS W IV CONTRAST;  10/1/2024 11:13 am   INDICATION: Signs/Symptoms:leukocytosis, ascites, recent ovarian cancer diagnosis.   COMPARISON: CT abdomen pelvis 08/08/2024   ACCESSION NUMBER(S): [id number]   ORDERING CLINICIAN: ASHLEY FAIRBANKS   TECHNIQUE: CT of the chest, abdomen, and pelvis was performed.  Contiguous axial images were obtained at 3 mm slice thickness through the chest, abdomen and pelvis. Coronal and sagittal reconstructions at 3 mm slice thickness were performed. ml of contrast  were administered intravenously without immediate complication.   FINDINGS: CHEST:   LUNG/PLEURA/LARGE AIRWAYS: No endobronchial lesion is seen.   Moderate to large bilateral pleural effusions with adjacent consolidative opacification of the bilateral lower lobes suggestive of compressive atelectasis. No pneumothorax. Mild asymmetric scattered reticular changes suggestive of chronic lung findings.     VESSELS: The thoracic aorta is unremarkable with respect course, caliber, and contour.   Prominent main pulmonary artery measuring up to 3.2 cm in anterior-posterior dimension measured on sagittal plane which may indicate sequela of pulmonary hypertension.   No significant coronary atherosclerotic calcifications are seen.   HEART: Heart size within normal limits. No pericardial effusion.   MEDIASTINUM AND OLIVE: Mildly prominent mediastinal lymph nodes measuring up to 10 mm in short axis   Moderate hiatal hernia with partial intrathoracic stomach and ascites and region of nodularity of the peritoneal fat within hiatal hernia suggestive carcinomatosis.   CHEST WALL AND LOWER NECK: No acute osseous abnormality. Multilevel presumed degenerative changes throughout the imaged spine. No acute soft tissue abnormality.   ABDOMEN:   LIVER: There is been interval scalloping of the right hepatic margins with new regions of irregularity along the right hepatic capsule diffusely which is new since comparison imaging from  08/08/2024 there is a new elongated subcapsular collection measuring up to approximately 11.4 x 2.1 cm, difficult to measure given curvilinear projection extending over the right hepatic lobe margin (series 202, images 40-70). Similar appearing subcentimeter left hepatic lobe hypodense lesion.   BILE DUCTS: No obvious new intrahepatic biliary dilatation. Mild prominence of the common bile duct, nonspecific in a cholecystectomy patient.   GALLBLADDER: Absent.   PANCREAS: Unremarkable.   SPLEEN: Unchanged.   ADRENAL GLANDS: Unchanged.   KIDNEYS AND URETERS: Similar appearing subcentimeter right renal lower pole calculus. No hydronephrosis. No obstructing urolithiasis.   PELVIS:   BLADDER: Unremarkable.   REPRODUCTIVE ORGANS: Uterus appears grossly unchanged.   BOWEL: Moderate hiatal hernia with wall thickening of the stomach in the hernia. No new pathologic distention of bowel. Wall thickening of the colon within the splenic flexure descending colon and sigmoid colon, nonspecific.     VESSELS: No evidence of abdominal aortic aneurysm.   PERITONEUM/RETROPERITONEUM/LYMPH NODES: Large volume ascites with extensive regions of anterior omental caking and peritoneal carcinomatosis. No obvious free intraperitoneal gas. Given the presence of extensive omental caking and peritoneal carcinomatosis, superimposed peritoneal enhancement 4 other causes cannot be excluded.   BONE AND SOFT TISSUE: No acute osseous abnormality. Osseous structures appear stable. No acute abnormality of the abdominal wall soft tissues.        CHEST: 1.  Moderate to large bilateral pleural effusions with adjacent presumed compressive atelectasis. 2. Prominent main pulmonary artery which may indicate pulmonary hypertension. 3. Mildly prominent mediastinal lymph nodes measuring up to 10 mm in short axis concerning for metastatic disease. 4. Moderate hiatal hernia with partial intrathoracic stomach. The peritoneal fat adjacent to the stomach within the  hernia demonstrates evidence of probable carcinomatosis and ascites.   ABDOMEN-PELVIS: 1.  Redemonstration of large volume ascites with regions of significant anterior omental caking and peritoneal carcinomatosis commensurate with patient's provided diagnosis of ovarian cancer. 2. Regions of wall thickening of the colon extending from the splenic flexure to the sigmoid which may indicate a nonspecific colitis. 3. Since the previous examination on 08/08/2024, there are now extensive regions of scalloping of the right hepatic lobe peripherally with the appearance of a large subcapsular collection along the right hepatic margin as above. The sterility of this collection cannot be assessed via CT and clinical correlation is advised for exclusion superimposed infection within this region.     Signed by: Oscar Aldrich 10/1/2024 12:14 PM Dictation workstation:   [id number]     XR chest 1 view     Result Date: 10/1/2024  Interpreted By:  Samuel Jaramillo, STUDY: XR CHEST 1 VIEW; 10/1/2024 8:44 am   INDICATION: Signs/Symptoms:weakness, edema in legs and abdomen   COMPARISON: April 2023.   ACCESSION NUMBER(S): [id number]   ORDERING CLINICIAN: ASHLEY FAIRBANKS   FINDINGS: The study is limited due to rotation and poor inspiratory effort, with resultant crowding of the pulmonary vasculature. The cardiac silhouette is within normal limits for the technique. Moderate size hiatal hernia is again seen. There is no pneumothorax, confluent infiltrates or significant effusion. Degenerative changes involve the spine and shoulders; metallic anchors again noted over the left humeral head related to previous rotator cuff repair.        Limited study. Hiatal hernia. No acute cardiopulmonary disease.   Signed by: Samuel Jaramillo 10/1/2024 9:22 AM Dictation workstation:   [id number]     US guided abdominal paracentesis     Result Date: 9/20/2024  Interpreted By:  Peri Lawrence and Kamau Nyokabi STUDY: US GUIDED ABDOMINAL  PARACENTESIS; US GUIDED PERCUTANEOUS ABDOMINAL RETROPERITONEUM BIOPSY;  9/19/2024 11:13 am   INDICATION: Signs/Symptoms:ascites; Signs/Symptoms:ascites, evaluate ovarian cancer.   COMPARISON: CT abdomen and pelvis 08/08/2024   ACCESSION NUMBER(S): [id number]; [id number]   ORDERING CLINICIAN: JEANETTE ARIAS   TECHNIQUE: INTERVENTIONALIST(S): Dr. Peri Lawrence MD   CONSENT: The patient was informed of the nature of the proposed procedure. The purposes, alternatives, risks, and benefits were explained and discussed. All questions were answered and consent was obtained.   SEDATION: 1% local lidocaine was used for anesthetic.   MEDICATION/CONTRAST: No additional.   TIME OUT:   A time out was performed immediately prior to procedure start with the interventional team, correctly identifying the patient name, date of birth, MRN, procedure, anatomy (including marking of site and side), patient position, procedure consent form, relevant laboratory and imaging test results, antibiotic administration, safety precautions, and procedure-specific equipment needs.   COMPLICATIONS: No immediate adverse events identified.   FINDINGS: The patient was placed in the supine position. Limited sonographic images of the lower abdominal wall were obtained for purposes of needle guidance, which demonstrated omental soft tissue mass which was seen on prior CT 08/08/2024. The area of concern was prepped and draped under sterile technique.   1% lidocaine was injected subcutaneously. Additional lidocaine was administered into deeper tissues surrounding the targeted area for biopsy using a 22G spinal needle. A small incision was made. Using the same access site a 18 gauge core biopsy needle was passed via a 17 gauge coaxial introducer needle to obtain a total of 1 core sample.   Postprocedure images demonstrate no evidence for hemorrhage. The patient tolerated the procedure well and there were no immediate complications. 1 core specimen was  sent to pathology.     Subsequently the right lower quadrant was visualized under ultrasound which demonstrated moderate volume ascites seen on prior CT 08/08/2024. 1% lidocaine was injected subcutaneously at this site. A 19 gauge Yueh was used to target the fluid collection under ultrasound guidance. Once the site was accessed, the inner needle was removed from the catheter. The Yueh catheter was connected to drainage container. Estimated 1000 cc of serosanguineous colored fluid was removed. Post paracentesis imaging demonstrated decreased ascitic fluid. The Yueh catheter was removed. The access site was bandaged.        1. Status post ultrasound guided core needle biopsy of omental mass seen on CT 08/08/2024. 1 core specimens sent to pathology. 2. Successful paracentesis under ultrasound guidance with removal of 1 L of serosanguineous fluid.     I was present for and/or performed the critical portions of the procedure and immediately available throughout the entire procedure.   I personally reviewed the image(s) / study and interpretation. I agree with the findings as stated.   Performed and dictated at [facility].   MACRO: None   Signed by: Peri Lawrence 9/20/2024 10:06 AM Dictation workstation:   [id number]     US guided percutaneous abdominal retroperitoneum biopsy     Result Date: 9/20/2024  Interpreted By:  Peri Lawrence and Kamau Nyokabi STUDY: US GUIDED ABDOMINAL PARACENTESIS; US GUIDED PERCUTANEOUS ABDOMINAL RETROPERITONEUM BIOPSY;  9/19/2024 11:13 am   INDICATION: Signs/Symptoms:ascites; Signs/Symptoms:ascites, evaluate ovarian cancer.   COMPARISON: CT abdomen and pelvis 08/08/2024   ACCESSION NUMBER(S): [id number]; [id number]   ORDERING CLINICIAN: JEANETTE ARIAS   TECHNIQUE: INTERVENTIONALIST(S): Dr. Peri Lawrence MD   CONSENT: The patient was informed of the nature of the proposed procedure. The purposes, alternatives, risks, and benefits were  explained and discussed. All questions were answered and consent was obtained.   SEDATION: 1% local lidocaine was used for anesthetic.   MEDICATION/CONTRAST: No additional.   TIME OUT:   A time out was performed immediately prior to procedure start with the interventional team, correctly identifying the patient name, date of birth, MRN, procedure, anatomy (including marking of site and side), patient position, procedure consent form, relevant laboratory and imaging test results, antibiotic administration, safety precautions, and procedure-specific equipment needs.   COMPLICATIONS: No immediate adverse events identified.   FINDINGS: The patient was placed in the supine position. Limited sonographic images of the lower abdominal wall were obtained for purposes of needle guidance, which demonstrated omental soft tissue mass which was seen on prior CT 08/08/2024. The area of concern was prepped and draped under sterile technique.   1% lidocaine was injected subcutaneously. Additional lidocaine was administered into deeper tissues surrounding the targeted area for biopsy using a 22G spinal needle. A small incision was made. Using the same access site a 18 gauge core biopsy needle was passed via a 17 gauge coaxial introducer needle to obtain a total of 1 core sample.   Postprocedure images demonstrate no evidence for hemorrhage. The patient tolerated the procedure well and there were no immediate complications. 1 core specimen was sent to pathology.     Subsequently the right lower quadrant was visualized under ultrasound which demonstrated moderate volume ascites seen on prior CT 08/08/2024. 1% lidocaine was injected subcutaneously at this site. A 19 gauge Yueh was used to target the fluid collection under ultrasound guidance. Once the site was accessed, the inner needle was removed from the catheter. The Yueh catheter was connected to drainage container. Estimated 1000 cc of serosanguineous colored fluid was removed.  Post paracentesis imaging demonstrated decreased ascitic fluid. The Yueh catheter was removed. The access site was bandaged.        1. Status post ultrasound guided core needle biopsy of omental mass seen on CT 08/08/2024. 1 core specimens sent to pathology. 2. Successful paracentesis under ultrasound guidance with removal of 1 L of serosanguineous fluid.     I was present for and/or performed the critical portions of the procedure and immediately available throughout the entire procedure.   I personally reviewed the image(s) / study and interpretation. I agree with the findings as stated.   Performed and dictated at [facility].   MACRO: None   Signed by: Peri Lawrence 9/20/2024 10:06 AM Dictation workstation:   [id number]            Assessment/Plan        Assessment & Plan    Hyponatremia        79-year-old female with past medical history of recently diagnosed ovarian cancer with peritoneal carcinomatosis s/p paracentesis x 4 (last one done 9/19/24), obesity, hyperlipidemia, osteoarthritis, and skin cancer s/p Mohs procedure.  Patient presents to the hospital with weakness and inability to care for herself.  Patient found to be hyponatremic and with evidence on CT imaging of possible abscess of the liver and ascites secondary to malignancy.  Hospitalized for further evaluation management..     #Ovarian cancer with peritoneal carcinomatosis  #Ascites  #Bilateral pleural effusions  # Suspected liver abscess  #Abdominal pain     Telemetry monitoring  Consult to IR for paracentesis and possible drainage of liver abscess  Consult to pulmonology for bilateral pleural effusions  Antiemetics as needed  Analgesics as needed  Patient needs to be followed up with by her gynecological oncologist to determine optimal plan going forward for treatment of her ovarian cancer     #Hyponatremia  #Generalized edema/anasarca  #Hypoalbuminemia  Patient is fluid overloaded and has significant  third spacing, suspect hypervolemia hyponatremia.  Will check urine electrolytes, urine osmolality, and serum osmolality  Fluid restriction of 1500 ml for the time being.  Consider starting diuretics, defer to attending  Repeat labs in a.m.     #debility  PT/OT  Social work for discharge planning     Chronic issues:  #Obesity  #Hyperlipidemia  #Osteoarthritis     Continue with patient's home medications as appropriate     #DVT prophylaxis  SCDs  Lovenox subcutaneous     I spent 75 minutes in the professional and overall care of this patient.        Revision History           Patient fully evaluated 10/2, awake, resting in bed. Patient with Moderate hiatal hernia with partial intrathoracic stomach and the peritoneal fat adjacent to the stomach within the hernia demonstrates evidence of probable carcinomatosis and ascites, Patient tolerated paracentesis on 10/01, awaiting culture results.No s/s or c/o acute difficulties at this time. Medications and labs reviewed.  Plan discussed with interdisciplinary team, per pulmonology - Plan:  Patient has bilateral pleural effusion in the context of malignant ascites/ovarian cancer I explained to the patient the pleural effusion most likely related to recurrent ascites, patient is requiring so far 5 steps malignant ascites in the last month most likely beneficial approaches intra-abdominal Pleurx catheter Abdominal Pleurx catheter would be the most effective way of preventing pleural effusions and ascites.  Pleural effusions are secondary to ascites, both of which are due to patient's underlying cancer Continue with goals of care discussions prior to interventional radiology consult Follow hepatic fluid analysis Continue IV antibiotics Zosyn, continue current and repeat labs in the AM. Patient still requiring frequent cardiac and SPO2 monitoring. Discharge planning discussed with patient and care team. Therapy evaluations ordered-  [facility], anticipate SNF/HHC at discharge.  Patient aware and agreeable to current plan, continue plan as above. I spent 50 minutes in the professional and overall care of this patient.              Assessment & Plan  Hyponatremia     Patient fully evaluated 10/3, awake, resting in bed. Patient with Moderate hiatal hernia with partial intrathoracic stomach and the peritoneal fat adjacent to the stomach within the hernia demonstrates evidence of probable carcinomatosis and ascites, Patient tolerated paracentesis on 10/01 well,order placed for repeat paracentesis therapeutic non diagnostic with IR.continue current and repeat labs in the AM. Patient aware and agreeable to current plan, continue plan as above.     Patient fully evaluated on October 4.  Patient awaiting therapeutic paracentesis.  Patient probably will need albumin afterwards.  Continue to monitor response with above treatments recheck labs in AM.  I spent 50 minutes in the professional and overall care of this patient.       Patient fully evaluated 10/06, head of bed elevated, no s/s or c/o acute difficulties at this time. Sister at bedside and agreeable to plan. Attempted therapeutic paracentesis by IR, aborted procedure due to insufficient fluid accumulation.  Medications and labs reviewed, cultures blood no growth at 4 days, AFB Culture      Culture in progress and will be examined weekly. A result will be issued either when positive or after 8 weeks incubation. AFB Stain -No acid fast bacilli seen.  Plan discussed with interdisciplinary team, continue current and repeat labs in the AM. Per pulmonary - Day of consult, patient is breathing on room air. Vital stable. CT chest showed bilateral large pleural effusions. Dicussed need for Abdominal Pleurx catheter. Patient with likely carcinomatosis and plan to discharge to [facility] and will follow up with gynecology in 7 days,     Patient still requiring frequent cardiac and SPO2 monitoring. Discharge planning discussed with  patient and care team. Therapy evaluations ordered- [address] at discharge. Patient aware and agreeable to current plan, continue plan as above. I spent 50 minutes in the professional and overall care of this patient.     Patient fully evaluated 10/07, head of bed elevated, no s/s or c/o acute difficulties at this time. Medications and labs reviewed, sodium 125, nephrology consulted.  Cultures blood no growth at 4 days, AFB Culture      Culture in progress and will be examined weekly. A result will be issued either when positive or after 8 weeks incubation. AFB Stain -No acid fast bacilli seen.  Plan discussed with interdisciplinary team, continue current and repeat labs in the AM, new supplemental oxygen requirements, will repeat chest xray in AM, maintain HOB elevated to alleviate postural dyspnea. Vitals stable. Patient with likely carcinomatosis and plan to discharge to SNF - [facility] and will follow up with gynecology in 7 days, atient still requiring frequent cardiac and SPO2 monitoring. Discharge planning discussed with patient and care team. Therapy evaluations ordered- [address] at discharge. Patient aware and agreeable to current plan, continue plan as above. I spent 50 minutes in the professional and overall care of this patient.   Patient fully evaluated 10/08, head of bed elevated, no s/s or c/o acute difficulties at this time. Medications and labs reviewed, sodium low again today at 125, nephrology consulted. Awaiting hepatic fluid analysis -AFB Culture -Culture in progress and will be examined weekly. A result will be issued either when positive or after 8 weeks incubation. AFB Stain -No acid fast bacilli seen.  Plan discussed with interdisciplinary team, per nephrology - Hyponatremia  Acute kidney injury   Ascites  Malnutrition  Anemia  Pleural effusion  Plan;  Discontinue potential nephrotoxins  Nutritional/iron/renal indices/urinary indices  DuoNeb aerosols  Appreciate  nephrology input. Patient requiring supplemental oxygen at this time, continue to maintain HOB elevated as tolerated. Patient seen by pulmonology - Consult IR for right sided diagnostic and therapeutic thoracentesis  Unable to safely perform paracentesis due to lack of fluid  Patient has bilateral pleural effusion in the context of malignant ascites/ovarian cancer - Pleural effusions are secondary to ascites, both of which are due to patient's underlying cancer. Continue with goals of care discussions prior to interventional radiology consult. Continue IV antibiotics zosyn, probiotics added. Continue current plan and repeat labs in the AM, repeat chest xray in AM, maintain HOB elevated to alleviate postural dyspnea. Vitals stable. Patient with likely carcinomatosis and plan to discharge to SNF - [facility] and will follow up with gynecology in 7 days, atient still requiring frequent cardiac and SPO2 monitoring. Discharge planning discussed with patient and care team. Therapy evaluations ordered- [place] [id number], [facility] at discharge. Patient aware and agreeable to current plan, continue plan as above. I spent 50 minutes in the professional and overall care of this patient.     Patient fully evaluated 10/09, patient resting in bed with HOB, no s/s or c/o acute difficulties at this time. Medications and labs reviewed, cultures no growth at 4 days, AFB cultures in process. Plan discussed with interdisciplinary team, pulmonary plan - Bilateral pleural effusion  Abdominal ascites  Ovarian carcinoma w/ peritoneal carcinomatosis carcinomatosis  HLD  Patient clear for discharge to SNF from standpoint of pulmonology  Patient will likely need an abdominal PleurX catheter at some point. However, we were unable to perform safely during this hospitalization due to lack of ascitic fluid  Pleural fluid suggests exudative effusion, likely secondary to malignancy  Okay to discontinue antibiotics from standpoint of pulmonology. Low  suspicion of pneumonia. Leukocytosis is unlikely reflective of infection.   Will continue current and repeat labs in the AM. Patient still requiring frequent cardiac and SPO2 monitoring. Discharge planning discussed with patient and care team. Therapy evaluations ordered- [facility] 10, anticipate SNF at discharge. Patient aware and agreeable to current plan, continue plan as above. I spent 50 minutes in the professional and overall care of this patient.     Patient fully evaluated 10/10, patient resting in bed with HOB, no s/s or c/o acute difficulties at this time. Medications and labs reviewed, cultures no growth at 5 days, AFB cultures in process. Plan discussed with interdisciplinary team, pulmonary plan- Bilateral pleural effusion  Abdominal ascites Ovarian carcinoma w/ peritoneal carcinomatosis carcinomatosis HLD Patient clear for discharge to SNF from standpoint.   Patient will likely need an abdominal PleurX catheter at some point. However, we were unable to perform safely during this hospitalization due to lack of ascitic fluid Pleural fluid suggests exudative effusion, likely secondary to malignancy Okay to discontinue antibiotics from standpoint of pulmonology. Low suspicion of pneumonia. Leukocytosis is unlikely reflective of infection.   Patient's sister at bedside, discussion of plan of care and will follow up with gynecology outpatient, possibly Dr. Jones. Will continue current and repeat labs in the AM. Patient with frequent bowel movements, soft, will hold miralax at this time. Patient still requiring frequent cardiac and SPO2 monitoring. Discharge planning discussed with patient and care team. Therapy evaluations ordered- [facility] 10, anticipate SNF at discharge. Patient aware and agreeable to current plan, continue plan as above. I spent 50 minutes in the professional and overall care of this patient.     Patient fully evaluated 10/11, patient resting in bed with HOB, no s/s or c/o acute difficulties  at this time. Medications and labs reviewed, cultures no growth at 5 days, AFB cultures in process, creatinine and Bun continue to rise. Nephrology on the case, appreciate input.  Plan discussed with interdisciplinary team, pulmonary plan- agree to discontinue antibiotics from standpoint of pulmonology with Low suspicion of pneumonia. Continues to require supplemental oxygen at this time. Leukocytosis is unlikely reflective of infection. Patient pain medications updated based on renal function - will switch Roxicodone. Long discussion with patient's sister and patient plan of care and will follow up with gynecology outpatient, possibly Dr. Jones. Will continue current and repeat labs in the AM. Patient with frequent bowel movements, soft, will hold miralax at this time. Patient still requiring frequent cardiac and SPO2 monitoring. Discharge planning discussed with patient and care team. Therapy evaluations ordered- [facility] 10, anticipate SNF at discharge. Patient aware and agreeable to current plan, continue plan as above.     Patient fully evaluated on October 12 and continues to have significant decline in renal function.  Oncology reconsulted.  I believe the patient is significant third spacing secondary to peritoneal carcinomatosis.  Recheck labs in AM.  Appreciate pulmonary and nephrology consultations.  I spent 50 minutes in the professional and overall care of this patient.     Patient fully evaluated 10/13, head of bed elevated, visible difficulties noted at this time. Medications and labs reviewed-  Cultures-  in process  WBC- 29.6  Hemoglobin- 9.2  Imaging- CT abdomen pelvis wo IV contrast   Impression:    Overall findings are grossly unchanged from 10/01/2024 CT scan.  1. Persistent moderate abdominopelvic ascites with diffuse peritoneal  carcinomatosis/omental caking.  2. 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  Underlying cirrhosis could not be entirely excluded as well.  3. Moderate bilateral pleural effusion with surrounding atelectasis.  Supplemental oxygen requiring at this time.      Goals of care- long discussion with sister, patient, and interdisciplinary team, patient with worsening renal impairment secondary to  ovarian carcinoma with peritoneal carcinomatosis abdominal distention, ascites requiring frequent drainage was admitted profound weakness tiredness, failure to thrive. Prognosis poor, plan discussed and will focus on comfort on measures at this time, planned call out to gyn/onc doctor tomorrow morning. Addition of methadone for pain management. Patient seen by Dr. Alexandre - The patient is a 79-year-old woman diagnosed with ovarian carcinoma and peritoneal carcinomatosis with ascites in August 2024.  Initially evaluated by GYN oncology and chemotherapy was recommended but due to intercurrent illness and admissions the patient did not/could not receive any treatment.  Currently at [facility] and was admitted because of profound weakness tiredness failure to thrive pain and noted to have elevated creatinine.  Physical examination revealed elderly woman in pain requesting assistance.  Performance status is 3-4.  Elevated creatinine at 3.14 mg/dL.  Personally discussed with Dr. Rhodes.  Could consider initial chemotherapy to site to reduce then consider surgery.  However, advanced age, poor performance status, elevated creatinine preclude chemotherapy.  Consider evaluation by GYN oncology.  Meanwhile consider palliative care pain control/symptom control drainage of ascites consider catheter placement to drain fluid.  Consider methadone in view of elevated creatinine.  Thank you for allowing me to participate in care of your patient if you have any questions please feel free to call me.  Will continue current and repeat,  labs in the AM. Patient still requiring frequent cardiac and SPO2 monitoring. Continue  aggressive pulmonary hygiene. Discharge planning discussed with patient and care team. Therapy evaluations ordered. [facility]-        , anticipate HHC/SNF at discharge. Patient aware and agreeable to current plan, continue plan as above. I spent a total of 50 minutes on the date of the service which included preparing to see the patient, face-to-face patient care, completing clinical documentation, obtaining and/or reviewing separately obtained history, performing a medically appropriate examination, counseling and educating the patient/family/caregiver, ordering medications, tests, or procedures, communicating with other HCPs (not separately reported), independently interpreting results (not separately reported), communicating results to the patient/family/caregiver, and care coordination (not separately reported).         Revision History          Pertinent Physical Exam At Time of Discharge  Physical Exam  Patient fully evaluated 10/16/24 for   1. Hyponatremia  HYDROmorphone (Dilaudid) 0.5 mg/0.5 mL solution injection     HYDROmorphone PF (Dilaudid) 0.2 mg/mL injection     bisacodyl (Dulcolax) 10 mg suppository     glycopyrrolate (Robinul) 200 mcg/mL injection     haloperidol lactate (Haldol) 5 mg/mL injection     LORazepam (Ativan) 2 mg/mL injection       Call placed to GYN/ONC Dr. Villalba this AM with long discussion on patient, treatment options, and goals of care. Patient and sister aware of poor prognosis and bleak outcome. Patient with no significant clinical improvement noted, patient medically cleared for discharge today to [facility]. Patient seen resting in bed with head of bed elevated, no s/s or c/o acute difficulties at this time. Medications and labs reviewed, will begin comfort care medications and transition to hospice.     No results found for this or any previous visit (from the past 24 hours).        Intake/Output this shift:  I/O this shift:  In: 320 [P.O.:320]  Out: -      Vital signs for  last 24 hours:  Temp:  [36.3 °C (97.3 °F)-37 °C (98.6 °F)] 37 °C (98.6 °F)  Heart Rate:  [102-106] 102  Resp:  [18] 18  BP: ()/(51-55) 89/55        Plan discussed with interdisciplinary team, ok to discharge to [facility]. Patient aware and agreeable to current plan, continue plan as above. I spent 30 minutes on the date of the service which included preparing to see the patient, face-to-face patient care, completing clinical documentation, obtaining and/or reviewing separately obtained history, performing a medically appropriate examination, counseling and educating the patient/family/caregiver, ordering medications, tests, or procedures, communicating with other HCPs (not separately reported), independently interpreting results (not separately reported), communicating results to the patient/family/caregiver, and care coordination (not separately reported  Outpatient Follow-Up  No future appointments.     Patient fully evaluated on October 16 and found to note be [facility] appropriate.  Patient to discharge to skilled nursing.     Jd Rhodes MD                 Patient fully evaluated 10/17 for   1. Acute kidney injury (CMS-HCC)          Patient seen resting in bed with head of bed elevated, no s/s or c/o acute difficulties at this time.  Vital signs for last 24 hours:  Temp:  [36.2 °C (97.2 °F)-37.2 °C (99 °F)] 36.4 °C (97.5 °F)  Heart Rate:  [] 100  Resp:  [16-18] 18  BP: ()/(49-56) 93/51 I/O this shift:  In: 150 [P.O.:150]  Out: -   Patient still requiring frequent cardiac and SPO2 monitoring. Continue aggressive pulmonary hygiene and oral hygiene. Off loading as tolerated for skin integrity. Medications and labs reviewed-   Results for orders placed or performed during the hospital encounter of 10/14/24 (from the past 24 hours)   Comprehensive Metabolic Panel   Result Value Ref Range    Glucose 95 74 - 99 mg/dL    Sodium 129 (L) 136 - 145 mmol/L    Potassium 4.6 3.5 - 5.3 mmol/L     Chloride 95 (L) 98 - 107 mmol/L    Bicarbonate 21 21 - 32 mmol/L    Anion Gap 18 10 - 20 mmol/L    Urea Nitrogen 79 (H) 6 - 23 mg/dL    Creatinine 4.17 (H) 0.50 - 1.05 mg/dL    eGFR 10 (L) >60 mL/min/1.73m*2    Calcium 7.4 (L) 8.6 - 10.3 mg/dL    Albumin 2.0 (L) 3.4 - 5.0 g/dL    Alkaline Phosphatase 171 (H) 33 - 136 U/L    Total Protein 4.9 (L) 6.4 - 8.2 g/dL    AST 29 9 - 39 U/L    Bilirubin, Total 0.3 0.0 - 1.2 mg/dL    ALT 10 7 - 45 U/L   CBC   Result Value Ref Range    WBC 27.3 (H) 4.4 - 11.3 x10*3/uL    nRBC 0.0 0.0 - 0.0 /100 WBCs    RBC 3.59 (L) 4.00 - 5.20 x10*6/uL    Hemoglobin 9.3 (L) 12.0 - 16.0 g/dL    Hematocrit 31.3 (L) 36.0 - 46.0 %    MCV 87 80 - 100 fL    MCH 25.9 (L) 26.0 - 34.0 pg    MCHC 29.7 (L) 32.0 - 36.0 g/dL    RDW 18.5 (H) 11.5 - 14.5 %    Platelets 338 150 - 450 x10*3/uL      Patient recently received an antibiotic (last 12 hours)       None           Continue aggressive pulmonary hygiene and oral hygiene. Off loading as tolerated for skin integrity.  Plan discussed with interdisciplinary team, Patient started on Unasyn TID IV for spontanous bacterial peritonitis, empiric. Will continue current and repeat labs in the AM.   Discharge planning discussed with patient and care team. Therapy evaluations ordered, anticipate SNF at discharge. Patient aware and agreeable to current plan, continue plan as above.   I spent a total of 50 minutes on the date of the service which included preparing to see the patient, face-to-face patient care, completing clinical documentation, obtaining and/or reviewing separately obtained history, performing a medically appropriate examination, counseling and educating the patient/family/caregiver, ordering medications, tests, or procedures, communicating with other HCPs (not separately reported), independently interpreting results (not separately reported), communicating results to the patient/family/caregiver, and care coordination (not separately reported).         Kim Angulo

## 2024-10-18 LAB
ALBUMIN SERPL BCP-MCNC: 2 G/DL (ref 3.4–5)
ALP SERPL-CCNC: 168 U/L (ref 33–136)
ALT SERPL W P-5'-P-CCNC: 10 U/L (ref 7–45)
ANION GAP SERPL CALC-SCNC: 18 MMOL/L (ref 10–20)
AST SERPL W P-5'-P-CCNC: 28 U/L (ref 9–39)
BASOPHILS # BLD AUTO: 0.11 X10*3/UL (ref 0–0.1)
BASOPHILS NFR BLD AUTO: 0.4 %
BILIRUB SERPL-MCNC: 0.3 MG/DL (ref 0–1.2)
BUN SERPL-MCNC: 83 MG/DL (ref 6–23)
CALCIUM SERPL-MCNC: 7.5 MG/DL (ref 8.6–10.3)
CHLORIDE SERPL-SCNC: 92 MMOL/L (ref 98–107)
CO2 SERPL-SCNC: 23 MMOL/L (ref 21–32)
CREAT SERPL-MCNC: 4.51 MG/DL (ref 0.5–1.05)
EGFRCR SERPLBLD CKD-EPI 2021: 9 ML/MIN/1.73M*2
EOSINOPHIL # BLD AUTO: 0.73 X10*3/UL (ref 0–0.4)
EOSINOPHIL NFR BLD AUTO: 2.8 %
ERYTHROCYTE [DISTWIDTH] IN BLOOD BY AUTOMATED COUNT: 18.3 % (ref 11.5–14.5)
GLUCOSE SERPL-MCNC: 89 MG/DL (ref 74–99)
HCT VFR BLD AUTO: 29.7 % (ref 36–46)
HGB BLD-MCNC: 9.1 G/DL (ref 12–16)
HOLD SPECIMEN: NORMAL
IMM GRANULOCYTES # BLD AUTO: 0.48 X10*3/UL (ref 0–0.5)
IMM GRANULOCYTES NFR BLD AUTO: 1.9 % (ref 0–0.9)
LYMPHOCYTES # BLD AUTO: 1.03 X10*3/UL (ref 0.8–3)
LYMPHOCYTES NFR BLD AUTO: 4 %
MCH RBC QN AUTO: 26 PG (ref 26–34)
MCHC RBC AUTO-ENTMCNC: 30.6 G/DL (ref 32–36)
MCV RBC AUTO: 85 FL (ref 80–100)
MONOCYTES # BLD AUTO: 1.01 X10*3/UL (ref 0.05–0.8)
MONOCYTES NFR BLD AUTO: 3.9 %
NEUTROPHILS # BLD AUTO: 22.34 X10*3/UL (ref 1.6–5.5)
NEUTROPHILS NFR BLD AUTO: 87 %
NRBC BLD-RTO: 0 /100 WBCS (ref 0–0)
PLATELET # BLD AUTO: 333 X10*3/UL (ref 150–450)
POTASSIUM SERPL-SCNC: 4.4 MMOL/L (ref 3.5–5.3)
PROT SERPL-MCNC: 5.3 G/DL (ref 6.4–8.2)
RBC # BLD AUTO: 3.5 X10*6/UL (ref 4–5.2)
SODIUM SERPL-SCNC: 129 MMOL/L (ref 136–145)
WBC # BLD AUTO: 25.7 X10*3/UL (ref 4.4–11.3)

## 2024-10-18 PROCEDURE — 85025 COMPLETE CBC W/AUTO DIFF WBC: CPT | Performed by: INTERNAL MEDICINE

## 2024-10-18 PROCEDURE — 2500000005 HC RX 250 GENERAL PHARMACY W/O HCPCS: Performed by: PHYSICIAN ASSISTANT

## 2024-10-18 PROCEDURE — 2500000004 HC RX 250 GENERAL PHARMACY W/ HCPCS (ALT 636 FOR OP/ED): Performed by: PHYSICIAN ASSISTANT

## 2024-10-18 PROCEDURE — 2500000002 HC RX 250 W HCPCS SELF ADMINISTERED DRUGS (ALT 637 FOR MEDICARE OP, ALT 636 FOR OP/ED): Performed by: PHYSICIAN ASSISTANT

## 2024-10-18 PROCEDURE — 2500000001 HC RX 250 WO HCPCS SELF ADMINISTERED DRUGS (ALT 637 FOR MEDICARE OP): Performed by: INTERNAL MEDICINE

## 2024-10-18 PROCEDURE — 36415 COLL VENOUS BLD VENIPUNCTURE: CPT | Performed by: INTERNAL MEDICINE

## 2024-10-18 PROCEDURE — 1210000001 HC SEMI-PRIVATE ROOM DAILY

## 2024-10-18 PROCEDURE — S0109 METHADONE ORAL 5MG: HCPCS | Performed by: PHYSICIAN ASSISTANT

## 2024-10-18 PROCEDURE — 2500000001 HC RX 250 WO HCPCS SELF ADMINISTERED DRUGS (ALT 637 FOR MEDICARE OP): Performed by: PHYSICIAN ASSISTANT

## 2024-10-18 PROCEDURE — 84075 ASSAY ALKALINE PHOSPHATASE: CPT | Performed by: INTERNAL MEDICINE

## 2024-10-18 PROCEDURE — 2500000004 HC RX 250 GENERAL PHARMACY W/ HCPCS (ALT 636 FOR OP/ED): Performed by: INTERNAL MEDICINE

## 2024-10-18 ASSESSMENT — COGNITIVE AND FUNCTIONAL STATUS - GENERAL
DRESSING REGULAR LOWER BODY CLOTHING: A LOT
CLIMB 3 TO 5 STEPS WITH RAILING: TOTAL
WALKING IN HOSPITAL ROOM: TOTAL
MOBILITY SCORE: 11
PERSONAL GROOMING: A LITTLE
DRESSING REGULAR LOWER BODY CLOTHING: A LOT
CLIMB 3 TO 5 STEPS WITH RAILING: TOTAL
EATING MEALS: A LITTLE
TURNING FROM BACK TO SIDE WHILE IN FLAT BAD: A LOT
HELP NEEDED FOR BATHING: A LOT
PERSONAL GROOMING: A LOT
MOVING FROM LYING ON BACK TO SITTING ON SIDE OF FLAT BED WITH BEDRAILS: A LOT
EATING MEALS: A LITTLE
DRESSING REGULAR UPPER BODY CLOTHING: A LOT
WALKING IN HOSPITAL ROOM: A LOT
MOVING FROM LYING ON BACK TO SITTING ON SIDE OF FLAT BED WITH BEDRAILS: A LOT
STANDING UP FROM CHAIR USING ARMS: A LOT
DRESSING REGULAR UPPER BODY CLOTHING: A LOT
MOVING TO AND FROM BED TO CHAIR: A LOT
HELP NEEDED FOR BATHING: A LOT
MOBILITY SCORE: 10
STANDING UP FROM CHAIR USING ARMS: A LOT
TOILETING: A LOT
TOILETING: A LOT
MOVING TO AND FROM BED TO CHAIR: A LOT
DAILY ACTIVITIY SCORE: 14
TURNING FROM BACK TO SIDE WHILE IN FLAT BAD: A LOT
DAILY ACTIVITIY SCORE: 13

## 2024-10-18 ASSESSMENT — PAIN SCALES - PAIN ASSESSMENT IN ADVANCED DEMENTIA (PAINAD)
BREATHING: NORMAL
TOTALSCORE: MEDICATION (SEE MAR)
FACIALEXPRESSION: FACIAL GRIMACING
CONSOLABILITY: DISTRACTED OR REASSURED BY VOICE/TOUCH
NEGVOCALIZATION: REPEATED TROUBLED CALLING OUT, LOUD MOANING/GROANING, CRYING
BODYLANGUAGE: RIGID, FISTS CLENCHED, KNEES UP, PUSHING/PULLING AWAY, STRIKES OUT
TOTALSCORE: 7

## 2024-10-18 ASSESSMENT — PAIN SCALES - WONG BAKER
WONGBAKER_NUMERICALRESPONSE: NO HURT
WONGBAKER_NUMERICALRESPONSE: NO HURT

## 2024-10-18 ASSESSMENT — PAIN SCALES - GENERAL
PAINLEVEL_OUTOF10: 0 - NO PAIN

## 2024-10-18 ASSESSMENT — PAIN - FUNCTIONAL ASSESSMENT: PAIN_FUNCTIONAL_ASSESSMENT: 0-10

## 2024-10-18 NOTE — PROGRESS NOTES
Subjective   Patient refers she slept well..  Today she is alert and oriented  Renal chemistries continue to downtrend with a BUN of 83 and creatinine of 4.51  Objective   Past Medical History  She has a past medical history of Bilateral primary osteoarthritis of knee, HLD (hyperlipidemia), Lumbosacral radiculopathy, Meralgia paraesthetica, Obesity, and Physical deconditioning.  1.  Ovarian cancer   2.  GERD  3.  Adjustment disorder with depressed mood  4.  Chronic neuropathy  5.  Hyperlipidemia  6.  Degenerative joint disease  6.  Obesity  7.  Corneal dystrophy status post corneal procedures  8.  History of skin cancer involving nose.  S/p Mohs procedure.  9.  Osteoarthritis  10.  Meralgia paresthetica  11.  Peritoneal carcinomatosis  12.  Malignant ascites  13.  Obesity  14.  Low back pain  15.  Acute kidney injury  16.  Hyponatremia  17.  Anemia  18.  Renal calculi  Surgical History  She has a past surgical history that includes Total knee arthroplasty (Left, 2019); Cholecystectomy; Corneal transplant; Lumbar fusion; Dilation and curettage of uterus; Shoulder arthroscopy (Left); Colonoscopy; Skin lesion excision; Trigger finger release (Right); Arthroplasty (Left); Breast biopsy (1999); and Total knee arthroplasty (Right, 2017)    Physical Exam  General Appearance; alert oriented  Skin pallor  HEENT; pupils react to light and accommodation  Tender maxillary sinuses  Tongue; well-hydrated  Neck; no jugular vein distention, no thyromegaly, no adenopathy, no bruit  Lungs; bibasilar crackles on expiration  Heart; no rubs no gallops, heart rate is regular  Abdomen; there is tympanic note to percussion with distention no rebound no guarding no bruit  ; no CVA tenderness  Musculoskeletal; decreased muscle tone  1+ edema of the legs  Knees on physical examination is close edema, bursal tenderness and decreased range of motion arthralgias  Neurological; no tremors no clonus  Last Recorded Vitals  Blood pressure  "112/58, pulse 104, temperature 35.7 °C (96.3 °F), temperature source Temporal, resp. rate 18, height 1.626 m (5' 4.02\"), weight 104 kg (229 lb 4.5 oz), SpO2 95%.  Intake/Output last 3 Shifts:  I/O last 3 completed shifts:  In: 250 (2.4 mL/kg) [P.O.:150; IV Piggyback:100]  Out: 200 (1.9 mL/kg) [Urine:200 (0.1 mL/kg/hr)]  Weight: 104 kg     Relevant Results    Results for orders placed or performed during the hospital encounter of 10/17/24 (from the past 24 hours)   CBC and Auto Differential   Result Value Ref Range    WBC 25.7 (H) 4.4 - 11.3 x10*3/uL    nRBC 0.0 0.0 - 0.0 /100 WBCs    RBC 3.50 (L) 4.00 - 5.20 x10*6/uL    Hemoglobin 9.1 (L) 12.0 - 16.0 g/dL    Hematocrit 29.7 (L) 36.0 - 46.0 %    MCV 85 80 - 100 fL    MCH 26.0 26.0 - 34.0 pg    MCHC 30.6 (L) 32.0 - 36.0 g/dL    RDW 18.3 (H) 11.5 - 14.5 %    Platelets 333 150 - 450 x10*3/uL    Neutrophils % 87.0 40.0 - 80.0 %    Immature Granulocytes %, Automated 1.9 (H) 0.0 - 0.9 %    Lymphocytes % 4.0 13.0 - 44.0 %    Monocytes % 3.9 2.0 - 10.0 %    Eosinophils % 2.8 0.0 - 6.0 %    Basophils % 0.4 0.0 - 2.0 %    Neutrophils Absolute 22.34 (H) 1.60 - 5.50 x10*3/uL    Immature Granulocytes Absolute, Automated 0.48 0.00 - 0.50 x10*3/uL    Lymphocytes Absolute 1.03 0.80 - 3.00 x10*3/uL    Monocytes Absolute 1.01 (H) 0.05 - 0.80 x10*3/uL    Eosinophils Absolute 0.73 (H) 0.00 - 0.40 x10*3/uL    Basophils Absolute 0.11 (H) 0.00 - 0.10 x10*3/uL   Comprehensive metabolic panel   Result Value Ref Range    Glucose 89 74 - 99 mg/dL    Sodium 129 (L) 136 - 145 mmol/L    Potassium 4.4 3.5 - 5.3 mmol/L    Chloride 92 (L) 98 - 107 mmol/L    Bicarbonate 23 21 - 32 mmol/L    Anion Gap 18 10 - 20 mmol/L    Urea Nitrogen 83 (H) 6 - 23 mg/dL    Creatinine 4.51 (H) 0.50 - 1.05 mg/dL    eGFR 9 (L) >60 mL/min/1.73m*2    Calcium 7.5 (L) 8.6 - 10.3 mg/dL    Albumin 2.0 (L) 3.4 - 5.0 g/dL    Alkaline Phosphatase 168 (H) 33 - 136 U/L    Total Protein 5.3 (L) 6.4 - 8.2 g/dL    AST 28 9 - 39 U/L "    Bilirubin, Total 0.3 0.0 - 1.2 mg/dL    ALT 10 7 - 45 U/L   SST TOP   Result Value Ref Range    Extra Tube Hold for add-ons.                    Assessment/Plan   Hyponatremia  SIADH  Acute kidney injury  Peritoneal carcinomatosis  Ovarian cancer  Ascites  Malnutrition  Anemia  Pleural effusion  Hyperuricemia  Plan  Continue current therapy  Assessment & Plan  EUSEBIO (acute kidney injury) (CMS-HCC)    Acute kidney injury (CMS-HCC)        I spent  20 minutes in the professional and overall care of this patient.      Elder Wells MD

## 2024-10-18 NOTE — PROGRESS NOTES
Rohini Beaver is a 79 y.o. female on day 1 of admission presenting with EUSEBIO (acute kidney injury) (CMS-HCC).      Subjective   Rohini Beaver is a 79 y.o. female presenting with recently diagnosed ovarian cancer with peritoneal carcinomatosis s/p paracentesis x 4 (last one done 9/19/24, EUSEBIO .         Objective     Last Recorded Vitals  /51 (BP Location: Left arm, Patient Position: Lying)   Pulse 103   Temp 36.5 °C (97.7 °F) (Temporal)   Resp 16   Wt 104 kg (229 lb 4.5 oz)   SpO2 98%   Intake/Output last 3 Shifts:    Intake/Output Summary (Last 24 hours) at 10/18/2024 1606  Last data filed at 10/18/2024 0500  Gross per 24 hour   Intake 100 ml   Output 200 ml   Net -100 ml       Admission Weight  Weight: 104 kg (229 lb 4.5 oz) (10/17/24 0842)    Daily Weight  10/17/24 : 104 kg (229 lb 4.5 oz)    Image Results  XR chest 1 view  Narrative: Interpreted By:  Yamini Hubbard,   STUDY:  XR CHEST 1 VIEW 10/16/2024 5:49 pm      INDICATION:  Signs/Symptoms:sob      COMPARISON:  10/08/2024      ACCESSION NUMBER(S):  [id number]      ORDERING CLINICIAN:  SHAKIRA PAEZ      TECHNIQUE:  AP view      FINDINGS:  There is an elevated right diaphragm. There is bilateral lower lobe  atelectasis or infiltrates. Small bilateral pleural effusions. There  is a retrocardiac density likely a large hiatal hernia. Heart size is  normal. Pulmonary vascularity is normal. Surgical clips overlie the  left humeral head. There are degenerative changes of both shoulders.  A few calcifications are seen medial to the right humeral head.      Impression: Elevated right diaphragm with bibasilar atelectasis and possible  small bilateral pleural effusions. Hiatal hernia. No change since the  previous exam      Signed by: Yamini Hubbard 10/16/2024 8:19 PM  Dictation workstation:   [id number]      Physical Exam    Relevant Results               Assessment/Plan                  Assessment & Plan  EUSEBIO (acute kidney injury) (CMS-HCC)    Acute  kidney injury (CMS-HCC)       Kim Angulo  Coordinator  Internal Medicine     H&P      Incomplete     Date of Service: 10/18/2024  3:53 PM     Incomplete       Expand All Collapse All    History Of Present Illness  Rohini Beaver is a 79 y.o. female presenting with recently diagnosed ovarian cancer with peritoneal carcinomatosis s/p paracentesis x 4 (last one done 9/19/24, EUSEBIO .     Past Medical History  She has a past medical history of Bilateral primary osteoarthritis of knee, HLD (hyperlipidemia), Lumbosacral radiculopathy, Meralgia paraesthetica, Obesity, and Physical deconditioning.     Surgical History  She has a past surgical history that includes Total knee arthroplasty (Left, 2019); Cholecystectomy; Corneal transplant; Lumbar fusion; Dilation and curettage of uterus; Shoulder arthroscopy (Left); Colonoscopy; Skin lesion excision; Trigger finger release (Right); Arthroplasty (Left); Breast biopsy (1999); and Total knee arthroplasty (Right, 2017).     Social History  She reports that she has never smoked. She has never used smokeless tobacco. She reports that she does not currently use alcohol. She reports that she does not use drugs.     Family History  Family History          Family History   Problem Relation Name Age of Onset    Colon cancer Mother        Lung cancer Father        Other (Mesothelioma) Brother        Brain cancer Mother's Brother                Allergies  Patient has no known allergies.     Review of Systems     Physical Exam     Last Recorded Vitals  /51 (BP Location: Left arm, Patient Position: Lying)   Pulse 103   Temp 36.5 °C (97.7 °F) (Temporal)   Resp 16   Wt 104 kg (229 lb 4.5 oz)   SpO2 98%      Relevant Results                   Assessment/Plan     Assessment & Plan  EUSEBIO (acute kidney injury) (CMS-HCC)     Acute kidney injury (CMS-HCC)           Jd Rhodes MD   Physician  Internal Medicine     H&P      Signed     Date of Service: 10/17/2024  4:02 PM      Signed         Expand All Collapse All    History Of Present Illness  Rohini Beaver is a 79-year-old female with past medical history of recently diagnosed ovarian cancer with peritoneal carcinomatosis s/p paracentesis x 4 (last one done 9/19/24), obesity, hyperlipidemia, osteoarthritis, and skin cancer s/p Mohs procedure.  Patient presents to the hospital with weakness and inability to care for herself.  She reports that she was at [facility] skilled nursing facility, however had a brief hospitalization at [facility] and upon discharge decided to go home rather than go back to [facility].  She lives with her 86-year-old sister and has a couple friends who assist with appointments, however limited support system.  Sister unable to care for due to age.  Today she was in urgent reclining chair and was able to get up.  ROS additionally positive for cold sweats, distended and tender abdomen, edema, pressure injury to coccyx/gluteal fold, and decreased activity tolerance.  Denies history of heart disease.  She reports that she is scheduled for outpatient appointment with her oncologist tomorrow to determine ongoing plan.      ER course: Hemodynamically stable, afebrile, SpO2 90s on room air.  WBC 28.9, Hgb 11.0/Hct34.1 (Hgb 15.1 4/4/2023), platelets 446. Glucose 107, Sodium 126, Chloride 94, calcium 8.5, albumin 2.4, alkaline phosphatase 231.  Lactate 1.4.  BNP 74.  High-sensitivity troponin 7.  UA unremarkable. CT chest showed moderate to large bilateral pleural effusion with adjacent presumed compressive atelectasis, prominent main pulmonary artery which may indicate pulmonary hypertension, mildly prominent mediastinal lymph nodes measuring up to 10 mm in short axis concerning for metastatic disease.  Moderate hiatal hernia with partial intrathoracic stomach and the peritoneal fat adjacent to the stomach within the hernia demonstrates evidence of probable carcinomatosis and ascites.  Redemonstration of large  volume ascites with regions of significant anterior omental caking and peritoneal carcinomatosis commensurate with patient's provided diagnosis of ovarian cancer. Regions of wall thickening of the colon extending from the splenic flexure to the sigmoid.  Extensive region of scalloping of the right hepatic lobe peripherally with appearance of large subcapsular collection along the right hepatic margin. Blood culture in process     Past medical history: As above  Past surgical history: Cholecystectomy, lumbar laminectomy, left shoulder arthroscopy, right total knee replacement, corneal transplant  Social history: No history of smoking, alcohol abuse, illicit drug use.  Lives with her elderly sister who is 86 years old.  Limited support system.   Family history: Mother-cancer (unknown type)     Past Medical History  Medical History           Past Medical History:   Diagnosis Date    Bilateral primary osteoarthritis of knee      HLD (hyperlipidemia)      Lumbosacral radiculopathy      Meralgia paraesthetica      Obesity      Physical deconditioning              Surgical History  Surgical History             Past Surgical History:   Procedure Laterality Date    ARTHROPLASTY Left       Left thumb carpometacarpal arthroplasty with ligament reconstruction and tendon interposition    BREAST BIOPSY   1999     Benign    CHOLECYSTECTOMY        COLONOSCOPY        CORNEAL TRANSPLANT        DILATION AND CURETTAGE OF UTERUS        LUMBAR FUSION        SHOULDER ARTHROSCOPY Left      SKIN LESION EXCISION        TOTAL KNEE ARTHROPLASTY Left 2019    TOTAL KNEE ARTHROPLASTY Right 2017    TRIGGER FINGER RELEASE Right              Social History  She reports that she has never smoked. She has never used smokeless tobacco. She reports that she does not currently use alcohol. She reports that she does not use drugs.     Family History  Family History               Family History   Problem Relation Name Age of Onset    Colon cancer Mother         Lung cancer Father        Other (Mesothelioma) Brother        Brain cancer Mother's Brother                Allergies  Patient has no known allergies.     Review of Systems     10 point ROS negative except as noted above in HPI      Physical Exam  Vitals reviewed.   Constitutional:       General: She is awake. She is not in acute distress.     Appearance: She is obese. She is ill-appearing. She is not toxic-appearing.   HENT:      Head: Normocephalic and atraumatic.      Nose: Nose normal.      Mouth/Throat:      Mouth: Mucous membranes are moist.      Pharynx: Oropharynx is clear.   Eyes:      Conjunctiva/sclera: Conjunctivae normal.   Cardiovascular:      Rate and Rhythm: Normal rate and regular rhythm.      Pulses: Normal pulses.      Heart sounds: No murmur heard.  Pulmonary:      Effort: Pulmonary effort is normal. No respiratory distress.      Breath sounds: Decreased air movement present. Decreased breath sounds present.      Comments: Posterior bilateral breath sounds are diminished  Abdominal:      General: There is distension.      Palpations: Abdomen is soft.      Tenderness: There is abdominal tenderness. There is no guarding.      Comments: Bowel sounds hypoactive, abdomen distended, tender to palpation, dullness noted to the sides.   Musculoskeletal:         General: No swelling, deformity or signs of injury. Normal range of motion.      Cervical back: Neck supple.      Right lower leg: Edema present.      Left lower leg: Edema present.      Comments: Generalized edema/anasarca   Skin:     General: Skin is warm and dry.      Capillary Refill: Capillary refill takes less than 2 seconds.      Findings: No ecchymosis or wound.      Comments: Wound on coccyx, exam deferred  due to patient discomfort    Neurological:      General: No focal deficit present.      Mental Status: She is alert and oriented to person, place, and time.   Psychiatric:         Mood and Affect: Mood normal.         Behavior:  "Behavior is cooperative.                  Last Recorded Vitals  Blood pressure 93/51, pulse 100, temperature 36.4 °C (97.5 °F), temperature source Temporal, resp. rate 18, height 1.626 m (5' 4.02\"), weight 104 kg (229 lb 4.5 oz), SpO2 99%.     Relevant Results                  Results for orders placed or performed during the hospital encounter of 10/14/24 (from the past 24 hours)   Comprehensive Metabolic Panel   Result Value Ref Range     Glucose 95 74 - 99 mg/dL     Sodium 129 (L) 136 - 145 mmol/L     Potassium 4.6 3.5 - 5.3 mmol/L     Chloride 95 (L) 98 - 107 mmol/L     Bicarbonate 21 21 - 32 mmol/L     Anion Gap 18 10 - 20 mmol/L     Urea Nitrogen 79 (H) 6 - 23 mg/dL     Creatinine 4.17 (H) 0.50 - 1.05 mg/dL     eGFR 10 (L) >60 mL/min/1.73m*2     Calcium 7.4 (L) 8.6 - 10.3 mg/dL     Albumin 2.0 (L) 3.4 - 5.0 g/dL     Alkaline Phosphatase 171 (H) 33 - 136 U/L     Total Protein 4.9 (L) 6.4 - 8.2 g/dL     AST 29 9 - 39 U/L     Bilirubin, Total 0.3 0.0 - 1.2 mg/dL     ALT 10 7 - 45 U/L   CBC   Result Value Ref Range     WBC 27.3 (H) 4.4 - 11.3 x10*3/uL     nRBC 0.0 0.0 - 0.0 /100 WBCs     RBC 3.59 (L) 4.00 - 5.20 x10*6/uL     Hemoglobin 9.3 (L) 12.0 - 16.0 g/dL     Hematocrit 31.3 (L) 36.0 - 46.0 %     MCV 87 80 - 100 fL     MCH 25.9 (L) 26.0 - 34.0 pg     MCHC 29.7 (L) 32.0 - 36.0 g/dL     RDW 18.5 (H) 11.5 - 14.5 %     Platelets 338 150 - 450 x10*3/uL      CT chest abdomen pelvis w IV contrast     Result Date: 10/1/2024  Interpreted By:  Oscar Aldrich, STUDY: CT CHEST ABDOMEN PELVIS W IV CONTRAST;  10/1/2024 11:13 am   INDICATION: Signs/Symptoms:leukocytosis, ascites, recent ovarian cancer diagnosis.   COMPARISON: CT abdomen pelvis 08/08/2024   ACCESSION NUMBER(S): [id number]   ORDERING CLINICIAN: ASHLEY FAIRBANKS   TECHNIQUE: CT of the chest, abdomen, and pelvis was performed.  Contiguous axial images were obtained at 3 mm slice thickness through the chest, abdomen and pelvis. Coronal and sagittal " reconstructions at 3 mm slice thickness were performed. ml of contrast  were administered intravenously without immediate complication.   FINDINGS: CHEST:   LUNG/PLEURA/LARGE AIRWAYS: No endobronchial lesion is seen.   Moderate to large bilateral pleural effusions with adjacent consolidative opacification of the bilateral lower lobes suggestive of compressive atelectasis. No pneumothorax. Mild asymmetric scattered reticular changes suggestive of chronic lung findings.     VESSELS: The thoracic aorta is unremarkable with respect course, caliber, and contour.   Prominent main pulmonary artery measuring up to 3.2 cm in anterior-posterior dimension measured on sagittal plane which may indicate sequela of pulmonary hypertension.   No significant coronary atherosclerotic calcifications are seen.   HEART: Heart size within normal limits. No pericardial effusion.   MEDIASTINUM AND OLIVE: Mildly prominent mediastinal lymph nodes measuring up to 10 mm in short axis   Moderate hiatal hernia with partial intrathoracic stomach and ascites and region of nodularity of the peritoneal fat within hiatal hernia suggestive carcinomatosis.   CHEST WALL AND LOWER NECK: No acute osseous abnormality. Multilevel presumed degenerative changes throughout the imaged spine. No acute soft tissue abnormality.   ABDOMEN:   LIVER: There is been interval scalloping of the right hepatic margins with new regions of irregularity along the right hepatic capsule diffusely which is new since comparison imaging from 08/08/2024 there is a new elongated subcapsular collection measuring up to approximately 11.4 x 2.1 cm, difficult to measure given curvilinear projection extending over the right hepatic lobe margin (series 202, images 40-70). Similar appearing subcentimeter left hepatic lobe hypodense lesion.   BILE DUCTS: No obvious new intrahepatic biliary dilatation. Mild prominence of the common bile duct, nonspecific in a cholecystectomy patient.    GALLBLADDER: Absent.   PANCREAS: Unremarkable.   SPLEEN: Unchanged.   ADRENAL GLANDS: Unchanged.   KIDNEYS AND URETERS: Similar appearing subcentimeter right renal lower pole calculus. No hydronephrosis. No obstructing urolithiasis.   PELVIS:   BLADDER: Unremarkable.   REPRODUCTIVE ORGANS: Uterus appears grossly unchanged.   BOWEL: Moderate hiatal hernia with wall thickening of the stomach in the hernia. No new pathologic distention of bowel. Wall thickening of the colon within the splenic flexure descending colon and sigmoid colon, nonspecific.     VESSELS: No evidence of abdominal aortic aneurysm.   PERITONEUM/RETROPERITONEUM/LYMPH NODES: Large volume ascites with extensive regions of anterior omental caking and peritoneal carcinomatosis. No obvious free intraperitoneal gas. Given the presence of extensive omental caking and peritoneal carcinomatosis, superimposed peritoneal enhancement 4 other causes cannot be excluded.   BONE AND SOFT TISSUE: No acute osseous abnormality. Osseous structures appear stable. No acute abnormality of the abdominal wall soft tissues.        CHEST: 1.  Moderate to large bilateral pleural effusions with adjacent presumed compressive atelectasis. 2. Prominent main pulmonary artery which may indicate pulmonary hypertension. 3. Mildly prominent mediastinal lymph nodes measuring up to 10 mm in short axis concerning for metastatic disease. 4. Moderate hiatal hernia with partial intrathoracic stomach. The peritoneal fat adjacent to the stomach within the hernia demonstrates evidence of probable carcinomatosis and ascites.   ABDOMEN-PELVIS: 1.  Redemonstration of large volume ascites with regions of significant anterior omental caking and peritoneal carcinomatosis commensurate with patient's provided diagnosis of ovarian cancer. 2. Regions of wall thickening of the colon extending from the splenic flexure to the sigmoid which may indicate a nonspecific colitis. 3. Since the previous  examination on 08/08/2024, there are now extensive regions of scalloping of the right hepatic lobe peripherally with the appearance of a large subcapsular collection along the right hepatic margin as above. The sterility of this collection cannot be assessed via CT and clinical correlation is advised for exclusion superimposed infection within this region.     Signed by: Oscar Aldrich 10/1/2024 12:14 PM Dictation workstation:   [id number]     XR chest 1 view     Result Date: 10/1/2024  Interpreted By:  Samuel Jaramillo, STUDY: XR CHEST 1 VIEW; 10/1/2024 8:44 am   INDICATION: Signs/Symptoms:weakness, edema in legs and abdomen   COMPARISON: April 2023.   ACCESSION NUMBER(S): [id number]   ORDERING CLINICIAN: ASHLEY FAIRBANKS   FINDINGS: The study is limited due to rotation and poor inspiratory effort, with resultant crowding of the pulmonary vasculature. The cardiac silhouette is within normal limits for the technique. Moderate size hiatal hernia is again seen. There is no pneumothorax, confluent infiltrates or significant effusion. Degenerative changes involve the spine and shoulders; metallic anchors again noted over the left humeral head related to previous rotator cuff repair.        Limited study. Hiatal hernia. No acute cardiopulmonary disease.   Signed by: Samuel Jaramillo 10/1/2024 9:22 AM Dictation workstation:   [id number]     US guided abdominal paracentesis     Result Date: 9/20/2024  Interpreted By:  Peri Lawrence,  Vidal Whitehead STUDY: US GUIDED ABDOMINAL PARACENTESIS; US GUIDED PERCUTANEOUS ABDOMINAL RETROPERITONEUM BIOPSY;  9/19/2024 11:13 am   INDICATION: Signs/Symptoms:ascites; Signs/Symptoms:ascites, evaluate ovarian cancer.   COMPARISON: CT abdomen and pelvis 08/08/2024   ACCESSION NUMBER(S): [id number]; [id number]   ORDERING CLINICIAN: JEANETTE ARIAS   TECHNIQUE: INTERVENTIONALIST(S): Dr. Peri Lawrence MD   CONSENT: The patient was informed of the nature of the proposed procedure.  The purposes, alternatives, risks, and benefits were explained and discussed. All questions were answered and consent was obtained.   SEDATION: 1% local lidocaine was used for anesthetic.   MEDICATION/CONTRAST: No additional.   TIME OUT:   A time out was performed immediately prior to procedure start with the interventional team, correctly identifying the patient name, date of birth, MRN, procedure, anatomy (including marking of site and side), patient position, procedure consent form, relevant laboratory and imaging test results, antibiotic administration, safety precautions, and procedure-specific equipment needs.   COMPLICATIONS: No immediate adverse events identified.   FINDINGS: The patient was placed in the supine position. Limited sonographic images of the lower abdominal wall were obtained for purposes of needle guidance, which demonstrated omental soft tissue mass which was seen on prior CT 08/08/2024. The area of concern was prepped and draped under sterile technique.   1% lidocaine was injected subcutaneously. Additional lidocaine was administered into deeper tissues surrounding the targeted area for biopsy using a 22G spinal needle. A small incision was made. Using the same access site a 18 gauge core biopsy needle was passed via a 17 gauge coaxial introducer needle to obtain a total of 1 core sample.   Postprocedure images demonstrate no evidence for hemorrhage. The patient tolerated the procedure well and there were no immediate complications. 1 core specimen was sent to pathology.     Subsequently the right lower quadrant was visualized under ultrasound which demonstrated moderate volume ascites seen on prior CT 08/08/2024. 1% lidocaine was injected subcutaneously at this site. A 19 gauge Yueh was used to target the fluid collection under ultrasound guidance. Once the site was accessed, the inner needle was removed from the catheter. The Yueh catheter was connected to drainage container. Estimated  1000 cc of serosanguineous colored fluid was removed. Post paracentesis imaging demonstrated decreased ascitic fluid. The Yueh catheter was removed. The access site was bandaged.        1. Status post ultrasound guided core needle biopsy of omental mass seen on CT 08/08/2024. 1 core specimens sent to pathology. 2. Successful paracentesis under ultrasound guidance with removal of 1 L of serosanguineous fluid.     I was present for and/or performed the critical portions of the procedure and immediately available throughout the entire procedure.   I personally reviewed the image(s) / study and interpretation. I agree with the findings as stated.   Performed and dictated at [facility].   MACRO: None   Signed by: Peri Lawrence 9/20/2024 10:06 AM Dictation workstation:   [id number]     US guided percutaneous abdominal retroperitoneum biopsy     Result Date: 9/20/2024  Interpreted By:  Peri Lawrence and Kamau Nyokabi STUDY: US GUIDED ABDOMINAL PARACENTESIS; US GUIDED PERCUTANEOUS ABDOMINAL RETROPERITONEUM BIOPSY;  9/19/2024 11:13 am   INDICATION: Signs/Symptoms:ascites; Signs/Symptoms:ascites, evaluate ovarian cancer.   COMPARISON: CT abdomen and pelvis 08/08/2024   ACCESSION NUMBER(S): [id number]; [id number]   ORDERING CLINICIAN: JEANETTE ARIAS   TECHNIQUE: INTERVENTIONALIST(S): Dr. Peri Lawrence MD   CONSENT: The patient was informed of the nature of the proposed procedure. The purposes, alternatives, risks, and benefits were explained and discussed. All questions were answered and consent was obtained.   SEDATION: 1% local lidocaine was used for anesthetic.   MEDICATION/CONTRAST: No additional.   TIME OUT:   A time out was performed immediately prior to procedure start with the interventional team, correctly identifying the patient name, date of birth, MRN, procedure, anatomy (including marking of site and side), patient position, procedure consent form, relevant  laboratory and imaging test results, antibiotic administration, safety precautions, and procedure-specific equipment needs.   COMPLICATIONS: No immediate adverse events identified.   FINDINGS: The patient was placed in the supine position. Limited sonographic images of the lower abdominal wall were obtained for purposes of needle guidance, which demonstrated omental soft tissue mass which was seen on prior CT 08/08/2024. The area of concern was prepped and draped under sterile technique.   1% lidocaine was injected subcutaneously. Additional lidocaine was administered into deeper tissues surrounding the targeted area for biopsy using a 22G spinal needle. A small incision was made. Using the same access site a 18 gauge core biopsy needle was passed via a 17 gauge coaxial introducer needle to obtain a total of 1 core sample.   Postprocedure images demonstrate no evidence for hemorrhage. The patient tolerated the procedure well and there were no immediate complications. 1 core specimen was sent to pathology.     Subsequently the right lower quadrant was visualized under ultrasound which demonstrated moderate volume ascites seen on prior CT 08/08/2024. 1% lidocaine was injected subcutaneously at this site. A 19 gauge Yueh was used to target the fluid collection under ultrasound guidance. Once the site was accessed, the inner needle was removed from the catheter. The Yueh catheter was connected to drainage container. Estimated 1000 cc of serosanguineous colored fluid was removed. Post paracentesis imaging demonstrated decreased ascitic fluid. The Yueh catheter was removed. The access site was bandaged.        1. Status post ultrasound guided core needle biopsy of omental mass seen on CT 08/08/2024. 1 core specimens sent to pathology. 2. Successful paracentesis under ultrasound guidance with removal of 1 L of serosanguineous fluid.     I was present for and/or performed the critical portions of the procedure and  immediately available throughout the entire procedure.   I personally reviewed the image(s) / study and interpretation. I agree with the findings as stated.   Performed and dictated at [facility].   MACRO: None   Signed by: Peri Lawrence 9/20/2024 10:06 AM Dictation workstation:   [id number]            Assessment/Plan     Assessment & Plan  EUSEBIO (acute kidney injury) (CMS-HCC)     Acute kidney injury (CMS-HCC)        79-year-old female with past medical history of recently diagnosed ovarian cancer with peritoneal carcinomatosis s/p paracentesis x 4 (last one done 9/19/24), obesity, hyperlipidemia, osteoarthritis, and skin cancer s/p Mohs procedure.  Patient presents to the hospital with weakness and inability to care for herself.  Patient found to be hyponatremic and with evidence on CT imaging of possible abscess of the liver and ascites secondary to malignancy.  Hospitalized for further evaluation management..     #Ovarian cancer with peritoneal carcinomatosis  #Ascites  #Bilateral pleural effusions  # Suspected liver abscess  #Abdominal pain     Telemetry monitoring  Consult to IR for paracentesis and possible drainage of liver abscess  Consult to pulmonology for bilateral pleural effusions  Antiemetics as needed  Analgesics as needed  Patient needs to be followed up with by her gynecological oncologist to determine optimal plan going forward for treatment of her ovarian cancer     #Hyponatremia  #Generalized edema/anasarca  #Hypoalbuminemia  Patient is fluid overloaded and has significant third spacing, suspect hypervolemia hyponatremia.  Will check urine electrolytes, urine osmolality, and serum osmolality  Fluid restriction of 1500 ml for the time being.  Consider starting diuretics, defer to attending  Repeat labs in a.m.     #debility  PT/OT  Social work for discharge planning     Chronic issues:  #Obesity  #Hyperlipidemia  #Osteoarthritis     Continue with  patient's home medications as appropriate     #DVT prophylaxis  SCDs  Lovenox subcutaneous     I spent 75 minutes in the professional and overall care of this patient.        Jd Rhodes MD   Physician  Internal Medicine     Discharge Summary      Signed     Date of Service: 10/16/2024  5:13 PM   Related encounter: Admission (Discharged) from 10/14/2024 in [facility] with dJ Rhodes MD      Signed          Discharge Diagnosis  Hyponatremia     Issues Requiring Follow-Up  Patient fully evaluated 10/14/24 for   1.  Hyponatremia   AFB Culture/Smear  AFB Culture/Smear  2.  Abdominal fluid collection   AFB Culture/Smear  AFB Culture/Smear  3.  Malignant ascites (CMS-HCC)   AFB Culture/Smear  AFB Culture/Smear  4.  Pleural effusion   AFB Culture/Smear  AFB Culture/Smear  Call placed to GYN/ONC Dr. Villalba this AM with long discussion on patient, treatment options, and goals of care. Patient and sister aware of poor prognosis and bleak outcome. Patient with no significant clinical improvement noted, patient medically cleared for discharge today to [facility]. Patient seen resting in bed with head of bed elevated, no s/s or c/o acute difficulties at this time. Medications and labs reviewed, will begin comfort care medications and transition to hospice.     Results for orders placed or performed during the hospital encounter of 10/01/24 (from the past 24 hour(s))  CBC  Result  Value  Ref Range  WBC  29.2 (H)  4.4 - 11.3 x10*3/uL  nRBC  0.0  0.0 - 0.0 /100 WBCs  RBC  3.62 (L)  4.00 - 5.20 x10*6/uL  Hemoglobin  9.5 (L)  12.0 - 16.0 g/dL  Hematocrit  30.3 (L)  36.0 - 46.0 %  MCV  84  80 - 100 fL  MCH  26.2  26.0 - 34.0 pg  MCHC  31.4 (L)  32.0 - 36.0 g/dL  RDW  17.5 (H)  11.5 - 14.5 %  Platelets  436  150 - 450 x10*3/uL  Comprehensive metabolic panel  Result  Value  Ref Range  Glucose  83  74 - 99 mg/dL  Sodium  133 (L)  136 - 145 mmol/L  Potassium  4.3  3.5 - 5.3 mmol/L  Chloride  94 (L)  98 -  107 mmol/L  Bicarbonate  22  21 - 32 mmol/L  Anion Gap  21 (H)  10 - 20 mmol/L  Urea Nitrogen  64 (H)  6 - 23 mg/dL  Creatinine  4.01 (H)  0.50 - 1.05 mg/dL  eGFR  11 (L)  >60 mL/min/1.73m*2  Calcium  8.0 (L)  8.6 - 10.3 mg/dL  Albumin  2.2 (L)  3.4 - 5.0 g/dL  Alkaline Phosphatase  165 (H)  33 - 136 U/L  Total Protein  5.0 (L)  6.4 - 8.2 g/dL  AST  17  9 - 39 U/L  Bilirubin, Total  0.3  0.0 - 1.2 mg/dL  ALT  6 (L)  7 - 45 U/L        Intake/Output this shift:  No intake/output data recorded.     Vital signs for last 24 hours:  Temp:  [35.6 °C (96.1 °F)-36.8 °C (98.2 °F)] 35.9 °C (96.6 °F)  Heart Rate:  [] 110  Resp:  [16-20] 16  BP: ()/(41-78) 107/43        Plan discussed with interdisciplinary team, ok to discharge to [facility]. Patient aware and agreeable to current plan, continue plan as above. I spent 30 minutes on the date of the service which included preparing to see the patient, face-to-face patient care, completing clinical documentation, obtaining and/or reviewing separately obtained history, performing a medically appropriate examination, counseling and educating the patient/family/caregiver, ordering medications, tests, or procedures, communicating with other HCPs (not separately reported), independently interpreting results (not separately reported), communicating results to the patient/family/caregiver, and care coordination (not separately reported)     Discharge Meds     Medication List      START taking these medications     bisacodyl 10 mg suppository; Commonly known as: Dulcolax; Insert 1   suppository (10 mg) into the rectum once daily as needed for constipation.   glycopyrrolate 200 mcg/mL injection; Commonly known as: Robinul; Infuse   1 mL (0.2 mg) into a venous catheter every 4 hours if needed (excess   secretions).   haloperidol lactate 5 mg/mL injection; Commonly known as: Haldol; Infuse   0.2 mL (1 mg) into a venous catheter every 4 hours if needed for agitation    (delirium).   HYDROmorphone PF 0.2 mg/mL injection; Commonly known as: Dilaudid;   Infuse 1 mL (0.2 mg) into a venous catheter every 15 minutes if needed   (moderate pain (4 - 6)).   LORazepam 2 mg/mL injection; Commonly known as: Ativan; Infuse 0.25 mL   (0.5 mg) over 5 minutes into a venous catheter every 4 hours if needed   (anxiety, restlessness, insomnia).     CHANGE how you take these medications     HYDROmorphone 0.5 mg/0.5 mL solution injection; Commonly known as:   Dilaudid; Infuse 0.4 mL (0.4 mg) into a venous catheter every 15 minutes   if needed (severe pain).; What changed: when to take this, reasons to take   this     CONTINUE taking these medications     acetaminophen 325 mg tablet; Commonly known as: Tylenol; Take 2 tablets   (650 mg) by mouth every 6 hours if needed for mild pain (1 - 3).   cetirizine 10 mg tablet; Commonly known as: ZyrTEC; Take 1 tablet (10   mg) by mouth once daily.   cyclobenzaprine 5 mg tablet; Commonly known as: Flexeril; Take 1 tablet   (5 mg) by mouth 3 times a day.   docusate sodium 100 mg capsule; Commonly known as: Colace   ipratropium-albuteroL 0.5-2.5 mg/3 mL nebulizer solution; Commonly known   as: Duo-Neb; Take 3 mL by nebulization every 2 hours if needed for   wheezing or shortness of breath.   methadone 5 mg/5 mL solution; Commonly known as: Dolophine; Take 5 mL (5   mg) by mouth every 12 hours.   ondansetron 8 mg tablet; Commonly known as: Zofran   oxygen gas therapy; Commonly known as: O2; Inhale 2 L/min every 12   hours.   polyethylene glycol 17 gram packet; Commonly known as: Glycolax, Miralax   prednisoLONE acetate 1 % ophthalmic suspension; Commonly known as:   Pred-Forte   temazepam 7.5 mg capsule; Commonly known as: Restoril; Take 1 capsule   (7.5 mg) by mouth as needed at bedtime for sleep.   torsemide 10 mg tablet; Commonly known as: Demadex; Take 5 tablets (50   mg) by mouth once daily.        Test Results Pending At Discharge  Pending Labs                    No current pending labs.              Hospital Course          Jd Rhodes MD    Physician  Internal Medicine     Progress Notes        Signed     Date of Service: 10/13/2024  2:52 PM  Signed  Expand All Collapse All     Rohini Beaver is a 79 y.o. female on day 12 of admission presenting with Hyponatremia.           Subjective     Patient fully evaluated 10/3, awake, resting in bed. Patient with Moderate hiatal hernia with partial intrathoracic stomach and the peritoneal fat adjacent to the stomach within the hernia demonstrates evidence of probable carcinomatosis and ascites, Patient tolerated paracentesis on 10/01 well,order placed for repeat paracentesis therapeutic non diagnostic with IR.continue current and repeat labs in the AM. Patient aware and agreeable to current plan, continue plan as above. I spent 50 minutes in the professional and overall care of this patient.      Objective  Last Recorded Vitals  /56   Pulse 103   Temp 36.3 °C (97.3 °F)   Resp 20   Wt 104 kg (230 lb)   SpO2 93%   Intake/Output last 3 Shifts:     Intake/Output Summary (Last 24 hours) at 10/13/2024 1452  Last data filed at 10/13/2024 0500  Gross per 24 hour  Intake  120 ml  Output  --  Net  120 ml        Admission Weight  Weight: 104 kg (230 lb) (10/01/24 0828)     Daily Weight  10/01/24 : 104 kg (230 lb)     Image Results  CT abdomen pelvis wo IV contrast  Narrative: Interpreted By:  Deep Scott,   STUDY:  CT ABDOMEN PELVIS WO IV CONTRAST;  10/12/2024 1:30 pm      INDICATION:  Signs/Symptoms:Ascites /Abdominal Distention/Renal Failure.          COMPARISON:  CT scan 10/01/2024.      ACCESSION NUMBER(S):  [id number]      ORDERING CLINICIAN:  ALICIA GRAHAM      TECHNIQUE:  CT of the abdomen and pelvis was performed. Contiguous axial images  were obtained at 3 mm slice thickness through the abdomen and pelvis.  Coronal and sagittal reconstructions at 3 mm slice thickness were  performed.  No intravenous  contrast was administered; positive oral  contrast was given.      FINDINGS:  Please note that the evaluation of vessels, lymph nodes and organs is  limited without intravenous contrast.      LOWER CHEST:  Small to moderate bilateral pleural effusion with surrounding  atelectasis.      ABDOMEN:      LIVER:  Nodular surface with a scalloping of the right hepatic capsule  secondary to the adjacent malignant ascites. No definite parenchymal  lesions.      BILE DUCTS:  The intrahepatic and extrahepatic ducts are not dilated.      GALLBLADDER:  Surgically absent      PANCREAS:  No duct dilatation      SPLEEN:  No splenomegaly.      ADRENAL GLANDS:  No nodule      KIDNEYS AND URETERS:  The kidneys are normal in size and unremarkable in appearance.  No  hydroureteronephrosis or nephroureterolithiasis is identified.      PELVIS:      BLADDER:  Unremarkable      REPRODUCTIVE ORGANS:  Uterus is unremarkable      BOWEL:  Moderate hiatal hernia. No bowel dilatation. Few uncomplicated  colonic diverticulosis.      Moderate abdominopelvic ascites. Diffuse omental caking and  peritoneal carcinomatosis.      VESSELS:  Multifocal vascular calcifications and atherosclerotic changes.      PERITONEUM/RETROPERITONEUM/LYMPH NODES:  Multiple subcentimeter retroperitoneal, iliac and pelvic lymph nodes.      ABDOMINAL WALL:  Subcutaneous edema.      BONES:  Multilevel degenerative spine changes and facet joint disease. Prior  lower lumbar spine fixation.      Impression: Overall findings are grossly unchanged from 10/01/2024 CT scan.  1. Persistent moderate abdominopelvic ascites with diffuse peritoneal  carcinomatosis/omental caking.  2. 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  Underlying cirrhosis could not be entirely excluded as well.  3. Moderate bilateral pleural effusion with surrounding atelectasis.          MACRO:  None      Signed by:  Deep Scott 10/13/2024 7:50 AM  Dictation workstation:   [id number]        Physical Exam     Relevant Results                       Assessment/Plan  This patient currently has cardiac telemetry ordered; if you would like to modify or discontinue the telemetry order, click hereto go to the orders activity to modify/discontinue the order.            Jd Rhodes MD    Physician  Internal Medicine     H&P        Addendum     Date of Service: 10/1/2024  2:54 PM     Addendum     Expand All Collapse All    History Of Present Illness  Rohini Beaver is a 79-year-old female with past medical history of recently diagnosed ovarian cancer with peritoneal carcinomatosis s/p paracentesis x 4 (last one done 9/19/24), obesity, hyperlipidemia, osteoarthritis, and skin cancer s/p Mohs procedure.  Patient presents to the hospital with weakness and inability to care for herself.  She reports that she was at [facility] nursing facility, however had a brief hospitalization at [facility] and upon discharge decided to go home rather than go back to [facility].  She lives with her 86-year-old sister and has a couple friends who assist with appointments, however limited support system.  Sister unable to care for due to age.  Today she was in urgent reclining chair and was able to get up.  ROS additionally positive for cold sweats, distended and tender abdomen, edema, pressure injury to coccyx/gluteal fold, and decreased activity tolerance.  Denies history of heart disease.  She reports that she is scheduled for outpatient appointment with her oncologist tomorrow to determine ongoing plan.      ER course: Hemodynamically stable, afebrile, SpO2 90s on room air.  WBC 28.9, Hgb 11.0/Hct34.1 (Hgb 15.1 4/4/2023), platelets 446. Glucose 107, Sodium 126, Chloride 94, calcium 8.5, albumin 2.4, alkaline phosphatase 231.  Lactate 1.4.  BNP 74.  High-sensitivity troponin 7.  UA unremarkable. CT chest showed moderate to  large bilateral pleural effusion with adjacent presumed compressive atelectasis, prominent main pulmonary artery which may indicate pulmonary hypertension, mildly prominent mediastinal lymph nodes measuring up to 10 mm in short axis concerning for metastatic disease.  Moderate hiatal hernia with partial intrathoracic stomach and the peritoneal fat adjacent to the stomach within the hernia demonstrates evidence of probable carcinomatosis and ascites.  Redemonstration of large volume ascites with regions of significant anterior omental caking and peritoneal carcinomatosis commensurate with patient's provided diagnosis of ovarian cancer. Regions of wall thickening of the colon extending from the splenic flexure to the sigmoid.  Extensive region of scalloping of the right hepatic lobe peripherally with appearance of large subcapsular collection along the right hepatic margin. Blood culture in process     Past medical history: As above  Past surgical history: Cholecystectomy, lumbar laminectomy, left shoulder arthroscopy, right total knee replacement, corneal transplant  Social history: No history of smoking, alcohol abuse, illicit drug use.  Lives with her elderly sister who is 86 years old.  Limited support system.   Family history: Mother-cancer (unknown type)     Past Medical History  Medical History           Past Medical History:  Diagnosis  Date    Bilateral primary osteoarthritis of knee       HLD (hyperlipidemia)       Lumbosacral radiculopathy       Meralgia paraesthetica       Physical deconditioning           Surgical History  Surgical History              Past Surgical History:  Procedure  Laterality  Date    ARTHROPLASTY  Left        Left thumb carpometacarpal arthroplasty with ligament reconstruction and tendon interposition    BREAST BIOPSY     1999     Benign    CHOLECYSTECTOMY          COLONOSCOPY          CORNEAL TRANSPLANT          DILATION AND CURETTAGE OF UTERUS          LUMBAR FUSION           SHOULDER ARTHROSCOPY  Left       SKIN LESION EXCISION          TOTAL KNEE ARTHROPLASTY  Left  2019    TOTAL KNEE ARTHROPLASTY  Right  2017    TRIGGER FINGER RELEASE  Right           Social History  She reports that she has never smoked. She has never used smokeless tobacco. She reports that she does not currently use alcohol. She reports that she does not use drugs.     Family History  Family History                 Family History  Problem  Relation  Name  Age of Onset    Colon cancer  Mother          Lung cancer  Father          Other (Mesothelioma)  Brother          Brain cancer  Mother's Brother              Allergies  Patient has no known allergies.     Review of Systems     10 point ROS negative except as noted above in HPI      Physical Exam  Vitals reviewed.   Constitutional:       General: She is awake. She is not in acute distress.     Appearance: She is obese. She is ill-appearing. She is not toxic-appearing.   HENT:      Head: Normocephalic and atraumatic.      Nose: Nose normal.      Mouth/Throat:      Mouth: Mucous membranes are moist.      Pharynx: Oropharynx is clear.   Eyes:      Conjunctiva/sclera: Conjunctivae normal.   Cardiovascular:      Rate and Rhythm: Normal rate and regular rhythm.      Pulses: Normal pulses.      Heart sounds: No murmur heard.  Pulmonary:      Effort: Pulmonary effort is normal. No respiratory distress.      Breath sounds: Decreased air movement present. Decreased breath sounds present.      Comments: Posterior bilateral breath sounds are diminished  Abdominal:      General: There is distension.      Palpations: Abdomen is soft.      Tenderness: There is abdominal tenderness. There is no guarding.      Comments: Bowel sounds hypoactive, abdomen distended, tender to palpation, dullness noted to the sides.   Musculoskeletal:         General: No swelling, deformity or signs of injury. Normal range of motion.      Cervical back: Neck supple.      Right lower leg: Edema  present.      Left lower leg: Edema present.      Comments: Generalized edema/anasarca   Skin:     General: Skin is warm and dry.      Capillary Refill: Capillary refill takes less than 2 seconds.      Findings: No ecchymosis or wound.      Comments: Wound on coccyx, exam deferred  due to patient discomfort    Neurological:      General: No focal deficit present.      Mental Status: She is alert and oriented to person, place, and time.   Psychiatric:         Mood and Affect: Mood normal.         Behavior: Behavior is cooperative.                  Last Recorded Vitals  Blood pressure 117/58, pulse 100, temperature 36.2 °C (97.2 °F), temperature source Temporal, resp. rate (!) 22, weight 104 kg (230 lb), SpO2 94%.     Relevant Results                    Results for orders placed or performed during the hospital encounter of 10/01/24 (from the past 24 hour(s))  CBC and Auto Differential  Result  Value  Ref Range     WBC  28.9 (H)  4.4 - 11.3 x10*3/uL     nRBC  0.0  0.0 - 0.0 /100 WBCs     RBC  4.15  4.00 - 5.20 x10*6/uL     Hemoglobin  11.0 (L)  12.0 - 16.0 g/dL     Hematocrit  34.1 (L)  36.0 - 46.0 %     MCV  82  80 - 100 fL     MCH  26.5  26.0 - 34.0 pg     MCHC  32.3  32.0 - 36.0 g/dL     RDW  15.7 (H)  11.5 - 14.5 %     Platelets  446  150 - 450 x10*3/uL     Neutrophils %  89.1  40.0 - 80.0 %     Immature Granulocytes %, Automated  1.0 (H)  0.0 - 0.9 %     Lymphocytes %  4.3  13.0 - 44.0 %     Monocytes %  4.8  2.0 - 10.0 %     Eosinophils %  0.5  0.0 - 6.0 %     Basophils %  0.3  0.0 - 2.0 %     Neutrophils Absolute  25.74 (H)  1.60 - 5.50 x10*3/uL     Immature Granulocytes Absolute, Automated  0.30  0.00 - 0.50 x10*3/uL     Lymphocytes Absolute  1.23  0.80 - 3.00 x10*3/uL     Monocytes Absolute  1.38 (H)  0.05 - 0.80 x10*3/uL     Eosinophils Absolute  0.14  0.00 - 0.40 x10*3/uL     Basophils Absolute  0.09  0.00 - 0.10 x10*3/uL  Comprehensive metabolic panel  Result  Value  Ref Range     Glucose  107 (H)  74 - 99  mg/dL     Sodium  126 (L)  136 - 145 mmol/L     Potassium  5.2  3.5 - 5.3 mmol/L     Chloride  94 (L)  98 - 107 mmol/L     Bicarbonate  24  21 - 32 mmol/L     Anion Gap  13  10 - 20 mmol/L     Urea Nitrogen  18  6 - 23 mg/dL     Creatinine  0.61  0.50 - 1.05 mg/dL     eGFR  >90  >60 mL/min/1.73m*2     Calcium  8.5 (L)  8.6 - 10.3 mg/dL     Albumin  2.4 (L)  3.4 - 5.0 g/dL     Alkaline Phosphatase  231 (H)  33 - 136 U/L     Total Protein  5.7 (L)  6.4 - 8.2 g/dL     AST  23  9 - 39 U/L     Bilirubin, Total  0.3  0.0 - 1.2 mg/dL     ALT  8  7 - 45 U/L  Magnesium  Result  Value  Ref Range     Magnesium  1.68  1.60 - 2.40 mg/dL  Troponin I, High Sensitivity  Result  Value  Ref Range     Troponin I, High Sensitivity  7  0 - 13 ng/L  B-Type Natriuretic Peptide  Result  Value  Ref Range     BNP  74  0 - 99 pg/mL  Sars-CoV-2 PCR  Result  Value  Ref Range     Coronavirus 2019, PCR  Not Detected  Not Detected  Lactate  Result  Value  Ref Range     Lactate  1.4  0.4 - 2.0 mmol/L  Urinalysis with Reflex Culture and Microscopic  Result  Value  Ref Range     Color, Urine  Light-Yellow  Light-Yellow, Yellow, Dark-Yellow     Appearance, Urine  Clear  Clear     Specific Gravity, Urine  >1.050 (N)  1.005 - 1.035     pH, Urine  6.0  5.0, 5.5, 6.0, 6.5, 7.0, 7.5, 8.0     Protein, Urine  20 (TRACE)  NEGATIVE, 10 (TRACE), 20 (TRACE) mg/dL     Glucose, Urine  Normal  Normal mg/dL     Blood, Urine  NEGATIVE  NEGATIVE     Ketones, Urine  NEGATIVE  NEGATIVE mg/dL     Bilirubin, Urine  NEGATIVE  NEGATIVE     Urobilinogen, Urine  Normal  Normal mg/dL     Nitrite, Urine  NEGATIVE  NEGATIVE     Leukocyte Esterase, Urine  NEGATIVE  NEGATIVE  Urinalysis Microscopic  Result  Value  Ref Range     WBC, Urine  1-5  1-5, NONE /HPF     RBC, Urine  1-2  NONE, 1-2, 3-5 /HPF     Squamous Epithelial Cells, Urine  1-9 (SPARSE)  Reference range not established. /HPF     Mucus, Urine  FEW  Reference range not established. /LPF  Protime-INR  Result  Value  Ref  Range     Protime  13.0 (H)  9.8 - 12.8 seconds     INR  1.2 (H)  0.9 - 1.1  APTT  Result  Value  Ref Range     aPTT  24 (L)  27 - 38 seconds     CT chest abdomen pelvis w IV contrast     Result Date: 10/1/2024  Interpreted By:  Oscar Aldrich, STUDY: CT CHEST ABDOMEN PELVIS W IV CONTRAST;  10/1/2024 11:13 am   INDICATION: Signs/Symptoms:leukocytosis, ascites, recent ovarian cancer diagnosis.   COMPARISON: CT abdomen pelvis 08/08/2024   ACCESSION NUMBER(S): [id number]   ORDERING CLINICIAN: ASHLEY FAIRBANKS   TECHNIQUE: CT of the chest, abdomen, and pelvis was performed.  Contiguous axial images were obtained at 3 mm slice thickness through the chest, abdomen and pelvis. Coronal and sagittal reconstructions at 3 mm slice thickness were performed. ml of contrast  were administered intravenously without immediate complication.   FINDINGS: CHEST:   LUNG/PLEURA/LARGE AIRWAYS: No endobronchial lesion is seen.   Moderate to large bilateral pleural effusions with adjacent consolidative opacification of the bilateral lower lobes suggestive of compressive atelectasis. No pneumothorax. Mild asymmetric scattered reticular changes suggestive of chronic lung findings.     VESSELS: The thoracic aorta is unremarkable with respect course, caliber, and contour.   Prominent main pulmonary artery measuring up to 3.2 cm in anterior-posterior dimension measured on sagittal plane which may indicate sequela of pulmonary hypertension.   No significant coronary atherosclerotic calcifications are seen.   HEART: Heart size within normal limits. No pericardial effusion.   MEDIASTINUM AND OLIVE: Mildly prominent mediastinal lymph nodes measuring up to 10 mm in short axis   Moderate hiatal hernia with partial intrathoracic stomach and ascites and region of nodularity of the peritoneal fat within hiatal hernia suggestive carcinomatosis.   CHEST WALL AND LOWER NECK: No acute osseous abnormality. Multilevel presumed degenerative changes throughout  the imaged spine. No acute soft tissue abnormality.   ABDOMEN:   LIVER: There is been interval scalloping of the right hepatic margins with new regions of irregularity along the right hepatic capsule diffusely which is new since comparison imaging from 08/08/2024 there is a new elongated subcapsular collection measuring up to approximately 11.4 x 2.1 cm, difficult to measure given curvilinear projection extending over the right hepatic lobe margin (series 202, images 40-70). Similar appearing subcentimeter left hepatic lobe hypodense lesion.   BILE DUCTS: No obvious new intrahepatic biliary dilatation. Mild prominence of the common bile duct, nonspecific in a cholecystectomy patient.   GALLBLADDER: Absent.   PANCREAS: Unremarkable.   SPLEEN: Unchanged.   ADRENAL GLANDS: Unchanged.   KIDNEYS AND URETERS: Similar appearing subcentimeter right renal lower pole calculus. No hydronephrosis. No obstructing urolithiasis.   PELVIS:   BLADDER: Unremarkable.   REPRODUCTIVE ORGANS: Uterus appears grossly unchanged.   BOWEL: Moderate hiatal hernia with wall thickening of the stomach in the hernia. No new pathologic distention of bowel. Wall thickening of the colon within the splenic flexure descending colon and sigmoid colon, nonspecific.     VESSELS: No evidence of abdominal aortic aneurysm.   PERITONEUM/RETROPERITONEUM/LYMPH NODES: Large volume ascites with extensive regions of anterior omental caking and peritoneal carcinomatosis. No obvious free intraperitoneal gas. Given the presence of extensive omental caking and peritoneal carcinomatosis, superimposed peritoneal enhancement 4 other causes cannot be excluded.   BONE AND SOFT TISSUE: No acute osseous abnormality. Osseous structures appear stable. No acute abnormality of the abdominal wall soft tissues.        CHEST: 1.  Moderate to large bilateral pleural effusions with adjacent presumed compressive atelectasis. 2. Prominent main pulmonary artery which may indicate  pulmonary hypertension. 3. Mildly prominent mediastinal lymph nodes measuring up to 10 mm in short axis concerning for metastatic disease. 4. Moderate hiatal hernia with partial intrathoracic stomach. The peritoneal fat adjacent to the stomach within the hernia demonstrates evidence of probable carcinomatosis and ascites.   ABDOMEN-PELVIS: 1.  Redemonstration of large volume ascites with regions of significant anterior omental caking and peritoneal carcinomatosis commensurate with patient's provided diagnosis of ovarian cancer. 2. Regions of wall thickening of the colon extending from the splenic flexure to the sigmoid which may indicate a nonspecific colitis. 3. Since the previous examination on 08/08/2024, there are now extensive regions of scalloping of the right hepatic lobe peripherally with the appearance of a large subcapsular collection along the right hepatic margin as above. The sterility of this collection cannot be assessed via CT and clinical correlation is advised for exclusion superimposed infection within this region.     Signed by: Oscar Aldrich 10/1/2024 12:14 PM Dictation workstation:   [id number]     XR chest 1 view     Result Date: 10/1/2024  Interpreted By:  Samuel Jaramillo, STUDY: XR CHEST 1 VIEW; 10/1/2024 8:44 am   INDICATION: Signs/Symptoms:weakness, edema in legs and abdomen   COMPARISON: April 2023.   ACCESSION NUMBER(S): [id number]   ORDERING CLINICIAN: ASHLEY FAIRBANKS   FINDINGS: The study is limited due to rotation and poor inspiratory effort, with resultant crowding of the pulmonary vasculature. The cardiac silhouette is within normal limits for the technique. Moderate size hiatal hernia is again seen. There is no pneumothorax, confluent infiltrates or significant effusion. Degenerative changes involve the spine and shoulders; metallic anchors again noted over the left humeral head related to previous rotator cuff repair.        Limited study. Hiatal hernia. No acute  cardiopulmonary disease.   Signed by: Samuel Jaramillo 10/1/2024 9:22 AM Dictation workstation:   [id number]     US guided abdominal paracentesis     Result Date: 9/20/2024  Interpreted By:  Peri Lawrence and Kamau Nyokabi STUDY: US GUIDED ABDOMINAL PARACENTESIS; US GUIDED PERCUTANEOUS ABDOMINAL RETROPERITONEUM BIOPSY;  9/19/2024 11:13 am   INDICATION: Signs/Symptoms:ascites; Signs/Symptoms:ascites, evaluate ovarian cancer.   COMPARISON: CT abdomen and pelvis 08/08/2024   ACCESSION NUMBER(S): [id number]; [id number]   ORDERING CLINICIAN: JEANETTE AIRAS   TECHNIQUE: INTERVENTIONALIST(S): Dr. Peri Lawrence MD   CONSENT: The patient was informed of the nature of the proposed procedure. The purposes, alternatives, risks, and benefits were explained and discussed. All questions were answered and consent was obtained.   SEDATION: 1% local lidocaine was used for anesthetic.   MEDICATION/CONTRAST: No additional.   TIME OUT:   A time out was performed immediately prior to procedure start with the interventional team, correctly identifying the patient name, date of birth, MRN, procedure, anatomy (including marking of site and side), patient position, procedure consent form, relevant laboratory and imaging test results, antibiotic administration, safety precautions, and procedure-specific equipment needs.   COMPLICATIONS: No immediate adverse events identified.   FINDINGS: The patient was placed in the supine position. Limited sonographic images of the lower abdominal wall were obtained for purposes of needle guidance, which demonstrated omental soft tissue mass which was seen on prior CT 08/08/2024. The area of concern was prepped and draped under sterile technique.   1% lidocaine was injected subcutaneously. Additional lidocaine was administered into deeper tissues surrounding the targeted area for biopsy using a 22G spinal needle. A small incision was made. Using the same access site a 18 gauge core biopsy  needle was passed via a 17 gauge coaxial introducer needle to obtain a total of 1 core sample.   Postprocedure images demonstrate no evidence for hemorrhage. The patient tolerated the procedure well and there were no immediate complications. 1 core specimen was sent to pathology.     Subsequently the right lower quadrant was visualized under ultrasound which demonstrated moderate volume ascites seen on prior CT 08/08/2024. 1% lidocaine was injected subcutaneously at this site. A 19 gauge Yueh was used to target the fluid collection under ultrasound guidance. Once the site was accessed, the inner needle was removed from the catheter. The Yueh catheter was connected to drainage container. Estimated 1000 cc of serosanguineous colored fluid was removed. Post paracentesis imaging demonstrated decreased ascitic fluid. The Yueh catheter was removed. The access site was bandaged.        1. Status post ultrasound guided core needle biopsy of omental mass seen on CT 08/08/2024. 1 core specimens sent to pathology. 2. Successful paracentesis under ultrasound guidance with removal of 1 L of serosanguineous fluid.     I was present for and/or performed the critical portions of the procedure and immediately available throughout the entire procedure.   I personally reviewed the image(s) / study and interpretation. I agree with the findings as stated.   Performed and dictated at [facility].   MACRO: None   Signed by: Peri Lawrence 9/20/2024 10:06 AM Dictation workstation:   [id number]     US guided percutaneous abdominal retroperitoneum biopsy     Result Date: 9/20/2024  Interpreted By:  Peri Lawrence and Kamau Nyokabi STUDY: US GUIDED ABDOMINAL PARACENTESIS; US GUIDED PERCUTANEOUS ABDOMINAL RETROPERITONEUM BIOPSY;  9/19/2024 11:13 am   INDICATION: Signs/Symptoms:ascites; Signs/Symptoms:ascites, evaluate ovarian cancer.   COMPARISON: CT abdomen and pelvis 08/08/2024   ACCESSION  NUMBER(S): [id number]; [id number]   ORDERING CLINICIAN: JEANETTE ARIAS   TECHNIQUE: INTERVENTIONALIST(S): Dr. Peri Lawrence MD   CONSENT: The patient was informed of the nature of the proposed procedure. The purposes, alternatives, risks, and benefits were explained and discussed. All questions were answered and consent was obtained.   SEDATION: 1% local lidocaine was used for anesthetic.   MEDICATION/CONTRAST: No additional.   TIME OUT:   A time out was performed immediately prior to procedure start with the interventional team, correctly identifying the patient name, date of birth, MRN, procedure, anatomy (including marking of site and side), patient position, procedure consent form, relevant laboratory and imaging test results, antibiotic administration, safety precautions, and procedure-specific equipment needs.   COMPLICATIONS: No immediate adverse events identified.   FINDINGS: The patient was placed in the supine position. Limited sonographic images of the lower abdominal wall were obtained for purposes of needle guidance, which demonstrated omental soft tissue mass which was seen on prior CT 08/08/2024. The area of concern was prepped and draped under sterile technique.   1% lidocaine was injected subcutaneously. Additional lidocaine was administered into deeper tissues surrounding the targeted area for biopsy using a 22G spinal needle. A small incision was made. Using the same access site a 18 gauge core biopsy needle was passed via a 17 gauge coaxial introducer needle to obtain a total of 1 core sample.   Postprocedure images demonstrate no evidence for hemorrhage. The patient tolerated the procedure well and there were no immediate complications. 1 core specimen was sent to pathology.     Subsequently the right lower quadrant was visualized under ultrasound which demonstrated moderate volume ascites seen on prior CT 08/08/2024. 1% lidocaine was injected subcutaneously at this site. A 19 gauge Wrapp  was used to target the fluid collection under ultrasound guidance. Once the site was accessed, the inner needle was removed from the catheter. The Yueh catheter was connected to drainage container. Estimated 1000 cc of serosanguineous colored fluid was removed. Post paracentesis imaging demonstrated decreased ascitic fluid. The Yueh catheter was removed. The access site was bandaged.        1. Status post ultrasound guided core needle biopsy of omental mass seen on CT 08/08/2024. 1 core specimens sent to pathology. 2. Successful paracentesis under ultrasound guidance with removal of 1 L of serosanguineous fluid.     I was present for and/or performed the critical portions of the procedure and immediately available throughout the entire procedure.   I personally reviewed the image(s) / study and interpretation. I agree with the findings as stated.   Performed and dictated at [facility].   MACRO: None   Signed by: Peri Lawrence 9/20/2024 10:06 AM Dictation workstation:   [id number]            Assessment/Plan        Assessment & Plan    Hyponatremia        79-year-old female with past medical history of recently diagnosed ovarian cancer with peritoneal carcinomatosis s/p paracentesis x 4 (last one done 9/19/24), obesity, hyperlipidemia, osteoarthritis, and skin cancer s/p Mohs procedure.  Patient presents to the hospital with weakness and inability to care for herself.  Patient found to be hyponatremic and with evidence on CT imaging of possible abscess of the liver and ascites secondary to malignancy.  Hospitalized for further evaluation management..     #Ovarian cancer with peritoneal carcinomatosis  #Ascites  #Bilateral pleural effusions  # Suspected liver abscess  #Abdominal pain     Telemetry monitoring  Consult to IR for paracentesis and possible drainage of liver abscess  Consult to pulmonology for bilateral pleural effusions  Antiemetics as needed  Analgesics as  needed  Patient needs to be followed up with by her gynecological oncologist to determine optimal plan going forward for treatment of her ovarian cancer     #Hyponatremia  #Generalized edema/anasarca  #Hypoalbuminemia  Patient is fluid overloaded and has significant third spacing, suspect hypervolemia hyponatremia.  Will check urine electrolytes, urine osmolality, and serum osmolality  Fluid restriction of 1500 ml for the time being.  Consider starting diuretics, defer to attending  Repeat labs in a.m.     #debility  PT/OT  Social work for discharge planning     Chronic issues:  #Obesity  #Hyperlipidemia  #Osteoarthritis     Continue with patient's home medications as appropriate     #DVT prophylaxis  SCDs  Lovenox subcutaneous     I spent 75 minutes in the professional and overall care of this patient.        Revision History           Patient fully evaluated 10/2, awake, resting in bed. Patient with Moderate hiatal hernia with partial intrathoracic stomach and the peritoneal fat adjacent to the stomach within the hernia demonstrates evidence of probable carcinomatosis and ascites, Patient tolerated paracentesis on 10/01, awaiting culture results.No s/s or c/o acute difficulties at this time. Medications and labs reviewed.  Plan discussed with interdisciplinary team, per pulmonology - Plan:  Patient has bilateral pleural effusion in the context of malignant ascites/ovarian cancer I explained to the patient the pleural effusion most likely related to recurrent ascites, patient is requiring so far 5 steps malignant ascites in the last month most likely beneficial approaches intra-abdominal Pleurx catheter Abdominal Pleurx catheter would be the most effective way of preventing pleural effusions and ascites.  Pleural effusions are secondary to ascites, both of which are due to patient's underlying cancer Continue with goals of care discussions prior to interventional radiology consult Follow hepatic fluid analysis  Continue IV antibiotics Zosyn, continue current and repeat labs in the AM. Patient still requiring frequent cardiac and SPO2 monitoring. Discharge planning discussed with patient and care team. Therapy evaluations ordered-  [facility] 14, anticipate SNF/HHC at discharge. Patient aware and agreeable to current plan, continue plan as above. I spent 50 minutes in the professional and overall care of this patient.              Assessment & Plan  Hyponatremia     Patient fully evaluated 10/3, awake, resting in bed. Patient with Moderate hiatal hernia with partial intrathoracic stomach and the peritoneal fat adjacent to the stomach within the hernia demonstrates evidence of probable carcinomatosis and ascites, Patient tolerated paracentesis on 10/01 well,order placed for repeat paracentesis therapeutic non diagnostic with IR.continue current and repeat labs in the AM. Patient aware and agreeable to current plan, continue plan as above.     Patient fully evaluated on October 4.  Patient awaiting therapeutic paracentesis.  Patient probably will need albumin afterwards.  Continue to monitor response with above treatments recheck labs in AM.  I spent 50 minutes in the professional and overall care of this patient.       Patient fully evaluated 10/06, head of bed elevated, no s/s or c/o acute difficulties at this time. Sister at bedside and agreeable to plan. Attempted therapeutic paracentesis by IR, aborted procedure due to insufficient fluid accumulation.  Medications and labs reviewed, cultures blood no growth at 4 days, AFB Culture      Culture in progress and will be examined weekly. A result will be issued either when positive or after 8 weeks incubation. AFB Stain -No acid fast bacilli seen.  Plan discussed with interdisciplinary team, continue current and repeat labs in the AM. Per pulmonary - Day of consult, patient is breathing on room air. Vital stable. CT chest showed bilateral large pleural effusions. Dicussed need for  Abdominal Pleurx catheter. Patient with likely carcinomatosis and plan to discharge to [facility] and will follow up with gynecology in 7 days,     Patient still requiring frequent cardiac and SPO2 monitoring. Discharge planning discussed with patient and care team. Therapy evaluations ordered- [place] [id number], [facility] at discharge. Patient aware and agreeable to current plan, continue plan as above. I spent 50 minutes in the professional and overall care of this patient.     Patient fully evaluated 10/07, head of bed elevated, no s/s or c/o acute difficulties at this time. Medications and labs reviewed, sodium 125, nephrology consulted.  Cultures blood no growth at 4 days, AFB Culture      Culture in progress and will be examined weekly. A result will be issued either when positive or after 8 weeks incubation. AFB Stain -No acid fast bacilli seen.  Plan discussed with interdisciplinary team, continue current and repeat labs in the AM, new supplemental oxygen requirements, will repeat chest xray in AM, maintain HOB elevated to alleviate postural dyspnea. Vitals stable. Patient with likely carcinomatosis and plan to discharge to [facility] and will follow up with gynecology in 7 days, atient still requiring frequent cardiac and SPO2 monitoring. Discharge planning discussed with patient and care team. Therapy evaluations ordered- [place] [id number], [facility] at discharge. Patient aware and agreeable to current plan, continue plan as above. I spent 50 minutes in the professional and overall care of this patient.   Patient fully evaluated 10/08, head of bed elevated, no s/s or c/o acute difficulties at this time. Medications and labs reviewed, sodium low again today at 125, nephrology consulted. Awaiting hepatic fluid analysis -AFB Culture -Culture in progress and will be examined weekly. A result will be issued either when positive or after 8 weeks incubation. AFB Stain -No acid fast bacilli seen.  Plan  discussed with interdisciplinary team, per nephrology - Hyponatremia  Acute kidney injury   Ascites  Malnutrition  Anemia  Pleural effusion  Plan;  Discontinue potential nephrotoxins  Nutritional/iron/renal indices/urinary indices  DuoNeb aerosols  Appreciate nephrology input. Patient requiring supplemental oxygen at this time, continue to maintain HOB elevated as tolerated. Patient seen by pulmonology - Consult IR for right sided diagnostic and therapeutic thoracentesis  Unable to safely perform paracentesis due to lack of fluid  Patient has bilateral pleural effusion in the context of malignant ascites/ovarian cancer - Pleural effusions are secondary to ascites, both of which are due to patient's underlying cancer. Continue with goals of care discussions prior to interventional radiology consult. Continue IV antibiotics zosyn, probiotics added. Continue current plan and repeat labs in the AM, repeat chest xray in AM, maintain HOB elevated to alleviate postural dyspnea. Vitals stable. Patient with likely carcinomatosis and plan to discharge to SNF - [facility] and will follow up with gynecology in 7 days, atient still requiring frequent cardiac and SPO2 monitoring. Discharge planning discussed with patient and care team. Therapy evaluations ordered- [place] [id number], [facility] at discharge. Patient aware and agreeable to current plan, continue plan as above. I spent 50 minutes in the professional and overall care of this patient.     Patient fully evaluated 10/09, patient resting in bed with HOB, no s/s or c/o acute difficulties at this time. Medications and labs reviewed, cultures no growth at 4 days, AFB cultures in process. Plan discussed with interdisciplinary team, pulmonary plan - Bilateral pleural effusion  Abdominal ascites  Ovarian carcinoma w/ peritoneal carcinomatosis carcinomatosis  HLD  Patient clear for discharge to SNF from standpoint of pulmonology  Patient will likely need an abdominal PleurX catheter  at some point. However, we were unable to perform safely during this hospitalization due to lack of ascitic fluid  Pleural fluid suggests exudative effusion, likely secondary to malignancy  Okay to discontinue antibiotics from standpoint of pulmonology. Low suspicion of pneumonia. Leukocytosis is unlikely reflective of infection.   Will continue current and repeat labs in the AM. Patient still requiring frequent cardiac and SPO2 monitoring. Discharge planning discussed with patient and care team. Therapy evaluations ordered- [facility] 10, anticipate SNF at discharge. Patient aware and agreeable to current plan, continue plan as above. I spent 50 minutes in the professional and overall care of this patient.     Patient fully evaluated 10/10, patient resting in bed with HOB, no s/s or c/o acute difficulties at this time. Medications and labs reviewed, cultures no growth at 5 days, AFB cultures in process. Plan discussed with interdisciplinary team, pulmonary plan- Bilateral pleural effusion  Abdominal ascites Ovarian carcinoma w/ peritoneal carcinomatosis carcinomatosis HLD Patient clear for discharge to SNF from standpoint.   Patient will likely need an abdominal PleurX catheter at some point. However, we were unable to perform safely during this hospitalization due to lack of ascitic fluid Pleural fluid suggests exudative effusion, likely secondary to malignancy Okay to discontinue antibiotics from standpoint of pulmonology. Low suspicion of pneumonia. Leukocytosis is unlikely reflective of infection.   Patient's sister at bedside, discussion of plan of care and will follow up with gynecology outpatient, possibly Dr. Jones. Will continue current and repeat labs in the AM. Patient with frequent bowel movements, soft, will hold miralax at this time. Patient still requiring frequent cardiac and SPO2 monitoring. Discharge planning discussed with patient and care team. Therapy evaluations ordered- [facility] 10, anticipate SNF  at discharge. Patient aware and agreeable to current plan, continue plan as above. I spent 50 minutes in the professional and overall care of this patient.     Patient fully evaluated 10/11, patient resting in bed with HOB, no s/s or c/o acute difficulties at this time. Medications and labs reviewed, cultures no growth at 5 days, AFB cultures in process, creatinine and Bun continue to rise. Nephrology on the case, appreciate input.  Plan discussed with interdisciplinary team, pulmonary plan- agree to discontinue antibiotics from standpoint of pulmonology with Low suspicion of pneumonia. Continues to require supplemental oxygen at this time. Leukocytosis is unlikely reflective of infection. Patient pain medications updated based on renal function - will switch Roxicodone. Long discussion with patient's sister and patient plan of care and will follow up with gynecology outpatient, possibly Dr. Jones. Will continue current and repeat labs in the AM. Patient with frequent bowel movements, soft, will hold miralax at this time. Patient still requiring frequent cardiac and SPO2 monitoring. Discharge planning discussed with patient and care team. Therapy evaluations ordered- [facility] 10, anticipate SNF at discharge. Patient aware and agreeable to current plan, continue plan as above.     Patient fully evaluated on October 12 and continues to have significant decline in renal function.  Oncology reconsulted.  I believe the patient is significant third spacing secondary to peritoneal carcinomatosis.  Recheck labs in AM.  Appreciate pulmonary and nephrology consultations.  I spent 50 minutes in the professional and overall care of this patient.     Patient fully evaluated 10/13, head of bed elevated, visible difficulties noted at this time. Medications and labs reviewed-  Cultures-  in process  WBC- 29.6  Hemoglobin- 9.2  Imaging- CT abdomen pelvis wo IV contrast   Impression:    Overall findings are grossly unchanged from  10/01/2024 CT scan.  1. Persistent moderate abdominopelvic ascites with diffuse peritoneal  carcinomatosis/omental caking.  2. 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  Underlying cirrhosis could not be entirely excluded as well.  3. Moderate bilateral pleural effusion with surrounding atelectasis.  Supplemental oxygen requiring at this time.      Goals of care- long discussion with sister, patient, and interdisciplinary team, patient with worsening renal impairment secondary to  ovarian carcinoma with peritoneal carcinomatosis abdominal distention, ascites requiring frequent drainage was admitted profound weakness tiredness, failure to thrive. Prognosis poor, plan discussed and will focus on comfort on measures at this time, planned call out to gyn/onc doctor tomorrow morning. Addition of methadone for pain management. Patient seen by Dr. Alexandre - The patient is a 79-year-old woman diagnosed with ovarian carcinoma and peritoneal carcinomatosis with ascites in August 2024.  Initially evaluated by GYN oncology and chemotherapy was recommended but due to intercurrent illness and admissions the patient did not/could not receive any treatment.  Currently at SNF and was admitted because of profound weakness tiredness failure to thrive pain and noted to have elevated creatinine.  Physical examination revealed elderly woman in pain requesting assistance.  Performance status is 3-4.  Elevated creatinine at 3.14 mg/dL.  Personally discussed with Dr. Rhodes.  Could consider initial chemotherapy to site to reduce then consider surgery.  However, advanced age, poor performance status, elevated creatinine preclude chemotherapy.  Consider evaluation by GYN oncology.  Meanwhile consider palliative care pain control/symptom control drainage of ascites consider catheter placement to drain fluid.  Consider methadone in view of elevated  creatinine.  Thank you for allowing me to participate in care of your patient if you have any questions please feel free to call me.  Will continue current and repeat,  labs in the AM. Patient still requiring frequent cardiac and SPO2 monitoring. Continue aggressive pulmonary hygiene. Discharge planning discussed with patient and care team. Therapy evaluations ordered. [facility]-        , anticipate HHC/SNF at discharge. Patient aware and agreeable to current plan, continue plan as above. I spent a total of 50 minutes on the date of the service which included preparing to see the patient, face-to-face patient care, completing clinical documentation, obtaining and/or reviewing separately obtained history, performing a medically appropriate examination, counseling and educating the patient/family/caregiver, ordering medications, tests, or procedures, communicating with other HCPs (not separately reported), independently interpreting results (not separately reported), communicating results to the patient/family/caregiver, and care coordination (not separately reported).         Revision History          Pertinent Physical Exam At Time of Discharge  Physical Exam  Patient fully evaluated 10/16/24 for   1. Hyponatremia  HYDROmorphone (Dilaudid) 0.5 mg/0.5 mL solution injection     HYDROmorphone PF (Dilaudid) 0.2 mg/mL injection     bisacodyl (Dulcolax) 10 mg suppository     glycopyrrolate (Robinul) 200 mcg/mL injection     haloperidol lactate (Haldol) 5 mg/mL injection     LORazepam (Ativan) 2 mg/mL injection       Call placed to GYN/ONC Dr. Villalba this AM with long discussion on patient, treatment options, and goals of care. Patient and sister aware of poor prognosis and bleak outcome. Patient with no significant clinical improvement noted, patient medically cleared for discharge today to [facility]. Patient seen resting in bed with head of bed elevated, no s/s or c/o acute difficulties at this time. Medications  and labs reviewed, will begin comfort care medications and transition to hospice.     No results found for this or any previous visit (from the past 24 hours).        Intake/Output this shift:  I/O this shift:  In: 320 [P.O.:320]  Out: -      Vital signs for last 24 hours:  Temp:  [36.3 °C (97.3 °F)-37 °C (98.6 °F)] 37 °C (98.6 °F)  Heart Rate:  [102-106] 102  Resp:  [18] 18  BP: ()/(51-55) 89/55        Plan discussed with interdisciplinary team, ok to discharge to [facility]. Patient aware and agreeable to current plan, continue plan as above. I spent 30 minutes on the date of the service which included preparing to see the patient, face-to-face patient care, completing clinical documentation, obtaining and/or reviewing separately obtained history, performing a medically appropriate examination, counseling and educating the patient/family/caregiver, ordering medications, tests, or procedures, communicating with other HCPs (not separately reported), independently interpreting results (not separately reported), communicating results to the patient/family/caregiver, and care coordination (not separately reported  Outpatient Follow-Up  No future appointments.     Patient fully evaluated on October 16 and found to note be GIP appropriate.  Patient to discharge to skilled nursing.     Jd Rhodes MD                  Patient fully evaluated 10/17 for   1. Acute kidney injury (CMS-HCC)            Patient seen resting in bed with head of bed elevated, no s/s or c/o acute difficulties at this time.  Vital signs for last 24 hours:  Temp:  [36.2 °C (97.2 °F)-37.2 °C (99 °F)] 36.4 °C (97.5 °F)  Heart Rate:  [] 100  Resp:  [16-18] 18  BP: ()/(49-56) 93/51 I/O this shift:  In: 150 [P.O.:150]  Out: -   Patient still requiring frequent cardiac and SPO2 monitoring. Continue aggressive pulmonary hygiene and oral hygiene. Off loading as tolerated for skin integrity. Medications and labs reviewed-              Results for orders placed or performed during the hospital encounter of 10/14/24 (from the past 24 hours)   Comprehensive Metabolic Panel   Result Value Ref Range     Glucose 95 74 - 99 mg/dL     Sodium 129 (L) 136 - 145 mmol/L     Potassium 4.6 3.5 - 5.3 mmol/L     Chloride 95 (L) 98 - 107 mmol/L     Bicarbonate 21 21 - 32 mmol/L     Anion Gap 18 10 - 20 mmol/L     Urea Nitrogen 79 (H) 6 - 23 mg/dL     Creatinine 4.17 (H) 0.50 - 1.05 mg/dL     eGFR 10 (L) >60 mL/min/1.73m*2     Calcium 7.4 (L) 8.6 - 10.3 mg/dL     Albumin 2.0 (L) 3.4 - 5.0 g/dL     Alkaline Phosphatase 171 (H) 33 - 136 U/L     Total Protein 4.9 (L) 6.4 - 8.2 g/dL     AST 29 9 - 39 U/L     Bilirubin, Total 0.3 0.0 - 1.2 mg/dL     ALT 10 7 - 45 U/L   CBC   Result Value Ref Range     WBC 27.3 (H) 4.4 - 11.3 x10*3/uL     nRBC 0.0 0.0 - 0.0 /100 WBCs     RBC 3.59 (L) 4.00 - 5.20 x10*6/uL     Hemoglobin 9.3 (L) 12.0 - 16.0 g/dL     Hematocrit 31.3 (L) 36.0 - 46.0 %     MCV 87 80 - 100 fL     MCH 25.9 (L) 26.0 - 34.0 pg     MCHC 29.7 (L) 32.0 - 36.0 g/dL     RDW 18.5 (H) 11.5 - 14.5 %     Platelets 338 150 - 450 x10*3/uL      Patient recently received an antibiotic (last 12 hours)         None             Continue aggressive pulmonary hygiene and oral hygiene. Off loading as tolerated for skin integrity.  Plan discussed with interdisciplinary team, Patient started on Unasyn TID IV for spontanous bacterial peritonitis, empiric. Will continue current and repeat labs in the AM.   Discharge planning discussed with patient and care team. Therapy evaluations ordered, anticipate SNF at discharge. Patient aware and agreeable to current plan, continue plan as above.   I spent a total of 50 minutes on the date of the service which included preparing to see the patient, face-to-face patient care, completing clinical documentation, obtaining and/or reviewing separately obtained history, performing a medically appropriate examination, counseling and educating  the patient/family/caregiver, ordering medications, tests, or procedures, communicating with other HCPs (not separately reported), independently interpreting results (not separately reported), communicating results to the patient/family/caregiver, and care coordination (not separately reported).                    Revision History       Patient fully evaluated 10/18  for   1. Acute kidney injury (CMS-HCC)            Patient seen resting in bed with head of bed elevated, no s/s or c/o acute difficulties at this time.  Vital signs for last 24 hours:  Temp:  [35.7 °C (96.3 °F)-36.5 °C (97.7 °F)] 36.5 °C (97.7 °F)  Heart Rate:  [] 103  Resp:  [16-18] 16  BP: ()/(46-58) 105/51 No intake/output data recorded.  Patient still requiring frequent cardiac and SPO2 monitoring. Continue aggressive pulmonary hygiene and oral hygiene. Off loading as tolerated for skin integrity. Medications and labs reviewed-         Results for orders placed or performed during the hospital encounter of 10/17/24 (from the past 24 hours)   CBC and Auto Differential   Result Value Ref Range     WBC 25.7 (H) 4.4 - 11.3 x10*3/uL     nRBC 0.0 0.0 - 0.0 /100 WBCs     RBC 3.50 (L) 4.00 - 5.20 x10*6/uL     Hemoglobin 9.1 (L) 12.0 - 16.0 g/dL     Hematocrit 29.7 (L) 36.0 - 46.0 %     MCV 85 80 - 100 fL     MCH 26.0 26.0 - 34.0 pg     MCHC 30.6 (L) 32.0 - 36.0 g/dL     RDW 18.3 (H) 11.5 - 14.5 %     Platelets 333 150 - 450 x10*3/uL     Neutrophils % 87.0 40.0 - 80.0 %     Immature Granulocytes %, Automated 1.9 (H) 0.0 - 0.9 %     Lymphocytes % 4.0 13.0 - 44.0 %     Monocytes % 3.9 2.0 - 10.0 %     Eosinophils % 2.8 0.0 - 6.0 %     Basophils % 0.4 0.0 - 2.0 %     Neutrophils Absolute 22.34 (H) 1.60 - 5.50 x10*3/uL     Immature Granulocytes Absolute, Automated 0.48 0.00 - 0.50 x10*3/uL     Lymphocytes Absolute 1.03 0.80 - 3.00 x10*3/uL     Monocytes Absolute 1.01 (H) 0.05 - 0.80 x10*3/uL     Eosinophils Absolute 0.73 (H) 0.00 - 0.40 x10*3/uL      Basophils Absolute 0.11 (H) 0.00 - 0.10 x10*3/uL   Comprehensive metabolic panel   Result Value Ref Range     Glucose 89 74 - 99 mg/dL     Sodium 129 (L) 136 - 145 mmol/L     Potassium 4.4 3.5 - 5.3 mmol/L     Chloride 92 (L) 98 - 107 mmol/L     Bicarbonate 23 21 - 32 mmol/L     Anion Gap 18 10 - 20 mmol/L     Urea Nitrogen 83 (H) 6 - 23 mg/dL     Creatinine 4.51 (H) 0.50 - 1.05 mg/dL     eGFR 9 (L) >60 mL/min/1.73m*2     Calcium 7.5 (L) 8.6 - 10.3 mg/dL     Albumin 2.0 (L) 3.4 - 5.0 g/dL     Alkaline Phosphatase 168 (H) 33 - 136 U/L     Total Protein 5.3 (L) 6.4 - 8.2 g/dL     AST 28 9 - 39 U/L     Bilirubin, Total 0.3 0.0 - 1.2 mg/dL     ALT 10 7 - 45 U/L   SST TOP   Result Value Ref Range     Extra Tube Hold for add-ons.        Patient recently received an antibiotic (last 12 hours)         None             Continue aggressive pulmonary hygiene and oral hygiene. Off loading as tolerated for skin integrity.  Long discussion with sister and interdisciplinary team and plan discussed. Per nephrology -Renal chemistries continue to downtrend with a BUN of 83 and creatinine of 4.51 continue, will continue current current and repeat labs in the AM.  Discharge planning discussed with patient and care team. Therapy evaluations ordered. Anticipate SNF at discharge. Patient aware and agreeable to current plan, continue plan as above.      I spent a total of 50 minutes on the date of the service which included preparing to see the patient, face-to-face patient care, completing clinical documentation, obtaining and/or reviewing separately obtained history, performing a medically appropriate examination, counseling and educating the patient/family/caregiver, ordering medications, tests, or procedures, communicating with other HCPs (not separately reported), independently interpreting results (not separately reported), communicating results to the patient/family/caregiver, and care coordination (not separately reported).                               Kim Angulo

## 2024-10-18 NOTE — CARE PLAN
The patient's goals for the shift include rest.    The clinical goals for the shift include safety, comfort and pain control.    Skin  Add All  Decreased wound size/increased tissue granulation at next dressing change  Add  Today at 0300 - Progressing by Jd Triplett RN  Add  Flowsheets  Taken today at 0300  Decreased wound size/increased tissue granulation at next dressing change  Promote sleep for wound healing  Participates in plan/prevention/treatment measures  Add  Today at 0300 - Progressing by Jd Triplett RN  Add  Flowsheets  Taken today at 0300  Participates in plan/prevention/treatment measures  Elevate heels  Prevent/manage excess moisture  Add  Today at 0300 - Progressing by Jd Triplett RN  Add  Flowsheets  Taken today at 0300  Prevent/manage excess moisture  Monitor for/manage infection if present  Prevent/minimize sheer/friction injuries  Add  Today at 0300 - Progressing by Jd Triplett RN  Add  Flowsheets  Taken today at 0300  Prevent/minimize sheer/friction injuries  HOB 30 degrees or less  Promote/optimize nutrition  Add  Today at 0300 - Progressing by Jd Triplett RN  Add  Flowsheets  Taken today at 0300  Promote/optimize nutrition  Offer water/supplements/favorite foods  Promote skin healing  Add  Today at 0300 - Progressing by Jd Triplett RN  Add  Flowsheets  Taken today at 0300  Promote skin healing  Assess skin/pad under line(s)/device(s)  Pain - Adult  Add All  Verbalizes/displays adequate comfort level or baseline comfort level  Add  Today at 0300 - Progressing by Jd Triplett RN  Add  Safety - Adult  Add All  Free from fall injury  Add  Today at 0300 - Progressing by Jd Triplett RN  Add  Discharge Planning  Add All  Discharge to home or other facility with appropriate resources  Add  Today at 0300 - Progressing by Jd Triplett RN  Add  Chronic Conditions and Co-morbidities  Add All  Patient's chronic conditions and co-morbidity symptoms are  monitored and maintained or improved  Add  Today at 0300 - Progressing by Jd Triplett, RN  Add

## 2024-10-19 LAB
ALBUMIN SERPL BCP-MCNC: 2 G/DL (ref 3.4–5)
ALP SERPL-CCNC: 173 U/L (ref 33–136)
ALT SERPL W P-5'-P-CCNC: 11 U/L (ref 7–45)
ANION GAP SERPL CALC-SCNC: 18 MMOL/L (ref 10–20)
AST SERPL W P-5'-P-CCNC: 31 U/L (ref 9–39)
BASOPHILS # BLD AUTO: 0.12 X10*3/UL (ref 0–0.1)
BASOPHILS NFR BLD AUTO: 0.4 %
BILIRUB SERPL-MCNC: 0.3 MG/DL (ref 0–1.2)
BUN SERPL-MCNC: 89 MG/DL (ref 6–23)
CALCIUM SERPL-MCNC: 7.3 MG/DL (ref 8.6–10.3)
CHLORIDE SERPL-SCNC: 92 MMOL/L (ref 98–107)
CO2 SERPL-SCNC: 22 MMOL/L (ref 21–32)
CREAT SERPL-MCNC: 5 MG/DL (ref 0.5–1.05)
EGFRCR SERPLBLD CKD-EPI 2021: 8 ML/MIN/1.73M*2
EOSINOPHIL # BLD AUTO: 0.83 X10*3/UL (ref 0–0.4)
EOSINOPHIL NFR BLD AUTO: 2.9 %
ERYTHROCYTE [DISTWIDTH] IN BLOOD BY AUTOMATED COUNT: 18.6 % (ref 11.5–14.5)
GLUCOSE SERPL-MCNC: 93 MG/DL (ref 74–99)
HCT VFR BLD AUTO: 29.4 % (ref 36–46)
HGB BLD-MCNC: 8.8 G/DL (ref 12–16)
IMM GRANULOCYTES # BLD AUTO: 0.31 X10*3/UL (ref 0–0.5)
IMM GRANULOCYTES NFR BLD AUTO: 1.1 % (ref 0–0.9)
LACTATE SERPL-SCNC: 1.6 MMOL/L (ref 0.4–2)
LYMPHOCYTES # BLD AUTO: 1.23 X10*3/UL (ref 0.8–3)
LYMPHOCYTES NFR BLD AUTO: 4.3 %
MCH RBC QN AUTO: 26.1 PG (ref 26–34)
MCHC RBC AUTO-ENTMCNC: 29.9 G/DL (ref 32–36)
MCV RBC AUTO: 87 FL (ref 80–100)
MONOCYTES # BLD AUTO: 0.99 X10*3/UL (ref 0.05–0.8)
MONOCYTES NFR BLD AUTO: 3.5 %
NEUTROPHILS # BLD AUTO: 24.88 X10*3/UL (ref 1.6–5.5)
NEUTROPHILS NFR BLD AUTO: 87.8 %
NRBC BLD-RTO: 0 /100 WBCS (ref 0–0)
PLATELET # BLD AUTO: 336 X10*3/UL (ref 150–450)
POTASSIUM SERPL-SCNC: 4.6 MMOL/L (ref 3.5–5.3)
PROT SERPL-MCNC: 5.2 G/DL (ref 6.4–8.2)
RBC # BLD AUTO: 3.37 X10*6/UL (ref 4–5.2)
SODIUM SERPL-SCNC: 127 MMOL/L (ref 136–145)
WBC # BLD AUTO: 28.4 X10*3/UL (ref 4.4–11.3)

## 2024-10-19 PROCEDURE — 2500000001 HC RX 250 WO HCPCS SELF ADMINISTERED DRUGS (ALT 637 FOR MEDICARE OP): Performed by: INTERNAL MEDICINE

## 2024-10-19 PROCEDURE — 2500000001 HC RX 250 WO HCPCS SELF ADMINISTERED DRUGS (ALT 637 FOR MEDICARE OP): Performed by: PHYSICIAN ASSISTANT

## 2024-10-19 PROCEDURE — 1100000001 HC PRIVATE ROOM DAILY

## 2024-10-19 PROCEDURE — 85025 COMPLETE CBC W/AUTO DIFF WBC: CPT | Performed by: INTERNAL MEDICINE

## 2024-10-19 PROCEDURE — 2500000002 HC RX 250 W HCPCS SELF ADMINISTERED DRUGS (ALT 637 FOR MEDICARE OP, ALT 636 FOR OP/ED): Performed by: PHYSICIAN ASSISTANT

## 2024-10-19 PROCEDURE — 2500000004 HC RX 250 GENERAL PHARMACY W/ HCPCS (ALT 636 FOR OP/ED): Performed by: PHYSICIAN ASSISTANT

## 2024-10-19 PROCEDURE — 36415 COLL VENOUS BLD VENIPUNCTURE: CPT | Performed by: INTERNAL MEDICINE

## 2024-10-19 PROCEDURE — S0109 METHADONE ORAL 5MG: HCPCS | Performed by: INTERNAL MEDICINE

## 2024-10-19 PROCEDURE — 2500000002 HC RX 250 W HCPCS SELF ADMINISTERED DRUGS (ALT 637 FOR MEDICARE OP, ALT 636 FOR OP/ED): Performed by: INTERNAL MEDICINE

## 2024-10-19 PROCEDURE — 2500000004 HC RX 250 GENERAL PHARMACY W/ HCPCS (ALT 636 FOR OP/ED): Performed by: INTERNAL MEDICINE

## 2024-10-19 PROCEDURE — 36415 COLL VENOUS BLD VENIPUNCTURE: CPT

## 2024-10-19 PROCEDURE — 83605 ASSAY OF LACTIC ACID: CPT

## 2024-10-19 PROCEDURE — 84075 ASSAY ALKALINE PHOSPHATASE: CPT | Performed by: INTERNAL MEDICINE

## 2024-10-19 PROCEDURE — 2500000005 HC RX 250 GENERAL PHARMACY W/O HCPCS: Performed by: PHYSICIAN ASSISTANT

## 2024-10-19 PROCEDURE — S0109 METHADONE ORAL 5MG: HCPCS | Performed by: PHYSICIAN ASSISTANT

## 2024-10-19 RX ORDER — BALSAM PERU/CASTOR OIL
1 OINTMENT (GRAM) TOPICAL 2 TIMES DAILY
Status: DISCONTINUED | OUTPATIENT
Start: 2024-10-19 | End: 2024-10-23 | Stop reason: HOSPADM

## 2024-10-19 RX ORDER — METHADONE HYDROCHLORIDE 10 MG/1
10 TABLET ORAL EVERY 12 HOURS
Status: DISCONTINUED | OUTPATIENT
Start: 2024-10-19 | End: 2024-10-23 | Stop reason: HOSPADM

## 2024-10-19 ASSESSMENT — PAIN SCALES - GENERAL
PAINLEVEL_OUTOF10: 7
PAINLEVEL_OUTOF10: 0 - NO PAIN
PAINLEVEL_OUTOF10: 5 - MODERATE PAIN
PAINLEVEL_OUTOF10: 2
PAINLEVEL_OUTOF10: 3

## 2024-10-19 ASSESSMENT — PAIN - FUNCTIONAL ASSESSMENT
PAIN_FUNCTIONAL_ASSESSMENT: WONG-BAKER FACES
PAIN_FUNCTIONAL_ASSESSMENT: 0-10
PAIN_FUNCTIONAL_ASSESSMENT: WONG-BAKER FACES
PAIN_FUNCTIONAL_ASSESSMENT: 0-10

## 2024-10-19 ASSESSMENT — PAIN SCALES - WONG BAKER: WONGBAKER_NUMERICALRESPONSE: NO HURT

## 2024-10-19 ASSESSMENT — PAIN DESCRIPTION - LOCATION: LOCATION: ABDOMEN

## 2024-10-19 NOTE — PROGRESS NOTES
Rohini Beaver is a 79 y.o. female on day 2 of admission presenting with EUSEBIO (acute kidney injury) (CMS-HCC).      Subjective   Rohini Beaver is a 79 y.o. female presenting with recently diagnosed ovarian cancer with peritoneal carcinomatosis s/p paracentesis x 4 (last one done 9/19/24, EUSEBIO .         Objective     Last Recorded Vitals  BP (!) 89/42 Comment: dr. menard aware  Pulse 100   Temp 36.5 °C (97.7 °F) (Temporal)   Resp 16   Wt 104 kg (229 lb 4.5 oz)   SpO2 98%   Intake/Output last 3 Shifts:    Intake/Output Summary (Last 24 hours) at 10/19/2024 1435  Last data filed at 10/19/2024 0935  Gross per 24 hour   Intake 100 ml   Output 200 ml   Net -100 ml       Admission Weight  Weight: 104 kg (229 lb 4.5 oz) (10/17/24 0842)    Daily Weight  10/17/24 : 104 kg (229 lb 4.5 oz)    Image Results  XR chest 1 view  Narrative: Interpreted By:  Yamini Hubbard,   STUDY:  XR CHEST 1 VIEW 10/16/2024 5:49 pm      INDICATION:  Signs/Symptoms:sob      COMPARISON:  10/08/2024      ACCESSION NUMBER(S):  [id number]      ORDERING CLINICIAN:  SHAKIRA MENARD      TECHNIQUE:  AP view      FINDINGS:  There is an elevated right diaphragm. There is bilateral lower lobe  atelectasis or infiltrates. Small bilateral pleural effusions. There  is a retrocardiac density likely a large hiatal hernia. Heart size is  normal. Pulmonary vascularity is normal. Surgical clips overlie the  left humeral head. There are degenerative changes of both shoulders.  A few calcifications are seen medial to the right humeral head.      Impression: Elevated right diaphragm with bibasilar atelectasis and possible  small bilateral pleural effusions. Hiatal hernia. No change since the  previous exam      Signed by: Yamini Hubbard 10/16/2024 8:19 PM  Dictation workstation:   [id number]      Physical Exam    Relevant Results               Assessment/Plan                  Assessment & Plan  EUSEBIO (acute kidney injury) (CMS-HCC)    Acute kidney injury  (CMS-HCC)       Kim Angulo  Coordinator  Internal Medicine     H&P      Incomplete     Date of Service: 10/18/2024  3:53 PM  Incomplete  Expand All Collapse All    History Of Present Illness  Rohini Beaver is a 79 y.o. female presenting with recently diagnosed ovarian cancer with peritoneal carcinomatosis s/p paracentesis x 4 (last one done 9/19/24, EUSEBIO .     Past Medical History  She has a past medical history of Bilateral primary osteoarthritis of knee, HLD (hyperlipidemia), Lumbosacral radiculopathy, Meralgia paraesthetica, Obesity, and Physical deconditioning.     Surgical History  She has a past surgical history that includes Total knee arthroplasty (Left, 2019); Cholecystectomy; Corneal transplant; Lumbar fusion; Dilation and curettage of uterus; Shoulder arthroscopy (Left); Colonoscopy; Skin lesion excision; Trigger finger release (Right); Arthroplasty (Left); Breast biopsy (1999); and Total knee arthroplasty (Right, 2017).     Social History  She reports that she has never smoked. She has never used smokeless tobacco. She reports that she does not currently use alcohol. She reports that she does not use drugs.     Family History  Family History  Family History  Problem  Relation  Name  Age of Onset    Colon cancer  Mother          Lung cancer  Father          Other (Mesothelioma)  Brother          Brain cancer  Mother's Brother              Allergies  Patient has no known allergies.     Review of Systems     Physical Exam     Last Recorded Vitals  /51 (BP Location: Left arm, Patient Position: Lying)   Pulse 103   Temp 36.5 °C (97.7 °F) (Temporal)   Resp 16   Wt 104 kg (229 lb 4.5 oz)   SpO2 98%      Relevant Results                   Assessment/Plan     Assessment & Plan  EUSEBIO (acute kidney injury) (CMS-HCC)     Acute kidney injury (CMS-HCC)           Jd Rhodes MD   Physician  Internal Medicine     H&P      Signed     Date of Service: 10/17/2024  4:02 PM     Signed     Expand All  Collapse All    History Of Present Illness  Rohini Beaver is a 79-year-old female with past medical history of recently diagnosed ovarian cancer with peritoneal carcinomatosis s/p paracentesis x 4 (last one done 9/19/24), obesity, hyperlipidemia, osteoarthritis, and skin cancer s/p Mohs procedure.  Patient presents to the hospital with weakness and inability to care for herself.  She reports that she was at [facility] skilled nursing [facility], however had a brief hospitalization at [facility] and upon discharge decided to go home rather than go back to [facility].  She lives with her 86-year-old sister and has a couple friends who assist with appointments, however limited support system.  Sister unable to care for due to age.  Today she was in urgent reclining chair and was able to get up.  ROS additionally positive for cold sweats, distended and tender abdomen, edema, pressure injury to coccyx/gluteal fold, and decreased activity tolerance.  Denies history of heart disease.  She reports that she is scheduled for outpatient appointment with her oncologist tomorrow to determine ongoing plan.      ER course: Hemodynamically stable, afebrile, SpO2 90s on room air.  WBC 28.9, Hgb 11.0/Hct34.1 (Hgb 15.1 4/4/2023), platelets 446. Glucose 107, Sodium 126, Chloride 94, calcium 8.5, albumin 2.4, alkaline phosphatase 231.  Lactate 1.4.  BNP 74.  High-sensitivity troponin 7.  UA unremarkable. CT chest showed moderate to large bilateral pleural effusion with adjacent presumed compressive atelectasis, prominent main pulmonary artery which may indicate pulmonary hypertension, mildly prominent mediastinal lymph nodes measuring up to 10 mm in short axis concerning for metastatic disease.  Moderate hiatal hernia with partial intrathoracic stomach and the peritoneal fat adjacent to the stomach within the hernia demonstrates evidence of probable carcinomatosis and ascites.  Redemonstration of large volume ascites  with regions of significant anterior omental caking and peritoneal carcinomatosis commensurate with patient's provided diagnosis of ovarian cancer. Regions of wall thickening of the colon extending from the splenic flexure to the sigmoid.  Extensive region of scalloping of the right hepatic lobe peripherally with appearance of large subcapsular collection along the right hepatic margin. Blood culture in process     Past medical history: As above  Past surgical history: Cholecystectomy, lumbar laminectomy, left shoulder arthroscopy, right total knee replacement, corneal transplant  Social history: No history of smoking, alcohol abuse, illicit drug use.  Lives with her elderly sister who is 86 years old.  Limited support system.   Family history: Mother-cancer (unknown type)     Past Medical History  Medical History           Past Medical History:  Diagnosis  Date    Bilateral primary osteoarthritis of knee       HLD (hyperlipidemia)       Lumbosacral radiculopathy       Meralgia paraesthetica       Obesity       Physical deconditioning           Surgical History  Surgical History              Past Surgical History:  Procedure  Laterality  Date    ARTHROPLASTY  Left        Left thumb carpometacarpal arthroplasty with ligament reconstruction and tendon interposition    BREAST BIOPSY     1999     Benign    CHOLECYSTECTOMY          COLONOSCOPY          CORNEAL TRANSPLANT          DILATION AND CURETTAGE OF UTERUS          LUMBAR FUSION          SHOULDER ARTHROSCOPY  Left       SKIN LESION EXCISION          TOTAL KNEE ARTHROPLASTY  Left  2019    TOTAL KNEE ARTHROPLASTY  Right  2017    TRIGGER FINGER RELEASE  Right           Social History  She reports that she has never smoked. She has never used smokeless tobacco. She reports that she does not currently use alcohol. She reports that she does not use drugs.     Family History  Family History                 Family History  Problem  Relation  Name  Age of Onset    Colon  cancer  Mother          Lung cancer  Father          Other (Mesothelioma)  Brother          Brain cancer  Mother's Brother              Allergies  Patient has no known allergies.     Review of Systems     10 point ROS negative except as noted above in HPI      Physical Exam  Vitals reviewed.   Constitutional:       General: She is awake. She is not in acute distress.     Appearance: She is obese. She is ill-appearing. She is not toxic-appearing.   HENT:      Head: Normocephalic and atraumatic.      Nose: Nose normal.      Mouth/Throat:      Mouth: Mucous membranes are moist.      Pharynx: Oropharynx is clear.   Eyes:      Conjunctiva/sclera: Conjunctivae normal.   Cardiovascular:      Rate and Rhythm: Normal rate and regular rhythm.      Pulses: Normal pulses.      Heart sounds: No murmur heard.  Pulmonary:      Effort: Pulmonary effort is normal. No respiratory distress.      Breath sounds: Decreased air movement present. Decreased breath sounds present.      Comments: Posterior bilateral breath sounds are diminished  Abdominal:      General: There is distension.      Palpations: Abdomen is soft.      Tenderness: There is abdominal tenderness. There is no guarding.      Comments: Bowel sounds hypoactive, abdomen distended, tender to palpation, dullness noted to the sides.   Musculoskeletal:         General: No swelling, deformity or signs of injury. Normal range of motion.      Cervical back: Neck supple.      Right lower leg: Edema present.      Left lower leg: Edema present.      Comments: Generalized edema/anasarca   Skin:     General: Skin is warm and dry.      Capillary Refill: Capillary refill takes less than 2 seconds.      Findings: No ecchymosis or wound.      Comments: Wound on coccyx, exam deferred  due to patient discomfort    Neurological:      General: No focal deficit present.      Mental Status: She is alert and oriented to person, place, and time.   Psychiatric:         Mood and Affect: Mood  "normal.         Behavior: Behavior is cooperative.                  Last Recorded Vitals  Blood pressure 93/51, pulse 100, temperature 36.4 °C (97.5 °F), temperature source Temporal, resp. rate 18, height 1.626 m (5' 4.02\"), weight 104 kg (229 lb 4.5 oz), SpO2 99%.     Relevant Results                    Results for orders placed or performed during the hospital encounter of 10/14/24 (from the past 24 hours)  Comprehensive Metabolic Panel  Result  Value  Ref Range     Glucose  95  74 - 99 mg/dL     Sodium  129 (L)  136 - 145 mmol/L     Potassium  4.6  3.5 - 5.3 mmol/L     Chloride  95 (L)  98 - 107 mmol/L     Bicarbonate  21  21 - 32 mmol/L     Anion Gap  18  10 - 20 mmol/L     Urea Nitrogen  79 (H)  6 - 23 mg/dL     Creatinine  4.17 (H)  0.50 - 1.05 mg/dL     eGFR  10 (L)  >60 mL/min/1.73m*2     Calcium  7.4 (L)  8.6 - 10.3 mg/dL     Albumin  2.0 (L)  3.4 - 5.0 g/dL     Alkaline Phosphatase  171 (H)  33 - 136 U/L     Total Protein  4.9 (L)  6.4 - 8.2 g/dL     AST  29  9 - 39 U/L     Bilirubin, Total  0.3  0.0 - 1.2 mg/dL     ALT  10  7 - 45 U/L  CBC  Result  Value  Ref Range     WBC  27.3 (H)  4.4 - 11.3 x10*3/uL     nRBC  0.0  0.0 - 0.0 /100 WBCs     RBC  3.59 (L)  4.00 - 5.20 x10*6/uL     Hemoglobin  9.3 (L)  12.0 - 16.0 g/dL     Hematocrit  31.3 (L)  36.0 - 46.0 %     MCV  87  80 - 100 fL     MCH  25.9 (L)  26.0 - 34.0 pg     MCHC  29.7 (L)  32.0 - 36.0 g/dL     RDW  18.5 (H)  11.5 - 14.5 %     Platelets  338  150 - 450 x10*3/uL     CT chest abdomen pelvis w IV contrast     Result Date: 10/1/2024  Interpreted By:  Oscar Aldrich, STUDY: CT CHEST ABDOMEN PELVIS W IV CONTRAST;  10/1/2024 11:13 am   INDICATION: Signs/Symptoms:leukocytosis, ascites, recent ovarian cancer diagnosis.   COMPARISON: CT abdomen pelvis 08/08/2024   ACCESSION NUMBER(S): [id number]   ORDERING CLINICIAN: ASHLEY FAIRBANKS   TECHNIQUE: CT of the chest, abdomen, and pelvis was performed.  Contiguous axial images were obtained at 3 mm slice " thickness through the chest, abdomen and pelvis. Coronal and sagittal reconstructions at 3 mm slice thickness were performed. ml of contrast  were administered intravenously without immediate complication.   FINDINGS: CHEST:   LUNG/PLEURA/LARGE AIRWAYS: No endobronchial lesion is seen.   Moderate to large bilateral pleural effusions with adjacent consolidative opacification of the bilateral lower lobes suggestive of compressive atelectasis. No pneumothorax. Mild asymmetric scattered reticular changes suggestive of chronic lung findings.     VESSELS: The thoracic aorta is unremarkable with respect course, caliber, and contour.   Prominent main pulmonary artery measuring up to 3.2 cm in anterior-posterior dimension measured on sagittal plane which may indicate sequela of pulmonary hypertension.   No significant coronary atherosclerotic calcifications are seen.   HEART: Heart size within normal limits. No pericardial effusion.   MEDIASTINUM AND OLIVE: Mildly prominent mediastinal lymph nodes measuring up to 10 mm in short axis   Moderate hiatal hernia with partial intrathoracic stomach and ascites and region of nodularity of the peritoneal fat within hiatal hernia suggestive carcinomatosis.   CHEST WALL AND LOWER NECK: No acute osseous abnormality. Multilevel presumed degenerative changes throughout the imaged spine. No acute soft tissue abnormality.   ABDOMEN:   LIVER: There is been interval scalloping of the right hepatic margins with new regions of irregularity along the right hepatic capsule diffusely which is new since comparison imaging from 08/08/2024 there is a new elongated subcapsular collection measuring up to approximately 11.4 x 2.1 cm, difficult to measure given curvilinear projection extending over the right hepatic lobe margin (series 202, images 40-70). Similar appearing subcentimeter left hepatic lobe hypodense lesion.   BILE DUCTS: No obvious new intrahepatic biliary dilatation. Mild prominence of the  common bile duct, nonspecific in a cholecystectomy patient.   GALLBLADDER: Absent.   PANCREAS: Unremarkable.   SPLEEN: Unchanged.   ADRENAL GLANDS: Unchanged.   KIDNEYS AND URETERS: Similar appearing subcentimeter right renal lower pole calculus. No hydronephrosis. No obstructing urolithiasis.   PELVIS:   BLADDER: Unremarkable.   REPRODUCTIVE ORGANS: Uterus appears grossly unchanged.   BOWEL: Moderate hiatal hernia with wall thickening of the stomach in the hernia. No new pathologic distention of bowel. Wall thickening of the colon within the splenic flexure descending colon and sigmoid colon, nonspecific.     VESSELS: No evidence of abdominal aortic aneurysm.   PERITONEUM/RETROPERITONEUM/LYMPH NODES: Large volume ascites with extensive regions of anterior omental caking and peritoneal carcinomatosis. No obvious free intraperitoneal gas. Given the presence of extensive omental caking and peritoneal carcinomatosis, superimposed peritoneal enhancement 4 other causes cannot be excluded.   BONE AND SOFT TISSUE: No acute osseous abnormality. Osseous structures appear stable. No acute abnormality of the abdominal wall soft tissues.        CHEST: 1.  Moderate to large bilateral pleural effusions with adjacent presumed compressive atelectasis. 2. Prominent main pulmonary artery which may indicate pulmonary hypertension. 3. Mildly prominent mediastinal lymph nodes measuring up to 10 mm in short axis concerning for metastatic disease. 4. Moderate hiatal hernia with partial intrathoracic stomach. The peritoneal fat adjacent to the stomach within the hernia demonstrates evidence of probable carcinomatosis and ascites.   ABDOMEN-PELVIS: 1.  Redemonstration of large volume ascites with regions of significant anterior omental caking and peritoneal carcinomatosis commensurate with patient's provided diagnosis of ovarian cancer. 2. Regions of wall thickening of the colon extending from the splenic flexure to the sigmoid which may  indicate a nonspecific colitis. 3. Since the previous examination on 08/08/2024, there are now extensive regions of scalloping of the right hepatic lobe peripherally with the appearance of a large subcapsular collection along the right hepatic margin as above. The sterility of this collection cannot be assessed via CT and clinical correlation is advised for exclusion superimposed infection within this region.     Signed by: Oscar Aldrich 10/1/2024 12:14 PM Dictation workstation:   [id number]     XR chest 1 view     Result Date: 10/1/2024  Interpreted By:  Samuel Jaramillo, STUDY: XR CHEST 1 VIEW; 10/1/2024 8:44 am   INDICATION: Signs/Symptoms:weakness, edema in legs and abdomen   COMPARISON: April 2023.   ACCESSION NUMBER(S): [id number]   ORDERING CLINICIAN: ASHLEY FAIRBANKS   FINDINGS: The study is limited due to rotation and poor inspiratory effort, with resultant crowding of the pulmonary vasculature. The cardiac silhouette is within normal limits for the technique. Moderate size hiatal hernia is again seen. There is no pneumothorax, confluent infiltrates or significant effusion. Degenerative changes involve the spine and shoulders; metallic anchors again noted over the left humeral head related to previous rotator cuff repair.        Limited study. Hiatal hernia. No acute cardiopulmonary disease.   Signed by: Samuel Jaramillo 10/1/2024 9:22 AM Dictation workstation:   [id number]     US guided abdominal paracentesis     Result Date: 9/20/2024  Interpreted By:  Peri Lawrence and Kamau Nyokabi STUDY: US GUIDED ABDOMINAL PARACENTESIS; US GUIDED PERCUTANEOUS ABDOMINAL RETROPERITONEUM BIOPSY;  9/19/2024 11:13 am   INDICATION: Signs/Symptoms:ascites; Signs/Symptoms:ascites, evaluate ovarian cancer.   COMPARISON: CT abdomen and pelvis 08/08/2024   ACCESSION NUMBER(S): [id number]; [id number]   ORDERING CLINICIAN: JEANETTE ARIAS   TECHNIQUE: INTERVENTIONALIST(S): Dr. Peri Lawrence MD   CONSENT: The patient  was informed of the nature of the proposed procedure. The purposes, alternatives, risks, and benefits were explained and discussed. All questions were answered and consent was obtained.   SEDATION: 1% local lidocaine was used for anesthetic.   MEDICATION/CONTRAST: No additional.   TIME OUT:   A time out was performed immediately prior to procedure start with the interventional team, correctly identifying the patient name, date of birth, MRN, procedure, anatomy (including marking of site and side), patient position, procedure consent form, relevant laboratory and imaging test results, antibiotic administration, safety precautions, and procedure-specific equipment needs.   COMPLICATIONS: No immediate adverse events identified.   FINDINGS: The patient was placed in the supine position. Limited sonographic images of the lower abdominal wall were obtained for purposes of needle guidance, which demonstrated omental soft tissue mass which was seen on prior CT 08/08/2024. The area of concern was prepped and draped under sterile technique.   1% lidocaine was injected subcutaneously. Additional lidocaine was administered into deeper tissues surrounding the targeted area for biopsy using a 22G spinal needle. A small incision was made. Using the same access site a 18 gauge core biopsy needle was passed via a 17 gauge coaxial introducer needle to obtain a total of 1 core sample.   Postprocedure images demonstrate no evidence for hemorrhage. The patient tolerated the procedure well and there were no immediate complications. 1 core specimen was sent to pathology.     Subsequently the right lower quadrant was visualized under ultrasound which demonstrated moderate volume ascites seen on prior CT 08/08/2024. 1% lidocaine was injected subcutaneously at this site. A 19 gauge Yueh was used to target the fluid collection under ultrasound guidance. Once the site was accessed, the inner needle was removed from the catheter. The Yueh  catheter was connected to drainage container. Estimated 1000 cc of serosanguineous colored fluid was removed. Post paracentesis imaging demonstrated decreased ascitic fluid. The Yueh catheter was removed. The access site was bandaged.        1. Status post ultrasound guided core needle biopsy of omental mass seen on CT 08/08/2024. 1 core specimens sent to pathology. 2. Successful paracentesis under ultrasound guidance with removal of 1 L of serosanguineous fluid.     I was present for and/or performed the critical portions of the procedure and immediately available throughout the entire procedure.   I personally reviewed the image(s) / study and interpretation. I agree with the findings as stated.   Performed and dictated at [facility].   MACRO: None   Signed by: Peri Lawrence 9/20/2024 10:06 AM Dictation workstation:   [id number]     US guided percutaneous abdominal retroperitoneum biopsy     Result Date: 9/20/2024  Interpreted By:  Peri Lawrence and Kamau Nyokabi STUDY: US GUIDED ABDOMINAL PARACENTESIS; US GUIDED PERCUTANEOUS ABDOMINAL RETROPERITONEUM BIOPSY;  9/19/2024 11:13 am   INDICATION: Signs/Symptoms:ascites; Signs/Symptoms:ascites, evaluate ovarian cancer.   COMPARISON: CT abdomen and pelvis 08/08/2024   ACCESSION NUMBER(S): [id number]; [id number]   ORDERING CLINICIAN: JEANETTE ARIAS   TECHNIQUE: INTERVENTIONALIST(S): Dr. Peri Lawrence MD   CONSENT: The patient was informed of the nature of the proposed procedure. The purposes, alternatives, risks, and benefits were explained and discussed. All questions were answered and consent was obtained.   SEDATION: 1% local lidocaine was used for anesthetic.   MEDICATION/CONTRAST: No additional.   TIME OUT:   A time out was performed immediately prior to procedure start with the interventional team, correctly identifying the patient name, date of birth, MRN, procedure, anatomy (including marking of site and  side), patient position, procedure consent form, relevant laboratory and imaging test results, antibiotic administration, safety precautions, and procedure-specific equipment needs.   COMPLICATIONS: No immediate adverse events identified.   FINDINGS: The patient was placed in the supine position. Limited sonographic images of the lower abdominal wall were obtained for purposes of needle guidance, which demonstrated omental soft tissue mass which was seen on prior CT 08/08/2024. The area of concern was prepped and draped under sterile technique.   1% lidocaine was injected subcutaneously. Additional lidocaine was administered into deeper tissues surrounding the targeted area for biopsy using a 22G spinal needle. A small incision was made. Using the same access site a 18 gauge core biopsy needle was passed via a 17 gauge coaxial introducer needle to obtain a total of 1 core sample.   Postprocedure images demonstrate no evidence for hemorrhage. The patient tolerated the procedure well and there were no immediate complications. 1 core specimen was sent to pathology.     Subsequently the right lower quadrant was visualized under ultrasound which demonstrated moderate volume ascites seen on prior CT 08/08/2024. 1% lidocaine was injected subcutaneously at this site. A 19 gauge Yueh was used to target the fluid collection under ultrasound guidance. Once the site was accessed, the inner needle was removed from the catheter. The Yueh catheter was connected to drainage container. Estimated 1000 cc of serosanguineous colored fluid was removed. Post paracentesis imaging demonstrated decreased ascitic fluid. The Yueh catheter was removed. The access site was bandaged.        1. Status post ultrasound guided core needle biopsy of omental mass seen on CT 08/08/2024. 1 core specimens sent to pathology. 2. Successful paracentesis under ultrasound guidance with removal of 1 L of serosanguineous fluid.     I was present for and/or  performed the critical portions of the procedure and immediately available throughout the entire procedure.   I personally reviewed the image(s) / study and interpretation. I agree with the findings as stated.   Performed and dictated at [facility].   MACRO: None   Signed by: Peri Lawrence 9/20/2024 10:06 AM Dictation workstation:   [id number]            Assessment/Plan     Assessment & Plan  EUSEBIO (acute kidney injury) (CMS-HCC)     Acute kidney injury (CMS-HCC)        79-year-old female with past medical history of recently diagnosed ovarian cancer with peritoneal carcinomatosis s/p paracentesis x 4 (last one done 9/19/24), obesity, hyperlipidemia, osteoarthritis, and skin cancer s/p Mohs procedure.  Patient presents to the hospital with weakness and inability to care for herself.  Patient found to be hyponatremic and with evidence on CT imaging of possible abscess of the liver and ascites secondary to malignancy.  Hospitalized for further evaluation management..     #Ovarian cancer with peritoneal carcinomatosis  #Ascites  #Bilateral pleural effusions  # Suspected liver abscess  #Abdominal pain     Telemetry monitoring  Consult to IR for paracentesis and possible drainage of liver abscess  Consult to pulmonology for bilateral pleural effusions  Antiemetics as needed  Analgesics as needed  Patient needs to be followed up with by her gynecological oncologist to determine optimal plan going forward for treatment of her ovarian cancer     #Hyponatremia  #Generalized edema/anasarca  #Hypoalbuminemia  Patient is fluid overloaded and has significant third spacing, suspect hypervolemia hyponatremia.  Will check urine electrolytes, urine osmolality, and serum osmolality  Fluid restriction of 1500 ml for the time being.  Consider starting diuretics, defer to attending  Repeat labs in a.m.     #debility  PT/OT  Social work for discharge planning     Chronic  issues:  #Obesity  #Hyperlipidemia  #Osteoarthritis     Continue with patient's home medications as appropriate     #DVT prophylaxis  SCDs  Lovenox subcutaneous     I spent 75 minutes in the professional and overall care of this patient.        Jd Rhodes MD   Physician  Internal Medicine     Discharge Summary      Signed     Date of Service: 10/16/2024  5:13 PM   Related encounter: Admission (Discharged) from 10/14/2024 in [facility] with Jd Rhodes MD     Signed       Discharge Diagnosis  Hyponatremia     Issues Requiring Follow-Up  Patient fully evaluated 10/14/24 for   1.  Hyponatremia   AFB Culture/Smear  AFB Culture/Smear  2.  Abdominal fluid collection   AFB Culture/Smear  AFB Culture/Smear  3.  Malignant ascites (CMS-HCC)   AFB Culture/Smear  AFB Culture/Smear  4.  Pleural effusion   AFB Culture/Smear  AFB Culture/Smear  Call placed to GYN/ONC Dr. Villalba this AM with long discussion on patient, treatment options, and goals of care. Patient and sister aware of poor prognosis and bleak outcome. Patient with no significant clinical improvement noted, patient medically cleared for discharge today to [facility]. Patient seen resting in bed with head of bed elevated, no s/s or c/o acute difficulties at this time. Medications and labs reviewed, will begin comfort care medications and transition to hospice.     Results for orders placed or performed during the hospital encounter of 10/01/24 (from the past 24 hour(s))  CBC  Result  Value  Ref Range  WBC  29.2 (H)  4.4 - 11.3 x10*3/uL  nRBC  0.0  0.0 - 0.0 /100 WBCs  RBC  3.62 (L)  4.00 - 5.20 x10*6/uL  Hemoglobin  9.5 (L)  12.0 - 16.0 g/dL  Hematocrit  30.3 (L)  36.0 - 46.0 %  MCV  84  80 - 100 fL  MCH  26.2  26.0 - 34.0 pg  MCHC  31.4 (L)  32.0 - 36.0 g/dL  RDW  17.5 (H)  11.5 - 14.5 %  Platelets  436  150 - 450 x10*3/uL  Comprehensive metabolic panel  Result  Value  Ref Range  Glucose  83  74 - 99 mg/dL  Sodium  133 (L)  136 -  145 mmol/L  Potassium  4.3  3.5 - 5.3 mmol/L  Chloride  94 (L)  98 - 107 mmol/L  Bicarbonate  22  21 - 32 mmol/L  Anion Gap  21 (H)  10 - 20 mmol/L  Urea Nitrogen  64 (H)  6 - 23 mg/dL  Creatinine  4.01 (H)  0.50 - 1.05 mg/dL  eGFR  11 (L)  >60 mL/min/1.73m*2  Calcium  8.0 (L)  8.6 - 10.3 mg/dL  Albumin  2.2 (L)  3.4 - 5.0 g/dL  Alkaline Phosphatase  165 (H)  33 - 136 U/L  Total Protein  5.0 (L)  6.4 - 8.2 g/dL  AST  17  9 - 39 U/L  Bilirubin, Total  0.3  0.0 - 1.2 mg/dL  ALT  6 (L)  7 - 45 U/L        Intake/Output this shift:  No intake/output data recorded.     Vital signs for last 24 hours:  Temp:  [35.6 °C (96.1 °F)-36.8 °C (98.2 °F)] 35.9 °C (96.6 °F)  Heart Rate:  [] 110  Resp:  [16-20] 16  BP: ()/(41-78) 107/43        Plan discussed with interdisciplinary team, ok to discharge to [facility]. Patient aware and agreeable to current plan, continue plan as above. I spent 30 minutes on the date of the service which included preparing to see the patient, face-to-face patient care, completing clinical documentation, obtaining and/or reviewing separately obtained history, performing a medically appropriate examination, counseling and educating the patient/family/caregiver, ordering medications, tests, or procedures, communicating with other HCPs (not separately reported), independently interpreting results (not separately reported), communicating results to the patient/family/caregiver, and care coordination (not separately reported)     Discharge Meds     Medication List      START taking these medications     bisacodyl 10 mg suppository; Commonly known as: Dulcolax; Insert 1   suppository (10 mg) into the rectum once daily as needed for constipation.   glycopyrrolate 200 mcg/mL injection; Commonly known as: Robinul; Infuse   1 mL (0.2 mg) into a venous catheter every 4 hours if needed (excess   secretions).   haloperidol lactate 5 mg/mL injection; Commonly known as: Haldol; Infuse   0.2 mL (1  mg) into a venous catheter every 4 hours if needed for agitation   (delirium).   HYDROmorphone PF 0.2 mg/mL injection; Commonly known as: Dilaudid;   Infuse 1 mL (0.2 mg) into a venous catheter every 15 minutes if needed   (moderate pain (4 - 6)).   LORazepam 2 mg/mL injection; Commonly known as: Ativan; Infuse 0.25 mL   (0.5 mg) over 5 minutes into a venous catheter every 4 hours if needed   (anxiety, restlessness, insomnia).     CHANGE how you take these medications     HYDROmorphone 0.5 mg/0.5 mL solution injection; Commonly known as:   Dilaudid; Infuse 0.4 mL (0.4 mg) into a venous catheter every 15 minutes   if needed (severe pain).; What changed: when to take this, reasons to take   this     CONTINUE taking these medications     acetaminophen 325 mg tablet; Commonly known as: Tylenol; Take 2 tablets   (650 mg) by mouth every 6 hours if needed for mild pain (1 - 3).   cetirizine 10 mg tablet; Commonly known as: ZyrTEC; Take 1 tablet (10   mg) by mouth once daily.   cyclobenzaprine 5 mg tablet; Commonly known as: Flexeril; Take 1 tablet   (5 mg) by mouth 3 times a day.   docusate sodium 100 mg capsule; Commonly known as: Colace   ipratropium-albuteroL 0.5-2.5 mg/3 mL nebulizer solution; Commonly known   as: Duo-Neb; Take 3 mL by nebulization every 2 hours if needed for   wheezing or shortness of breath.   methadone 5 mg/5 mL solution; Commonly known as: Dolophine; Take 5 mL (5   mg) by mouth every 12 hours.   ondansetron 8 mg tablet; Commonly known as: Zofran   oxygen gas therapy; Commonly known as: O2; Inhale 2 L/min every 12   hours.   polyethylene glycol 17 gram packet; Commonly known as: Glycolax, Miralax   prednisoLONE acetate 1 % ophthalmic suspension; Commonly known as:   Pred-Forte   temazepam 7.5 mg capsule; Commonly known as: Restoril; Take 1 capsule   (7.5 mg) by mouth as needed at bedtime for sleep.   torsemide 10 mg tablet; Commonly known as: Demadex; Take 5 tablets (50   mg) by mouth once daily.         Test Results Pending At Discharge  Pending Labs                   No current pending labs.              Hospital Course          Jd Rhodes MD    Physician  Internal Medicine     Progress Notes        Signed     Date of Service: 10/13/2024  2:52 PM  Signed  Expand All Collapse All     Rohini Beaver is a 79 y.o. female on day 12 of admission presenting with Hyponatremia.           Subjective     Patient fully evaluated 10/3, awake, resting in bed. Patient with Moderate hiatal hernia with partial intrathoracic stomach and the peritoneal fat adjacent to the stomach within the hernia demonstrates evidence of probable carcinomatosis and ascites, Patient tolerated paracentesis on 10/01 well,order placed for repeat paracentesis therapeutic non diagnostic with IR.continue current and repeat labs in the AM. Patient aware and agreeable to current plan, continue plan as above. I spent 50 minutes in the professional and overall care of this patient.      Objective  Last Recorded Vitals  /56   Pulse 103   Temp 36.3 °C (97.3 °F)   Resp 20   Wt 104 kg (230 lb)   SpO2 93%   Intake/Output last 3 Shifts:     Intake/Output Summary (Last 24 hours) at 10/13/2024 1452  Last data filed at 10/13/2024 0500  Gross per 24 hour  Intake  120 ml  Output  --  Net  120 ml        Admission Weight  Weight: 104 kg (230 lb) (10/01/24 0828)     Daily Weight  10/01/24 : 104 kg (230 lb)     Image Results  CT abdomen pelvis wo IV contrast  Narrative: Interpreted By:  Deep Scott,   STUDY:  CT ABDOMEN PELVIS WO IV CONTRAST;  10/12/2024 1:30 pm      INDICATION:  Signs/Symptoms:Ascites /Abdominal Distention/Renal Failure.          COMPARISON:  CT scan 10/01/2024.      ACCESSION NUMBER(S):  [id number]      ORDERING CLINICIAN:  ALICIA GRAHAM      TECHNIQUE:  CT of the abdomen and pelvis was performed. Contiguous axial images  were obtained at 3 mm slice thickness through the abdomen and pelvis.  Coronal and sagittal  reconstructions at 3 mm slice thickness were  performed.  No intravenous contrast was administered; positive oral  contrast was given.      FINDINGS:  Please note that the evaluation of vessels, lymph nodes and organs is  limited without intravenous contrast.      LOWER CHEST:  Small to moderate bilateral pleural effusion with surrounding  atelectasis.      ABDOMEN:      LIVER:  Nodular surface with a scalloping of the right hepatic capsule  secondary to the adjacent malignant ascites. No definite parenchymal  lesions.      BILE DUCTS:  The intrahepatic and extrahepatic ducts are not dilated.      GALLBLADDER:  Surgically absent      PANCREAS:  No duct dilatation      SPLEEN:  No splenomegaly.      ADRENAL GLANDS:  No nodule      KIDNEYS AND URETERS:  The kidneys are normal in size and unremarkable in appearance.  No  hydroureteronephrosis or nephroureterolithiasis is identified.      PELVIS:      BLADDER:  Unremarkable      REPRODUCTIVE ORGANS:  Uterus is unremarkable      BOWEL:  Moderate hiatal hernia. No bowel dilatation. Few uncomplicated  colonic diverticulosis.      Moderate abdominopelvic ascites. Diffuse omental caking and  peritoneal carcinomatosis.      VESSELS:  Multifocal vascular calcifications and atherosclerotic changes.      PERITONEUM/RETROPERITONEUM/LYMPH NODES:  Multiple subcentimeter retroperitoneal, iliac and pelvic lymph nodes.      ABDOMINAL WALL:  Subcutaneous edema.      BONES:  Multilevel degenerative spine changes and facet joint disease. Prior  lower lumbar spine fixation.      Impression: Overall findings are grossly unchanged from 10/01/2024 CT scan.  1. Persistent moderate abdominopelvic ascites with diffuse peritoneal  carcinomatosis/omental caking.  2. 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  Underlying cirrhosis could not be entirely excluded as well.  3. Moderate bilateral pleural  effusion with surrounding atelectasis.          MACRO:  None      Signed by: Deep Scott 10/13/2024 7:50 AM  Dictation workstation:   [id number]        Physical Exam     Relevant Results                       Assessment/Plan  This patient currently has cardiac telemetry ordered; if you would like to modify or discontinue the telemetry order, click hereto go to the orders activity to modify/discontinue the order.            Jd Rhodes MD    Physician  Internal Medicine     H&P        Addendum     Date of Service: 10/1/2024  2:54 PM     Addendum     Expand All Collapse All    History Of Present Illness  Rohini Beaver is a 79-year-old female with past medical history of recently diagnosed ovarian cancer with peritoneal carcinomatosis s/p paracentesis x 4 (last one done 9/19/24), obesity, hyperlipidemia, osteoarthritis, and skin cancer s/p Mohs procedure.  Patient presents to the hospital with weakness and inability to care for herself.  She reports that she was at [facility] nursing [facility], however had a brief hospitalization at [facility] and upon discharge decided to go home rather than go back to [facility].  She lives with her 86-year-old sister and has a couple friends who assist with appointments, however limited support system.  Sister unable to care for due to age.  Today she was in urgent reclining chair and was able to get up.  ROS additionally positive for cold sweats, distended and tender abdomen, edema, pressure injury to coccyx/gluteal fold, and decreased activity tolerance.  Denies history of heart disease.  She reports that she is scheduled for outpatient appointment with her oncologist tomorrow to determine ongoing plan.      ER course: Hemodynamically stable, afebrile, SpO2 90s on room air.  WBC 28.9, Hgb 11.0/Hct34.1 (Hgb 15.1 4/4/2023), platelets 446. Glucose 107, Sodium 126, Chloride 94, calcium 8.5, albumin 2.4, alkaline phosphatase 231.  Lactate 1.4.  BNP 74.   High-sensitivity troponin 7.  UA unremarkable. CT chest showed moderate to large bilateral pleural effusion with adjacent presumed compressive atelectasis, prominent main pulmonary artery which may indicate pulmonary hypertension, mildly prominent mediastinal lymph nodes measuring up to 10 mm in short axis concerning for metastatic disease.  Moderate hiatal hernia with partial intrathoracic stomach and the peritoneal fat adjacent to the stomach within the hernia demonstrates evidence of probable carcinomatosis and ascites.  Redemonstration of large volume ascites with regions of significant anterior omental caking and peritoneal carcinomatosis commensurate with patient's provided diagnosis of ovarian cancer. Regions of wall thickening of the colon extending from the splenic flexure to the sigmoid.  Extensive region of scalloping of the right hepatic lobe peripherally with appearance of large subcapsular collection along the right hepatic margin. Blood culture in process     Past medical history: As above  Past surgical history: Cholecystectomy, lumbar laminectomy, left shoulder arthroscopy, right total knee replacement, corneal transplant  Social history: No history of smoking, alcohol abuse, illicit drug use.  Lives with her elderly sister who is 86 years old.  Limited support system.   Family history: Mother-cancer (unknown type)     Past Medical History  Medical History           Past Medical History:  Diagnosis  Date    Bilateral primary osteoarthritis of knee       HLD (hyperlipidemia)       Lumbosacral radiculopathy       Meralgia paraesthetica       Physical deconditioning           Surgical History  Surgical History              Past Surgical History:  Procedure  Laterality  Date    ARTHROPLASTY  Left        Left thumb carpometacarpal arthroplasty with ligament reconstruction and tendon interposition    BREAST BIOPSY     1999     Benign    CHOLECYSTECTOMY          COLONOSCOPY          CORNEAL TRANSPLANT           DILATION AND CURETTAGE OF UTERUS          LUMBAR FUSION          SHOULDER ARTHROSCOPY  Left       SKIN LESION EXCISION          TOTAL KNEE ARTHROPLASTY  Left  2019    TOTAL KNEE ARTHROPLASTY  Right  2017    TRIGGER FINGER RELEASE  Right           Social History  She reports that she has never smoked. She has never used smokeless tobacco. She reports that she does not currently use alcohol. She reports that she does not use drugs.     Family History  Family History                 Family History  Problem  Relation  Name  Age of Onset    Colon cancer  Mother          Lung cancer  Father          Other (Mesothelioma)  Brother          Brain cancer  Mother's Brother              Allergies  Patient has no known allergies.     Review of Systems     10 point ROS negative except as noted above in HPI      Physical Exam  Vitals reviewed.   Constitutional:       General: She is awake. She is not in acute distress.     Appearance: She is obese. She is ill-appearing. She is not toxic-appearing.   HENT:      Head: Normocephalic and atraumatic.      Nose: Nose normal.      Mouth/Throat:      Mouth: Mucous membranes are moist.      Pharynx: Oropharynx is clear.   Eyes:      Conjunctiva/sclera: Conjunctivae normal.   Cardiovascular:      Rate and Rhythm: Normal rate and regular rhythm.      Pulses: Normal pulses.      Heart sounds: No murmur heard.  Pulmonary:      Effort: Pulmonary effort is normal. No respiratory distress.      Breath sounds: Decreased air movement present. Decreased breath sounds present.      Comments: Posterior bilateral breath sounds are diminished  Abdominal:      General: There is distension.      Palpations: Abdomen is soft.      Tenderness: There is abdominal tenderness. There is no guarding.      Comments: Bowel sounds hypoactive, abdomen distended, tender to palpation, dullness noted to the sides.   Musculoskeletal:         General: No swelling, deformity or signs of injury. Normal range of motion.       Cervical back: Neck supple.      Right lower leg: Edema present.      Left lower leg: Edema present.      Comments: Generalized edema/anasarca   Skin:     General: Skin is warm and dry.      Capillary Refill: Capillary refill takes less than 2 seconds.      Findings: No ecchymosis or wound.      Comments: Wound on coccyx, exam deferred  due to patient discomfort    Neurological:      General: No focal deficit present.      Mental Status: She is alert and oriented to person, place, and time.   Psychiatric:         Mood and Affect: Mood normal.         Behavior: Behavior is cooperative.                  Last Recorded Vitals  Blood pressure 117/58, pulse 100, temperature 36.2 °C (97.2 °F), temperature source Temporal, resp. rate (!) 22, weight 104 kg (230 lb), SpO2 94%.     Relevant Results                    Results for orders placed or performed during the hospital encounter of 10/01/24 (from the past 24 hour(s))  CBC and Auto Differential  Result  Value  Ref Range     WBC  28.9 (H)  4.4 - 11.3 x10*3/uL     nRBC  0.0  0.0 - 0.0 /100 WBCs     RBC  4.15  4.00 - 5.20 x10*6/uL     Hemoglobin  11.0 (L)  12.0 - 16.0 g/dL     Hematocrit  34.1 (L)  36.0 - 46.0 %     MCV  82  80 - 100 fL     MCH  26.5  26.0 - 34.0 pg     MCHC  32.3  32.0 - 36.0 g/dL     RDW  15.7 (H)  11.5 - 14.5 %     Platelets  446  150 - 450 x10*3/uL     Neutrophils %  89.1  40.0 - 80.0 %     Immature Granulocytes %, Automated  1.0 (H)  0.0 - 0.9 %     Lymphocytes %  4.3  13.0 - 44.0 %     Monocytes %  4.8  2.0 - 10.0 %     Eosinophils %  0.5  0.0 - 6.0 %     Basophils %  0.3  0.0 - 2.0 %     Neutrophils Absolute  25.74 (H)  1.60 - 5.50 x10*3/uL     Immature Granulocytes Absolute, Automated  0.30  0.00 - 0.50 x10*3/uL     Lymphocytes Absolute  1.23  0.80 - 3.00 x10*3/uL     Monocytes Absolute  1.38 (H)  0.05 - 0.80 x10*3/uL     Eosinophils Absolute  0.14  0.00 - 0.40 x10*3/uL     Basophils Absolute  0.09  0.00 - 0.10 x10*3/uL  Comprehensive metabolic  panel  Result  Value  Ref Range     Glucose  107 (H)  74 - 99 mg/dL     Sodium  126 (L)  136 - 145 mmol/L     Potassium  5.2  3.5 - 5.3 mmol/L     Chloride  94 (L)  98 - 107 mmol/L     Bicarbonate  24  21 - 32 mmol/L     Anion Gap  13  10 - 20 mmol/L     Urea Nitrogen  18  6 - 23 mg/dL     Creatinine  0.61  0.50 - 1.05 mg/dL     eGFR  >90  >60 mL/min/1.73m*2     Calcium  8.5 (L)  8.6 - 10.3 mg/dL     Albumin  2.4 (L)  3.4 - 5.0 g/dL     Alkaline Phosphatase  231 (H)  33 - 136 U/L     Total Protein  5.7 (L)  6.4 - 8.2 g/dL     AST  23  9 - 39 U/L     Bilirubin, Total  0.3  0.0 - 1.2 mg/dL     ALT  8  7 - 45 U/L  Magnesium  Result  Value  Ref Range     Magnesium  1.68  1.60 - 2.40 mg/dL  Troponin I, High Sensitivity  Result  Value  Ref Range     Troponin I, High Sensitivity  7  0 - 13 ng/L  B-Type Natriuretic Peptide  Result  Value  Ref Range     BNP  74  0 - 99 pg/mL  Sars-CoV-2 PCR  Result  Value  Ref Range     Coronavirus 2019, PCR  Not Detected  Not Detected  Lactate  Result  Value  Ref Range     Lactate  1.4  0.4 - 2.0 mmol/L  Urinalysis with Reflex Culture and Microscopic  Result  Value  Ref Range     Color, Urine  Light-Yellow  Light-Yellow, Yellow, Dark-Yellow     Appearance, Urine  Clear  Clear     Specific Gravity, Urine  >1.050 (N)  1.005 - 1.035     pH, Urine  6.0  5.0, 5.5, 6.0, 6.5, 7.0, 7.5, 8.0     Protein, Urine  20 (TRACE)  NEGATIVE, 10 (TRACE), 20 (TRACE) mg/dL     Glucose, Urine  Normal  Normal mg/dL     Blood, Urine  NEGATIVE  NEGATIVE     Ketones, Urine  NEGATIVE  NEGATIVE mg/dL     Bilirubin, Urine  NEGATIVE  NEGATIVE     Urobilinogen, Urine  Normal  Normal mg/dL     Nitrite, Urine  NEGATIVE  NEGATIVE     Leukocyte Esterase, Urine  NEGATIVE  NEGATIVE  Urinalysis Microscopic  Result  Value  Ref Range     WBC, Urine  1-5  1-5, NONE /HPF     RBC, Urine  1-2  NONE, 1-2, 3-5 /HPF     Squamous Epithelial Cells, Urine  1-9 (SPARSE)  Reference range not established. /HPF     Mucus, Urine  FEW  Reference  range not established. /LPF  Protime-INR  Result  Value  Ref Range     Protime  13.0 (H)  9.8 - 12.8 seconds     INR  1.2 (H)  0.9 - 1.1  APTT  Result  Value  Ref Range     aPTT  24 (L)  27 - 38 seconds     CT chest abdomen pelvis w IV contrast     Result Date: 10/1/2024  Interpreted By:  Oscar Aldrich, STUDY: CT CHEST ABDOMEN PELVIS W IV CONTRAST;  10/1/2024 11:13 am   INDICATION: Signs/Symptoms:leukocytosis, ascites, recent ovarian cancer diagnosis.   COMPARISON: CT abdomen pelvis 08/08/2024   ACCESSION NUMBER(S): [id number]   ORDERING CLINICIAN: ASHLEY FAIRBANKS   TECHNIQUE: CT of the chest, abdomen, and pelvis was performed.  Contiguous axial images were obtained at 3 mm slice thickness through the chest, abdomen and pelvis. Coronal and sagittal reconstructions at 3 mm slice thickness were performed. ml of contrast  were administered intravenously without immediate complication.   FINDINGS: CHEST:   LUNG/PLEURA/LARGE AIRWAYS: No endobronchial lesion is seen.   Moderate to large bilateral pleural effusions with adjacent consolidative opacification of the bilateral lower lobes suggestive of compressive atelectasis. No pneumothorax. Mild asymmetric scattered reticular changes suggestive of chronic lung findings.     VESSELS: The thoracic aorta is unremarkable with respect course, caliber, and contour.   Prominent main pulmonary artery measuring up to 3.2 cm in anterior-posterior dimension measured on sagittal plane which may indicate sequela of pulmonary hypertension.   No significant coronary atherosclerotic calcifications are seen.   HEART: Heart size within normal limits. No pericardial effusion.   MEDIASTINUM AND OLIVE: Mildly prominent mediastinal lymph nodes measuring up to 10 mm in short axis   Moderate hiatal hernia with partial intrathoracic stomach and ascites and region of nodularity of the peritoneal fat within hiatal hernia suggestive carcinomatosis.   CHEST WALL AND LOWER NECK: No acute osseous  abnormality. Multilevel presumed degenerative changes throughout the imaged spine. No acute soft tissue abnormality.   ABDOMEN:   LIVER: There is been interval scalloping of the right hepatic margins with new regions of irregularity along the right hepatic capsule diffusely which is new since comparison imaging from 08/08/2024 there is a new elongated subcapsular collection measuring up to approximately 11.4 x 2.1 cm, difficult to measure given curvilinear projection extending over the right hepatic lobe margin (series 202, images 40-70). Similar appearing subcentimeter left hepatic lobe hypodense lesion.   BILE DUCTS: No obvious new intrahepatic biliary dilatation. Mild prominence of the common bile duct, nonspecific in a cholecystectomy patient.   GALLBLADDER: Absent.   PANCREAS: Unremarkable.   SPLEEN: Unchanged.   ADRENAL GLANDS: Unchanged.   KIDNEYS AND URETERS: Similar appearing subcentimeter right renal lower pole calculus. No hydronephrosis. No obstructing urolithiasis.   PELVIS:   BLADDER: Unremarkable.   REPRODUCTIVE ORGANS: Uterus appears grossly unchanged.   BOWEL: Moderate hiatal hernia with wall thickening of the stomach in the hernia. No new pathologic distention of bowel. Wall thickening of the colon within the splenic flexure descending colon and sigmoid colon, nonspecific.     VESSELS: No evidence of abdominal aortic aneurysm.   PERITONEUM/RETROPERITONEUM/LYMPH NODES: Large volume ascites with extensive regions of anterior omental caking and peritoneal carcinomatosis. No obvious free intraperitoneal gas. Given the presence of extensive omental caking and peritoneal carcinomatosis, superimposed peritoneal enhancement 4 other causes cannot be excluded.   BONE AND SOFT TISSUE: No acute osseous abnormality. Osseous structures appear stable. No acute abnormality of the abdominal wall soft tissues.        CHEST: 1.  Moderate to large bilateral pleural effusions with adjacent presumed compressive  atelectasis. 2. Prominent main pulmonary artery which may indicate pulmonary hypertension. 3. Mildly prominent mediastinal lymph nodes measuring up to 10 mm in short axis concerning for metastatic disease. 4. Moderate hiatal hernia with partial intrathoracic stomach. The peritoneal fat adjacent to the stomach within the hernia demonstrates evidence of probable carcinomatosis and ascites.   ABDOMEN-PELVIS: 1.  Redemonstration of large volume ascites with regions of significant anterior omental caking and peritoneal carcinomatosis commensurate with patient's provided diagnosis of ovarian cancer. 2. Regions of wall thickening of the colon extending from the splenic flexure to the sigmoid which may indicate a nonspecific colitis. 3. Since the previous examination on 08/08/2024, there are now extensive regions of scalloping of the right hepatic lobe peripherally with the appearance of a large subcapsular collection along the right hepatic margin as above. The sterility of this collection cannot be assessed via CT and clinical correlation is advised for exclusion superimposed infection within this region.     Signed by: Oscar Aldrich 10/1/2024 12:14 PM Dictation workstation:   [id number]     XR chest 1 view     Result Date: 10/1/2024  Interpreted By:  Samuel Jaramillo, STUDY: XR CHEST 1 VIEW; 10/1/2024 8:44 am   INDICATION: Signs/Symptoms:weakness, edema in legs and abdomen   COMPARISON: April 2023.   ACCESSION NUMBER(S): [id number]   ORDERING CLINICIAN: ASHLEY FAIRBANKS   FINDINGS: The study is limited due to rotation and poor inspiratory effort, with resultant crowding of the pulmonary vasculature. The cardiac silhouette is within normal limits for the technique. Moderate size hiatal hernia is again seen. There is no pneumothorax, confluent infiltrates or significant effusion. Degenerative changes involve the spine and shoulders; metallic anchors again noted over the left humeral head related to previous rotator cuff  repair.        Limited study. Hiatal hernia. No acute cardiopulmonary disease.   Signed by: Samuel Jaramillo 10/1/2024 9:22 AM Dictation workstation:   [id number]     US guided abdominal paracentesis     Result Date: 9/20/2024  Interpreted By:  Peri Lawrence and Kamau Nyokabi STUDY: US GUIDED ABDOMINAL PARACENTESIS; US GUIDED PERCUTANEOUS ABDOMINAL RETROPERITONEUM BIOPSY;  9/19/2024 11:13 am   INDICATION: Signs/Symptoms:ascites; Signs/Symptoms:ascites, evaluate ovarian cancer.   COMPARISON: CT abdomen and pelvis 08/08/2024   ACCESSION NUMBER(S): [id number]; [id number]   ORDERING CLINICIAN: JEANETTE ARIAS   TECHNIQUE: INTERVENTIONALIST(S): Dr. Peri Lawrence MD   CONSENT: The patient was informed of the nature of the proposed procedure. The purposes, alternatives, risks, and benefits were explained and discussed. All questions were answered and consent was obtained.   SEDATION: 1% local lidocaine was used for anesthetic.   MEDICATION/CONTRAST: No additional.   TIME OUT:   A time out was performed immediately prior to procedure start with the interventional team, correctly identifying the patient name, date of birth, MRN, procedure, anatomy (including marking of site and side), patient position, procedure consent form, relevant laboratory and imaging test results, antibiotic administration, safety precautions, and procedure-specific equipment needs.   COMPLICATIONS: No immediate adverse events identified.   FINDINGS: The patient was placed in the supine position. Limited sonographic images of the lower abdominal wall were obtained for purposes of needle guidance, which demonstrated omental soft tissue mass which was seen on prior CT 08/08/2024. The area of concern was prepped and draped under sterile technique.   1% lidocaine was injected subcutaneously. Additional lidocaine was administered into deeper tissues surrounding the targeted area for biopsy using a 22G spinal needle. A small incision was made.  Using the same access site a 18 gauge core biopsy needle was passed via a 17 gauge coaxial introducer needle to obtain a total of 1 core sample.   Postprocedure images demonstrate no evidence for hemorrhage. The patient tolerated the procedure well and there were no immediate complications. 1 core specimen was sent to pathology.     Subsequently the right lower quadrant was visualized under ultrasound which demonstrated moderate volume ascites seen on prior CT 08/08/2024. 1% lidocaine was injected subcutaneously at this site. A 19 gauge Yueh was used to target the fluid collection under ultrasound guidance. Once the site was accessed, the inner needle was removed from the catheter. The Yueh catheter was connected to drainage container. Estimated 1000 cc of serosanguineous colored fluid was removed. Post paracentesis imaging demonstrated decreased ascitic fluid. The Yueh catheter was removed. The access site was bandaged.        1. Status post ultrasound guided core needle biopsy of omental mass seen on CT 08/08/2024. 1 core specimens sent to pathology. 2. Successful paracentesis under ultrasound guidance with removal of 1 L of serosanguineous fluid.     I was present for and/or performed the critical portions of the procedure and immediately available throughout the entire procedure.   I personally reviewed the image(s) / study and interpretation. I agree with the findings as stated.   Performed and dictated at [facility].   MACRO: None   Signed by: Peri Lawrence 9/20/2024 10:06 AM Dictation workstation:   [id number]     US guided percutaneous abdominal retroperitoneum biopsy     Result Date: 9/20/2024  Interpreted By:  Peri Lawrence and Kamau Nyokabi STUDY: US GUIDED ABDOMINAL PARACENTESIS; US GUIDED PERCUTANEOUS ABDOMINAL RETROPERITONEUM BIOPSY;  9/19/2024 11:13 am   INDICATION: Signs/Symptoms:ascites; Signs/Symptoms:ascites, evaluate ovarian cancer.    COMPARISON: CT abdomen and pelvis 08/08/2024   ACCESSION NUMBER(S): [id number]; [id number]   ORDERING CLINICIAN: JEANETTE ARIAS   TECHNIQUE: INTERVENTIONALIST(S): Dr. Peri Lawrence MD   CONSENT: The patient was informed of the nature of the proposed procedure. The purposes, alternatives, risks, and benefits were explained and discussed. All questions were answered and consent was obtained.   SEDATION: 1% local lidocaine was used for anesthetic.   MEDICATION/CONTRAST: No additional.   TIME OUT:   A time out was performed immediately prior to procedure start with the interventional team, correctly identifying the patient name, date of birth, MRN, procedure, anatomy (including marking of site and side), patient position, procedure consent form, relevant laboratory and imaging test results, antibiotic administration, safety precautions, and procedure-specific equipment needs.   COMPLICATIONS: No immediate adverse events identified.   FINDINGS: The patient was placed in the supine position. Limited sonographic images of the lower abdominal wall were obtained for purposes of needle guidance, which demonstrated omental soft tissue mass which was seen on prior CT 08/08/2024. The area of concern was prepped and draped under sterile technique.   1% lidocaine was injected subcutaneously. Additional lidocaine was administered into deeper tissues surrounding the targeted area for biopsy using a 22G spinal needle. A small incision was made. Using the same access site a 18 gauge core biopsy needle was passed via a 17 gauge coaxial introducer needle to obtain a total of 1 core sample.   Postprocedure images demonstrate no evidence for hemorrhage. The patient tolerated the procedure well and there were no immediate complications. 1 core specimen was sent to pathology.     Subsequently the right lower quadrant was visualized under ultrasound which demonstrated moderate volume ascites seen on prior CT 08/08/2024. 1% lidocaine was  injected subcutaneously at this site. A 19 gauge Yueh was used to target the fluid collection under ultrasound guidance. Once the site was accessed, the inner needle was removed from the catheter. The Yueh catheter was connected to drainage container. Estimated 1000 cc of serosanguineous colored fluid was removed. Post paracentesis imaging demonstrated decreased ascitic fluid. The Yueh catheter was removed. The access site was bandaged.        1. Status post ultrasound guided core needle biopsy of omental mass seen on CT 08/08/2024. 1 core specimens sent to pathology. 2. Successful paracentesis under ultrasound guidance with removal of 1 L of serosanguineous fluid.     I was present for and/or performed the critical portions of the procedure and immediately available throughout the entire procedure.   I personally reviewed the image(s) / study and interpretation. I agree with the findings as stated.   Performed and dictated at [facility].   MACRO: None   Signed by: Peri Lawrence 9/20/2024 10:06 AM Dictation workstation:   [id number]            Assessment/Plan        Assessment & Plan    Hyponatremia        79-year-old female with past medical history of recently diagnosed ovarian cancer with peritoneal carcinomatosis s/p paracentesis x 4 (last one done 9/19/24), obesity, hyperlipidemia, osteoarthritis, and skin cancer s/p Mohs procedure.  Patient presents to the hospital with weakness and inability to care for herself.  Patient found to be hyponatremic and with evidence on CT imaging of possible abscess of the liver and ascites secondary to malignancy.  Hospitalized for further evaluation management..     #Ovarian cancer with peritoneal carcinomatosis  #Ascites  #Bilateral pleural effusions  # Suspected liver abscess  #Abdominal pain     Telemetry monitoring  Consult to IR for paracentesis and possible drainage of liver abscess  Consult to pulmonology for bilateral  pleural effusions  Antiemetics as needed  Analgesics as needed  Patient needs to be followed up with by her gynecological oncologist to determine optimal plan going forward for treatment of her ovarian cancer     #Hyponatremia  #Generalized edema/anasarca  #Hypoalbuminemia  Patient is fluid overloaded and has significant third spacing, suspect hypervolemia hyponatremia.  Will check urine electrolytes, urine osmolality, and serum osmolality  Fluid restriction of 1500 ml for the time being.  Consider starting diuretics, defer to attending  Repeat labs in a.m.     #debility  PT/OT  Social work for discharge planning     Chronic issues:  #Obesity  #Hyperlipidemia  #Osteoarthritis     Continue with patient's home medications as appropriate     #DVT prophylaxis  SCDs  Lovenox subcutaneous     I spent 75 minutes in the professional and overall care of this patient.        Revision History           Patient fully evaluated 10/2, awake, resting in bed. Patient with Moderate hiatal hernia with partial intrathoracic stomach and the peritoneal fat adjacent to the stomach within the hernia demonstrates evidence of probable carcinomatosis and ascites, Patient tolerated paracentesis on 10/01, awaiting culture results.No s/s or c/o acute difficulties at this time. Medications and labs reviewed.  Plan discussed with interdisciplinary team, per pulmonology - Plan:  Patient has bilateral pleural effusion in the context of malignant ascites/ovarian cancer I explained to the patient the pleural effusion most likely related to recurrent ascites, patient is requiring so far 5 steps malignant ascites in the last month most likely beneficial approaches intra-abdominal Pleurx catheter Abdominal Pleurx catheter would be the most effective way of preventing pleural effusions and ascites.  Pleural effusions are secondary to ascites, both of which are due to patient's underlying cancer Continue with goals of care discussions prior to  interventional radiology consult Follow hepatic fluid analysis Continue IV antibiotics Zosyn, continue current and repeat labs in the AM. Patient still requiring frequent cardiac and SPO2 monitoring. Discharge planning discussed with patient and care team. Therapy evaluations ordered-  [facility], anticipate SNF/C at discharge. Patient aware and agreeable to current plan, continue plan as above. I spent 50 minutes in the professional and overall care of this patient.              Assessment & Plan  Hyponatremia     Patient fully evaluated 10/3, awake, resting in bed. Patient with Moderate hiatal hernia with partial intrathoracic stomach and the peritoneal fat adjacent to the stomach within the hernia demonstrates evidence of probable carcinomatosis and ascites, Patient tolerated paracentesis on 10/01 well,order placed for repeat paracentesis therapeutic non diagnostic with IR.continue current and repeat labs in the AM. Patient aware and agreeable to current plan, continue plan as above.     Patient fully evaluated on October 4.  Patient awaiting therapeutic paracentesis.  Patient probably will need albumin afterwards.  Continue to monitor response with above treatments recheck labs in AM.  I spent 50 minutes in the professional and overall care of this patient.       Patient fully evaluated 10/06, head of bed elevated, no s/s or c/o acute difficulties at this time. Sister at bedside and agreeable to plan. Attempted therapeutic paracentesis by IR, aborted procedure due to insufficient fluid accumulation.  Medications and labs reviewed, cultures blood no growth at 4 days, AFB Culture      Culture in progress and will be examined weekly. A result will be issued either when positive or after 8 weeks incubation. AFB Stain -No acid fast bacilli seen.  Plan discussed with interdisciplinary team, continue current and repeat labs in the AM. Per pulmonary - Day of consult, patient is breathing on room air. Vital stable. CT  chest showed bilateral large pleural effusions. Dicussed need for Abdominal Pleurx catheter. Patient with likely carcinomatosis and plan to discharge to [facility] and will follow up with gynecology in 7 days,     Patient still requiring frequent cardiac and SPO2 monitoring. Discharge planning discussed with patient and care team. Therapy evaluations ordered- [place] [id number], [facility] at discharge. Patient aware and agreeable to current plan, continue plan as above. I spent 50 minutes in the professional and overall care of this patient.     Patient fully evaluated 10/07, head of bed elevated, no s/s or c/o acute difficulties at this time. Medications and labs reviewed, sodium 125, nephrology consulted.  Cultures blood no growth at 4 days, AFB Culture      Culture in progress and will be examined weekly. A result will be issued either when positive or after 8 weeks incubation. AFB Stain -No acid fast bacilli seen.  Plan discussed with interdisciplinary team, continue current and repeat labs in the AM, new supplemental oxygen requirements, will repeat chest xray in AM, maintain HOB elevated to alleviate postural dyspnea. Vitals stable. Patient with likely carcinomatosis and plan to discharge to [facility] and will follow up with gynecology in 7 days, atient still requiring frequent cardiac and SPO2 monitoring. Discharge planning discussed with patient and care team. Therapy evaluations ordered- [address] at discharge. Patient aware and agreeable to current plan, continue plan as above. I spent 50 minutes in the professional and overall care of this patient.   Patient fully evaluated 10/08, head of bed elevated, no s/s or c/o acute difficulties at this time. Medications and labs reviewed, sodium low again today at 125, nephrology consulted. Awaiting hepatic fluid analysis -AFB Culture -Culture in progress and will be examined weekly. A result will be issued either when positive or after 8  weeks incubation. AFB Stain -No acid fast bacilli seen.  Plan discussed with interdisciplinary team, per nephrology - Hyponatremia  Acute kidney injury   Ascites  Malnutrition  Anemia  Pleural effusion  Plan;  Discontinue potential nephrotoxins  Nutritional/iron/renal indices/urinary indices  DuoNeb aerosols  Appreciate nephrology input. Patient requiring supplemental oxygen at this time, continue to maintain HOB elevated as tolerated. Patient seen by pulmonology - Consult IR for right sided diagnostic and therapeutic thoracentesis  Unable to safely perform paracentesis due to lack of fluid  Patient has bilateral pleural effusion in the context of malignant ascites/ovarian cancer - Pleural effusions are secondary to ascites, both of which are due to patient's underlying cancer. Continue with goals of care discussions prior to interventional radiology consult. Continue IV antibiotics zosyn, probiotics added. Continue current plan and repeat labs in the AM, repeat chest xray in AM, maintain HOB elevated to alleviate postural dyspnea. Vitals stable. Patient with likely carcinomatosis and plan to discharge to SNF - [facility] and will follow up with gynecology in 7 days, atient still requiring frequent cardiac and SPO2 monitoring. Discharge planning discussed with patient and care team. Therapy evaluations ordered- [place] [id number], [facility] at discharge. Patient aware and agreeable to current plan, continue plan as above. I spent 50 minutes in the professional and overall care of this patient.     Patient fully evaluated 10/09, patient resting in bed with HOB, no s/s or c/o acute difficulties at this time. Medications and labs reviewed, cultures no growth at 4 days, AFB cultures in process. Plan discussed with interdisciplinary team, pulmonary plan - Bilateral pleural effusion  Abdominal ascites  Ovarian carcinoma w/ peritoneal carcinomatosis carcinomatosis  HLD  Patient clear for discharge to SNF from standpoint of  pulmonology  Patient will likely need an abdominal PleurX catheter at some point. However, we were unable to perform safely during this hospitalization due to lack of ascitic fluid  Pleural fluid suggests exudative effusion, likely secondary to malignancy  Okay to discontinue antibiotics from standpoint of pulmonology. Low suspicion of pneumonia. Leukocytosis is unlikely reflective of infection.   Will continue current and repeat labs in the AM. Patient still requiring frequent cardiac and SPO2 monitoring. Discharge planning discussed with patient and care team. Therapy evaluations ordered- [facility] 10, anticipate SNF at discharge. Patient aware and agreeable to current plan, continue plan as above. I spent 50 minutes in the professional and overall care of this patient.     Patient fully evaluated 10/10, patient resting in bed with HOB, no s/s or c/o acute difficulties at this time. Medications and labs reviewed, cultures no growth at 5 days, AFB cultures in process. Plan discussed with interdisciplinary team, pulmonary plan- Bilateral pleural effusion  Abdominal ascites Ovarian carcinoma w/ peritoneal carcinomatosis carcinomatosis HLD Patient clear for discharge to SNF from standpoint.   Patient will likely need an abdominal PleurX catheter at some point. However, we were unable to perform safely during this hospitalization due to lack of ascitic fluid Pleural fluid suggests exudative effusion, likely secondary to malignancy Okay to discontinue antibiotics from standpoint of pulmonology. Low suspicion of pneumonia. Leukocytosis is unlikely reflective of infection.   Patient's sister at bedside, discussion of plan of care and will follow up with gynecology outpatient, possibly Dr. Jones. Will continue current and repeat labs in the AM. Patient with frequent bowel movements, soft, will hold miralax at this time. Patient still requiring frequent cardiac and SPO2 monitoring. Discharge planning discussed with patient  and care team. Therapy evaluations ordered- [facility], anticipate SNF at discharge. Patient aware and agreeable to current plan, continue plan as above. I spent 50 minutes in the professional and overall care of this patient.     Patient fully evaluated 10/11, patient resting in bed with HOB, no s/s or c/o acute difficulties at this time. Medications and labs reviewed, cultures no growth at 5 days, AFB cultures in process, creatinine and Bun continue to rise. Nephrology on the case, appreciate input.  Plan discussed with interdisciplinary team, pulmonary plan- agree to discontinue antibiotics from standpoint of pulmonology with Low suspicion of pneumonia. Continues to require supplemental oxygen at this time. Leukocytosis is unlikely reflective of infection. Patient pain medications updated based on renal function - will switch Roxicodone. Long discussion with patient's sister and patient plan of care and will follow up with gynecology outpatient, possibly Dr. Jones. Will continue current and repeat labs in the AM. Patient with frequent bowel movements, soft, will hold miralax at this time. Patient still requiring frequent cardiac and SPO2 monitoring. Discharge planning discussed with patient and care team. Therapy evaluations ordered- [facility], anticipate SNF at discharge. Patient aware and agreeable to current plan, continue plan as above.     Patient fully evaluated on October 12 and continues to have significant decline in renal function.  Oncology reconsulted.  I believe the patient is significant third spacing secondary to peritoneal carcinomatosis.  Recheck labs in AM.  Appreciate pulmonary and nephrology consultations.  I spent 50 minutes in the professional and overall care of this patient.     Patient fully evaluated 10/13, head of bed elevated, visible difficulties noted at this time. Medications and labs reviewed-  Cultures-  in process  WBC- 29.6  Hemoglobin- 9.2  Imaging- CT abdomen pelvis wo IV  contrast   Impression:    Overall findings are grossly unchanged from 10/01/2024 CT scan.  1. Persistent moderate abdominopelvic ascites with diffuse peritoneal  carcinomatosis/omental caking.  2. 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  Underlying cirrhosis could not be entirely excluded as well.  3. Moderate bilateral pleural effusion with surrounding atelectasis.  Supplemental oxygen requiring at this time.      Goals of care- long discussion with sister, patient, and interdisciplinary team, patient with worsening renal impairment secondary to  ovarian carcinoma with peritoneal carcinomatosis abdominal distention, ascites requiring frequent drainage was admitted profound weakness tiredness, failure to thrive. Prognosis poor, plan discussed and will focus on comfort on measures at this time, planned call out to gyn/onc doctor tomorrow morning. Addition of methadone for pain management. Patient seen by Dr. Alexandre - The patient is a 79-year-old woman diagnosed with ovarian carcinoma and peritoneal carcinomatosis with ascites in August 2024.  Initially evaluated by GYN oncology and chemotherapy was recommended but due to intercurrent illness and admissions the patient did not/could not receive any treatment.  Currently at SNF and was admitted because of profound weakness tiredness failure to thrive pain and noted to have elevated creatinine.  Physical examination revealed elderly woman in pain requesting assistance.  Performance status is 3-4.  Elevated creatinine at 3.14 mg/dL.  Personally discussed with Dr. Rhodes.  Could consider initial chemotherapy to site to reduce then consider surgery.  However, advanced age, poor performance status, elevated creatinine preclude chemotherapy.  Consider evaluation by GYN oncology.  Meanwhile consider palliative care pain control/symptom control drainage of ascites consider catheter  placement to drain fluid.  Consider methadone in view of elevated creatinine.  Thank you for allowing me to participate in care of your patient if you have any questions please feel free to call me.  Will continue current and repeat,  labs in the AM. Patient still requiring frequent cardiac and SPO2 monitoring. Continue aggressive pulmonary hygiene. Discharge planning discussed with patient and care team. Therapy evaluations ordered. [facility]-        , anticipate HHC/SNF at discharge. Patient aware and agreeable to current plan, continue plan as above. I spent a total of 50 minutes on the date of the service which included preparing to see the patient, face-to-face patient care, completing clinical documentation, obtaining and/or reviewing separately obtained history, performing a medically appropriate examination, counseling and educating the patient/family/caregiver, ordering medications, tests, or procedures, communicating with other HCPs (not separately reported), independently interpreting results (not separately reported), communicating results to the patient/family/caregiver, and care coordination (not separately reported).         Revision History          Pertinent Physical Exam At Time of Discharge  Physical Exam  Patient fully evaluated 10/16/24 for   1.  Hyponatremia   HYDROmorphone (Dilaudid) 0.5 mg/0.5 mL solution injection     HYDROmorphone PF (Dilaudid) 0.2 mg/mL injection     bisacodyl (Dulcolax) 10 mg suppository     glycopyrrolate (Robinul) 200 mcg/mL injection     haloperidol lactate (Haldol) 5 mg/mL injection     LORazepam (Ativan) 2 mg/mL injection     Call placed to GYN/ONC Dr. Villalba this AM with long discussion on patient, treatment options, and goals of care. Patient and sister aware of poor prognosis and bleak outcome. Patient with no significant clinical improvement noted, patient medically cleared for discharge today to [facility]. Patient seen resting in bed with head of bed  elevated, no s/s or c/o acute difficulties at this time. Medications and labs reviewed, will begin comfort care medications and transition to hospice.     No results found for this or any previous visit (from the past 24 hours).        Intake/Output this shift:  I/O this shift:  In: 320 [P.O.:320]  Out: -      Vital signs for last 24 hours:  Temp:  [36.3 °C (97.3 °F)-37 °C (98.6 °F)] 37 °C (98.6 °F)  Heart Rate:  [102-106] 102  Resp:  [18] 18  BP: ()/(51-55) 89/55        Plan discussed with interdisciplinary team, ok to discharge to [facility]. Patient aware and agreeable to current plan, continue plan as above. I spent 30 minutes on the date of the service which included preparing to see the patient, face-to-face patient care, completing clinical documentation, obtaining and/or reviewing separately obtained history, performing a medically appropriate examination, counseling and educating the patient/family/caregiver, ordering medications, tests, or procedures, communicating with other HCPs (not separately reported), independently interpreting results (not separately reported), communicating results to the patient/family/caregiver, and care coordination (not separately reported  Outpatient Follow-Up  No future appointments.     Patient fully evaluated on October 16 and found to note be GIP appropriate.  Patient to discharge to skilled nursing.     Jd Rhodes MD        Patient fully evaluated 10/17 for   1.  Acute kidney injury (CMS-HCC)           Patient seen resting in bed with head of bed elevated, no s/s or c/o acute difficulties at this time.  Vital signs for last 24 hours:  Temp:  [36.2 °C (97.2 °F)-37.2 °C (99 °F)] 36.4 °C (97.5 °F)  Heart Rate:  [] 100  Resp:  [16-18] 18  BP: ()/(49-56) 93/51 I/O this shift:  In: 150 [P.O.:150]  Out: -   Patient still requiring frequent cardiac and SPO2 monitoring. Continue aggressive pulmonary hygiene and oral hygiene. Off loading as  tolerated for skin integrity. Medications and labs reviewed-               Results for orders placed or performed during the hospital encounter of 10/14/24 (from the past 24 hours)  Comprehensive Metabolic Panel  Result  Value  Ref Range     Glucose  95  74 - 99 mg/dL     Sodium  129 (L)  136 - 145 mmol/L     Potassium  4.6  3.5 - 5.3 mmol/L     Chloride  95 (L)  98 - 107 mmol/L     Bicarbonate  21  21 - 32 mmol/L     Anion Gap  18  10 - 20 mmol/L     Urea Nitrogen  79 (H)  6 - 23 mg/dL     Creatinine  4.17 (H)  0.50 - 1.05 mg/dL     eGFR  10 (L)  >60 mL/min/1.73m*2     Calcium  7.4 (L)  8.6 - 10.3 mg/dL     Albumin  2.0 (L)  3.4 - 5.0 g/dL     Alkaline Phosphatase  171 (H)  33 - 136 U/L     Total Protein  4.9 (L)  6.4 - 8.2 g/dL     AST  29  9 - 39 U/L     Bilirubin, Total  0.3  0.0 - 1.2 mg/dL     ALT  10  7 - 45 U/L  CBC  Result  Value  Ref Range     WBC  27.3 (H)  4.4 - 11.3 x10*3/uL     nRBC  0.0  0.0 - 0.0 /100 WBCs     RBC  3.59 (L)  4.00 - 5.20 x10*6/uL     Hemoglobin  9.3 (L)  12.0 - 16.0 g/dL     Hematocrit  31.3 (L)  36.0 - 46.0 %     MCV  87  80 - 100 fL     MCH  25.9 (L)  26.0 - 34.0 pg     MCHC  29.7 (L)  32.0 - 36.0 g/dL     RDW  18.5 (H)  11.5 - 14.5 %     Platelets  338  150 - 450 x10*3/uL     Patient recently received an antibiotic (last 12 hours)         None           Continue aggressive pulmonary hygiene and oral hygiene. Off loading as tolerated for skin integrity.  Plan discussed with interdisciplinary team, Patient started on Unasyn TID IV for spontanous bacterial peritonitis, empiric. Will continue current and repeat labs in the AM.   Discharge planning discussed with patient and care team. Therapy evaluations ordered, anticipate SNF at discharge. Patient aware and agreeable to current plan, continue plan as above.   I spent a total of 50 minutes on the date of the service which included preparing to see the patient, face-to-face patient care, completing clinical documentation, obtaining  and/or reviewing separately obtained history, performing a medically appropriate examination, counseling and educating the patient/family/caregiver, ordering medications, tests, or procedures, communicating with other HCPs (not separately reported), independently interpreting results (not separately reported), communicating results to the patient/family/caregiver, and care coordination (not separately reported).          Revision History       Patient fully evaluated 10/18  for   1.  Acute kidney injury (CMS-[facility])           Patient seen resting in bed with head of bed elevated, no s/s or c/o acute difficulties at this time.  Vital signs for last 24 hours:  Temp:  [35.7 °C (96.3 °F)-36.5 °C (97.7 °F)] 36.5 °C (97.7 °F)  Heart Rate:  [] 103  Resp:  [16-18] 16  BP: ()/(46-58) 105/51 No intake/output data recorded.  Patient still requiring frequent cardiac and SPO2 monitoring. Continue aggressive pulmonary hygiene and oral hygiene. Off loading as tolerated for skin integrity. Medications and labs reviewed-   Results for orders placed or performed during the hospital encounter of 10/17/24 (from the past 24 hours)  CBC and Auto Differential  Result  Value  Ref Range     WBC  25.7 (H)  4.4 - 11.3 x10*3/uL     nRBC  0.0  0.0 - 0.0 /100 WBCs     RBC  3.50 (L)  4.00 - 5.20 x10*6/uL     Hemoglobin  9.1 (L)  12.0 - 16.0 g/dL     Hematocrit  29.7 (L)  36.0 - 46.0 %     MCV  85  80 - 100 fL     MCH  26.0  26.0 - 34.0 pg     MCHC  30.6 (L)  32.0 - 36.0 g/dL     RDW  18.3 (H)  11.5 - 14.5 %     Platelets  333  150 - 450 x10*3/uL     Neutrophils %  87.0  40.0 - 80.0 %     Immature Granulocytes %, Automated  1.9 (H)  0.0 - 0.9 %     Lymphocytes %  4.0  13.0 - 44.0 %     Monocytes %  3.9  2.0 - 10.0 %     Eosinophils %  2.8  0.0 - 6.0 %     Basophils %  0.4  0.0 - 2.0 %     Neutrophils Absolute  22.34 (H)  1.60 - 5.50 x10*3/uL     Immature Granulocytes Absolute, Automated  0.48  0.00 - 0.50 x10*3/uL     Lymphocytes  Absolute  1.03  0.80 - 3.00 x10*3/uL     Monocytes Absolute  1.01 (H)  0.05 - 0.80 x10*3/uL     Eosinophils Absolute  0.73 (H)  0.00 - 0.40 x10*3/uL     Basophils Absolute  0.11 (H)  0.00 - 0.10 x10*3/uL  Comprehensive metabolic panel  Result  Value  Ref Range     Glucose  89  74 - 99 mg/dL     Sodium  129 (L)  136 - 145 mmol/L     Potassium  4.4  3.5 - 5.3 mmol/L     Chloride  92 (L)  98 - 107 mmol/L     Bicarbonate  23  21 - 32 mmol/L     Anion Gap  18  10 - 20 mmol/L     Urea Nitrogen  83 (H)  6 - 23 mg/dL     Creatinine  4.51 (H)  0.50 - 1.05 mg/dL     eGFR  9 (L)  >60 mL/min/1.73m*2     Calcium  7.5 (L)  8.6 - 10.3 mg/dL     Albumin  2.0 (L)  3.4 - 5.0 g/dL     Alkaline Phosphatase  168 (H)  33 - 136 U/L     Total Protein  5.3 (L)  6.4 - 8.2 g/dL     AST  28  9 - 39 U/L     Bilirubin, Total  0.3  0.0 - 1.2 mg/dL     ALT  10  7 - 45 U/L  SST TOP  Result  Value  Ref Range     Extra Tube  Hold for add-ons.        Patient recently received an antibiotic (last 12 hours)         None           Continue aggressive pulmonary hygiene and oral hygiene. Off loading as tolerated for skin integrity.  Long discussion with sister and interdisciplinary team and plan discussed. Per nephrology -Renal chemistries continue to downtrend with a BUN of 83 and creatinine of 4.51 continue, will continue current current and repeat labs in the AM.  Discharge planning discussed with patient and care team. Therapy evaluations ordered. Anticipate SNF at discharge. Patient aware and agreeable to current plan, continue plan as above.      I spent a total of 50 minutes on the date of the service which included preparing to see the patient, face-to-face patient care, completing clinical documentation, obtaining and/or reviewing separately obtained history, performing a medically appropriate examination, counseling and educating the patient/family/caregiver, ordering medications, tests, or procedures, communicating with other HCPs (not  separately reported), independently interpreting results (not separately reported), communicating results to the patient/family/caregiver, and care coordination (not separately reported).           Patient fully evaluated 10/19  for Principal Problem:    EUSEBIO (acute kidney injury) (CMS-[facility])  Active Problems:    Acute kidney injury (CMS-[facility])  Patient seen resting in bed with head of bed elevated, no s/s or c/o acute difficulties at this time.  Vital signs for last 24 hours:  Temp:  [35.3 °C (95.5 °F)-36.5 °C (97.7 °F)] 36.5 °C (97.7 °F)  Heart Rate:  [100-107] 100  Resp:  [16] 16  BP: ()/(42-58) 89/42 I/O this shift:  In: -   Out: 200 [Urine:200]  Patient still requiring frequent cardiac and SPO2 monitoring. Continue aggressive pulmonary hygiene and oral hygiene. Off loading as tolerated for skin integrity. Medications and labs reviewed-   Results for orders placed or performed during the hospital encounter of 10/17/24 (from the past 24 hours)   CBC and Auto Differential   Result Value Ref Range    WBC 28.4 (H) 4.4 - 11.3 x10*3/uL    nRBC 0.0 0.0 - 0.0 /100 WBCs    RBC 3.37 (L) 4.00 - 5.20 x10*6/uL    Hemoglobin 8.8 (L) 12.0 - 16.0 g/dL    Hematocrit 29.4 (L) 36.0 - 46.0 %    MCV 87 80 - 100 fL    MCH 26.1 26.0 - 34.0 pg    MCHC 29.9 (L) 32.0 - 36.0 g/dL    RDW 18.6 (H) 11.5 - 14.5 %    Platelets 336 150 - 450 x10*3/uL    Neutrophils % 87.8 40.0 - 80.0 %    Immature Granulocytes %, Automated 1.1 (H) 0.0 - 0.9 %    Lymphocytes % 4.3 13.0 - 44.0 %    Monocytes % 3.5 2.0 - 10.0 %    Eosinophils % 2.9 0.0 - 6.0 %    Basophils % 0.4 0.0 - 2.0 %    Neutrophils Absolute 24.88 (H) 1.60 - 5.50 x10*3/uL    Immature Granulocytes Absolute, Automated 0.31 0.00 - 0.50 x10*3/uL    Lymphocytes Absolute 1.23 0.80 - 3.00 x10*3/uL    Monocytes Absolute 0.99 (H) 0.05 - 0.80 x10*3/uL    Eosinophils Absolute 0.83 (H) 0.00 - 0.40 x10*3/uL    Basophils Absolute 0.12 (H) 0.00 - 0.10 x10*3/uL   Comprehensive metabolic panel   Result  Value Ref Range    Glucose 93 74 - 99 mg/dL    Sodium 127 (L) 136 - 145 mmol/L    Potassium 4.6 3.5 - 5.3 mmol/L    Chloride 92 (L) 98 - 107 mmol/L    Bicarbonate 22 21 - 32 mmol/L    Anion Gap 18 10 - 20 mmol/L    Urea Nitrogen 89 (H) 6 - 23 mg/dL    Creatinine 5.00 (H) 0.50 - 1.05 mg/dL    eGFR 8 (L) >60 mL/min/1.73m*2    Calcium 7.3 (L) 8.6 - 10.3 mg/dL    Albumin 2.0 (L) 3.4 - 5.0 g/dL    Alkaline Phosphatase 173 (H) 33 - 136 U/L    Total Protein 5.2 (L) 6.4 - 8.2 g/dL    AST 31 9 - 39 U/L    Bilirubin, Total 0.3 0.0 - 1.2 mg/dL    ALT 11 7 - 45 U/L   Lactate   Result Value Ref Range    Lactate 1.6 0.4 - 2.0 mmol/L      Patient recently received an antibiotic (last 12 hours)       Date/Time Action Medication Dose Rate    10/19/24 1428 New Bag    ampicillin-sulbactam (Unasyn) in sodium chloride 0.9 % 100 mL 3 g 3 g 200 mL/hr           Plan discussed with interdisciplinary team, continue current and repeat labs in the AM. Increased methadone to 10 mg from 5 mg for further pain management, diligent wound care and repositioning, ointment as needed.     Discharge planning discussed with patient and care team. Therapy evaluations ordered. anticipate HHC/SNF at discharge. Patient aware and agreeable to current plan, continue plan as above.     I spent a total of 50 minutes on the date of the service which included preparing to see the patient, face-to-face patient care, completing clinical documentation, obtaining and/or reviewing separately obtained history, performing a medically appropriate examination, counseling and educating the patient/family/caregiver, ordering medications, tests, or procedures, communicating with other HCPs (not separately reported), independently interpreting results (not separately reported), communicating results to the patient/family/caregiver, and care coordination (not separately reported).        Kim Angulo

## 2024-10-19 NOTE — PROGRESS NOTES
Subjective   Patient denies any voiced complaint.  Renal chemistries continue to deteriorate  Objective   Past Medical History  She has a past medical history of Bilateral primary osteoarthritis of knee, HLD (hyperlipidemia), Lumbosacral radiculopathy, Meralgia paraesthetica, Obesity, and Physical deconditioning.  1.  Ovarian cancer   2.  GERD  3.  Adjustment disorder with depressed mood  4.  Chronic neuropathy  5.  Hyperlipidemia  6.  Degenerative joint disease  6.  Obesity  7.  Corneal dystrophy status post corneal procedures  8.  History of skin cancer involving nose.  S/p Mohs procedure.  9.  Osteoarthritis  10.  Meralgia paresthetica  11.  Peritoneal carcinomatosis  12.  Malignant ascites  13.  Obesity  14.  Low back pain  15.  Acute kidney injury  16.  Hyponatremia  17.  Anemia  18.  Renal calculi  Surgical History  She has a past surgical history that includes Total knee arthroplasty (Left, 2019); Cholecystectomy; Corneal transplant; Lumbar fusion; Dilation and curettage of uterus; Shoulder arthroscopy (Left); Colonoscopy; Skin lesion excision; Trigger finger release (Right); Arthroplasty (Left); Breast biopsy (1999); and Total knee arthroplasty (Right, 2017)    Physical Exam  General Appearance; alert oriented  Skin pallor  HEENT; pupils react to light and accommodation  Tender maxillary sinuses  Tongue; well-hydrated  Neck; no jugular vein distention, no thyromegaly, no adenopathy, no bruit  Lungs; bibasilar crackles on expiration  Heart; no rubs no gallops, heart rate is regular  Abdomen; there is tympanic note to percussion with distention no rebound no guarding no bruit  ; no CVA tenderness  Musculoskeletal; decreased muscle tone  1+ edema of the legs  Knees on physical examination is close edema, bursal tenderness and decreased range of motion arthralgias  Neurological; no tremors no clonus  Last Recorded Vitals  Blood pressure (!) 89/42, pulse 100, temperature 36.5 °C (97.7 °F), temperature source  "Temporal, resp. rate 16, height 1.626 m (5' 4.02\"), weight 104 kg (229 lb 4.5 oz), SpO2 98%.  Intake/Output last 3 Shifts:  I/O last 3 completed shifts:  In: 200 (1.9 mL/kg) [IV Piggyback:200]  Out: 200 (1.9 mL/kg) [Urine:200 (0.1 mL/kg/hr)]  Weight: 104 kg     Relevant Results    Results for orders placed or performed during the hospital encounter of 10/17/24 (from the past 24 hours)   CBC and Auto Differential   Result Value Ref Range    WBC 28.4 (H) 4.4 - 11.3 x10*3/uL    nRBC 0.0 0.0 - 0.0 /100 WBCs    RBC 3.37 (L) 4.00 - 5.20 x10*6/uL    Hemoglobin 8.8 (L) 12.0 - 16.0 g/dL    Hematocrit 29.4 (L) 36.0 - 46.0 %    MCV 87 80 - 100 fL    MCH 26.1 26.0 - 34.0 pg    MCHC 29.9 (L) 32.0 - 36.0 g/dL    RDW 18.6 (H) 11.5 - 14.5 %    Platelets 336 150 - 450 x10*3/uL    Neutrophils % 87.8 40.0 - 80.0 %    Immature Granulocytes %, Automated 1.1 (H) 0.0 - 0.9 %    Lymphocytes % 4.3 13.0 - 44.0 %    Monocytes % 3.5 2.0 - 10.0 %    Eosinophils % 2.9 0.0 - 6.0 %    Basophils % 0.4 0.0 - 2.0 %    Neutrophils Absolute 24.88 (H) 1.60 - 5.50 x10*3/uL    Immature Granulocytes Absolute, Automated 0.31 0.00 - 0.50 x10*3/uL    Lymphocytes Absolute 1.23 0.80 - 3.00 x10*3/uL    Monocytes Absolute 0.99 (H) 0.05 - 0.80 x10*3/uL    Eosinophils Absolute 0.83 (H) 0.00 - 0.40 x10*3/uL    Basophils Absolute 0.12 (H) 0.00 - 0.10 x10*3/uL   Comprehensive metabolic panel   Result Value Ref Range    Glucose 93 74 - 99 mg/dL    Sodium 127 (L) 136 - 145 mmol/L    Potassium 4.6 3.5 - 5.3 mmol/L    Chloride 92 (L) 98 - 107 mmol/L    Bicarbonate 22 21 - 32 mmol/L    Anion Gap 18 10 - 20 mmol/L    Urea Nitrogen 89 (H) 6 - 23 mg/dL    Creatinine 5.00 (H) 0.50 - 1.05 mg/dL    eGFR 8 (L) >60 mL/min/1.73m*2    Calcium 7.3 (L) 8.6 - 10.3 mg/dL    Albumin 2.0 (L) 3.4 - 5.0 g/dL    Alkaline Phosphatase 173 (H) 33 - 136 U/L    Total Protein 5.2 (L) 6.4 - 8.2 g/dL    AST 31 9 - 39 U/L    Bilirubin, Total 0.3 0.0 - 1.2 mg/dL    ALT 11 7 - 45 U/L   Lactate   Result " Value Ref Range    Lactate 1.6 0.4 - 2.0 mmol/L                   Assessment/Plan   Hyponatremia  SIADH  Acute kidney injury  Peritoneal carcinomatosis  Ovarian cancer  Ascites  Malnutrition  Anemia  Pleural effusion  Hyperuricemia  Plan  Continue supportive therapy  Patient is not a dialysis candidate due to  terminal metastatic cancer disease  Assessment & Plan  EUSEBIO (acute kidney injury) (CMS-HCC)    Acute kidney injury (CMS-HCC)        I spent  20 minutes in the professional and overall care of this patient.      Elder Wells MD

## 2024-10-19 NOTE — CARE PLAN
The patient's goals for the shift include rest.    The clinical goals for the shift include comfort    Over the shift, the patient did not make progress toward the following goals. Barriers to progression include      Problem: Pain - Adult  Goal: Verbalizes/displays adequate comfort level or baseline comfort level  Outcome: Not Progressing     Recommendations to address these barriers include medical and cognitive decline.      Problem: Skin  Goal: Decreased wound size/increased tissue granulation at next dressing change  10/18/2024 2222 by Odessa Sainz RN  Outcome: Progressing  10/18/2024 2221 by Odessa Sainz RN  Flowsheets (Taken 10/18/2024 2221)  Decreased wound size/increased tissue granulation at next dressing change: Promote sleep for wound healing  Goal: Participates in plan/prevention/treatment measures  10/18/2024 2222 by Odessa Sainz RN  Outcome: Progressing  10/18/2024 2221 by Odessa Sainz RN  Flowsheets (Taken 10/18/2024 2221)  Participates in plan/prevention/treatment measures: Elevate heels  Goal: Prevent/manage excess moisture  10/18/2024 2222 by Odessa Sainz RN  Outcome: Progressing  10/18/2024 2221 by Odessa Sainz RN  Flowsheets (Taken 10/18/2024 2221)  Prevent/manage excess moisture:   Cleanse incontinence/protect with barrier cream   Moisturize dry skin  Goal: Prevent/minimize sheer/friction injuries  10/18/2024 2222 by Odessa Sainz RN  Outcome: Progressing  10/18/2024 2221 by Odessa Sainz RN  Flowsheets (Taken 10/18/2024 2221)  Prevent/minimize sheer/friction injuries: Use pull sheet  Goal: Promote/optimize nutrition  10/18/2024 2222 by Odessa Sainz RN  Outcome: Progressing  10/18/2024 2221 by Odessa Sainz RN  Flowsheets (Taken 10/18/2024 2221)  Promote/optimize nutrition: Monitor/record intake including meals  Goal: Promote skin healing  10/18/2024 2222 by Odessa Sainz RN  Outcome: Progressing  10/18/2024 2221 by Odessa Sainz RN  Flowsheets (Taken 10/18/2024  2221)  Promote skin healing:   Assess skin/pad under line(s)/device(s)   Turn/reposition every 2 hours/use positioning/transfer devices     Problem: Safety - Adult  Goal: Free from fall injury  Outcome: Progressing     Problem: Discharge Planning  Goal: Discharge to home or other facility with appropriate resources  Outcome: Progressing

## 2024-10-20 VITALS
OXYGEN SATURATION: 95 % | WEIGHT: 229.28 LBS | DIASTOLIC BLOOD PRESSURE: 55 MMHG | TEMPERATURE: 97.9 F | SYSTOLIC BLOOD PRESSURE: 101 MMHG | HEART RATE: 98 BPM | RESPIRATION RATE: 18 BRPM | BODY MASS INDEX: 39.14 KG/M2 | HEIGHT: 64 IN

## 2024-10-20 LAB
ALBUMIN SERPL BCP-MCNC: 1.9 G/DL (ref 3.4–5)
ALP SERPL-CCNC: 165 U/L (ref 33–136)
ALT SERPL W P-5'-P-CCNC: 10 U/L (ref 7–45)
ANION GAP SERPL CALC-SCNC: 19 MMOL/L (ref 10–20)
AST SERPL W P-5'-P-CCNC: 28 U/L (ref 9–39)
BASOPHILS # BLD AUTO: 0.07 X10*3/UL (ref 0–0.1)
BASOPHILS NFR BLD AUTO: 0.3 %
BILIRUB SERPL-MCNC: 0.3 MG/DL (ref 0–1.2)
BUN SERPL-MCNC: 88 MG/DL (ref 6–23)
CALCIUM SERPL-MCNC: 7 MG/DL (ref 8.6–10.3)
CHLORIDE SERPL-SCNC: 92 MMOL/L (ref 98–107)
CO2 SERPL-SCNC: 22 MMOL/L (ref 21–32)
CREAT SERPL-MCNC: 5.44 MG/DL (ref 0.5–1.05)
EGFRCR SERPLBLD CKD-EPI 2021: 8 ML/MIN/1.73M*2
EOSINOPHIL # BLD AUTO: 0.92 X10*3/UL (ref 0–0.4)
EOSINOPHIL NFR BLD AUTO: 3.9 %
ERYTHROCYTE [DISTWIDTH] IN BLOOD BY AUTOMATED COUNT: 18.5 % (ref 11.5–14.5)
GLUCOSE SERPL-MCNC: 95 MG/DL (ref 74–99)
HCT VFR BLD AUTO: 27.4 % (ref 36–46)
HGB BLD-MCNC: 8.4 G/DL (ref 12–16)
IMM GRANULOCYTES # BLD AUTO: 0.27 X10*3/UL (ref 0–0.5)
IMM GRANULOCYTES NFR BLD AUTO: 1.1 % (ref 0–0.9)
LYMPHOCYTES # BLD AUTO: 1.2 X10*3/UL (ref 0.8–3)
LYMPHOCYTES NFR BLD AUTO: 5.1 %
MCH RBC QN AUTO: 26.4 PG (ref 26–34)
MCHC RBC AUTO-ENTMCNC: 30.7 G/DL (ref 32–36)
MCV RBC AUTO: 86 FL (ref 80–100)
MONOCYTES # BLD AUTO: 0.84 X10*3/UL (ref 0.05–0.8)
MONOCYTES NFR BLD AUTO: 3.5 %
NEUTROPHILS # BLD AUTO: 20.45 X10*3/UL (ref 1.6–5.5)
NEUTROPHILS NFR BLD AUTO: 86.1 %
NRBC BLD-RTO: 0 /100 WBCS (ref 0–0)
PLATELET # BLD AUTO: 317 X10*3/UL (ref 150–450)
POTASSIUM SERPL-SCNC: 4.9 MMOL/L (ref 3.5–5.3)
PROT SERPL-MCNC: 5.1 G/DL (ref 6.4–8.2)
RBC # BLD AUTO: 3.18 X10*6/UL (ref 4–5.2)
SODIUM SERPL-SCNC: 128 MMOL/L (ref 136–145)
WBC # BLD AUTO: 23.8 X10*3/UL (ref 4.4–11.3)

## 2024-10-20 PROCEDURE — 80053 COMPREHEN METABOLIC PANEL: CPT | Performed by: INTERNAL MEDICINE

## 2024-10-20 PROCEDURE — 94640 AIRWAY INHALATION TREATMENT: CPT

## 2024-10-20 PROCEDURE — 1100000001 HC PRIVATE ROOM DAILY

## 2024-10-20 PROCEDURE — 36415 COLL VENOUS BLD VENIPUNCTURE: CPT | Performed by: INTERNAL MEDICINE

## 2024-10-20 PROCEDURE — S0109 METHADONE ORAL 5MG: HCPCS | Performed by: INTERNAL MEDICINE

## 2024-10-20 PROCEDURE — 2500000001 HC RX 250 WO HCPCS SELF ADMINISTERED DRUGS (ALT 637 FOR MEDICARE OP): Performed by: INTERNAL MEDICINE

## 2024-10-20 PROCEDURE — 85025 COMPLETE CBC W/AUTO DIFF WBC: CPT | Performed by: INTERNAL MEDICINE

## 2024-10-20 PROCEDURE — 2500000002 HC RX 250 W HCPCS SELF ADMINISTERED DRUGS (ALT 637 FOR MEDICARE OP, ALT 636 FOR OP/ED): Performed by: PHYSICIAN ASSISTANT

## 2024-10-20 PROCEDURE — 2500000002 HC RX 250 W HCPCS SELF ADMINISTERED DRUGS (ALT 637 FOR MEDICARE OP, ALT 636 FOR OP/ED): Performed by: INTERNAL MEDICINE

## 2024-10-20 PROCEDURE — 2500000004 HC RX 250 GENERAL PHARMACY W/ HCPCS (ALT 636 FOR OP/ED): Performed by: INTERNAL MEDICINE

## 2024-10-20 PROCEDURE — 2500000005 HC RX 250 GENERAL PHARMACY W/O HCPCS: Performed by: PHYSICIAN ASSISTANT

## 2024-10-20 PROCEDURE — 2500000004 HC RX 250 GENERAL PHARMACY W/ HCPCS (ALT 636 FOR OP/ED)

## 2024-10-20 PROCEDURE — 2500000004 HC RX 250 GENERAL PHARMACY W/ HCPCS (ALT 636 FOR OP/ED): Performed by: PHYSICIAN ASSISTANT

## 2024-10-20 PROCEDURE — 2500000001 HC RX 250 WO HCPCS SELF ADMINISTERED DRUGS (ALT 637 FOR MEDICARE OP): Performed by: PHYSICIAN ASSISTANT

## 2024-10-20 RX ORDER — DEXTROSE MONOHYDRATE AND SODIUM CHLORIDE 5; .9 G/100ML; G/100ML
100 INJECTION, SOLUTION INTRAVENOUS CONTINUOUS
Status: ACTIVE | OUTPATIENT
Start: 2024-10-20 | End: 2024-10-21

## 2024-10-20 ASSESSMENT — COGNITIVE AND FUNCTIONAL STATUS - GENERAL
TOILETING: A LOT
EATING MEALS: A LITTLE
MOVING FROM LYING ON BACK TO SITTING ON SIDE OF FLAT BED WITH BEDRAILS: A LOT
CLIMB 3 TO 5 STEPS WITH RAILING: TOTAL
STANDING UP FROM CHAIR USING ARMS: A LOT
WALKING IN HOSPITAL ROOM: TOTAL
MOVING TO AND FROM BED TO CHAIR: A LOT
PERSONAL GROOMING: A LOT
TURNING FROM BACK TO SIDE WHILE IN FLAT BAD: A LOT
DRESSING REGULAR UPPER BODY CLOTHING: A LOT
MOBILITY SCORE: 10
HELP NEEDED FOR BATHING: A LOT
DRESSING REGULAR LOWER BODY CLOTHING: A LOT
DAILY ACTIVITIY SCORE: 13

## 2024-10-20 ASSESSMENT — PAIN - FUNCTIONAL ASSESSMENT: PAIN_FUNCTIONAL_ASSESSMENT: 0-10

## 2024-10-20 ASSESSMENT — PAIN SCALES - GENERAL
PAINLEVEL_OUTOF10: 0 - NO PAIN
PAINLEVEL_OUTOF10: 0 - NO PAIN

## 2024-10-20 ASSESSMENT — PAIN SCALES - WONG BAKER
WONGBAKER_NUMERICALRESPONSE: NO HURT
WONGBAKER_NUMERICALRESPONSE: NO HURT

## 2024-10-20 NOTE — NURSING NOTE
Pt was showing signs of pain and discomfort, family requesting pain medication. BP 72/33 at the time. MD contacted made aware of situation, MD instructed this nurse to proceed with pain medication administration to maintain comfort as pt is comfort care. Medication administered  with good effect. Pt remains resting quietly with no symptoms of discomfort observed.

## 2024-10-20 NOTE — PROGRESS NOTES
"Rohini Beaver is a 79 y.o. female on day 3 of admission presenting with EUSEBIO (acute kidney injury) (CMS-HCC).      Subjective   Rohini Beaver is a 79 y.o. female presenting with recently diagnosed ovarian cancer with peritoneal carcinomatosis s/p paracentesis x 4 (last one done 9/19/24, EUSEBIO .         Objective     Last Recorded Vitals  BP (!) 72/33   Pulse 104   Temp 36.6 °C (97.9 °F)   Resp 16   Wt 104 kg (229 lb 4.5 oz)   SpO2 95%   Intake/Output last 3 Shifts:    Intake/Output Summary (Last 24 hours) at 10/20/2024 1320  Last data filed at 10/20/2024 0420  Gross per 24 hour   Intake 650 ml   Output --   Net 650 ml       Admission Weight  Weight: 104 kg (229 lb 4.5 oz) (10/17/24 0842)    Daily Weight  10/17/24 : 104 kg (229 lb 4.5 oz)    Image Results  US guided abdominal paracentesis  Narrative: Interpreted By:  Parrish Vargas,   STUDY:  US GUIDED ABDOMINAL PARACENTESIS; 10/14/2024 2:20 pm      PROCEDURE: Ultrasound-guided paracentesis      INDICATION:  Signs/Symptoms:parascentesis.      COMPARISON:  None.      ACCESSION NUMBER(S):  [id number]      ORDERING CLINICIAN:  SHAKIRA PAEZ      (S): Parrish Vargas MD      The history and physical exam pertinent to the procedure were  reviewed and no updates were made.\"      MEDICATIONS: Local      COMPLICATIONS: None immediate      FINDINGS:  The risks (including the risk of bleeding that may require blood  products) and benefits of the procedure were explained to the patient  and informed consent was obtained. The patient was identified at the  initial medical timeout. Patient's vitals were monitored throughout  the procedure by a radiology nurse.      Patient was placed supine on the procedure table. Screening  ultrasound was performed which demonstrates ascites. After marking  the skin under ultrasound guidance, the skin was prepped and draped  in usual sterile fashion. Local anesthesia of the skin and  subcutaneous tissues was obtained with 1% " lidocaine. A 19-gauge 5  [place] Smart Panel catheter needle was passed into the peritoneal cavity in  the  right lower quadrant. Approximately  2650 cc was collected which  was  yellow. The catheter was then removed.      The patient did not experience any immediate post-procedure  complications.      Impression: Successful ultrasound guided paracentesis as detailed above.      Signed by: Parrish Vargas 10/19/2024 11:16 PM  Dictation workstation:   [id number]      Physical Exam    Relevant Results               Assessment/Plan                  Assessment & Plan  EUSEBIO (acute kidney injury) (CMS-HCC)    Acute kidney injury (CMS-HCC)       Kim Angulo  Coordinator  Internal Medicine     H&P      Incomplete     Date of Service: 10/18/2024  3:53 PM  Incomplete  Expand All Collapse All    History Of Present Illness  Rohini Beaver is a 79 y.o. female presenting with recently diagnosed ovarian cancer with peritoneal carcinomatosis s/p paracentesis x 4 (last one done 9/19/24, EUSEBIO .     Past Medical History  She has a past medical history of Bilateral primary osteoarthritis of knee, HLD (hyperlipidemia), Lumbosacral radiculopathy, Meralgia paraesthetica, Obesity, and Physical deconditioning.     Surgical History  She has a past surgical history that includes Total knee arthroplasty (Left, 2019); Cholecystectomy; Corneal transplant; Lumbar fusion; Dilation and curettage of uterus; Shoulder arthroscopy (Left); Colonoscopy; Skin lesion excision; Trigger finger release (Right); Arthroplasty (Left); Breast biopsy (1999); and Total knee arthroplasty (Right, 2017).     Social History  She reports that she has never smoked. She has never used smokeless tobacco. She reports that she does not currently use alcohol. She reports that she does not use drugs.     Family History  Family History  Family History  Problem  Relation  Name  Age of Onset    Colon cancer  Mother          Lung cancer  Father          Other (Mesothelioma)  Brother           Brain cancer  Mother's Brother              Allergies  Patient has no known allergies.     Review of Systems     Physical Exam     Last Recorded Vitals  /51 (BP Location: Left arm, Patient Position: Lying)   Pulse 103   Temp 36.5 °C (97.7 °F) (Temporal)   Resp 16   Wt 104 kg (229 lb 4.5 oz)   SpO2 98%      Relevant Results                   Assessment/Plan     Assessment & Plan  EUSEBIO (acute kidney injury) (CMS-[facility])     Acute kidney injury (CMS-[facility])           Jd Rhodes MD   Physician  Internal Medicine     H&P      Signed     Date of Service: 10/17/2024  4:02 PM     Signed     Expand All Collapse All    History Of Present Illness  Rohini Beaver is a 79-year-old female with past medical history of recently diagnosed ovarian cancer with peritoneal carcinomatosis s/p paracentesis x 4 (last one done 9/19/24), obesity, hyperlipidemia, osteoarthritis, and skin cancer s/p Mohs procedure.  Patient presents to the hospital with weakness and inability to care for herself.  She reports that she was at [facility] skilled nursing facility, however had a brief hospitalization at [facility] and upon discharge decided to go home rather than go back to [facility].  She lives with her 86-year-old sister and has a couple friends who assist with appointments, however limited support system.  Sister unable to care for due to age.  Today she was in urgent reclining chair and was able to get up.  ROS additionally positive for cold sweats, distended and tender abdomen, edema, pressure injury to coccyx/gluteal fold, and decreased activity tolerance.  Denies history of heart disease.  She reports that she is scheduled for outpatient appointment with her oncologist tomorrow to determine ongoing plan.      ER course: Hemodynamically stable, afebrile, SpO2 90s on room air.  WBC 28.9, Hgb 11.0/Hct34.1 (Hgb 15.1 4/4/2023), platelets 446. Glucose 107, Sodium 126, Chloride 94, calcium 8.5, albumin 2.4, alkaline  phosphatase 231.  Lactate 1.4.  BNP 74.  High-sensitivity troponin 7.  UA unremarkable. CT chest showed moderate to large bilateral pleural effusion with adjacent presumed compressive atelectasis, prominent main pulmonary artery which may indicate pulmonary hypertension, mildly prominent mediastinal lymph nodes measuring up to 10 mm in short axis concerning for metastatic disease.  Moderate hiatal hernia with partial intrathoracic stomach and the peritoneal fat adjacent to the stomach within the hernia demonstrates evidence of probable carcinomatosis and ascites.  Redemonstration of large volume ascites with regions of significant anterior omental caking and peritoneal carcinomatosis commensurate with patient's provided diagnosis of ovarian cancer. Regions of wall thickening of the colon extending from the splenic flexure to the sigmoid.  Extensive region of scalloping of the right hepatic lobe peripherally with appearance of large subcapsular collection along the right hepatic margin. Blood culture in process     Past medical history: As above  Past surgical history: Cholecystectomy, lumbar laminectomy, left shoulder arthroscopy, right total knee replacement, corneal transplant  Social history: No history of smoking, alcohol abuse, illicit drug use.  Lives with her elderly sister who is 86 years old.  Limited support system.   Family history: Mother-cancer (unknown type)     Past Medical History  Medical History           Past Medical History:  Diagnosis  Date    Bilateral primary osteoarthritis of knee       HLD (hyperlipidemia)       Lumbosacral radiculopathy       Meralgia paraesthetica       Obesity       Physical deconditioning           Surgical History  Surgical History              Past Surgical History:  Procedure  Laterality  Date    ARTHROPLASTY  Left        Left thumb carpometacarpal arthroplasty with ligament reconstruction and tendon interposition    BREAST BIOPSY     1999      Benign    CHOLECYSTECTOMY          COLONOSCOPY          CORNEAL TRANSPLANT          DILATION AND CURETTAGE OF UTERUS          LUMBAR FUSION          SHOULDER ARTHROSCOPY  Left       SKIN LESION EXCISION          TOTAL KNEE ARTHROPLASTY  Left  2019    TOTAL KNEE ARTHROPLASTY  Right  2017    TRIGGER FINGER RELEASE  Right           Social History  She reports that she has never smoked. She has never used smokeless tobacco. She reports that she does not currently use alcohol. She reports that she does not use drugs.     Family History  Family History                 Family History  Problem  Relation  Name  Age of Onset    Colon cancer  Mother          Lung cancer  Father          Other (Mesothelioma)  Brother          Brain cancer  Mother's Brother              Allergies  Patient has no known allergies.     Review of Systems     10 point ROS negative except as noted above in HPI      Physical Exam  Vitals reviewed.   Constitutional:       General: She is awake. She is not in acute distress.     Appearance: She is obese. She is ill-appearing. She is not toxic-appearing.   HENT:      Head: Normocephalic and atraumatic.      Nose: Nose normal.      Mouth/Throat:      Mouth: Mucous membranes are moist.      Pharynx: Oropharynx is clear.   Eyes:      Conjunctiva/sclera: Conjunctivae normal.   Cardiovascular:      Rate and Rhythm: Normal rate and regular rhythm.      Pulses: Normal pulses.      Heart sounds: No murmur heard.  Pulmonary:      Effort: Pulmonary effort is normal. No respiratory distress.      Breath sounds: Decreased air movement present. Decreased breath sounds present.      Comments: Posterior bilateral breath sounds are diminished  Abdominal:      General: There is distension.      Palpations: Abdomen is soft.      Tenderness: There is abdominal tenderness. There is no guarding.      Comments: Bowel sounds hypoactive, abdomen distended, tender to palpation, dullness noted to the sides.   Musculoskeletal:     "     General: No swelling, deformity or signs of injury. Normal range of motion.      Cervical back: Neck supple.      Right lower leg: Edema present.      Left lower leg: Edema present.      Comments: Generalized edema/anasarca   Skin:     General: Skin is warm and dry.      Capillary Refill: Capillary refill takes less than 2 seconds.      Findings: No ecchymosis or wound.      Comments: Wound on coccyx, exam deferred  due to patient discomfort    Neurological:      General: No focal deficit present.      Mental Status: She is alert and oriented to person, place, and time.   Psychiatric:         Mood and Affect: Mood normal.         Behavior: Behavior is cooperative.                  Last Recorded Vitals  Blood pressure 93/51, pulse 100, temperature 36.4 °C (97.5 °F), temperature source Temporal, resp. rate 18, height 1.626 m (5' 4.02\"), weight 104 kg (229 lb 4.5 oz), SpO2 99%.     Relevant Results                    Results for orders placed or performed during the hospital encounter of 10/14/24 (from the past 24 hours)  Comprehensive Metabolic Panel  Result  Value  Ref Range     Glucose  95  74 - 99 mg/dL     Sodium  129 (L)  136 - 145 mmol/L     Potassium  4.6  3.5 - 5.3 mmol/L     Chloride  95 (L)  98 - 107 mmol/L     Bicarbonate  21  21 - 32 mmol/L     Anion Gap  18  10 - 20 mmol/L     Urea Nitrogen  79 (H)  6 - 23 mg/dL     Creatinine  4.17 (H)  0.50 - 1.05 mg/dL     eGFR  10 (L)  >60 mL/min/1.73m*2     Calcium  7.4 (L)  8.6 - 10.3 mg/dL     Albumin  2.0 (L)  3.4 - 5.0 g/dL     Alkaline Phosphatase  171 (H)  33 - 136 U/L     Total Protein  4.9 (L)  6.4 - 8.2 g/dL     AST  29  9 - 39 U/L     Bilirubin, Total  0.3  0.0 - 1.2 mg/dL     ALT  10  7 - 45 U/L  CBC  Result  Value  Ref Range     WBC  27.3 (H)  4.4 - 11.3 x10*3/uL     nRBC  0.0  0.0 - 0.0 /100 WBCs     RBC  3.59 (L)  4.00 - 5.20 x10*6/uL     Hemoglobin  9.3 (L)  12.0 - 16.0 g/dL     Hematocrit  31.3 (L)  36.0 - 46.0 %     MCV  87  80 - 100 fL   "   MCH  25.9 (L)  26.0 - 34.0 pg     MCHC  29.7 (L)  32.0 - 36.0 g/dL     RDW  18.5 (H)  11.5 - 14.5 %     Platelets  338  150 - 450 x10*3/uL     CT chest abdomen pelvis w IV contrast     Result Date: 10/1/2024  Interpreted By:  Oscar Aldrich, STUDY: CT CHEST ABDOMEN PELVIS W IV CONTRAST;  10/1/2024 11:13 am   INDICATION: Signs/Symptoms:leukocytosis, ascites, recent ovarian cancer diagnosis.   COMPARISON: CT abdomen pelvis 08/08/2024   ACCESSION NUMBER(S): [id number]   ORDERING CLINICIAN: ASHLEY FAIRBANKS   TECHNIQUE: CT of the chest, abdomen, and pelvis was performed.  Contiguous axial images were obtained at 3 mm slice thickness through the chest, abdomen and pelvis. Coronal and sagittal reconstructions at 3 mm slice thickness were performed. ml of contrast  were administered intravenously without immediate complication.   FINDINGS: CHEST:   LUNG/PLEURA/LARGE AIRWAYS: No endobronchial lesion is seen.   Moderate to large bilateral pleural effusions with adjacent consolidative opacification of the bilateral lower lobes suggestive of compressive atelectasis. No pneumothorax. Mild asymmetric scattered reticular changes suggestive of chronic lung findings.     VESSELS: The thoracic aorta is unremarkable with respect course, caliber, and contour.   Prominent main pulmonary artery measuring up to 3.2 cm in anterior-posterior dimension measured on sagittal plane which may indicate sequela of pulmonary hypertension.   No significant coronary atherosclerotic calcifications are seen.   HEART: Heart size within normal limits. No pericardial effusion.   MEDIASTINUM AND OLIVE: Mildly prominent mediastinal lymph nodes measuring up to 10 mm in short axis   Moderate hiatal hernia with partial intrathoracic stomach and ascites and region of nodularity of the peritoneal fat within hiatal hernia suggestive carcinomatosis.   CHEST WALL AND LOWER NECK: No acute osseous abnormality. Multilevel presumed degenerative changes throughout the  imaged spine. No acute soft tissue abnormality.   ABDOMEN:   LIVER: There is been interval scalloping of the right hepatic margins with new regions of irregularity along the right hepatic capsule diffusely which is new since comparison imaging from 08/08/2024 there is a new elongated subcapsular collection measuring up to approximately 11.4 x 2.1 cm, difficult to measure given curvilinear projection extending over the right hepatic lobe margin (series 202, images 40-70). Similar appearing subcentimeter left hepatic lobe hypodense lesion.   BILE DUCTS: No obvious new intrahepatic biliary dilatation. Mild prominence of the common bile duct, nonspecific in a cholecystectomy patient.   GALLBLADDER: Absent.   PANCREAS: Unremarkable.   SPLEEN: Unchanged.   ADRENAL GLANDS: Unchanged.   KIDNEYS AND URETERS: Similar appearing subcentimeter right renal lower pole calculus. No hydronephrosis. No obstructing urolithiasis.   PELVIS:   BLADDER: Unremarkable.   REPRODUCTIVE ORGANS: Uterus appears grossly unchanged.   BOWEL: Moderate hiatal hernia with wall thickening of the stomach in the hernia. No new pathologic distention of bowel. Wall thickening of the colon within the splenic flexure descending colon and sigmoid colon, nonspecific.     VESSELS: No evidence of abdominal aortic aneurysm.   PERITONEUM/RETROPERITONEUM/LYMPH NODES: Large volume ascites with extensive regions of anterior omental caking and peritoneal carcinomatosis. No obvious free intraperitoneal gas. Given the presence of extensive omental caking and peritoneal carcinomatosis, superimposed peritoneal enhancement 4 other causes cannot be excluded.   BONE AND SOFT TISSUE: No acute osseous abnormality. Osseous structures appear stable. No acute abnormality of the abdominal wall soft tissues.        CHEST: 1.  Moderate to large bilateral pleural effusions with adjacent presumed compressive atelectasis. 2. Prominent main pulmonary artery which may indicate pulmonary  hypertension. 3. Mildly prominent mediastinal lymph nodes measuring up to 10 mm in short axis concerning for metastatic disease. 4. Moderate hiatal hernia with partial intrathoracic stomach. The peritoneal fat adjacent to the stomach within the hernia demonstrates evidence of probable carcinomatosis and ascites.   ABDOMEN-PELVIS: 1.  Redemonstration of large volume ascites with regions of significant anterior omental caking and peritoneal carcinomatosis commensurate with patient's provided diagnosis of ovarian cancer. 2. Regions of wall thickening of the colon extending from the splenic flexure to the sigmoid which may indicate a nonspecific colitis. 3. Since the previous examination on 08/08/2024, there are now extensive regions of scalloping of the right hepatic lobe peripherally with the appearance of a large subcapsular collection along the right hepatic margin as above. The sterility of this collection cannot be assessed via CT and clinical correlation is advised for exclusion superimposed infection within this region.     Signed by: Oscar Aldrich 10/1/2024 12:14 PM Dictation workstation:   [id number]     XR chest 1 view     Result Date: 10/1/2024  Interpreted By:  Samuel Jaramillo, STUDY: XR CHEST 1 VIEW; 10/1/2024 8:44 am   INDICATION: Signs/Symptoms:weakness, edema in legs and abdomen   COMPARISON: April 2023.   ACCESSION NUMBER(S): [id number]   ORDERING CLINICIAN: ASHLEY FAIRBANKS   FINDINGS: The study is limited due to rotation and poor inspiratory effort, with resultant crowding of the pulmonary vasculature. The cardiac silhouette is within normal limits for the technique. Moderate size hiatal hernia is again seen. There is no pneumothorax, confluent infiltrates or significant effusion. Degenerative changes involve the spine and shoulders; metallic anchors again noted over the left humeral head related to previous rotator cuff repair.        Limited study. Hiatal hernia. No acute cardiopulmonary disease.    Signed by: Samuel Jaramillo 10/1/2024 9:22 AM Dictation workstation:   [id number]     US guided abdominal paracentesis     Result Date: 9/20/2024  Interpreted By:  Peri Lawrence and Kamau Nyokabi STUDY: US GUIDED ABDOMINAL PARACENTESIS; US GUIDED PERCUTANEOUS ABDOMINAL RETROPERITONEUM BIOPSY;  9/19/2024 11:13 am   INDICATION: Signs/Symptoms:ascites; Signs/Symptoms:ascites, evaluate ovarian cancer.   COMPARISON: CT abdomen and pelvis 08/08/2024   ACCESSION NUMBER(S): [id number]; [id number]   ORDERING CLINICIAN: JEANETTE ARIAS   TECHNIQUE: INTERVENTIONALIST(S): Dr. Peri Lawrence MD   CONSENT: The patient was informed of the nature of the proposed procedure. The purposes, alternatives, risks, and benefits were explained and discussed. All questions were answered and consent was obtained.   SEDATION: 1% local lidocaine was used for anesthetic.   MEDICATION/CONTRAST: No additional.   TIME OUT:   A time out was performed immediately prior to procedure start with the interventional team, correctly identifying the patient name, date of birth, MRN, procedure, anatomy (including marking of site and side), patient position, procedure consent form, relevant laboratory and imaging test results, antibiotic administration, safety precautions, and procedure-specific equipment needs.   COMPLICATIONS: No immediate adverse events identified.   FINDINGS: The patient was placed in the supine position. Limited sonographic images of the lower abdominal wall were obtained for purposes of needle guidance, which demonstrated omental soft tissue mass which was seen on prior CT 08/08/2024. The area of concern was prepped and draped under sterile technique.   1% lidocaine was injected subcutaneously. Additional lidocaine was administered into deeper tissues surrounding the targeted area for biopsy using a 22G spinal needle. A small incision was made. Using the same access site a 18 gauge core biopsy needle was passed via a 17  gauge coaxial introducer needle to obtain a total of 1 core sample.   Postprocedure images demonstrate no evidence for hemorrhage. The patient tolerated the procedure well and there were no immediate complications. 1 core specimen was sent to pathology.     Subsequently the right lower quadrant was visualized under ultrasound which demonstrated moderate volume ascites seen on prior CT 08/08/2024. 1% lidocaine was injected subcutaneously at this site. A 19 gauge Yueh was used to target the fluid collection under ultrasound guidance. Once the site was accessed, the inner needle was removed from the catheter. The Yueh catheter was connected to drainage container. Estimated 1000 cc of serosanguineous colored fluid was removed. Post paracentesis imaging demonstrated decreased ascitic fluid. The Yueh catheter was removed. The access site was bandaged.        1. Status post ultrasound guided core needle biopsy of omental mass seen on CT 08/08/2024. 1 core specimens sent to pathology. 2. Successful paracentesis under ultrasound guidance with removal of 1 L of serosanguineous fluid.     I was present for and/or performed the critical portions of the procedure and immediately available throughout the entire procedure.   I personally reviewed the image(s) / study and interpretation. I agree with the findings as stated.   Performed and dictated at [facility].   MACRO: None   Signed by: Peri Lawrence 9/20/2024 10:06 AM Dictation workstation:   [id number]     US guided percutaneous abdominal retroperitoneum biopsy     Result Date: 9/20/2024  Interpreted By:  Peri Lawrence and Kamau Nyokabi STUDY: US GUIDED ABDOMINAL PARACENTESIS; US GUIDED PERCUTANEOUS ABDOMINAL RETROPERITONEUM BIOPSY;  9/19/2024 11:13 am   INDICATION: Signs/Symptoms:ascites; Signs/Symptoms:ascites, evaluate ovarian cancer.   COMPARISON: CT abdomen and pelvis 08/08/2024   ACCESSION NUMBER(S): [id number];  [id number]   ORDERING CLINICIAN: JEANETTE ARIAS   TECHNIQUE: INTERVENTIONALIST(S): Dr. Peri Lawrence MD   CONSENT: The patient was informed of the nature of the proposed procedure. The purposes, alternatives, risks, and benefits were explained and discussed. All questions were answered and consent was obtained.   SEDATION: 1% local lidocaine was used for anesthetic.   MEDICATION/CONTRAST: No additional.   TIME OUT:   A time out was performed immediately prior to procedure start with the interventional team, correctly identifying the patient name, date of birth, MRN, procedure, anatomy (including marking of site and side), patient position, procedure consent form, relevant laboratory and imaging test results, antibiotic administration, safety precautions, and procedure-specific equipment needs.   COMPLICATIONS: No immediate adverse events identified.   FINDINGS: The patient was placed in the supine position. Limited sonographic images of the lower abdominal wall were obtained for purposes of needle guidance, which demonstrated omental soft tissue mass which was seen on prior CT 08/08/2024. The area of concern was prepped and draped under sterile technique.   1% lidocaine was injected subcutaneously. Additional lidocaine was administered into deeper tissues surrounding the targeted area for biopsy using a 22G spinal needle. A small incision was made. Using the same access site a 18 gauge core biopsy needle was passed via a 17 gauge coaxial introducer needle to obtain a total of 1 core sample.   Postprocedure images demonstrate no evidence for hemorrhage. The patient tolerated the procedure well and there were no immediate complications. 1 core specimen was sent to pathology.     Subsequently the right lower quadrant was visualized under ultrasound which demonstrated moderate volume ascites seen on prior CT 08/08/2024. 1% lidocaine was injected subcutaneously at this site. A 19 gauge Yueh was used to target the  fluid collection under ultrasound guidance. Once the site was accessed, the inner needle was removed from the catheter. The Yueh catheter was connected to drainage container. Estimated 1000 cc of serosanguineous colored fluid was removed. Post paracentesis imaging demonstrated decreased ascitic fluid. The Yueh catheter was removed. The access site was bandaged.        1. Status post ultrasound guided core needle biopsy of omental mass seen on CT 08/08/2024. 1 core specimens sent to pathology. 2. Successful paracentesis under ultrasound guidance with removal of 1 L of serosanguineous fluid.     I was present for and/or performed the critical portions of the procedure and immediately available throughout the entire procedure.   I personally reviewed the image(s) / study and interpretation. I agree with the findings as stated.   Performed and dictated at [facility].   MACRO: None   Signed by: Peri Lawrence 9/20/2024 10:06 AM Dictation workstation:   [id number]            Assessment/Plan     Assessment & Plan  EUSEBIO (acute kidney injury) (CMS-HCC)     Acute kidney injury (CMS-HCC)        79-year-old female with past medical history of recently diagnosed ovarian cancer with peritoneal carcinomatosis s/p paracentesis x 4 (last one done 9/19/24), obesity, hyperlipidemia, osteoarthritis, and skin cancer s/p Mohs procedure.  Patient presents to the hospital with weakness and inability to care for herself.  Patient found to be hyponatremic and with evidence on CT imaging of possible abscess of the liver and ascites secondary to malignancy.  Hospitalized for further evaluation management..     #Ovarian cancer with peritoneal carcinomatosis  #Ascites  #Bilateral pleural effusions  # Suspected liver abscess  #Abdominal pain     Telemetry monitoring  Consult to IR for paracentesis and possible drainage of liver abscess  Consult to pulmonology for bilateral pleural effusions  Antiemetics  as needed  Analgesics as needed  Patient needs to be followed up with by her gynecological oncologist to determine optimal plan going forward for treatment of her ovarian cancer     #Hyponatremia  #Generalized edema/anasarca  #Hypoalbuminemia  Patient is fluid overloaded and has significant third spacing, suspect hypervolemia hyponatremia.  Will check urine electrolytes, urine osmolality, and serum osmolality  Fluid restriction of 1500 ml for the time being.  Consider starting diuretics, defer to attending  Repeat labs in a.m.     #debility  PT/OT  Social work for discharge planning     Chronic issues:  #Obesity  #Hyperlipidemia  #Osteoarthritis     Continue with patient's home medications as appropriate     #DVT prophylaxis  SCDs  Lovenox subcutaneous     I spent 75 minutes in the professional and overall care of this patient.        Jd Rhodes MD   Physician  Internal Medicine     Discharge Summary      Signed     Date of Service: 10/16/2024  5:13 PM   Related encounter: Admission (Discharged) from 10/14/2024 in [facility] 9 with Jd Rhodes MD     Signed       Discharge Diagnosis  Hyponatremia     Issues Requiring Follow-Up  Patient fully evaluated 10/14/24 for   1.  Hyponatremia   AFB Culture/Smear  AFB Culture/Smear  2.  Abdominal fluid collection   AFB Culture/Smear  AFB Culture/Smear  3.  Malignant ascites (CMS-HCC)   AFB Culture/Smear  AFB Culture/Smear  4.  Pleural effusion   AFB Culture/Smear  AFB Culture/Smear  Call placed to GYN/ONC Dr. Villalba this AM with long discussion on patient, treatment options, and goals of care. Patient and sister aware of poor prognosis and bleak outcome. Patient with no significant clinical improvement noted, patient medically cleared for discharge today to [facility]. Patient seen resting in bed with head of bed elevated, no s/s or c/o acute difficulties at this time. Medications and labs reviewed, will begin comfort care medications and  transition to hospice.     Results for orders placed or performed during the hospital encounter of 10/01/24 (from the past 24 hour(s))  CBC  Result  Value  Ref Range  WBC  29.2 (H)  4.4 - 11.3 x10*3/uL  nRBC  0.0  0.0 - 0.0 /100 WBCs  RBC  3.62 (L)  4.00 - 5.20 x10*6/uL  Hemoglobin  9.5 (L)  12.0 - 16.0 g/dL  Hematocrit  30.3 (L)  36.0 - 46.0 %  MCV  84  80 - 100 fL  MCH  26.2  26.0 - 34.0 pg  MCHC  31.4 (L)  32.0 - 36.0 g/dL  RDW  17.5 (H)  11.5 - 14.5 %  Platelets  436  150 - 450 x10*3/uL  Comprehensive metabolic panel  Result  Value  Ref Range  Glucose  83  74 - 99 mg/dL  Sodium  133 (L)  136 - 145 mmol/L  Potassium  4.3  3.5 - 5.3 mmol/L  Chloride  94 (L)  98 - 107 mmol/L  Bicarbonate  22  21 - 32 mmol/L  Anion Gap  21 (H)  10 - 20 mmol/L  Urea Nitrogen  64 (H)  6 - 23 mg/dL  Creatinine  4.01 (H)  0.50 - 1.05 mg/dL  eGFR  11 (L)  >60 mL/min/1.73m*2  Calcium  8.0 (L)  8.6 - 10.3 mg/dL  Albumin  2.2 (L)  3.4 - 5.0 g/dL  Alkaline Phosphatase  165 (H)  33 - 136 U/L  Total Protein  5.0 (L)  6.4 - 8.2 g/dL  AST  17  9 - 39 U/L  Bilirubin, Total  0.3  0.0 - 1.2 mg/dL  ALT  6 (L)  7 - 45 U/L        Intake/Output this shift:  No intake/output data recorded.     Vital signs for last 24 hours:  Temp:  [35.6 °C (96.1 °F)-36.8 °C (98.2 °F)] 35.9 °C (96.6 °F)  Heart Rate:  [] 110  Resp:  [16-20] 16  BP: ()/(41-78) 107/43        Plan discussed with interdisciplinary team, ok to discharge to [facility]. Patient aware and agreeable to current plan, continue plan as above. I spent 30 minutes on the date of the service which included preparing to see the patient, face-to-face patient care, completing clinical documentation, obtaining and/or reviewing separately obtained history, performing a medically appropriate examination, counseling and educating the patient/family/caregiver, ordering medications, tests, or procedures, communicating with other HCPs (not separately reported), independently interpreting  results (not separately reported), communicating results to the patient/family/caregiver, and care coordination (not separately reported)     Discharge Meds     Medication List      START taking these medications     bisacodyl 10 mg suppository; Commonly known as: Dulcolax; Insert 1   suppository (10 mg) into the rectum once daily as needed for constipation.   glycopyrrolate 200 mcg/mL injection; Commonly known as: Robinul; Infuse   1 mL (0.2 mg) into a venous catheter every 4 hours if needed (excess   secretions).   haloperidol lactate 5 mg/mL injection; Commonly known as: Haldol; Infuse   0.2 mL (1 mg) into a venous catheter every 4 hours if needed for agitation   (delirium).   HYDROmorphone PF 0.2 mg/mL injection; Commonly known as: Dilaudid;   Infuse 1 mL (0.2 mg) into a venous catheter every 15 minutes if needed   (moderate pain (4 - 6)).   LORazepam 2 mg/mL injection; Commonly known as: Ativan; Infuse 0.25 mL   (0.5 mg) over 5 minutes into a venous catheter every 4 hours if needed   (anxiety, restlessness, insomnia).     CHANGE how you take these medications     HYDROmorphone 0.5 mg/0.5 mL solution injection; Commonly known as:   Dilaudid; Infuse 0.4 mL (0.4 mg) into a venous catheter every 15 minutes   if needed (severe pain).; What changed: when to take this, reasons to take   this     CONTINUE taking these medications     acetaminophen 325 mg tablet; Commonly known as: Tylenol; Take 2 tablets   (650 mg) by mouth every 6 hours if needed for mild pain (1 - 3).   cetirizine 10 mg tablet; Commonly known as: ZyrTEC; Take 1 tablet (10   mg) by mouth once daily.   cyclobenzaprine 5 mg tablet; Commonly known as: Flexeril; Take 1 tablet   (5 mg) by mouth 3 times a day.   docusate sodium 100 mg capsule; Commonly known as: Colace   ipratropium-albuteroL 0.5-2.5 mg/3 mL nebulizer solution; Commonly known   as: Duo-Neb; Take 3 mL by nebulization every 2 hours if needed for   wheezing or shortness of breath.   methadone  5 mg/5 mL solution; Commonly known as: Dolophine; Take 5 mL (5   mg) by mouth every 12 hours.   ondansetron 8 mg tablet; Commonly known as: Zofran   oxygen gas therapy; Commonly known as: O2; Inhale 2 L/min every 12   hours.   polyethylene glycol 17 gram packet; Commonly known as: Glycolax, Miralax   prednisoLONE acetate 1 % ophthalmic suspension; Commonly known as:   Pred-Forte   temazepam 7.5 mg capsule; Commonly known as: Restoril; Take 1 capsule   (7.5 mg) by mouth as needed at bedtime for sleep.   torsemide 10 mg tablet; Commonly known as: Demadex; Take 5 tablets (50   mg) by mouth once daily.        Test Results Pending At Discharge  Pending Labs                   No current pending labs.              Hospital Course          Jd Rhodes MD    Physician  Internal Medicine     Progress Notes        Signed     Date of Service: 10/13/2024  2:52 PM  Signed  Expand All Collapse All     Rohini Beaver is a 79 y.o. female on day 12 of admission presenting with Hyponatremia.           Subjective     Patient fully evaluated 10/3, awake, resting in bed. Patient with Moderate hiatal hernia with partial intrathoracic stomach and the peritoneal fat adjacent to the stomach within the hernia demonstrates evidence of probable carcinomatosis and ascites, Patient tolerated paracentesis on 10/01 well,order placed for repeat paracentesis therapeutic non diagnostic with IR.continue current and repeat labs in the AM. Patient aware and agreeable to current plan, continue plan as above. I spent 50 minutes in the professional and overall care of this patient.      Objective  Last Recorded Vitals  /56   Pulse 103   Temp 36.3 °C (97.3 °F)   Resp 20   Wt 104 kg (230 lb)   SpO2 93%   Intake/Output last 3 Shifts:     Intake/Output Summary (Last 24 hours) at 10/13/2024 1452  Last data filed at 10/13/2024 0500  Gross per 24 hour  Intake  120 ml  Output  --  Net  120 ml        Admission Weight  Weight: 104 kg (230 lb)  (10/01/24 0828)     Daily Weight  10/01/24 : 104 kg (230 lb)     Image Results  CT abdomen pelvis wo IV contrast  Narrative: Interpreted By:  Deep Scott,   STUDY:  CT ABDOMEN PELVIS WO IV CONTRAST;  10/12/2024 1:30 pm      INDICATION:  Signs/Symptoms:Ascites /Abdominal Distention/Renal Failure.          COMPARISON:  CT scan 10/01/2024.      ACCESSION NUMBER(S):  [id number]      ORDERING CLINICIAN:  ALICIA GRAHAM      TECHNIQUE:  CT of the abdomen and pelvis was performed. Contiguous axial images  were obtained at 3 mm slice thickness through the abdomen and pelvis.  Coronal and sagittal reconstructions at 3 mm slice thickness were  performed.  No intravenous contrast was administered; positive oral  contrast was given.      FINDINGS:  Please note that the evaluation of vessels, lymph nodes and organs is  limited without intravenous contrast.      LOWER CHEST:  Small to moderate bilateral pleural effusion with surrounding  atelectasis.      ABDOMEN:      LIVER:  Nodular surface with a scalloping of the right hepatic capsule  secondary to the adjacent malignant ascites. No definite parenchymal  lesions.      BILE DUCTS:  The intrahepatic and extrahepatic ducts are not dilated.      GALLBLADDER:  Surgically absent      PANCREAS:  No duct dilatation      SPLEEN:  No splenomegaly.      ADRENAL GLANDS:  No nodule      KIDNEYS AND URETERS:  The kidneys are normal in size and unremarkable in appearance.  No  hydroureteronephrosis or nephroureterolithiasis is identified.      PELVIS:      BLADDER:  Unremarkable      REPRODUCTIVE ORGANS:  Uterus is unremarkable      BOWEL:  Moderate hiatal hernia. No bowel dilatation. Few uncomplicated  colonic diverticulosis.      Moderate abdominopelvic ascites. Diffuse omental caking and  peritoneal carcinomatosis.      VESSELS:  Multifocal vascular calcifications and atherosclerotic changes.      PERITONEUM/RETROPERITONEUM/LYMPH NODES:  Multiple subcentimeter retroperitoneal,  iliac and pelvic lymph nodes.      ABDOMINAL WALL:  Subcutaneous edema.      BONES:  Multilevel degenerative spine changes and facet joint disease. Prior  lower lumbar spine fixation.      Impression: Overall findings are grossly unchanged from 10/01/2024 CT scan.  1. Persistent moderate abdominopelvic ascites with diffuse peritoneal  carcinomatosis/omental caking.  2. 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  Underlying cirrhosis could not be entirely excluded as well.  3. Moderate bilateral pleural effusion with surrounding atelectasis.          MACRO:  None      Signed by: Deep Scott 10/13/2024 7:50 AM  Dictation workstation:   [id number]        Physical Exam     Relevant Results                       Assessment/Plan  This patient currently has cardiac telemetry ordered; if you would like to modify or discontinue the telemetry order, click hereto go to the orders activity to modify/discontinue the order.            Jd Rhodes MD    Physician  Internal Medicine     H&P        Addendum     Date of Service: 10/1/2024  2:54 PM     Addendum     Expand All Collapse All    History Of Present Illness  Rohini Beaver is a 79-year-old female with past medical history of recently diagnosed ovarian cancer with peritoneal carcinomatosis s/p paracentesis x 4 (last one done 9/19/24), obesity, hyperlipidemia, osteoarthritis, and skin cancer s/p Mohs procedure.  Patient presents to the hospital with weakness and inability to care for herself.  She reports that she was at [facility] skilled nursing facility, however had a brief hospitalization at [facility] and upon discharge decided to go home rather than go back to [facility].  She lives with her 86-year-old sister and has a couple friends who assist with appointments, however limited support system.  Sister unable to care for due to age.  Today she was in  urgent reclining chair and was able to get up.  ROS additionally positive for cold sweats, distended and tender abdomen, edema, pressure injury to coccyx/gluteal fold, and decreased activity tolerance.  Denies history of heart disease.  She reports that she is scheduled for outpatient appointment with her oncologist tomorrow to determine ongoing plan.      ER course: Hemodynamically stable, afebrile, SpO2 90s on room air.  WBC 28.9, Hgb 11.0/Hct34.1 (Hgb 15.1 4/4/2023), platelets 446. Glucose 107, Sodium 126, Chloride 94, calcium 8.5, albumin 2.4, alkaline phosphatase 231.  Lactate 1.4.  BNP 74.  High-sensitivity troponin 7.  UA unremarkable. CT chest showed moderate to large bilateral pleural effusion with adjacent presumed compressive atelectasis, prominent main pulmonary artery which may indicate pulmonary hypertension, mildly prominent mediastinal lymph nodes measuring up to 10 mm in short axis concerning for metastatic disease.  Moderate hiatal hernia with partial intrathoracic stomach and the peritoneal fat adjacent to the stomach within the hernia demonstrates evidence of probable carcinomatosis and ascites.  Redemonstration of large volume ascites with regions of significant anterior omental caking and peritoneal carcinomatosis commensurate with patient's provided diagnosis of ovarian cancer. Regions of wall thickening of the colon extending from the splenic flexure to the sigmoid.  Extensive region of scalloping of the right hepatic lobe peripherally with appearance of large subcapsular collection along the right hepatic margin. Blood culture in process     Past medical history: As above  Past surgical history: Cholecystectomy, lumbar laminectomy, left shoulder arthroscopy, right total knee replacement, corneal transplant  Social history: No history of smoking, alcohol abuse, illicit drug use.  Lives with her elderly sister who is 86 years old.  Limited support system.   Family history: Mother-cancer  (unknown type)     Past Medical History  Medical History           Past Medical History:  Diagnosis  Date    Bilateral primary osteoarthritis of knee       HLD (hyperlipidemia)       Lumbosacral radiculopathy       Meralgia paraesthetica       Physical deconditioning           Surgical History  Surgical History              Past Surgical History:  Procedure  Laterality  Date    ARTHROPLASTY  Left        Left thumb carpometacarpal arthroplasty with ligament reconstruction and tendon interposition    BREAST BIOPSY     1999     Benign    CHOLECYSTECTOMY          COLONOSCOPY          CORNEAL TRANSPLANT          DILATION AND CURETTAGE OF UTERUS          LUMBAR FUSION          SHOULDER ARTHROSCOPY  Left       SKIN LESION EXCISION          TOTAL KNEE ARTHROPLASTY  Left  2019    TOTAL KNEE ARTHROPLASTY  Right  2017    TRIGGER FINGER RELEASE  Right           Social History  She reports that she has never smoked. She has never used smokeless tobacco. She reports that she does not currently use alcohol. She reports that she does not use drugs.     Family History  Family History                 Family History  Problem  Relation  Name  Age of Onset    Colon cancer  Mother          Lung cancer  Father          Other (Mesothelioma)  Brother          Brain cancer  Mother's Brother              Allergies  Patient has no known allergies.     Review of Systems     10 point ROS negative except as noted above in HPI      Physical Exam  Vitals reviewed.   Constitutional:       General: She is awake. She is not in acute distress.     Appearance: She is obese. She is ill-appearing. She is not toxic-appearing.   HENT:      Head: Normocephalic and atraumatic.      Nose: Nose normal.      Mouth/Throat:      Mouth: Mucous membranes are moist.      Pharynx: Oropharynx is clear.   Eyes:      Conjunctiva/sclera: Conjunctivae normal.   Cardiovascular:      Rate and Rhythm: Normal rate and regular rhythm.      Pulses: Normal pulses.      Heart  sounds: No murmur heard.  Pulmonary:      Effort: Pulmonary effort is normal. No respiratory distress.      Breath sounds: Decreased air movement present. Decreased breath sounds present.      Comments: Posterior bilateral breath sounds are diminished  Abdominal:      General: There is distension.      Palpations: Abdomen is soft.      Tenderness: There is abdominal tenderness. There is no guarding.      Comments: Bowel sounds hypoactive, abdomen distended, tender to palpation, dullness noted to the sides.   Musculoskeletal:         General: No swelling, deformity or signs of injury. Normal range of motion.      Cervical back: Neck supple.      Right lower leg: Edema present.      Left lower leg: Edema present.      Comments: Generalized edema/anasarca   Skin:     General: Skin is warm and dry.      Capillary Refill: Capillary refill takes less than 2 seconds.      Findings: No ecchymosis or wound.      Comments: Wound on coccyx, exam deferred  due to patient discomfort    Neurological:      General: No focal deficit present.      Mental Status: She is alert and oriented to person, place, and time.   Psychiatric:         Mood and Affect: Mood normal.         Behavior: Behavior is cooperative.                  Last Recorded Vitals  Blood pressure 117/58, pulse 100, temperature 36.2 °C (97.2 °F), temperature source Temporal, resp. rate (!) 22, weight 104 kg (230 lb), SpO2 94%.     Relevant Results                    Results for orders placed or performed during the hospital encounter of 10/01/24 (from the past 24 hour(s))  CBC and Auto Differential  Result  Value  Ref Range     WBC  28.9 (H)  4.4 - 11.3 x10*3/uL     nRBC  0.0  0.0 - 0.0 /100 WBCs     RBC  4.15  4.00 - 5.20 x10*6/uL     Hemoglobin  11.0 (L)  12.0 - 16.0 g/dL     Hematocrit  34.1 (L)  36.0 - 46.0 %     MCV  82  80 - 100 fL     MCH  26.5  26.0 - 34.0 pg     MCHC  32.3  32.0 - 36.0 g/dL     RDW  15.7 (H)  11.5 - 14.5 %     Platelets  446  150 - 450  x10*3/uL     Neutrophils %  89.1  40.0 - 80.0 %     Immature Granulocytes %, Automated  1.0 (H)  0.0 - 0.9 %     Lymphocytes %  4.3  13.0 - 44.0 %     Monocytes %  4.8  2.0 - 10.0 %     Eosinophils %  0.5  0.0 - 6.0 %     Basophils %  0.3  0.0 - 2.0 %     Neutrophils Absolute  25.74 (H)  1.60 - 5.50 x10*3/uL     Immature Granulocytes Absolute, Automated  0.30  0.00 - 0.50 x10*3/uL     Lymphocytes Absolute  1.23  0.80 - 3.00 x10*3/uL     Monocytes Absolute  1.38 (H)  0.05 - 0.80 x10*3/uL     Eosinophils Absolute  0.14  0.00 - 0.40 x10*3/uL     Basophils Absolute  0.09  0.00 - 0.10 x10*3/uL  Comprehensive metabolic panel  Result  Value  Ref Range     Glucose  107 (H)  74 - 99 mg/dL     Sodium  126 (L)  136 - 145 mmol/L     Potassium  5.2  3.5 - 5.3 mmol/L     Chloride  94 (L)  98 - 107 mmol/L     Bicarbonate  24  21 - 32 mmol/L     Anion Gap  13  10 - 20 mmol/L     Urea Nitrogen  18  6 - 23 mg/dL     Creatinine  0.61  0.50 - 1.05 mg/dL     eGFR  >90  >60 mL/min/1.73m*2     Calcium  8.5 (L)  8.6 - 10.3 mg/dL     Albumin  2.4 (L)  3.4 - 5.0 g/dL     Alkaline Phosphatase  231 (H)  33 - 136 U/L     Total Protein  5.7 (L)  6.4 - 8.2 g/dL     AST  23  9 - 39 U/L     Bilirubin, Total  0.3  0.0 - 1.2 mg/dL     ALT  8  7 - 45 U/L  Magnesium  Result  Value  Ref Range     Magnesium  1.68  1.60 - 2.40 mg/dL  Troponin I, High Sensitivity  Result  Value  Ref Range     Troponin I, High Sensitivity  7  0 - 13 ng/L  B-Type Natriuretic Peptide  Result  Value  Ref Range     BNP  74  0 - 99 pg/mL  Sars-CoV-2 PCR  Result  Value  Ref Range     Coronavirus 2019, PCR  Not Detected  Not Detected  Lactate  Result  Value  Ref Range     Lactate  1.4  0.4 - 2.0 mmol/L  Urinalysis with Reflex Culture and Microscopic  Result  Value  Ref Range     Color, Urine  Light-Yellow  Light-Yellow, Yellow, Dark-Yellow     Appearance, Urine  Clear  Clear     Specific Gravity, Urine  >1.050 (N)  1.005 - 1.035     pH, Urine  6.0  5.0, 5.5, 6.0, 6.5, 7.0, 7.5,  8.0     Protein, Urine  20 (TRACE)  NEGATIVE, 10 (TRACE), 20 (TRACE) mg/dL     Glucose, Urine  Normal  Normal mg/dL     Blood, Urine  NEGATIVE  NEGATIVE     Ketones, Urine  NEGATIVE  NEGATIVE mg/dL     Bilirubin, Urine  NEGATIVE  NEGATIVE     Urobilinogen, Urine  Normal  Normal mg/dL     Nitrite, Urine  NEGATIVE  NEGATIVE     Leukocyte Esterase, Urine  NEGATIVE  NEGATIVE  Urinalysis Microscopic  Result  Value  Ref Range     WBC, Urine  1-5  1-5, NONE /HPF     RBC, Urine  1-2  NONE, 1-2, 3-5 /HPF     Squamous Epithelial Cells, Urine  1-9 (SPARSE)  Reference range not established. /HPF     Mucus, Urine  FEW  Reference range not established. /LPF  Protime-INR  Result  Value  Ref Range     Protime  13.0 (H)  9.8 - 12.8 seconds     INR  1.2 (H)  0.9 - 1.1  APTT  Result  Value  Ref Range     aPTT  24 (L)  27 - 38 seconds     CT chest abdomen pelvis w IV contrast     Result Date: 10/1/2024  Interpreted By:  Oscar Aldrich, STUDY: CT CHEST ABDOMEN PELVIS W IV CONTRAST;  10/1/2024 11:13 am   INDICATION: Signs/Symptoms:leukocytosis, ascites, recent ovarian cancer diagnosis.   COMPARISON: CT abdomen pelvis 08/08/2024   ACCESSION NUMBER(S): [id number]   ORDERING CLINICIAN: ASHLEY FAIRBANKS   TECHNIQUE: CT of the chest, abdomen, and pelvis was performed.  Contiguous axial images were obtained at 3 mm slice thickness through the chest, abdomen and pelvis. Coronal and sagittal reconstructions at 3 mm slice thickness were performed. ml of contrast  were administered intravenously without immediate complication.   FINDINGS: CHEST:   LUNG/PLEURA/LARGE AIRWAYS: No endobronchial lesion is seen.   Moderate to large bilateral pleural effusions with adjacent consolidative opacification of the bilateral lower lobes suggestive of compressive atelectasis. No pneumothorax. Mild asymmetric scattered reticular changes suggestive of chronic lung findings.     VESSELS: The thoracic aorta is unremarkable with respect course, caliber, and contour.    Prominent main pulmonary artery measuring up to 3.2 cm in anterior-posterior dimension measured on sagittal plane which may indicate sequela of pulmonary hypertension.   No significant coronary atherosclerotic calcifications are seen.   HEART: Heart size within normal limits. No pericardial effusion.   MEDIASTINUM AND OLIVE: Mildly prominent mediastinal lymph nodes measuring up to 10 mm in short axis   Moderate hiatal hernia with partial intrathoracic stomach and ascites and region of nodularity of the peritoneal fat within hiatal hernia suggestive carcinomatosis.   CHEST WALL AND LOWER NECK: No acute osseous abnormality. Multilevel presumed degenerative changes throughout the imaged spine. No acute soft tissue abnormality.   ABDOMEN:   LIVER: There is been interval scalloping of the right hepatic margins with new regions of irregularity along the right hepatic capsule diffusely which is new since comparison imaging from 08/08/2024 there is a new elongated subcapsular collection measuring up to approximately 11.4 x 2.1 cm, difficult to measure given curvilinear projection extending over the right hepatic lobe margin (series 202, images 40-70). Similar appearing subcentimeter left hepatic lobe hypodense lesion.   BILE DUCTS: No obvious new intrahepatic biliary dilatation. Mild prominence of the common bile duct, nonspecific in a cholecystectomy patient.   GALLBLADDER: Absent.   PANCREAS: Unremarkable.   SPLEEN: Unchanged.   ADRENAL GLANDS: Unchanged.   KIDNEYS AND URETERS: Similar appearing subcentimeter right renal lower pole calculus. No hydronephrosis. No obstructing urolithiasis.   PELVIS:   BLADDER: Unremarkable.   REPRODUCTIVE ORGANS: Uterus appears grossly unchanged.   BOWEL: Moderate hiatal hernia with wall thickening of the stomach in the hernia. No new pathologic distention of bowel. Wall thickening of the colon within the splenic flexure descending colon and sigmoid colon, nonspecific.     VESSELS: No  evidence of abdominal aortic aneurysm.   PERITONEUM/RETROPERITONEUM/LYMPH NODES: Large volume ascites with extensive regions of anterior omental caking and peritoneal carcinomatosis. No obvious free intraperitoneal gas. Given the presence of extensive omental caking and peritoneal carcinomatosis, superimposed peritoneal enhancement 4 other causes cannot be excluded.   BONE AND SOFT TISSUE: No acute osseous abnormality. Osseous structures appear stable. No acute abnormality of the abdominal wall soft tissues.        CHEST: 1.  Moderate to large bilateral pleural effusions with adjacent presumed compressive atelectasis. 2. Prominent main pulmonary artery which may indicate pulmonary hypertension. 3. Mildly prominent mediastinal lymph nodes measuring up to 10 mm in short axis concerning for metastatic disease. 4. Moderate hiatal hernia with partial intrathoracic stomach. The peritoneal fat adjacent to the stomach within the hernia demonstrates evidence of probable carcinomatosis and ascites.   ABDOMEN-PELVIS: 1.  Redemonstration of large volume ascites with regions of significant anterior omental caking and peritoneal carcinomatosis commensurate with patient's provided diagnosis of ovarian cancer. 2. Regions of wall thickening of the colon extending from the splenic flexure to the sigmoid which may indicate a nonspecific colitis. 3. Since the previous examination on 08/08/2024, there are now extensive regions of scalloping of the right hepatic lobe peripherally with the appearance of a large subcapsular collection along the right hepatic margin as above. The sterility of this collection cannot be assessed via CT and clinical correlation is advised for exclusion superimposed infection within this region.     Signed by: Oscar Aldrich 10/1/2024 12:14 PM Dictation workstation:   [id number]     XR chest 1 view     Result Date: 10/1/2024  Interpreted By:  Samuel Jaramillo, STUDY: XR CHEST 1 VIEW; 10/1/2024 8:44 am    INDICATION: Signs/Symptoms:weakness, edema in legs and abdomen   COMPARISON: April 2023.   ACCESSION NUMBER(S): [id number]   ORDERING CLINICIAN: ASHLEY FAIRBANKS   FINDINGS: The study is limited due to rotation and poor inspiratory effort, with resultant crowding of the pulmonary vasculature. The cardiac silhouette is within normal limits for the technique. Moderate size hiatal hernia is again seen. There is no pneumothorax, confluent infiltrates or significant effusion. Degenerative changes involve the spine and shoulders; metallic anchors again noted over the left humeral head related to previous rotator cuff repair.        Limited study. Hiatal hernia. No acute cardiopulmonary disease.   Signed by: Samuel Jaramillo 10/1/2024 9:22 AM Dictation workstation:   [id number]     US guided abdominal paracentesis     Result Date: 9/20/2024  Interpreted By:  Peri Lawrence and Kamau Nyokabi STUDY: US GUIDED ABDOMINAL PARACENTESIS; US GUIDED PERCUTANEOUS ABDOMINAL RETROPERITONEUM BIOPSY;  9/19/2024 11:13 am   INDICATION: Signs/Symptoms:ascites; Signs/Symptoms:ascites, evaluate ovarian cancer.   COMPARISON: CT abdomen and pelvis 08/08/2024   ACCESSION NUMBER(S): [id number]; [id number]   ORDERING CLINICIAN: JEANETTE ARIAS   TECHNIQUE: INTERVENTIONALIST(S): Dr. Peri Lawrence MD   CONSENT: The patient was informed of the nature of the proposed procedure. The purposes, alternatives, risks, and benefits were explained and discussed. All questions were answered and consent was obtained.   SEDATION: 1% local lidocaine was used for anesthetic.   MEDICATION/CONTRAST: No additional.   TIME OUT:   A time out was performed immediately prior to procedure start with the interventional team, correctly identifying the patient name, date of birth, MRN, procedure, anatomy (including marking of site and side), patient position, procedure consent form, relevant laboratory and imaging test results, antibiotic administration, safety  precautions, and procedure-specific equipment needs.   COMPLICATIONS: No immediate adverse events identified.   FINDINGS: The patient was placed in the supine position. Limited sonographic images of the lower abdominal wall were obtained for purposes of needle guidance, which demonstrated omental soft tissue mass which was seen on prior CT 08/08/2024. The area of concern was prepped and draped under sterile technique.   1% lidocaine was injected subcutaneously. Additional lidocaine was administered into deeper tissues surrounding the targeted area for biopsy using a 22G spinal needle. A small incision was made. Using the same access site a 18 gauge core biopsy needle was passed via a 17 gauge coaxial introducer needle to obtain a total of 1 core sample.   Postprocedure images demonstrate no evidence for hemorrhage. The patient tolerated the procedure well and there were no immediate complications. 1 core specimen was sent to pathology.     Subsequently the right lower quadrant was visualized under ultrasound which demonstrated moderate volume ascites seen on prior CT 08/08/2024. 1% lidocaine was injected subcutaneously at this site. A 19 gauge Yueh was used to target the fluid collection under ultrasound guidance. Once the site was accessed, the inner needle was removed from the catheter. The Yueh catheter was connected to drainage container. Estimated 1000 cc of serosanguineous colored fluid was removed. Post paracentesis imaging demonstrated decreased ascitic fluid. The Yueh catheter was removed. The access site was bandaged.        1. Status post ultrasound guided core needle biopsy of omental mass seen on CT 08/08/2024. 1 core specimens sent to pathology. 2. Successful paracentesis under ultrasound guidance with removal of 1 L of serosanguineous fluid.     I was present for and/or performed the critical portions of the procedure and immediately available throughout the entire procedure.   I personally reviewed  the image(s) / study and interpretation. I agree with the findings as stated.   Performed and dictated at [facility].   MACRO: None   Signed by: Peri Lawrence 9/20/2024 10:06 AM Dictation workstation:   [id number]     US guided percutaneous abdominal retroperitoneum biopsy     Result Date: 9/20/2024  Interpreted By:  Peri Lawrence and Kamau Nyokabi STUDY: US GUIDED ABDOMINAL PARACENTESIS; US GUIDED PERCUTANEOUS ABDOMINAL RETROPERITONEUM BIOPSY;  9/19/2024 11:13 am   INDICATION: Signs/Symptoms:ascites; Signs/Symptoms:ascites, evaluate ovarian cancer.   COMPARISON: CT abdomen and pelvis 08/08/2024   ACCESSION NUMBER(S): [id number]; [id number]   ORDERING CLINICIAN: JEANETTE ARIAS   TECHNIQUE: INTERVENTIONALIST(S): Dr. Peri Lawrence MD   CONSENT: The patient was informed of the nature of the proposed procedure. The purposes, alternatives, risks, and benefits were explained and discussed. All questions were answered and consent was obtained.   SEDATION: 1% local lidocaine was used for anesthetic.   MEDICATION/CONTRAST: No additional.   TIME OUT:   A time out was performed immediately prior to procedure start with the interventional team, correctly identifying the patient name, date of birth, MRN, procedure, anatomy (including marking of site and side), patient position, procedure consent form, relevant laboratory and imaging test results, antibiotic administration, safety precautions, and procedure-specific equipment needs.   COMPLICATIONS: No immediate adverse events identified.   FINDINGS: The patient was placed in the supine position. Limited sonographic images of the lower abdominal wall were obtained for purposes of needle guidance, which demonstrated omental soft tissue mass which was seen on prior CT 08/08/2024. The area of concern was prepped and draped under sterile technique.   1% lidocaine was injected subcutaneously. Additional lidocaine was  administered into deeper tissues surrounding the targeted area for biopsy using a 22G spinal needle. A small incision was made. Using the same access site a 18 gauge core biopsy needle was passed via a 17 gauge coaxial introducer needle to obtain a total of 1 core sample.   Postprocedure images demonstrate no evidence for hemorrhage. The patient tolerated the procedure well and there were no immediate complications. 1 core specimen was sent to pathology.     Subsequently the right lower quadrant was visualized under ultrasound which demonstrated moderate volume ascites seen on prior CT 08/08/2024. 1% lidocaine was injected subcutaneously at this site. A 19 gauge Yueh was used to target the fluid collection under ultrasound guidance. Once the site was accessed, the inner needle was removed from the catheter. The Yueh catheter was connected to drainage container. Estimated 1000 cc of serosanguineous colored fluid was removed. Post paracentesis imaging demonstrated decreased ascitic fluid. The Yueh catheter was removed. The access site was bandaged.        1. Status post ultrasound guided core needle biopsy of omental mass seen on CT 08/08/2024. 1 core specimens sent to pathology. 2. Successful paracentesis under ultrasound guidance with removal of 1 L of serosanguineous fluid.     I was present for and/or performed the critical portions of the procedure and immediately available throughout the entire procedure.   I personally reviewed the image(s) / study and interpretation. I agree with the findings as stated.   Performed and dictated at [facility].   MACRO: None   Signed by: Peri Lawrence 9/20/2024 10:06 AM Dictation workstation:   [id number]            Assessment/Plan        Assessment & Plan    Hyponatremia        79-year-old female with past medical history of recently diagnosed ovarian cancer with peritoneal carcinomatosis s/p paracentesis x 4 (last one done 9/19/24),  obesity, hyperlipidemia, osteoarthritis, and skin cancer s/p Mohs procedure.  Patient presents to the hospital with weakness and inability to care for herself.  Patient found to be hyponatremic and with evidence on CT imaging of possible abscess of the liver and ascites secondary to malignancy.  Hospitalized for further evaluation management..     #Ovarian cancer with peritoneal carcinomatosis  #Ascites  #Bilateral pleural effusions  # Suspected liver abscess  #Abdominal pain     Telemetry monitoring  Consult to IR for paracentesis and possible drainage of liver abscess  Consult to pulmonology for bilateral pleural effusions  Antiemetics as needed  Analgesics as needed  Patient needs to be followed up with by her gynecological oncologist to determine optimal plan going forward for treatment of her ovarian cancer     #Hyponatremia  #Generalized edema/anasarca  #Hypoalbuminemia  Patient is fluid overloaded and has significant third spacing, suspect hypervolemia hyponatremia.  Will check urine electrolytes, urine osmolality, and serum osmolality  Fluid restriction of 1500 ml for the time being.  Consider starting diuretics, defer to attending  Repeat labs in a.m.     #debility  PT/OT  Social work for discharge planning     Chronic issues:  #Obesity  #Hyperlipidemia  #Osteoarthritis     Continue with patient's home medications as appropriate     #DVT prophylaxis  SCDs  Lovenox subcutaneous     I spent 75 minutes in the professional and overall care of this patient.        Revision History           Patient fully evaluated 10/2, awake, resting in bed. Patient with Moderate hiatal hernia with partial intrathoracic stomach and the peritoneal fat adjacent to the stomach within the hernia demonstrates evidence of probable carcinomatosis and ascites, Patient tolerated paracentesis on 10/01, awaiting culture results.No s/s or c/o acute difficulties at this time. Medications and labs reviewed.  Plan discussed with  interdisciplinary team, per pulmonology - Plan:  Patient has bilateral pleural effusion in the context of malignant ascites/ovarian cancer I explained to the patient the pleural effusion most likely related to recurrent ascites, patient is requiring so far 5 steps malignant ascites in the last month most likely beneficial approaches intra-abdominal Pleurx catheter Abdominal Pleurx catheter would be the most effective way of preventing pleural effusions and ascites.  Pleural effusions are secondary to ascites, both of which are due to patient's underlying cancer Continue with goals of care discussions prior to interventional radiology consult Follow hepatic fluid analysis Continue IV antibiotics Zosyn, continue current and repeat labs in the AM. Patient still requiring frequent cardiac and SPO2 monitoring. Discharge planning discussed with patient and care team. Therapy evaluations ordered-  [facility] 14, anticipate SNF/C at discharge. Patient aware and agreeable to current plan, continue plan as above. I spent 50 minutes in the professional and overall care of this patient.              Assessment & Plan  Hyponatremia     Patient fully evaluated 10/3, awake, resting in bed. Patient with Moderate hiatal hernia with partial intrathoracic stomach and the peritoneal fat adjacent to the stomach within the hernia demonstrates evidence of probable carcinomatosis and ascites, Patient tolerated paracentesis on 10/01 well,order placed for repeat paracentesis therapeutic non diagnostic with IR.continue current and repeat labs in the AM. Patient aware and agreeable to current plan, continue plan as above.     Patient fully evaluated on October 4.  Patient awaiting therapeutic paracentesis.  Patient probably will need albumin afterwards.  Continue to monitor response with above treatments recheck labs in AM.  I spent 50 minutes in the professional and overall care of this patient.       Patient fully evaluated 10/06, head of bed  elevated, no s/s or c/o acute difficulties at this time. Sister at bedside and agreeable to plan. Attempted therapeutic paracentesis by IR, aborted procedure due to insufficient fluid accumulation.  Medications and labs reviewed, cultures blood no growth at 4 days, AFB Culture      Culture in progress and will be examined weekly. A result will be issued either when positive or after 8 weeks incubation. AFB Stain -No acid fast bacilli seen.  Plan discussed with interdisciplinary team, continue current and repeat labs in the AM. Per pulmonary - Day of consult, patient is breathing on room air. Vital stable. CT chest showed bilateral large pleural effusions. Dicussed need for Abdominal Pleurx catheter. Patient with likely carcinomatosis and plan to discharge to [facility] and will follow up with gynecology in 7 days,     Patient still requiring frequent cardiac and SPO2 monitoring. Discharge planning discussed with patient and care team. Therapy evaluations ordered- [place] [id number], [facility] at discharge. Patient aware and agreeable to current plan, continue plan as above. I spent 50 minutes in the professional and overall care of this patient.     Patient fully evaluated 10/07, head of bed elevated, no s/s or c/o acute difficulties at this time. Medications and labs reviewed, sodium 125, nephrology consulted.  Cultures blood no growth at 4 days, AFB Culture      Culture in progress and will be examined weekly. A result will be issued either when positive or after 8 weeks incubation. AFB Stain -No acid fast bacilli seen.  Plan discussed with interdisciplinary team, continue current and repeat labs in the AM, new supplemental oxygen requirements, will repeat chest xray in AM, maintain HOB elevated to alleviate postural dyspnea. Vitals stable. Patient with likely carcinomatosis and plan to discharge to [facility] and will follow up with gynecology in 7 days, atient still requiring frequent cardiac  and SPO2 monitoring. Discharge planning discussed with patient and care team. Therapy evaluations ordered- [facility] 13, SNF at discharge. Patient aware and agreeable to current plan, continue plan as above. I spent 50 minutes in the professional and overall care of this patient.   Patient fully evaluated 10/08, head of bed elevated, no s/s or c/o acute difficulties at this time. Medications and labs reviewed, sodium low again today at 125, nephrology consulted. Awaiting hepatic fluid analysis -AFB Culture -Culture in progress and will be examined weekly. A result will be issued either when positive or after 8 weeks incubation. AFB Stain -No acid fast bacilli seen.  Plan discussed with interdisciplinary team, per nephrology - Hyponatremia  Acute kidney injury   Ascites  Malnutrition  Anemia  Pleural effusion  Plan;  Discontinue potential nephrotoxins  Nutritional/iron/renal indices/urinary indices  DuoNeb aerosols  Appreciate nephrology input. Patient requiring supplemental oxygen at this time, continue to maintain HOB elevated as tolerated. Patient seen by pulmonology - Consult IR for right sided diagnostic and therapeutic thoracentesis  Unable to safely perform paracentesis due to lack of fluid  Patient has bilateral pleural effusion in the context of malignant ascites/ovarian cancer - Pleural effusions are secondary to ascites, both of which are due to patient's underlying cancer. Continue with goals of care discussions prior to interventional radiology consult. Continue IV antibiotics zosyn, probiotics added. Continue current plan and repeat labs in the AM, repeat chest xray in AM, maintain HOB elevated to alleviate postural dyspnea. Vitals stable. Patient with likely carcinomatosis and plan to discharge to SNF - [facility] and will follow up with gynecology in 7 days, atient still requiring frequent cardiac and SPO2 monitoring. Discharge planning discussed with patient and care team. Therapy evaluations  ordered- [facility] 13, SNF at discharge. Patient aware and agreeable to current plan, continue plan as above. I spent 50 minutes in the professional and overall care of this patient.     Patient fully evaluated 10/09, patient resting in bed with HOB, no s/s or c/o acute difficulties at this time. Medications and labs reviewed, cultures no growth at 4 days, AFB cultures in process. Plan discussed with interdisciplinary team, pulmonary plan - Bilateral pleural effusion  Abdominal ascites  Ovarian carcinoma w/ peritoneal carcinomatosis carcinomatosis  HLD  Patient clear for discharge to SNF from standpoint of pulmonology  Patient will likely need an abdominal PleurX catheter at some point. However, we were unable to perform safely during this hospitalization due to lack of ascitic fluid  Pleural fluid suggests exudative effusion, likely secondary to malignancy  Okay to discontinue antibiotics from standpoint of pulmonology. Low suspicion of pneumonia. Leukocytosis is unlikely reflective of infection.   Will continue current and repeat labs in the AM. Patient still requiring frequent cardiac and SPO2 monitoring. Discharge planning discussed with patient and care team. Therapy evaluations ordered- [facility] 10, anticipate SNF at discharge. Patient aware and agreeable to current plan, continue plan as above. I spent 50 minutes in the professional and overall care of this patient.     Patient fully evaluated 10/10, patient resting in bed with HOB, no s/s or c/o acute difficulties at this time. Medications and labs reviewed, cultures no growth at 5 days, AFB cultures in process. Plan discussed with interdisciplinary team, pulmonary plan- Bilateral pleural effusion  Abdominal ascites Ovarian carcinoma w/ peritoneal carcinomatosis carcinomatosis HLD Patient clear for discharge to SNF from standpoint.   Patient will likely need an abdominal PleurX catheter at some point. However, we were unable to perform safely during this  hospitalization due to lack of ascitic fluid Pleural fluid suggests exudative effusion, likely secondary to malignancy Okay to discontinue antibiotics from standpoint of pulmonology. Low suspicion of pneumonia. Leukocytosis is unlikely reflective of infection.   Patient's sister at bedside, discussion of plan of care and will follow up with gynecology outpatient, possibly Dr. Jones. Will continue current and repeat labs in the AM. Patient with frequent bowel movements, soft, will hold miralax at this time. Patient still requiring frequent cardiac and SPO2 monitoring. Discharge planning discussed with patient and care team. Therapy evaluations ordered- [facility] 10, anticipate SNF at discharge. Patient aware and agreeable to current plan, continue plan as above. I spent 50 minutes in the professional and overall care of this patient.     Patient fully evaluated 10/11, patient resting in bed with HOB, no s/s or c/o acute difficulties at this time. Medications and labs reviewed, cultures no growth at 5 days, AFB cultures in process, creatinine and Bun continue to rise. Nephrology on the case, appreciate input.  Plan discussed with interdisciplinary team, pulmonary plan- agree to discontinue antibiotics from standpoint of pulmonology with Low suspicion of pneumonia. Continues to require supplemental oxygen at this time. Leukocytosis is unlikely reflective of infection. Patient pain medications updated based on renal function - will switch Roxicodone. Long discussion with patient's sister and patient plan of care and will follow up with gynecology outpatient, possibly Dr. Jones. Will continue current and repeat labs in the AM. Patient with frequent bowel movements, soft, will hold miralax at this time. Patient still requiring frequent cardiac and SPO2 monitoring. Discharge planning discussed with patient and care team. Therapy evaluations ordered- [facility] 10, anticipate SNF at discharge. Patient aware and agreeable to  current plan, continue plan as above.     Patient fully evaluated on October 12 and continues to have significant decline in renal function.  Oncology reconsulted.  I believe the patient is significant third spacing secondary to peritoneal carcinomatosis.  Recheck labs in AM.  Appreciate pulmonary and nephrology consultations.  I spent 50 minutes in the professional and overall care of this patient.     Patient fully evaluated 10/13, head of bed elevated, visible difficulties noted at this time. Medications and labs reviewed-  Cultures-  in process  WBC- 29.6  Hemoglobin- 9.2  Imaging- CT abdomen pelvis wo IV contrast   Impression:    Overall findings are grossly unchanged from 10/01/2024 CT scan.  1. Persistent moderate abdominopelvic ascites with diffuse peritoneal  carcinomatosis/omental caking.  2. 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  Underlying cirrhosis could not be entirely excluded as well.  3. Moderate bilateral pleural effusion with surrounding atelectasis.  Supplemental oxygen requiring at this time.      Goals of care- long discussion with sister, patient, and interdisciplinary team, patient with worsening renal impairment secondary to  ovarian carcinoma with peritoneal carcinomatosis abdominal distention, ascites requiring frequent drainage was admitted profound weakness tiredness, failure to thrive. Prognosis poor, plan discussed and will focus on comfort on measures at this time, planned call out to gyn/onc doctor tomorrow morning. Addition of methadone for pain management. Patient seen by Dr. Alexandre - The patient is a 79-year-old woman diagnosed with ovarian carcinoma and peritoneal carcinomatosis with ascites in August 2024.  Initially evaluated by GYN oncology and chemotherapy was recommended but due to intercurrent illness and admissions the patient did not/could not receive any treatment.  Currently  at SNF and was admitted because of profound weakness tiredness failure to thrive pain and noted to have elevated creatinine.  Physical examination revealed elderly woman in pain requesting assistance.  Performance status is 3-4.  Elevated creatinine at 3.14 mg/dL.  Personally discussed with Dr. Rhodes.  Could consider initial chemotherapy to site to reduce then consider surgery.  However, advanced age, poor performance status, elevated creatinine preclude chemotherapy.  Consider evaluation by GYN oncology.  Meanwhile consider palliative care pain control/symptom control drainage of ascites consider catheter placement to drain fluid.  Consider methadone in view of elevated creatinine.  Thank you for allowing me to participate in care of your patient if you have any questions please feel free to call me.  Will continue current and repeat,  labs in the AM. Patient still requiring frequent cardiac and SPO2 monitoring. Continue aggressive pulmonary hygiene. Discharge planning discussed with patient and care team. Therapy evaluations ordered. [facility]-        , anticipate C/SNF at discharge. Patient aware and agreeable to current plan, continue plan as above. I spent a total of 50 minutes on the date of the service which included preparing to see the patient, face-to-face patient care, completing clinical documentation, obtaining and/or reviewing separately obtained history, performing a medically appropriate examination, counseling and educating the patient/family/caregiver, ordering medications, tests, or procedures, communicating with other HCPs (not separately reported), independently interpreting results (not separately reported), communicating results to the patient/family/caregiver, and care coordination (not separately reported).         Revision History          Pertinent Physical Exam At Time of Discharge  Physical Exam  Patient fully evaluated 10/16/24 for   1.  Hyponatremia   HYDROmorphone (Dilaudid) 0.5 mg/0.5  mL solution injection     HYDROmorphone PF (Dilaudid) 0.2 mg/mL injection     bisacodyl (Dulcolax) 10 mg suppository     glycopyrrolate (Robinul) 200 mcg/mL injection     haloperidol lactate (Haldol) 5 mg/mL injection     LORazepam (Ativan) 2 mg/mL injection     Call placed to GYN/ONC Dr. Villalba this AM with long discussion on patient, treatment options, and goals of care. Patient and sister aware of poor prognosis and bleak outcome. Patient with no significant clinical improvement noted, patient medically cleared for discharge today to [facility]. Patient seen resting in bed with head of bed elevated, no s/s or c/o acute difficulties at this time. Medications and labs reviewed, will begin comfort care medications and transition to hospice.     No results found for this or any previous visit (from the past 24 hours).        Intake/Output this shift:  I/O this shift:  In: 320 [P.O.:320]  Out: -      Vital signs for last 24 hours:  Temp:  [36.3 °C (97.3 °F)-37 °C (98.6 °F)] 37 °C (98.6 °F)  Heart Rate:  [102-106] 102  Resp:  [18] 18  BP: ()/(51-55) 89/55        Plan discussed with interdisciplinary team, ok to discharge to [facility]. Patient aware and agreeable to current plan, continue plan as above. I spent 30 minutes on the date of the service which included preparing to see the patient, face-to-face patient care, completing clinical documentation, obtaining and/or reviewing separately obtained history, performing a medically appropriate examination, counseling and educating the patient/family/caregiver, ordering medications, tests, or procedures, communicating with other HCPs (not separately reported), independently interpreting results (not separately reported), communicating results to the patient/family/caregiver, and care coordination (not separately reported  Outpatient Follow-Up  No future appointments.     Patient fully evaluated on October 16 and found to note be [facility] appropriate.   Patient to discharge to skilled nursing.     Jd Rhodes MD        Patient fully evaluated 10/17 for   1.  Acute kidney injury (CMS-HCC)           Patient seen resting in bed with head of bed elevated, no s/s or c/o acute difficulties at this time.  Vital signs for last 24 hours:  Temp:  [36.2 °C (97.2 °F)-37.2 °C (99 °F)] 36.4 °C (97.5 °F)  Heart Rate:  [] 100  Resp:  [16-18] 18  BP: ()/(49-56) 93/51 I/O this shift:  In: 150 [P.O.:150]  Out: -   Patient still requiring frequent cardiac and SPO2 monitoring. Continue aggressive pulmonary hygiene and oral hygiene. Off loading as tolerated for skin integrity. Medications and labs reviewed-               Results for orders placed or performed during the hospital encounter of 10/14/24 (from the past 24 hours)  Comprehensive Metabolic Panel  Result  Value  Ref Range     Glucose  95  74 - 99 mg/dL     Sodium  129 (L)  136 - 145 mmol/L     Potassium  4.6  3.5 - 5.3 mmol/L     Chloride  95 (L)  98 - 107 mmol/L     Bicarbonate  21  21 - 32 mmol/L     Anion Gap  18  10 - 20 mmol/L     Urea Nitrogen  79 (H)  6 - 23 mg/dL     Creatinine  4.17 (H)  0.50 - 1.05 mg/dL     eGFR  10 (L)  >60 mL/min/1.73m*2     Calcium  7.4 (L)  8.6 - 10.3 mg/dL     Albumin  2.0 (L)  3.4 - 5.0 g/dL     Alkaline Phosphatase  171 (H)  33 - 136 U/L     Total Protein  4.9 (L)  6.4 - 8.2 g/dL     AST  29  9 - 39 U/L     Bilirubin, Total  0.3  0.0 - 1.2 mg/dL     ALT  10  7 - 45 U/L  CBC  Result  Value  Ref Range     WBC  27.3 (H)  4.4 - 11.3 x10*3/uL     nRBC  0.0  0.0 - 0.0 /100 WBCs     RBC  3.59 (L)  4.00 - 5.20 x10*6/uL     Hemoglobin  9.3 (L)  12.0 - 16.0 g/dL     Hematocrit  31.3 (L)  36.0 - 46.0 %     MCV  87  80 - 100 fL     MCH  25.9 (L)  26.0 - 34.0 pg     MCHC  29.7 (L)  32.0 - 36.0 g/dL     RDW  18.5 (H)  11.5 - 14.5 %     Platelets  338  150 - 450 x10*3/uL     Patient recently received an antibiotic (last 12 hours)         None           Continue aggressive pulmonary hygiene  and oral hygiene. Off loading as tolerated for skin integrity.  Plan discussed with interdisciplinary team, Patient started on Unasyn TID IV for spontanous bacterial peritonitis, empiric. Will continue current and repeat labs in the AM.   Discharge planning discussed with patient and care team. Therapy evaluations ordered, anticipate SNF at discharge. Patient aware and agreeable to current plan, continue plan as above.   I spent a total of 50 minutes on the date of the service which included preparing to see the patient, face-to-face patient care, completing clinical documentation, obtaining and/or reviewing separately obtained history, performing a medically appropriate examination, counseling and educating the patient/family/caregiver, ordering medications, tests, or procedures, communicating with other HCPs (not separately reported), independently interpreting results (not separately reported), communicating results to the patient/family/caregiver, and care coordination (not separately reported).          Revision History       Patient fully evaluated 10/18  for   1.  Acute kidney injury (CMS-HCC)           Patient seen resting in bed with head of bed elevated, no s/s or c/o acute difficulties at this time.  Vital signs for last 24 hours:  Temp:  [35.7 °C (96.3 °F)-36.5 °C (97.7 °F)] 36.5 °C (97.7 °F)  Heart Rate:  [] 103  Resp:  [16-18] 16  BP: ()/(46-58) 105/51 No intake/output data recorded.  Patient still requiring frequent cardiac and SPO2 monitoring. Continue aggressive pulmonary hygiene and oral hygiene. Off loading as tolerated for skin integrity. Medications and labs reviewed-   Results for orders placed or performed during the hospital encounter of 10/17/24 (from the past 24 hours)  CBC and Auto Differential  Result  Value  Ref Range     WBC  25.7 (H)  4.4 - 11.3 x10*3/uL     nRBC  0.0  0.0 - 0.0 /100 WBCs     RBC  3.50 (L)  4.00 - 5.20 x10*6/uL     Hemoglobin  9.1 (L)  12.0 - 16.0 g/dL      Hematocrit  29.7 (L)  36.0 - 46.0 %     MCV  85  80 - 100 fL     MCH  26.0  26.0 - 34.0 pg     MCHC  30.6 (L)  32.0 - 36.0 g/dL     RDW  18.3 (H)  11.5 - 14.5 %     Platelets  333  150 - 450 x10*3/uL     Neutrophils %  87.0  40.0 - 80.0 %     Immature Granulocytes %, Automated  1.9 (H)  0.0 - 0.9 %     Lymphocytes %  4.0  13.0 - 44.0 %     Monocytes %  3.9  2.0 - 10.0 %     Eosinophils %  2.8  0.0 - 6.0 %     Basophils %  0.4  0.0 - 2.0 %     Neutrophils Absolute  22.34 (H)  1.60 - 5.50 x10*3/uL     Immature Granulocytes Absolute, Automated  0.48  0.00 - 0.50 x10*3/uL     Lymphocytes Absolute  1.03  0.80 - 3.00 x10*3/uL     Monocytes Absolute  1.01 (H)  0.05 - 0.80 x10*3/uL     Eosinophils Absolute  0.73 (H)  0.00 - 0.40 x10*3/uL     Basophils Absolute  0.11 (H)  0.00 - 0.10 x10*3/uL  Comprehensive metabolic panel  Result  Value  Ref Range     Glucose  89  74 - 99 mg/dL     Sodium  129 (L)  136 - 145 mmol/L     Potassium  4.4  3.5 - 5.3 mmol/L     Chloride  92 (L)  98 - 107 mmol/L     Bicarbonate  23  21 - 32 mmol/L     Anion Gap  18  10 - 20 mmol/L     Urea Nitrogen  83 (H)  6 - 23 mg/dL     Creatinine  4.51 (H)  0.50 - 1.05 mg/dL     eGFR  9 (L)  >60 mL/min/1.73m*2     Calcium  7.5 (L)  8.6 - 10.3 mg/dL     Albumin  2.0 (L)  3.4 - 5.0 g/dL     Alkaline Phosphatase  168 (H)  33 - 136 U/L     Total Protein  5.3 (L)  6.4 - 8.2 g/dL     AST  28  9 - 39 U/L     Bilirubin, Total  0.3  0.0 - 1.2 mg/dL     ALT  10  7 - 45 U/L  SST TOP  Result  Value  Ref Range     Extra Tube  Hold for add-ons.        Patient recently received an antibiotic (last 12 hours)         None           Continue aggressive pulmonary hygiene and oral hygiene. Off loading as tolerated for skin integrity.  Long discussion with sister and interdisciplinary team and plan discussed. Per nephrology -Renal chemistries continue to downtrend with a BUN of 83 and creatinine of 4.51 continue, will continue current current and repeat labs in the AM.   Discharge planning discussed with patient and care team. Therapy evaluations ordered. Anticipate SNF at discharge. Patient aware and agreeable to current plan, continue plan as above.      I spent a total of 50 minutes on the date of the service which included preparing to see the patient, face-to-face patient care, completing clinical documentation, obtaining and/or reviewing separately obtained history, performing a medically appropriate examination, counseling and educating the patient/family/caregiver, ordering medications, tests, or procedures, communicating with other HCPs (not separately reported), independently interpreting results (not separately reported), communicating results to the patient/family/caregiver, and care coordination (not separately reported).           Patient fully evaluated 10/19  for Principal Problem:    EUSEBIO (acute kidney injury) (CMS-[facility])  Active Problems:    Acute kidney injury (CMS-HCC)  Patient seen resting in bed with head of bed elevated, no s/s or c/o acute difficulties at this time.  Vital signs for last 24 hours:  Temp:  [35.7 °C (96.3 °F)-36.7 °C (98.1 °F)] 36.6 °C (97.9 °F)  Heart Rate:  [] 104  Resp:  [14-16] 16  BP: (72-97)/(33-55) 72/33  FiO2 (%):  [28 %] 28 % No intake/output data recorded.  Patient still requiring frequent cardiac and SPO2 monitoring. Continue aggressive pulmonary hygiene and oral hygiene. Off loading as tolerated for skin integrity. Medications and labs reviewed-   Results for orders placed or performed during the hospital encounter of 10/17/24 (from the past 24 hours)   CBC and Auto Differential   Result Value Ref Range    WBC 23.8 (H) 4.4 - 11.3 x10*3/uL    nRBC 0.0 0.0 - 0.0 /100 WBCs    RBC 3.18 (L) 4.00 - 5.20 x10*6/uL    Hemoglobin 8.4 (L) 12.0 - 16.0 g/dL    Hematocrit 27.4 (L) 36.0 - 46.0 %    MCV 86 80 - 100 fL    MCH 26.4 26.0 - 34.0 pg    MCHC 30.7 (L) 32.0 - 36.0 g/dL    RDW 18.5 (H) 11.5 - 14.5 %    Platelets 317 150 - 450 x10*3/uL     Neutrophils % 86.1 40.0 - 80.0 %    Immature Granulocytes %, Automated 1.1 (H) 0.0 - 0.9 %    Lymphocytes % 5.1 13.0 - 44.0 %    Monocytes % 3.5 2.0 - 10.0 %    Eosinophils % 3.9 0.0 - 6.0 %    Basophils % 0.3 0.0 - 2.0 %    Neutrophils Absolute 20.45 (H) 1.60 - 5.50 x10*3/uL    Immature Granulocytes Absolute, Automated 0.27 0.00 - 0.50 x10*3/uL    Lymphocytes Absolute 1.20 0.80 - 3.00 x10*3/uL    Monocytes Absolute 0.84 (H) 0.05 - 0.80 x10*3/uL    Eosinophils Absolute 0.92 (H) 0.00 - 0.40 x10*3/uL    Basophils Absolute 0.07 0.00 - 0.10 x10*3/uL   Comprehensive metabolic panel   Result Value Ref Range    Glucose 95 74 - 99 mg/dL    Sodium 128 (L) 136 - 145 mmol/L    Potassium 4.9 3.5 - 5.3 mmol/L    Chloride 92 (L) 98 - 107 mmol/L    Bicarbonate 22 21 - 32 mmol/L    Anion Gap 19 10 - 20 mmol/L    Urea Nitrogen 88 (H) 6 - 23 mg/dL    Creatinine 5.44 (H) 0.50 - 1.05 mg/dL    eGFR 8 (L) >60 mL/min/1.73m*2    Calcium 7.0 (L) 8.6 - 10.3 mg/dL    Albumin 1.9 (L) 3.4 - 5.0 g/dL    Alkaline Phosphatase 165 (H) 33 - 136 U/L    Total Protein 5.1 (L) 6.4 - 8.2 g/dL    AST 28 9 - 39 U/L    Bilirubin, Total 0.3 0.0 - 1.2 mg/dL    ALT 10 7 - 45 U/L      Patient recently received an antibiotic (last 12 hours)       Date/Time Action Medication Dose Rate    10/19/24 1428 New Bag    ampicillin-sulbactam (Unasyn) in sodium chloride 0.9 % 100 mL 3 g 3 g 200 mL/hr           Plan discussed with interdisciplinary team, continue current and repeat labs in the AM. Increased methadone to 10 mg from 5 mg for further pain management, diligent wound care and repositioning, ointment as needed.     Discharge planning discussed with patient and care team. Therapy evaluations ordered. anticipate HHC/SNF at discharge. Patient aware and agreeable to current plan, continue plan as above.     I spent a total of 50 minutes on the date of the service which included preparing to see the patient, face-to-face patient care, completing clinical  documentation, obtaining and/or reviewing separately obtained history, performing a medically appropriate examination, counseling and educating the patient/family/caregiver, ordering medications, tests, or procedures, communicating with other HCPs (not separately reported), independently interpreting results (not separately reported), communicating results to the patient/family/caregiver, and care coordination (not separately reported).        Patient fully evaluated 10/20  for Principal Problem:    EUSEBIO (acute kidney injury) (CMS-HCC)  Active Problems:    Acute kidney injury (CMS-HCC)  Patient seen resting in bed with head of bed elevated, no s/s or c/o acute difficulties at this time.  Vital signs for last 24 hours:  Temp:  [35.7 °C (96.3 °F)-36.7 °C (98.1 °F)] 36.6 °C (97.9 °F)  Heart Rate:  [] 104  Resp:  [14-16] 16  BP: (72-97)/(33-55) 72/33  FiO2 (%):  [28 %] 28 % No intake/output data recorded.  Patient still requiring frequent cardiac and SPO2 monitoring. Patient with significant urine burden noted on bladder scan, will place morin for comfort. Continue aggressive pulmonary hygiene and oral hygiene. Off loading as tolerated for skin integrity. Medications and labs reviewed-   Results for orders placed or performed during the hospital encounter of 10/17/24 (from the past 24 hours)   CBC and Auto Differential   Result Value Ref Range    WBC 23.8 (H) 4.4 - 11.3 x10*3/uL    nRBC 0.0 0.0 - 0.0 /100 WBCs    RBC 3.18 (L) 4.00 - 5.20 x10*6/uL    Hemoglobin 8.4 (L) 12.0 - 16.0 g/dL    Hematocrit 27.4 (L) 36.0 - 46.0 %    MCV 86 80 - 100 fL    MCH 26.4 26.0 - 34.0 pg    MCHC 30.7 (L) 32.0 - 36.0 g/dL    RDW 18.5 (H) 11.5 - 14.5 %    Platelets 317 150 - 450 x10*3/uL    Neutrophils % 86.1 40.0 - 80.0 %    Immature Granulocytes %, Automated 1.1 (H) 0.0 - 0.9 %    Lymphocytes % 5.1 13.0 - 44.0 %    Monocytes % 3.5 2.0 - 10.0 %    Eosinophils % 3.9 0.0 - 6.0 %    Basophils % 0.3 0.0 - 2.0 %    Neutrophils Absolute  20.45 (H) 1.60 - 5.50 x10*3/uL    Immature Granulocytes Absolute, Automated 0.27 0.00 - 0.50 x10*3/uL    Lymphocytes Absolute 1.20 0.80 - 3.00 x10*3/uL    Monocytes Absolute 0.84 (H) 0.05 - 0.80 x10*3/uL    Eosinophils Absolute 0.92 (H) 0.00 - 0.40 x10*3/uL    Basophils Absolute 0.07 0.00 - 0.10 x10*3/uL   Comprehensive metabolic panel   Result Value Ref Range    Glucose 95 74 - 99 mg/dL    Sodium 128 (L) 136 - 145 mmol/L    Potassium 4.9 3.5 - 5.3 mmol/L    Chloride 92 (L) 98 - 107 mmol/L    Bicarbonate 22 21 - 32 mmol/L    Anion Gap 19 10 - 20 mmol/L    Urea Nitrogen 88 (H) 6 - 23 mg/dL    Creatinine 5.44 (H) 0.50 - 1.05 mg/dL    eGFR 8 (L) >60 mL/min/1.73m*2    Calcium 7.0 (L) 8.6 - 10.3 mg/dL    Albumin 1.9 (L) 3.4 - 5.0 g/dL    Alkaline Phosphatase 165 (H) 33 - 136 U/L    Total Protein 5.1 (L) 6.4 - 8.2 g/dL    AST 28 9 - 39 U/L    Bilirubin, Total 0.3 0.0 - 1.2 mg/dL    ALT 10 7 - 45 U/L      Patient recently received an antibiotic (last 12 hours)       None           Plan discussed with interdisciplinary team, continue current and repeat labs in the AM.     Discharge planning discussed with patient and care team. Prognosis poor, long discussion with sister and patient at bedside, aware. Goals of care discussed and will continue current. SNF at discharge. Patient aware and agreeable to current plan, continue plan as above.     I spent a total of 50 minutes on the date of the service which included preparing to see the patient, face-to-face patient care, completing clinical documentation, obtaining and/or reviewing separately obtained history, performing a medically appropriate examination, counseling and educating the patient/family/caregiver, ordering medications, tests, or procedures, communicating with other HCPs (not separately reported), independently interpreting results (not separately reported), communicating results to the patient/family/caregiver, and care coordination (not separately reported).    Kim Angulo

## 2024-10-20 NOTE — CARE PLAN
The patient's goals for the shift include rest.    The clinical goals for the shift include comfort    Problem: Skin  Goal: Decreased wound size/increased tissue granulation at next dressing change  Outcome: Progressing  Goal: Participates in plan/prevention/treatment measures  Outcome: Progressing  Goal: Prevent/manage excess moisture  Outcome: Progressing  Goal: Prevent/minimize sheer/friction injuries  Outcome: Progressing  Goal: Promote/optimize nutrition  Outcome: Progressing  Goal: Promote skin healing  Outcome: Progressing     Problem: Pain - Adult  Goal: Verbalizes/displays adequate comfort level or baseline comfort level  Outcome: Progressing     Problem: Safety - Adult  Goal: Free from fall injury  Outcome: Progressing     Problem: Discharge Planning  Goal: Discharge to home or other facility with appropriate resources  Outcome: Progressing     Problem: Chronic Conditions and Co-morbidities  Goal: Patient's chronic conditions and co-morbidity symptoms are monitored and maintained or improved  Outcome: Progressing

## 2024-10-20 NOTE — CARE PLAN
The patient's goals for the shift include rest.    The clinical goals for the shift include comfort and safety taken    Problem: Skin  Goal: Decreased wound size/increased tissue granulation at next dressing change  Outcome: Progressing  Goal: Participates in plan/prevention/treatment measures  Outcome: Progressing  Goal: Prevent/manage excess moisture  Outcome: Progressing  Goal: Prevent/minimize sheer/friction injuries  Outcome: Progressing  Goal: Promote/optimize nutrition  Outcome: Progressing  Goal: Promote skin healing  Outcome: Progressing     Problem: Pain - Adult  Goal: Verbalizes/displays adequate comfort level or baseline comfort level  Outcome: Progressing     Problem: Safety - Adult  Goal: Free from fall injury  Outcome: Progressing     Problem: Discharge Planning  Goal: Discharge to home or other facility with appropriate resources  Outcome: Progressing     Problem: Chronic Conditions and Co-morbidities  Goal: Patient's chronic conditions and co-morbidity symptoms are monitored and maintained or improved  Outcome: Progressing

## 2024-10-20 NOTE — CARE PLAN
The patient's goals for the shift include rest.    The clinical goals for the shift include comfort    Problem: Skin  Goal: Decreased wound size/increased tissue granulation at next dressing change  10/19/2024 2226 by Savanah Moyer RN  Flowsheets (Taken 10/19/2024 2226)  Decreased wound size/increased tissue granulation at next dressing change:   Protective dressings over bony prominences   Promote sleep for wound healing   Utilize specialty bed per algorithm  10/19/2024 2225 by Savanah Moyer RN  Outcome: Progressing  Goal: Participates in plan/prevention/treatment measures  10/19/2024 2226 by Savanah Moyer RN  Flowsheets (Taken 10/19/2024 2226)  Participates in plan/prevention/treatment measures: Elevate heels  10/19/2024 2225 by Savanah Moyer RN  Outcome: Progressing  Goal: Prevent/manage excess moisture  10/19/2024 2226 by Savanah Moyer RN  Flowsheets (Taken 10/19/2024 2226)  Prevent/manage excess moisture:   Cleanse incontinence/protect with barrier cream   Moisturize dry skin   Follow provider orders for dressing changes  10/19/2024 2225 by Savanah Moyer RN  Outcome: Progressing  Goal: Prevent/minimize sheer/friction injuries  10/19/2024 2226 by Savanah Moyer RN  Flowsheets (Taken 10/19/2024 2226)  Prevent/minimize sheer/friction injuries:   HOB 30 degrees or less   Turn/reposition every 2 hours/use positioning/transfer devices  10/19/2024 2225 by Savanah Moyer RN  Outcome: Progressing  Goal: Promote/optimize nutrition  10/19/2024 2226 by Savanah Moyer RN  Flowsheets (Taken 10/19/2024 2226)  Promote/optimize nutrition:   Offer water/supplements/favorite foods   Monitor/record intake including meals  10/19/2024 2225 by Savanah Moyer RN  Outcome: Progressing  Goal: Promote skin healing  10/19/2024 2226 by Savanah Moyer RN  Flowsheets (Taken 10/19/2024 2226)  Promote skin healing:   Protective dressings over bony prominences    Turn/reposition every 2 hours/use positioning/transfer devices  10/19/2024 2225 by Savanah Moyer RN  Outcome: Progressing     Problem: Pain - Adult  Goal: Verbalizes/displays adequate comfort level or baseline comfort level  Outcome: Progressing     Problem: Safety - Adult  Goal: Free from fall injury  Outcome: Progressing     Problem: Discharge Planning  Goal: Discharge to home or other facility with appropriate resources  Outcome: Progressing     Problem: Chronic Conditions and Co-morbidities  Goal: Patient's chronic conditions and co-morbidity symptoms are monitored and maintained or improved  Outcome: Progressing

## 2024-10-20 NOTE — NURSING NOTE
PCNA notified this nurse of no urine output since start of shift. Bladder scan done, 156 ml. House NP notified with NO. Order carried out. Will continue to monitor.

## 2024-10-20 NOTE — PROGRESS NOTES
Subjective   Patient with oligoanuria; terminal cancer now with hypotension  Objective   Past Medical History  She has a past medical history of Bilateral primary osteoarthritis of knee, HLD (hyperlipidemia), Lumbosacral radiculopathy, Meralgia paraesthetica, Obesity, and Physical deconditioning.  1.  Ovarian cancer   2.  GERD  3.  Adjustment disorder with depressed mood  4.  Chronic neuropathy  5.  Hyperlipidemia  6.  Degenerative joint disease  6.  Obesity  7.  Corneal dystrophy status post corneal procedures  8.  History of skin cancer involving nose.  S/p Mohs procedure.  9.  Osteoarthritis  10.  Meralgia paresthetica  11.  Peritoneal carcinomatosis  12.  Malignant ascites  13.  Obesity  14.  Low back pain  15.  Acute kidney injury  16.  Hyponatremia  17.  Anemia  18.  Renal calculi  Surgical History  She has a past surgical history that includes Total knee arthroplasty (Left, 2019); Cholecystectomy; Corneal transplant; Lumbar fusion; Dilation and curettage of uterus; Shoulder arthroscopy (Left); Colonoscopy; Skin lesion excision; Trigger finger release (Right); Arthroplasty (Left); Breast biopsy (1999); and Total knee arthroplasty (Right, 2017)    Physical Exam  General Appearance; alert oriented  Skin pallor  HEENT; pupils react to light and accommodation  Tender maxillary sinuses  Tongue; well-hydrated  Neck; no jugular vein distention, no thyromegaly, no adenopathy, no bruit  Lungs; bibasilar crackles on expiration  Heart; no rubs no gallops, heart rate is regular  Abdomen; there is tympanic note to percussion with distention no rebound no guarding no bruit  ; no CVA tenderness  Musculoskeletal; decreased muscle tone  1+ edema of the legs  Knees on physical examination is close edema, bursal tenderness and decreased range of motion arthralgias  Neurological; no tremors no clonus  Last Recorded Vitals  Blood pressure (!) 72/33, pulse 104, temperature 36.6 °C (97.9 °F), resp. rate 16, height 1.626 m (5'  "4.02\"), weight 104 kg (229 lb 4.5 oz), SpO2 95%.  Intake/Output last 3 Shifts:  I/O last 3 completed shifts:  In: 750 (7.2 mL/kg) [P.O.:250; IV Piggyback:500]  Out: 200 (1.9 mL/kg) [Urine:200 (0.1 mL/kg/hr)]  Weight: 104 kg     Relevant Results    Results for orders placed or performed during the hospital encounter of 10/17/24 (from the past 24 hours)   CBC and Auto Differential   Result Value Ref Range    WBC 23.8 (H) 4.4 - 11.3 x10*3/uL    nRBC 0.0 0.0 - 0.0 /100 WBCs    RBC 3.18 (L) 4.00 - 5.20 x10*6/uL    Hemoglobin 8.4 (L) 12.0 - 16.0 g/dL    Hematocrit 27.4 (L) 36.0 - 46.0 %    MCV 86 80 - 100 fL    MCH 26.4 26.0 - 34.0 pg    MCHC 30.7 (L) 32.0 - 36.0 g/dL    RDW 18.5 (H) 11.5 - 14.5 %    Platelets 317 150 - 450 x10*3/uL    Neutrophils % 86.1 40.0 - 80.0 %    Immature Granulocytes %, Automated 1.1 (H) 0.0 - 0.9 %    Lymphocytes % 5.1 13.0 - 44.0 %    Monocytes % 3.5 2.0 - 10.0 %    Eosinophils % 3.9 0.0 - 6.0 %    Basophils % 0.3 0.0 - 2.0 %    Neutrophils Absolute 20.45 (H) 1.60 - 5.50 x10*3/uL    Immature Granulocytes Absolute, Automated 0.27 0.00 - 0.50 x10*3/uL    Lymphocytes Absolute 1.20 0.80 - 3.00 x10*3/uL    Monocytes Absolute 0.84 (H) 0.05 - 0.80 x10*3/uL    Eosinophils Absolute 0.92 (H) 0.00 - 0.40 x10*3/uL    Basophils Absolute 0.07 0.00 - 0.10 x10*3/uL   Comprehensive metabolic panel   Result Value Ref Range    Glucose 95 74 - 99 mg/dL    Sodium 128 (L) 136 - 145 mmol/L    Potassium 4.9 3.5 - 5.3 mmol/L    Chloride 92 (L) 98 - 107 mmol/L    Bicarbonate 22 21 - 32 mmol/L    Anion Gap 19 10 - 20 mmol/L    Urea Nitrogen 88 (H) 6 - 23 mg/dL    Creatinine 5.44 (H) 0.50 - 1.05 mg/dL    eGFR 8 (L) >60 mL/min/1.73m*2    Calcium 7.0 (L) 8.6 - 10.3 mg/dL    Albumin 1.9 (L) 3.4 - 5.0 g/dL    Alkaline Phosphatase 165 (H) 33 - 136 U/L    Total Protein 5.1 (L) 6.4 - 8.2 g/dL    AST 28 9 - 39 U/L    Bilirubin, Total 0.3 0.0 - 1.2 mg/dL    ALT 10 7 - 45 U/L                   Assessment/Plan "   Hyponatremia  SIADH  Acute kidney injury  Peritoneal carcinomatosis  Ovarian cancer  Ascites  Malnutrition  Anemia  Pleural effusion  Hyperuricemia  Plan  D5 normal saline at 100 an hour  Patient is not a dialysis candidate due to  terminal metastatic cancer disease  Assessment & Plan  EUSEBIO (acute kidney injury) (CMS-HCC)    Acute kidney injury (CMS-HCC)        I spent  20 minutes in the professional and overall care of this patient.      Elder Wells MD

## 2024-10-21 LAB
ALBUMIN SERPL BCP-MCNC: 1.9 G/DL (ref 3.4–5)
ALP SERPL-CCNC: 180 U/L (ref 33–136)
ALT SERPL W P-5'-P-CCNC: 12 U/L (ref 7–45)
ANION GAP SERPL CALC-SCNC: 19 MMOL/L (ref 10–20)
AST SERPL W P-5'-P-CCNC: 29 U/L (ref 9–39)
BASOPHILS # BLD AUTO: 0.15 X10*3/UL (ref 0–0.1)
BASOPHILS NFR BLD AUTO: 0.5 %
BILIRUB SERPL-MCNC: 0.3 MG/DL (ref 0–1.2)
BUN SERPL-MCNC: 93 MG/DL (ref 6–23)
CALCIUM SERPL-MCNC: 6.9 MG/DL (ref 8.6–10.3)
CHLORIDE SERPL-SCNC: 94 MMOL/L (ref 98–107)
CO2 SERPL-SCNC: 22 MMOL/L (ref 21–32)
CREAT SERPL-MCNC: 5.91 MG/DL (ref 0.5–1.05)
EGFRCR SERPLBLD CKD-EPI 2021: 7 ML/MIN/1.73M*2
EOSINOPHIL # BLD AUTO: 0.66 X10*3/UL (ref 0–0.4)
EOSINOPHIL NFR BLD AUTO: 2.3 %
ERYTHROCYTE [DISTWIDTH] IN BLOOD BY AUTOMATED COUNT: 18.6 % (ref 11.5–14.5)
GLUCOSE SERPL-MCNC: 115 MG/DL (ref 74–99)
HCT VFR BLD AUTO: 28.2 % (ref 36–46)
HGB BLD-MCNC: 8.4 G/DL (ref 12–16)
IMM GRANULOCYTES # BLD AUTO: 0.39 X10*3/UL (ref 0–0.5)
IMM GRANULOCYTES NFR BLD AUTO: 1.4 % (ref 0–0.9)
LYMPHOCYTES # BLD AUTO: 1.3 X10*3/UL (ref 0.8–3)
LYMPHOCYTES NFR BLD AUTO: 4.5 %
MCH RBC QN AUTO: 25.9 PG (ref 26–34)
MCHC RBC AUTO-ENTMCNC: 29.8 G/DL (ref 32–36)
MCV RBC AUTO: 87 FL (ref 80–100)
MONOCYTES # BLD AUTO: 1.15 X10*3/UL (ref 0.05–0.8)
MONOCYTES NFR BLD AUTO: 4 %
NEUTROPHILS # BLD AUTO: 24.99 X10*3/UL (ref 1.6–5.5)
NEUTROPHILS NFR BLD AUTO: 87.3 %
NRBC BLD-RTO: 0 /100 WBCS (ref 0–0)
PLATELET # BLD AUTO: 341 X10*3/UL (ref 150–450)
POTASSIUM SERPL-SCNC: 5.4 MMOL/L (ref 3.5–5.3)
PROT SERPL-MCNC: 5 G/DL (ref 6.4–8.2)
RBC # BLD AUTO: 3.24 X10*6/UL (ref 4–5.2)
SODIUM SERPL-SCNC: 130 MMOL/L (ref 136–145)
WBC # BLD AUTO: 28.6 X10*3/UL (ref 4.4–11.3)

## 2024-10-21 PROCEDURE — 80053 COMPREHEN METABOLIC PANEL: CPT | Performed by: INTERNAL MEDICINE

## 2024-10-21 PROCEDURE — 2500000004 HC RX 250 GENERAL PHARMACY W/ HCPCS (ALT 636 FOR OP/ED): Performed by: INTERNAL MEDICINE

## 2024-10-21 PROCEDURE — 1100000001 HC PRIVATE ROOM DAILY

## 2024-10-21 PROCEDURE — 2500000001 HC RX 250 WO HCPCS SELF ADMINISTERED DRUGS (ALT 637 FOR MEDICARE OP): Performed by: PHYSICIAN ASSISTANT

## 2024-10-21 PROCEDURE — 36415 COLL VENOUS BLD VENIPUNCTURE: CPT | Performed by: INTERNAL MEDICINE

## 2024-10-21 PROCEDURE — 2500000004 HC RX 250 GENERAL PHARMACY W/ HCPCS (ALT 636 FOR OP/ED): Performed by: PHYSICIAN ASSISTANT

## 2024-10-21 PROCEDURE — 2500000005 HC RX 250 GENERAL PHARMACY W/O HCPCS: Performed by: PHYSICIAN ASSISTANT

## 2024-10-21 PROCEDURE — 2500000001 HC RX 250 WO HCPCS SELF ADMINISTERED DRUGS (ALT 637 FOR MEDICARE OP): Performed by: INTERNAL MEDICINE

## 2024-10-21 PROCEDURE — 85025 COMPLETE CBC W/AUTO DIFF WBC: CPT | Performed by: INTERNAL MEDICINE

## 2024-10-21 RX ORDER — HEPARIN SODIUM 5000 [USP'U]/ML
5000 INJECTION, SOLUTION INTRAVENOUS; SUBCUTANEOUS EVERY 8 HOURS
Start: 2024-10-21

## 2024-10-21 RX ORDER — CLONAZEPAM 0.5 MG/1
0.5 TABLET ORAL NIGHTLY
Start: 2024-10-21

## 2024-10-21 RX ORDER — ACETAMINOPHEN 325 MG/1
650 TABLET ORAL EVERY 4 HOURS PRN
Qty: 30 TABLET | Refills: 0 | Status: SHIPPED | OUTPATIENT
Start: 2024-10-21

## 2024-10-21 RX ORDER — METHADONE HYDROCHLORIDE 10 MG/1
10 TABLET ORAL EVERY 12 HOURS
Start: 2024-10-21

## 2024-10-21 RX ORDER — BALSAM PERU/CASTOR OIL
1 OINTMENT (GRAM) TOPICAL 2 TIMES DAILY
Start: 2024-10-21

## 2024-10-21 RX ORDER — MORPHINE SULFATE 10 MG/.5ML
5 SOLUTION ORAL EVERY 4 HOURS PRN
Start: 2024-10-21

## 2024-10-21 ASSESSMENT — COGNITIVE AND FUNCTIONAL STATUS - GENERAL
PERSONAL GROOMING: TOTAL
MOVING TO AND FROM BED TO CHAIR: A LOT
MOBILITY SCORE: 10
MOVING TO AND FROM BED TO CHAIR: A LOT
HELP NEEDED FOR BATHING: TOTAL
TURNING FROM BACK TO SIDE WHILE IN FLAT BAD: A LOT
TOILETING: TOTAL
PERSONAL GROOMING: TOTAL
DAILY ACTIVITIY SCORE: 6
DAILY ACTIVITIY SCORE: 6
DRESSING REGULAR UPPER BODY CLOTHING: TOTAL
WALKING IN HOSPITAL ROOM: TOTAL
EATING MEALS: TOTAL
STANDING UP FROM CHAIR USING ARMS: A LOT
DRESSING REGULAR LOWER BODY CLOTHING: TOTAL
MOVING FROM LYING ON BACK TO SITTING ON SIDE OF FLAT BED WITH BEDRAILS: A LOT
TURNING FROM BACK TO SIDE WHILE IN FLAT BAD: A LOT
WALKING IN HOSPITAL ROOM: TOTAL
EATING MEALS: TOTAL
STANDING UP FROM CHAIR USING ARMS: A LOT
DRESSING REGULAR UPPER BODY CLOTHING: TOTAL
TOILETING: TOTAL
CLIMB 3 TO 5 STEPS WITH RAILING: TOTAL
HELP NEEDED FOR BATHING: TOTAL
MOVING FROM LYING ON BACK TO SITTING ON SIDE OF FLAT BED WITH BEDRAILS: A LOT
MOBILITY SCORE: 10
CLIMB 3 TO 5 STEPS WITH RAILING: TOTAL
DRESSING REGULAR LOWER BODY CLOTHING: TOTAL

## 2024-10-21 ASSESSMENT — PAIN SCALES - PAIN ASSESSMENT IN ADVANCED DEMENTIA (PAINAD)
FACIALEXPRESSION: SMILING OR INEXPRESSIVE
BREATHING: NORMAL
TOTALSCORE: 1
NEGVOCALIZATION: OCCASIONAL MOAN/GROAN, LOW SPEECH, NEGATIVE/DISAPPROVING QUALITY
BODYLANGUAGE: RELAXED
BREATHING: NORMAL
BODYLANGUAGE: RELAXED
FACIALEXPRESSION: SMILING OR INEXPRESSIVE
TOTALSCORE: 0
CONSOLABILITY: NO NEED TO CONSOLE
CONSOLABILITY: NO NEED TO CONSOLE

## 2024-10-21 NOTE — CARE PLAN
The patient's goals for the shift include comfort and rest    The clinical goals for the shift include safety and comfort

## 2024-10-21 NOTE — PROGRESS NOTES
"  Subjective   Patient is unresponsive today  Objective   Past Medical History  She has a past medical history of Bilateral primary osteoarthritis of knee, HLD (hyperlipidemia), Lumbosacral radiculopathy, Meralgia paraesthetica, Obesity, and Physical deconditioning.  1.  Ovarian cancer   2.  GERD  3.  Adjustment disorder with depressed mood  4.  Chronic neuropathy  5.  Hyperlipidemia  6.  Degenerative joint disease  6.  Obesity  7.  Corneal dystrophy status post corneal procedures  8.  History of skin cancer involving nose.  S/p Mohs procedure.  9.  Osteoarthritis  10.  Meralgia paresthetica  11.  Peritoneal carcinomatosis  12.  Malignant ascites  13.  Obesity  14.  Low back pain  15.  Acute kidney injury  16.  Hyponatremia  17.  Anemia  18.  Renal calculi  Surgical History  She has a past surgical history that includes Total knee arthroplasty (Left, 2019); Cholecystectomy; Corneal transplant; Lumbar fusion; Dilation and curettage of uterus; Shoulder arthroscopy (Left); Colonoscopy; Skin lesion excision; Trigger finger release (Right); Arthroplasty (Left); Breast biopsy (1999); and Total knee arthroplasty (Right, 2017)    Physical Exam  General Appearance; alert oriented  Skin pallor  HEENT; pupils react to light and accommodation  Tender maxillary sinuses  Tongue; well-hydrated  Neck; no jugular vein distention, no thyromegaly, no adenopathy, no bruit  Lungs; bibasilar crackles on expiration  Heart; no rubs no gallops, heart rate is regular  Abdomen; there is tympanic note to percussion with distention no rebound no guarding no bruit  ; no CVA tenderness  Musculoskeletal; decreased muscle tone  1+ edema of the legs  Knees on physical examination is close edema, bursal tenderness and decreased range of motion arthralgias  Neurological; no tremors no clonus  Last Recorded Vitals  Blood pressure 80/52, pulse 101, temperature 36.3 °C (97.3 °F), temperature source Temporal, resp. rate 18, height 1.626 m (5' 4.02\"), " weight 104 kg (229 lb 4.5 oz), SpO2 95%.  Intake/Output last 3 Shifts:  I/O last 3 completed shifts:  In: 1553.3 (14.9 mL/kg) [I.V.:853.3 (8.2 mL/kg); IV Piggyback:700]  Out: 50 (0.5 mL/kg) [Urine:50 (0 mL/kg/hr)]  Weight: 104 kg     Relevant Results    Results for orders placed or performed during the hospital encounter of 10/17/24 (from the past 24 hours)   Comprehensive metabolic panel   Result Value Ref Range    Glucose 115 (H) 74 - 99 mg/dL    Sodium 130 (L) 136 - 145 mmol/L    Potassium 5.4 (H) 3.5 - 5.3 mmol/L    Chloride 94 (L) 98 - 107 mmol/L    Bicarbonate 22 21 - 32 mmol/L    Anion Gap 19 10 - 20 mmol/L    Urea Nitrogen 93 (HH) 6 - 23 mg/dL    Creatinine 5.91 (H) 0.50 - 1.05 mg/dL    eGFR 7 (L) >60 mL/min/1.73m*2    Calcium 6.9 (L) 8.6 - 10.3 mg/dL    Albumin 1.9 (L) 3.4 - 5.0 g/dL    Alkaline Phosphatase 180 (H) 33 - 136 U/L    Total Protein 5.0 (L) 6.4 - 8.2 g/dL    AST 29 9 - 39 U/L    Bilirubin, Total 0.3 0.0 - 1.2 mg/dL    ALT 12 7 - 45 U/L   CBC and Auto Differential   Result Value Ref Range    WBC 28.6 (H) 4.4 - 11.3 x10*3/uL    nRBC 0.0 0.0 - 0.0 /100 WBCs    RBC 3.24 (L) 4.00 - 5.20 x10*6/uL    Hemoglobin 8.4 (L) 12.0 - 16.0 g/dL    Hematocrit 28.2 (L) 36.0 - 46.0 %    MCV 87 80 - 100 fL    MCH 25.9 (L) 26.0 - 34.0 pg    MCHC 29.8 (L) 32.0 - 36.0 g/dL    RDW 18.6 (H) 11.5 - 14.5 %    Platelets 341 150 - 450 x10*3/uL    Neutrophils % 87.3 40.0 - 80.0 %    Immature Granulocytes %, Automated 1.4 (H) 0.0 - 0.9 %    Lymphocytes % 4.5 13.0 - 44.0 %    Monocytes % 4.0 2.0 - 10.0 %    Eosinophils % 2.3 0.0 - 6.0 %    Basophils % 0.5 0.0 - 2.0 %    Neutrophils Absolute 24.99 (H) 1.60 - 5.50 x10*3/uL    Immature Granulocytes Absolute, Automated 0.39 0.00 - 0.50 x10*3/uL    Lymphocytes Absolute 1.30 0.80 - 3.00 x10*3/uL    Monocytes Absolute 1.15 (H) 0.05 - 0.80 x10*3/uL    Eosinophils Absolute 0.66 (H) 0.00 - 0.40 x10*3/uL    Basophils Absolute 0.15 (H) 0.00 - 0.10 x10*3/uL                    Assessment/Plan   Hyponatremia  SIADH  Acute kidney injury  Peritoneal carcinomatosis  Ovarian cancer  Ascites  Malnutrition  Anemia  Pleural effusion  Hyperuricemia  Plan  Comfort measures  Assessment & Plan  EUSEBIO (acute kidney injury) (CMS-HCC)    Acute kidney injury (CMS-HCC)        I spent  20 minutes in the professional and overall care of this patient.      Elder Wells MD

## 2024-10-21 NOTE — NURSING NOTE
Pt still no urine output at this time. Report passed on to the day shift nurse to get in contact with nephrologist or Dr. Rhodes per the advise of NP.

## 2024-10-21 NOTE — NURSING NOTE
Pt had a BP of 79/37  per [facility]. Manual BP obtained by RN. 70/40. Urine output 50. NP notified. NP stated that the pt is dnrcc  and nephrology is on consult. NNO continue to monitor.

## 2024-10-21 NOTE — DISCHARGE SUMMARY
Discharge Diagnosis  EUSEBIO (acute kidney injury) (CMS-[facility])    Issues Requiring Follow-Up  Patient fully evaluated  10/21  for Principal Problem:    EUSEBIO (acute kidney injury) (CMS-HCC)  Active Problems:    Acute kidney injury (CMS-HCC)  Patient with no significant clinical improvement noted, poor prognosis,  patient medically cleared for discharge today. Patient seen resting in bed with head of bed elevated, no s/s or c/o acute difficulties at this time.    Keep morin in place for comfort measures.     Vital signs for last 24 hours:  Temp:  [35.8 °C (96.4 °F)-36.6 °C (97.9 °F)] 36.3 °C (97.3 °F)  Heart Rate:  [] 101  Resp:  [18] 18  BP: ()/(37-55) 80/52 Medications and labs reviewed-   Results for orders placed or performed during the hospital encounter of 10/17/24 (from the past 24 hours)   Comprehensive metabolic panel   Result Value Ref Range    Glucose 115 (H) 74 - 99 mg/dL    Sodium 130 (L) 136 - 145 mmol/L    Potassium 5.4 (H) 3.5 - 5.3 mmol/L    Chloride 94 (L) 98 - 107 mmol/L    Bicarbonate 22 21 - 32 mmol/L    Anion Gap 19 10 - 20 mmol/L    Urea Nitrogen 93 (HH) 6 - 23 mg/dL    Creatinine 5.91 (H) 0.50 - 1.05 mg/dL    eGFR 7 (L) >60 mL/min/1.73m*2    Calcium 6.9 (L) 8.6 - 10.3 mg/dL    Albumin 1.9 (L) 3.4 - 5.0 g/dL    Alkaline Phosphatase 180 (H) 33 - 136 U/L    Total Protein 5.0 (L) 6.4 - 8.2 g/dL    AST 29 9 - 39 U/L    Bilirubin, Total 0.3 0.0 - 1.2 mg/dL    ALT 12 7 - 45 U/L   CBC and Auto Differential   Result Value Ref Range    WBC 28.6 (H) 4.4 - 11.3 x10*3/uL    nRBC 0.0 0.0 - 0.0 /100 WBCs    RBC 3.24 (L) 4.00 - 5.20 x10*6/uL    Hemoglobin 8.4 (L) 12.0 - 16.0 g/dL    Hematocrit 28.2 (L) 36.0 - 46.0 %    MCV 87 80 - 100 fL    MCH 25.9 (L) 26.0 - 34.0 pg    MCHC 29.8 (L) 32.0 - 36.0 g/dL    RDW 18.6 (H) 11.5 - 14.5 %    Platelets 341 150 - 450 x10*3/uL    Neutrophils % 87.3 40.0 - 80.0 %    Immature Granulocytes %, Automated 1.4 (H) 0.0 - 0.9 %    Lymphocytes % 4.5 13.0 - 44.0 %    Monocytes  % 4.0 2.0 - 10.0 %    Eosinophils % 2.3 0.0 - 6.0 %    Basophils % 0.5 0.0 - 2.0 %    Neutrophils Absolute 24.99 (H) 1.60 - 5.50 x10*3/uL    Immature Granulocytes Absolute, Automated 0.39 0.00 - 0.50 x10*3/uL    Lymphocytes Absolute 1.30 0.80 - 3.00 x10*3/uL    Monocytes Absolute 1.15 (H) 0.05 - 0.80 x10*3/uL    Eosinophils Absolute 0.66 (H) 0.00 - 0.40 x10*3/uL    Basophils Absolute 0.15 (H) 0.00 - 0.10 x10*3/uL      Patient recently received an antibiotic (last 12 hours)       None           No results found for the last 90 days.    Continue aggressive pulmonary hygiene and oral hygiene. Off loading as tolerated for skin integrity.  Plan discussed with interdisciplinary team, ok to discharge, will continue current and repeat labs in the AM if patient still hospitalized. Patient aware and agreeable to current plan, continue plan as above.     I spent 30 minutes on the date of the service which included preparing to see the patient, face-to-face patient care, completing clinical documentation, obtaining and/or reviewing separately obtained history, performing a medically appropriate examination, counseling and educating the patient/family/caregiver, ordering medications, tests, or procedures, communicating with other HCPs (not separately reported), independently interpreting results (not separately reported), communicating results to the patient/family/caregiver, and care coordination (not separately reported.)    Discharge Meds     Medication List      ASK your doctor about these medications     acetaminophen 325 mg tablet; Commonly known as: Tylenol; Take 2 tablets   (650 mg) by mouth every 6 hours if needed for mild pain (1 - 3).   bisacodyl 10 mg suppository; Commonly known as: Dulcolax; Insert 1   suppository (10 mg) into the rectum once daily as needed for constipation.   cetirizine 10 mg tablet; Commonly known as: ZyrTEC; Take 1 tablet (10   mg) by mouth once daily.   cyclobenzaprine 5 mg tablet; Commonly  known as: Flexeril; Take 1 tablet   (5 mg) by mouth 3 times a day.   docusate sodium 100 mg capsule; Commonly known as: Colace   ipratropium-albuteroL 0.5-2.5 mg/3 mL nebulizer solution; Commonly known   as: Duo-Neb; Take 3 mL by nebulization every 2 hours if needed for   wheezing or shortness of breath.   ondansetron 8 mg tablet; Commonly known as: Zofran   oxygen gas therapy; Commonly known as: O2; Inhale 2 L/min every 12   hours.   polyethylene glycol 17 gram packet; Commonly known as: Glycolax, Miralax   prednisoLONE acetate 1 % ophthalmic suspension; Commonly known as:   Pred-Forte   temazepam 7.5 mg capsule; Commonly known as: Restoril; Take 1 capsule   (7.5 mg) by mouth as needed at bedtime for sleep.   torsemide 10 mg tablet; Commonly known as: Demadex; Take 5 tablets (50   mg) by mouth once daily.       Test Results Pending At Discharge  Pending Labs       No current pending labs.            Hospital Course         Jd Rhodes MD   Physician  Internal Medicine     Progress Notes      Signed     Date of Service: 10/20/2024  1:20 PM     Signed       Expand All Collapse All    Rohini Beaver is a 79 y.o. female on day 3 of admission presenting with EUSEBIO (acute kidney injury) (CMS-HCC).           Subjective  Rohini Beaver is a 79 y.o. female presenting with recently diagnosed ovarian cancer with peritoneal carcinomatosis s/p paracentesis x 4 (last one done 9/19/24, EUSEBIO .                 Objective  Last Recorded Vitals  BP (!) 72/33   Pulse 104   Temp 36.6 °C (97.9 °F)   Resp 16   Wt 104 kg (229 lb 4.5 oz)   SpO2 95%   Intake/Output last 3 Shifts:     Intake/Output Summary (Last 24 hours) at 10/20/2024 1320  Last data filed at 10/20/2024 0420      Gross per 24 hour   Intake 650 ml   Output --   Net 650 ml         Admission Weight  Weight: 104 kg (229 lb 4.5 oz) (10/17/24 0842)     Daily Weight  10/17/24 : 104 kg (229 lb 4.5 oz)     Image Results  US guided abdominal paracentesis  Narrative:  "Interpreted By:  Parrish Vargas,   STUDY:  US GUIDED ABDOMINAL PARACENTESIS; 10/14/2024 2:20 pm      PROCEDURE: Ultrasound-guided paracentesis      INDICATION:  Signs/Symptoms:parascentesis.      COMPARISON:  None.      ACCESSION NUMBER(S):  [id number]      ORDERING CLINICIAN:  SHAKIRA PAEZ      (S): Parrish Vargas MD      The history and physical exam pertinent to the procedure were  reviewed and no updates were made.\"      MEDICATIONS: Local      COMPLICATIONS: None immediate      FINDINGS:  The risks (including the risk of bleeding that may require blood  products) and benefits of the procedure were explained to the patient  and informed consent was obtained. The patient was identified at the  initial medical timeout. Patient's vitals were monitored throughout  the procedure by a radiology nurse.      Patient was placed supine on the procedure table. Screening  ultrasound was performed which demonstrates ascites. After marking  the skin under ultrasound guidance, the skin was prepped and draped  in usual sterile fashion. Local anesthesia of the skin and  subcutaneous tissues was obtained with 1% lidocaine. A 19-gauge 5  [place] Certaineh catheter needle was passed into the peritoneal cavity in  the  right lower quadrant. Approximately  2650 cc was collected which  was  yellow. The catheter was then removed.      The patient did not experience any immediate post-procedure  complications.      Impression: Successful ultrasound guided paracentesis as detailed above.      Signed by: Parrish Vargas 10/19/2024 11:16 PM  Dictation workstation:   [id number]        Physical Exam     Relevant Results                       Assessment/Plan                       Assessment & Plan  EUSEBIO (acute kidney injury) (CMS-[facility])     Acute kidney injury (CMS-[facility])        Kim Angulo  Coordinator  Internal Medicine     H&P      Incomplete     Date of Service: 10/18/2024  3:53 PM  Incomplete  Expand All Collapse All    History Of Present " Illness  Rohini Beaver is a 79 y.o. female presenting with recently diagnosed ovarian cancer with peritoneal carcinomatosis s/p paracentesis x 4 (last one done 9/19/24, EUSEBIO .     Past Medical History  She has a past medical history of Bilateral primary osteoarthritis of knee, HLD (hyperlipidemia), Lumbosacral radiculopathy, Meralgia paraesthetica, Obesity, and Physical deconditioning.     Surgical History  She has a past surgical history that includes Total knee arthroplasty (Left, 2019); Cholecystectomy; Corneal transplant; Lumbar fusion; Dilation and curettage of uterus; Shoulder arthroscopy (Left); Colonoscopy; Skin lesion excision; Trigger finger release (Right); Arthroplasty (Left); Breast biopsy (1999); and Total knee arthroplasty (Right, 2017).     Social History  She reports that she has never smoked. She has never used smokeless tobacco. She reports that she does not currently use alcohol. She reports that she does not use drugs.     Family History  Family History  Family History  Problem  Relation  Name  Age of Onset    Colon cancer  Mother          Lung cancer  Father          Other (Mesothelioma)  Brother          Brain cancer  Mother's Brother              Allergies  Patient has no known allergies.     Review of Systems     Physical Exam     Last Recorded Vitals  /51 (BP Location: Left arm, Patient Position: Lying)   Pulse 103   Temp 36.5 °C (97.7 °F) (Temporal)   Resp 16   Wt 104 kg (229 lb 4.5 oz)   SpO2 98%      Relevant Results                    Assessment/Plan     Assessment & Plan  EUSEBIO (acute kidney injury) (CMS-[facility])     Acute kidney injury (CMS-[facility])           Jd Rhodes MD    Patient fully evaluated 10/19  for Principal Problem:    EUSEBIO (acute kidney injury) (CMS-[facility])  Active Problems:    Acute kidney injury (CMS-[facility])  Patient seen resting in bed with head of bed elevated, no s/s or c/o acute difficulties at this time.  Vital signs for last 24 hours:  Temp:  [35.7 °C (96.3  °F)-36.7 °C (98.1 °F)] 36.6 °C (97.9 °F)  Heart Rate:  [] 104  Resp:  [14-16] 16  BP: (72-97)/(33-55) 72/33  FiO2 (%):  [28 %] 28 % No intake/output data recorded.  Patient still requiring frequent cardiac and SPO2 monitoring. Continue aggressive pulmonary hygiene and oral hygiene. Off loading as tolerated for skin integrity. Medications and labs reviewed-         Results for orders placed or performed during the hospital encounter of 10/17/24 (from the past 24 hours)CBC and Auto Differential   Result Value Ref Range     WBC 23.8 (H) 4.4 - 11.3 x10*3/uL     nRBC 0.0 0.0 - 0.0 /100 WBCs     RBC 3.18 (L) 4.00 - 5.20 x10*6/uL     Hemoglobin 8.4 (L) 12.0 - 16.0 g/dL     Hematocrit 27.4 (L) 36.0 - 46.0 %     MCV 86 80 - 100 fL     MCH 26.4 26.0 - 34.0 pg     MCHC 30.7 (L) 32.0 - 36.0 g/dL     RDW 18.5 (H) 11.5 - 14.5 %     Platelets 317 150 - 450 x10*3/uL     Neutrophils % 86.1 40.0 - 80.0 %     Immature Granulocytes %, Automated 1.1 (H) 0.0 - 0.9 %     Lymphocytes % 5.1 13.0 - 44.0 %     Monocytes % 3.5 2.0 - 10.0 %     Eosinophils % 3.9 0.0 - 6.0 %     Basophils % 0.3 0.0 - 2.0 %     Neutrophils Absolute 20.45 (H) 1.60 - 5.50 x10*3/uL     Immature Granulocytes Absolute, Automated 0.27 0.00 - 0.50 x10*3/uL     Lymphocytes Absolute 1.20 0.80 - 3.00 x10*3/uL     Monocytes Absolute 0.84 (H) 0.05 - 0.80 x10*3/uL     Eosinophils Absolute 0.92 (H) 0.00 - 0.40 x10*3/uL     Basophils Absolute 0.07 0.00 - 0.10 x10*3/uL   Comprehensive metabolic panel   Result Value Ref Range     Glucose 95 74 - 99 mg/dL     Sodium 128 (L) 136 - 145 mmol/L     Potassium 4.9 3.5 - 5.3 mmol/L     Chloride 92 (L) 98 - 107 mmol/L     Bicarbonate 22 21 - 32 mmol/L     Anion Gap 19 10 - 20 mmol/L     Urea Nitrogen 88 (H) 6 - 23 mg/dL     Creatinine 5.44 (H) 0.50 - 1.05 mg/dL     eGFR 8 (L) >60 mL/min/1.73m*2     Calcium 7.0 (L) 8.6 - 10.3 mg/dL     Albumin 1.9 (L) 3.4 - 5.0 g/dL     Alkaline Phosphatase 165 (H) 33 - 136 U/L     Total Protein 5.1  (L) 6.4 - 8.2 g/dL     AST 28 9 - 39 U/L     Bilirubin, Total 0.3 0.0 - 1.2 mg/dL     ALT 10 7 - 45 U/L      Patient recently received an antibiotic (last 12 hours)         Date/Time Action Medication Dose Rate     10/19/24 1428 New Bag    ampicillin-sulbactam (Unasyn) in sodium chloride 0.9 % 100 mL 3 g 3 g 200 mL/hr             Plan discussed with interdisciplinary team, continue current and repeat labs in the AM. Increased methadone to 10 mg from 5 mg for further pain management, diligent wound care and repositioning, ointment as needed.      Discharge planning discussed with patient and care team. Therapy evaluations ordered. anticipate HHC/SNF at discharge. Patient aware and agreeable to current plan, continue plan as above.      I spent a total of 50 minutes on the date of the service which included preparing to see the patient, face-to-face patient care, completing clinical documentation, obtaining and/or reviewing separately obtained history, performing a medically appropriate examination, counseling and educating the patient/family/caregiver, ordering medications, tests, or procedures, communicating with other HCPs (not separately reported), independently interpreting results (not separately reported), communicating results to the patient/family/caregiver, and care coordination (not separately reported).      Patient fully evaluated 10/20  for Principal Problem:    EUSEBIO (acute kidney injury) (CMS-[facility])  Active Problems:    Acute kidney injury (CMS-[facility])  Patient seen resting in bed with head of bed elevated, no s/s or c/o acute difficulties at this time.  Vital signs for last 24 hours:  Temp:  [35.7 °C (96.3 °F)-36.7 °C (98.1 °F)] 36.6 °C (97.9 °F)  Heart Rate:  [] 104  Resp:  [14-16] 16  BP: (72-97)/(33-55) 72/33  FiO2 (%):  [28 %] 28 % No intake/output data recorded.  Patient still requiring frequent cardiac and SPO2 monitoring. Patient with significant urine burden noted on bladder scan, will place  morin for comfort. Continue aggressive pulmonary hygiene and oral hygiene. Off loading as tolerated for skin integrity. Medications and labs reviewed-         Results for orders placed or performed during the hospital encounter of 10/17/24 (from the past 24 hours)   CBC and Auto Differential   Result Value Ref Range     WBC 23.8 (H) 4.4 - 11.3 x10*3/uL     nRBC 0.0 0.0 - 0.0 /100 WBCs     RBC 3.18 (L) 4.00 - 5.20 x10*6/uL     Hemoglobin 8.4 (L) 12.0 - 16.0 g/dL     Hematocrit 27.4 (L) 36.0 - 46.0 %     MCV 86 80 - 100 fL     MCH 26.4 26.0 - 34.0 pg     MCHC 30.7 (L) 32.0 - 36.0 g/dL     RDW 18.5 (H) 11.5 - 14.5 %     Platelets 317 150 - 450 x10*3/uL     Neutrophils % 86.1 40.0 - 80.0 %     Immature Granulocytes %, Automated 1.1 (H) 0.0 - 0.9 %     Lymphocytes % 5.1 13.0 - 44.0 %     Monocytes % 3.5 2.0 - 10.0 %     Eosinophils % 3.9 0.0 - 6.0 %     Basophils % 0.3 0.0 - 2.0 %     Neutrophils Absolute 20.45 (H) 1.60 - 5.50 x10*3/uL     Immature Granulocytes Absolute, Automated 0.27 0.00 - 0.50 x10*3/uL     Lymphocytes Absolute 1.20 0.80 - 3.00 x10*3/uL     Monocytes Absolute 0.84 (H) 0.05 - 0.80 x10*3/uL     Eosinophils Absolute 0.92 (H) 0.00 - 0.40 x10*3/uL     Basophils Absolute 0.07 0.00 - 0.10 x10*3/uL   Comprehensive metabolic panel   Result Value Ref Range     Glucose 95 74 - 99 mg/dL     Sodium 128 (L) 136 - 145 mmol/L     Potassium 4.9 3.5 - 5.3 mmol/L     Chloride 92 (L) 98 - 107 mmol/L     Bicarbonate 22 21 - 32 mmol/L     Anion Gap 19 10 - 20 mmol/L     Urea Nitrogen 88 (H) 6 - 23 mg/dL     Creatinine 5.44 (H) 0.50 - 1.05 mg/dL     eGFR 8 (L) >60 mL/min/1.73m*2     Calcium 7.0 (L) 8.6 - 10.3 mg/dL     Albumin 1.9 (L) 3.4 - 5.0 g/dL     Alkaline Phosphatase 165 (H) 33 - 136 U/L     Total Protein 5.1 (L) 6.4 - 8.2 g/dL     AST 28 9 - 39 U/L     Bilirubin, Total 0.3 0.0 - 1.2 mg/dL     ALT 10 7 - 45 U/L      Patient recently received an antibiotic (last 12 hours)         None             Plan discussed with  interdisciplinary team, continue current and repeat labs in the AM.      Discharge planning discussed with patient and care team. Prognosis poor, long discussion with sister and patient at bedside, aware. Goals of care discussed and will continue current. SNF at discharge. Patient aware and agreeable to current plan, continue plan as above.      I spent a total of 50 minutes on the date of the service which included preparing to see the patient, face-to-face patient care, completing clinical documentation, obtaining and/or reviewing separately obtained history, performing a medically appropriate examination, counseling and educating the patient/family/caregiver, ordering medications, tests, or procedures, communicating with other HCPs (not separately reported), independently interpreting results (not separately reported), communicating results to the patient/family/caregiver, and care coordination (not separately reported).   Kim Angulo                          Revision History         Pertinent Physical Exam At Time of Discharge  Physical Exam  Patient fully evaluated  10/21  for Principal Problem:    EUSEBIO (acute kidney injury) (CMS-[facility])  Active Problems:    Acute kidney injury (CMS-[facility])  Patient with no significant clinical improvement noted, poor prognosis,  patient medically cleared for discharge today. Patient seen resting in bed with head of bed elevated, no s/s or c/o acute difficulties at this time.    Keep morin in place for comfort measures.     Vital signs for last 24 hours:  Temp:  [35.8 °C (96.4 °F)-36.6 °C (97.9 °F)] 36.3 °C (97.3 °F)  Heart Rate:  [] 101  Resp:  [18] 18  BP: ()/(37-55) 80/52 Medications and labs reviewed-   Results for orders placed or performed during the hospital encounter of 10/17/24 (from the past 24 hours)   Comprehensive metabolic panel   Result Value Ref Range    Glucose 115 (H) 74 - 99 mg/dL    Sodium 130 (L) 136 - 145 mmol/L    Potassium 5.4 (H) 3.5 - 5.3 mmol/L     Chloride 94 (L) 98 - 107 mmol/L    Bicarbonate 22 21 - 32 mmol/L    Anion Gap 19 10 - 20 mmol/L    Urea Nitrogen 93 (HH) 6 - 23 mg/dL    Creatinine 5.91 (H) 0.50 - 1.05 mg/dL    eGFR 7 (L) >60 mL/min/1.73m*2    Calcium 6.9 (L) 8.6 - 10.3 mg/dL    Albumin 1.9 (L) 3.4 - 5.0 g/dL    Alkaline Phosphatase 180 (H) 33 - 136 U/L    Total Protein 5.0 (L) 6.4 - 8.2 g/dL    AST 29 9 - 39 U/L    Bilirubin, Total 0.3 0.0 - 1.2 mg/dL    ALT 12 7 - 45 U/L   CBC and Auto Differential   Result Value Ref Range    WBC 28.6 (H) 4.4 - 11.3 x10*3/uL    nRBC 0.0 0.0 - 0.0 /100 WBCs    RBC 3.24 (L) 4.00 - 5.20 x10*6/uL    Hemoglobin 8.4 (L) 12.0 - 16.0 g/dL    Hematocrit 28.2 (L) 36.0 - 46.0 %    MCV 87 80 - 100 fL    MCH 25.9 (L) 26.0 - 34.0 pg    MCHC 29.8 (L) 32.0 - 36.0 g/dL    RDW 18.6 (H) 11.5 - 14.5 %    Platelets 341 150 - 450 x10*3/uL    Neutrophils % 87.3 40.0 - 80.0 %    Immature Granulocytes %, Automated 1.4 (H) 0.0 - 0.9 %    Lymphocytes % 4.5 13.0 - 44.0 %    Monocytes % 4.0 2.0 - 10.0 %    Eosinophils % 2.3 0.0 - 6.0 %    Basophils % 0.5 0.0 - 2.0 %    Neutrophils Absolute 24.99 (H) 1.60 - 5.50 x10*3/uL    Immature Granulocytes Absolute, Automated 0.39 0.00 - 0.50 x10*3/uL    Lymphocytes Absolute 1.30 0.80 - 3.00 x10*3/uL    Monocytes Absolute 1.15 (H) 0.05 - 0.80 x10*3/uL    Eosinophils Absolute 0.66 (H) 0.00 - 0.40 x10*3/uL    Basophils Absolute 0.15 (H) 0.00 - 0.10 x10*3/uL      Patient recently received an antibiotic (last 12 hours)       None           No results found for the last 90 days.    Continue aggressive pulmonary hygiene and oral hygiene. Off loading as tolerated for skin integrity.  Plan discussed with interdisciplinary team, ok to discharge, will continue current and repeat labs in the AM if patient still hospitalized. Patient aware and agreeable to current plan, continue plan as above.     I spent 30 minutes on the date of the service which included preparing to see the patient, face-to-face patient  care, completing clinical documentation, obtaining and/or reviewing separately obtained history, performing a medically appropriate examination, counseling and educating the patient/family/caregiver, ordering medications, tests, or procedures, communicating with other HCPs (not separately reported), independently interpreting results (not separately reported), communicating results to the patient/family/caregiver, and care coordination (not separately reported  Outpatient Follow-Up  No future appointments.      Kim Angulo

## 2024-10-22 LAB
ALBUMIN SERPL BCP-MCNC: 2 G/DL (ref 3.4–5)
ALP SERPL-CCNC: 182 U/L (ref 33–136)
ALT SERPL W P-5'-P-CCNC: 11 U/L (ref 7–45)
ANION GAP SERPL CALC-SCNC: 19 MMOL/L (ref 10–20)
AST SERPL W P-5'-P-CCNC: 30 U/L (ref 9–39)
BASOPHILS # BLD AUTO: 0.15 X10*3/UL (ref 0–0.1)
BASOPHILS NFR BLD AUTO: 0.5 %
BILIRUB SERPL-MCNC: 0.3 MG/DL (ref 0–1.2)
BUN SERPL-MCNC: 100 MG/DL (ref 6–23)
CALCIUM SERPL-MCNC: 7 MG/DL (ref 8.6–10.3)
CHLORIDE SERPL-SCNC: 95 MMOL/L (ref 98–107)
CO2 SERPL-SCNC: 20 MMOL/L (ref 21–32)
CREAT SERPL-MCNC: 6.31 MG/DL (ref 0.5–1.05)
EGFRCR SERPLBLD CKD-EPI 2021: 6 ML/MIN/1.73M*2
EOSINOPHIL # BLD AUTO: 0.49 X10*3/UL (ref 0–0.4)
EOSINOPHIL NFR BLD AUTO: 1.7 %
ERYTHROCYTE [DISTWIDTH] IN BLOOD BY AUTOMATED COUNT: 18.6 % (ref 11.5–14.5)
GLUCOSE SERPL-MCNC: 97 MG/DL (ref 74–99)
HCT VFR BLD AUTO: 30.2 % (ref 36–46)
HGB BLD-MCNC: 9 G/DL (ref 12–16)
IMM GRANULOCYTES # BLD AUTO: 0.48 X10*3/UL (ref 0–0.5)
IMM GRANULOCYTES NFR BLD AUTO: 1.7 % (ref 0–0.9)
LYMPHOCYTES # BLD AUTO: 1.22 X10*3/UL (ref 0.8–3)
LYMPHOCYTES NFR BLD AUTO: 4.3 %
MCH RBC QN AUTO: 26 PG (ref 26–34)
MCHC RBC AUTO-ENTMCNC: 29.8 G/DL (ref 32–36)
MCV RBC AUTO: 87 FL (ref 80–100)
MONOCYTES # BLD AUTO: 1.19 X10*3/UL (ref 0.05–0.8)
MONOCYTES NFR BLD AUTO: 4.2 %
NEUTROPHILS # BLD AUTO: 24.67 X10*3/UL (ref 1.6–5.5)
NEUTROPHILS NFR BLD AUTO: 87.6 %
NRBC BLD-RTO: 0 /100 WBCS (ref 0–0)
PLATELET # BLD AUTO: 365 X10*3/UL (ref 150–450)
POTASSIUM SERPL-SCNC: 5.2 MMOL/L (ref 3.5–5.3)
PROT SERPL-MCNC: 5.5 G/DL (ref 6.4–8.2)
RBC # BLD AUTO: 3.46 X10*6/UL (ref 4–5.2)
SODIUM SERPL-SCNC: 129 MMOL/L (ref 136–145)
WBC # BLD AUTO: 28.2 X10*3/UL (ref 4.4–11.3)

## 2024-10-22 PROCEDURE — 1100000001 HC PRIVATE ROOM DAILY

## 2024-10-22 PROCEDURE — 2500000004 HC RX 250 GENERAL PHARMACY W/ HCPCS (ALT 636 FOR OP/ED): Performed by: PHYSICIAN ASSISTANT

## 2024-10-22 PROCEDURE — 80053 COMPREHEN METABOLIC PANEL: CPT | Performed by: INTERNAL MEDICINE

## 2024-10-22 PROCEDURE — 85025 COMPLETE CBC W/AUTO DIFF WBC: CPT | Performed by: INTERNAL MEDICINE

## 2024-10-22 PROCEDURE — 36415 COLL VENOUS BLD VENIPUNCTURE: CPT | Performed by: INTERNAL MEDICINE

## 2024-10-22 PROCEDURE — 2500000004 HC RX 250 GENERAL PHARMACY W/ HCPCS (ALT 636 FOR OP/ED): Performed by: INTERNAL MEDICINE

## 2024-10-22 PROCEDURE — 2500000001 HC RX 250 WO HCPCS SELF ADMINISTERED DRUGS (ALT 637 FOR MEDICARE OP): Performed by: INTERNAL MEDICINE

## 2024-10-22 PROCEDURE — 2500000005 HC RX 250 GENERAL PHARMACY W/O HCPCS: Performed by: PHYSICIAN ASSISTANT

## 2024-10-22 RX ORDER — MORPHINE SULFATE 10 MG/.5ML
5 SOLUTION ORAL EVERY 4 HOURS PRN
Status: DISCONTINUED | OUTPATIENT
Start: 2024-10-22 | End: 2024-10-22

## 2024-10-22 RX ORDER — MORPHINE SULFATE 10 MG/.5ML
5 SOLUTION ORAL EVERY 2 HOUR PRN
Start: 2024-10-22

## 2024-10-22 RX ORDER — MORPHINE SULFATE 10 MG/.5ML
5 SOLUTION ORAL EVERY 2 HOUR PRN
Status: DISCONTINUED | OUTPATIENT
Start: 2024-10-22 | End: 2024-10-23

## 2024-10-22 ASSESSMENT — COGNITIVE AND FUNCTIONAL STATUS - GENERAL
TOILETING: TOTAL
HELP NEEDED FOR BATHING: TOTAL
DAILY ACTIVITIY SCORE: 6
DRESSING REGULAR UPPER BODY CLOTHING: TOTAL
DRESSING REGULAR LOWER BODY CLOTHING: TOTAL
STANDING UP FROM CHAIR USING ARMS: A LOT
TURNING FROM BACK TO SIDE WHILE IN FLAT BAD: A LOT
MOVING TO AND FROM BED TO CHAIR: A LOT
MOBILITY SCORE: 10
EATING MEALS: TOTAL
PERSONAL GROOMING: TOTAL
WALKING IN HOSPITAL ROOM: TOTAL
MOVING FROM LYING ON BACK TO SITTING ON SIDE OF FLAT BED WITH BEDRAILS: A LOT
CLIMB 3 TO 5 STEPS WITH RAILING: TOTAL

## 2024-10-22 ASSESSMENT — PAIN SCALES - PAIN ASSESSMENT IN ADVANCED DEMENTIA (PAINAD)
TOTALSCORE: 7
CONSOLABILITY: NO NEED TO CONSOLE
BODYLANGUAGE: RELAXED
BODYLANGUAGE: RELAXED
NEGVOCALIZATION: OCCASIONAL MOAN/GROAN, LOW SPEECH, NEGATIVE/DISAPPROVING QUALITY
FACIALEXPRESSION: FACIAL GRIMACING
NEGVOCALIZATION: OCCASIONAL MOAN/GROAN, LOW SPEECH, NEGATIVE/DISAPPROVING QUALITY
TOTALSCORE: MEDICATION (SEE MAR)
BREATHING: NORMAL
NEGVOCALIZATION: REPEATED TROUBLED CALLING OUT, LOUD MOANING/GROANING, CRYING
BREATHING: NORMAL
CONSOLABILITY: NO NEED TO CONSOLE
TOTALSCORE: 0
BODYLANGUAGE: TENSE, DISTRESSED PACING, FIDGETING
FACIALEXPRESSION: FACIAL GRIMACING
BODYLANGUAGE: TENSE, DISTRESSED PACING, FIDGETING
TOTALSCORE: 0
FACIALEXPRESSION: SMILING OR INEXPRESSIVE
FACIALEXPRESSION: SMILING OR INEXPRESSIVE
TOTALSCORE: 1
TOTALSCORE: 6
BREATHING: NORMAL
FACIALEXPRESSION: SMILING OR INEXPRESSIVE
BREATHING: OCCASIONAL LABORED BREATHING, SHORT PERIOD OF HYPERVENTILATION
BREATHING: NORMAL
TOTALSCORE: MEDICATION (SEE MAR)
CONSOLABILITY: NO NEED TO CONSOLE
CONSOLABILITY: UNABLE TO CONSOLE, DISTRACT OR REASSURE
BODYLANGUAGE: RELAXED
CONSOLABILITY: DISTRACTED OR REASSURED BY VOICE/TOUCH

## 2024-10-22 NOTE — PROGRESS NOTES
Spoke with SW regarding patient. SW going to reach out to [facility] to potentially re eval patient for GIP.

## 2024-10-22 NOTE — CARE PLAN
The patient's goals for the shift include rest.    The clinical goals for the shift include comfort        Problem: Skin  Goal: Decreased wound size/increased tissue granulation at next dressing change  10/22/2024 1715 by Donna Colby RN  Flowsheets (Taken 10/22/2024 1715)  Decreased wound size/increased tissue granulation at next dressing change: Protective dressings over bony prominences  10/22/2024 1714 by Donna Colby RN  Outcome: Not Progressing  Goal: Participates in plan/prevention/treatment measures  10/22/2024 1715 by Donna Colby RN  Flowsheets (Taken 10/22/2024 1715)  Participates in plan/prevention/treatment measures: Elevate heels  10/22/2024 1714 by Donna Colby RN  Outcome: Not Progressing  Goal: Prevent/manage excess moisture  10/22/2024 1715 by Donna Colby RN  Flowsheets (Taken 10/22/2024 1715)  Prevent/manage excess moisture:   Cleanse incontinence/protect with barrier cream   Moisturize dry skin  10/22/2024 1714 by Donna Colby RN  Outcome: Not Progressing  Goal: Prevent/minimize sheer/friction injuries  10/22/2024 1715 by Donna Colby RN  Flowsheets (Taken 10/22/2024 1715)  Prevent/minimize sheer/friction injuries: Turn/reposition every 2 hours/use positioning/transfer devices  10/22/2024 1714 by Donna Colby RN  Outcome: Not Progressing  Goal: Promote/optimize nutrition  10/22/2024 1715 by Donna Colby RN  Flowsheets (Taken 10/22/2024 1715)  Promote/optimize nutrition:   Offer water/supplements/favorite foods   Assist with feeding  10/22/2024 1714 by Donna Colby RN  Outcome: Not Progressing  Goal: Promote skin healing  10/22/2024 1715 by Donna Colby RN  Flowsheets (Taken 10/22/2024 1715)  Promote skin healing: Turn/reposition every 2 hours/use positioning/transfer devices  10/22/2024 1714 by Donna Colby RN  Outcome: Not Progressing     Problem: Pain - Adult  Goal: Verbalizes/displays adequate comfort level or baseline comfort level  Outcome:  Not Progressing     Problem: Safety - Adult  Goal: Free from fall injury  Outcome: Not Progressing     Problem: Discharge Planning  Goal: Discharge to home or other facility with appropriate resources  Outcome: Not Progressing     Problem: Chronic Conditions and Co-morbidities  Goal: Patient's chronic conditions and co-morbidity symptoms are monitored and maintained or improved  Outcome: Not Progressing

## 2024-10-22 NOTE — CARE PLAN
The patient's goals for the shift include pt unable to answer will maintain safety and comfort    The clinical goals for the shift include comfort        Problem: Skin  Goal: Decreased wound size/increased tissue granulation at next dressing change  Outcome: Not Progressing  Goal: Participates in plan/prevention/treatment measures  Outcome: Not Progressing  Goal: Prevent/manage excess moisture  Outcome: Not Progressing  Goal: Prevent/minimize sheer/friction injuries  Outcome: Not Progressing  Goal: Promote/optimize nutrition  Outcome: Not Progressing  Goal: Promote skin healing  Outcome: Not Progressing     Problem: Pain - Adult  Goal: Verbalizes/displays adequate comfort level or baseline comfort level  Outcome: Not Progressing     Problem: Safety - Adult  Goal: Free from fall injury  Outcome: Not Progressing     Problem: Discharge Planning  Goal: Discharge to home or other facility with appropriate resources  Outcome: Not Progressing     Problem: Chronic Conditions and Co-morbidities  Goal: Patient's chronic conditions and co-morbidity symptoms are monitored and maintained or improved  Outcome: Not Progressing

## 2024-10-22 NOTE — DISCHARGE SUMMARY
Discharge Diagnosis  EUSEBIO (acute kidney injury) (CMS-HCC)    Issues Requiring Follow-Up  Patient fully evaluated  10/22  for Principal Problem:    EUSEBIO (acute kidney injury) (CMS-HCC)  Active Problems:    Acute kidney injury (CMS-HCC)  Patient with no significant clinical improvement noted, poor prognosis,  patient medically cleared for discharge today to hospice GIP with [facility] today. Patient seen resting in bed with head of bed elevated, no s/s or c/o acute difficulties at this time.    Keep morin in place for comfort measures.     Vital signs for last 24 hours:  Temp:  [35.8 °C (96.4 °F)-36.6 °C (97.9 °F)] 36.3 °C (97.3 °F)  Heart Rate:  [] 101  Resp:  [18] 18  BP: ()/(37-55) 80/52 Medications and labs reviewed-   Results for orders placed or performed during the hospital encounter of 10/17/24 (from the past 24 hours)   Comprehensive metabolic panel   Result Value Ref Range    Glucose 115 (H) 74 - 99 mg/dL    Sodium 130 (L) 136 - 145 mmol/L    Potassium 5.4 (H) 3.5 - 5.3 mmol/L    Chloride 94 (L) 98 - 107 mmol/L    Bicarbonate 22 21 - 32 mmol/L    Anion Gap 19 10 - 20 mmol/L    Urea Nitrogen 93 (HH) 6 - 23 mg/dL    Creatinine 5.91 (H) 0.50 - 1.05 mg/dL    eGFR 7 (L) >60 mL/min/1.73m*2    Calcium 6.9 (L) 8.6 - 10.3 mg/dL    Albumin 1.9 (L) 3.4 - 5.0 g/dL    Alkaline Phosphatase 180 (H) 33 - 136 U/L    Total Protein 5.0 (L) 6.4 - 8.2 g/dL    AST 29 9 - 39 U/L    Bilirubin, Total 0.3 0.0 - 1.2 mg/dL    ALT 12 7 - 45 U/L   CBC and Auto Differential   Result Value Ref Range    WBC 28.6 (H) 4.4 - 11.3 x10*3/uL    nRBC 0.0 0.0 - 0.0 /100 WBCs    RBC 3.24 (L) 4.00 - 5.20 x10*6/uL    Hemoglobin 8.4 (L) 12.0 - 16.0 g/dL    Hematocrit 28.2 (L) 36.0 - 46.0 %    MCV 87 80 - 100 fL    MCH 25.9 (L) 26.0 - 34.0 pg    MCHC 29.8 (L) 32.0 - 36.0 g/dL    RDW 18.6 (H) 11.5 - 14.5 %    Platelets 341 150 - 450 x10*3/uL    Neutrophils % 87.3 40.0 - 80.0 %    Immature Granulocytes %, Automated 1.4 (H) 0.0 - 0.9 %     Lymphocytes % 4.5 13.0 - 44.0 %    Monocytes % 4.0 2.0 - 10.0 %    Eosinophils % 2.3 0.0 - 6.0 %    Basophils % 0.5 0.0 - 2.0 %    Neutrophils Absolute 24.99 (H) 1.60 - 5.50 x10*3/uL    Immature Granulocytes Absolute, Automated 0.39 0.00 - 0.50 x10*3/uL    Lymphocytes Absolute 1.30 0.80 - 3.00 x10*3/uL    Monocytes Absolute 1.15 (H) 0.05 - 0.80 x10*3/uL    Eosinophils Absolute 0.66 (H) 0.00 - 0.40 x10*3/uL    Basophils Absolute 0.15 (H) 0.00 - 0.10 x10*3/uL      Patient recently received an antibiotic (last 12 hours)       None           No results found for the last 90 days.    Continue aggressive pulmonary hygiene and oral hygiene. Off loading as tolerated for skin integrity.  Plan discussed with interdisciplinary team, ok to discharge, will continue current and repeat labs in the AM if patient still hospitalized. Patient aware and agreeable to current plan, continue plan as above.     I spent 30 minutes on the date of the service which included preparing to see the patient, face-to-face patient care, completing clinical documentation, obtaining and/or reviewing separately obtained history, performing a medically appropriate examination, counseling and educating the patient/family/caregiver, ordering medications, tests, or procedures, communicating with other HCPs (not separately reported), independently interpreting results (not separately reported), communicating results to the patient/family/caregiver, and care coordination (not separately reported.)    Discharge Meds     Medication List      START taking these medications     ampicillin-sulbactam 3 gram/100 mL IV; Commonly known as: Unasyn; Infuse   100 mL (3 g) at 200 mL/hr over 30 minutes into a venous catheter once   every 24 hours.   balsam peru-castor oiL ointment; Commonly known as: Venelex; Apply 1   Application topically 2 times a day.   clonazePAM 0.5 mg tablet; Commonly known as: KlonoPIN; Take 1 tablet   (0.5 mg) by mouth once daily at bedtime.    heparin (porcine) 5,000 unit/mL injection; Inject 1 mL (5,000 Units)   under the skin every 8 hours.   methadone 10 mg tablet; Commonly known as: Dolophine; Take 1 tablet (10   mg) by mouth every 12 hours.   morphine 10 mg/0.5 mL concentrated oral solution; Take 0.3 mL (6 mg) by   mouth every 4 hours if needed for severe pain (7 - 10).     CHANGE how you take these medications     * acetaminophen 325 mg tablet; Commonly known as: Tylenol; Take 2   tablets (650 mg) by mouth every 6 hours if needed for mild pain (1 - 3).;   What changed: Another medication with the same name was added. Make sure   you understand how and when to take each.   * acetaminophen 325 mg tablet; Commonly known as: Tylenol; Take 2   tablets (650 mg) by mouth every 4 hours if needed for mild pain (1 - 3).;   What changed: You were already taking a medication with the same name, and   this prescription was added. Make sure you understand how and when to take   each.  * This list has 2 medication(s) that are the same as other medications   prescribed for you. Read the directions carefully, and ask your doctor or   other care provider to review them with you.     CONTINUE taking these medications     bisacodyl 10 mg suppository; Commonly known as: Dulcolax; Insert 1   suppository (10 mg) into the rectum once daily as needed for constipation.   cetirizine 10 mg tablet; Commonly known as: ZyrTEC; Take 1 tablet (10   mg) by mouth once daily.   cyclobenzaprine 5 mg tablet; Commonly known as: Flexeril; Take 1 tablet   (5 mg) by mouth 3 times a day.   docusate sodium 100 mg capsule; Commonly known as: Colace   ipratropium-albuteroL 0.5-2.5 mg/3 mL nebulizer solution; Commonly known   as: Duo-Neb; Take 3 mL by nebulization every 2 hours if needed for   wheezing or shortness of breath.   ondansetron 8 mg tablet; Commonly known as: Zofran   oxygen gas therapy; Commonly known as: O2; Inhale 2 L/min every 12   hours.   polyethylene glycol 17 gram packet;  "Commonly known as: Glycolax, Miralax   prednisoLONE acetate 1 % ophthalmic suspension; Commonly known as:   Pred-Forte   temazepam 7.5 mg capsule; Commonly known as: Restoril; Take 1 capsule   (7.5 mg) by mouth as needed at bedtime for sleep.   torsemide 10 mg tablet; Commonly known as: Demadex; Take 5 tablets (50   mg) by mouth once daily.       Test Results Pending At Discharge  Pending Labs       No current pending labs.            Hospital Course         Jd Paez MD   Physician  Internal Medicine     Progress Notes      Signed     Date of Service: 10/20/2024  1:20 PM     Signed       Expand All Collapse All    Rohini Beaver is a 79 y.o. female on day 3 of admission presenting with EUSEBIO (acute kidney injury) (CMS-HCC).           Subjective  Rohini Beaver is a 79 y.o. female presenting with recently diagnosed ovarian cancer with peritoneal carcinomatosis s/p paracentesis x 4 (last one done 9/19/24, EUSEBIO .                 Objective  Last Recorded Vitals  BP (!) 72/33   Pulse 104   Temp 36.6 °C (97.9 °F)   Resp 16   Wt 104 kg (229 lb 4.5 oz)   SpO2 95%   Intake/Output last 3 Shifts:     Intake/Output Summary (Last 24 hours) at 10/20/2024 1320  Last data filed at 10/20/2024 0420      Gross per 24 hour   Intake 650 ml   Output --   Net 650 ml         Admission Weight  Weight: 104 kg (229 lb 4.5 oz) (10/17/24 0842)     Daily Weight  10/17/24 : 104 kg (229 lb 4.5 oz)     Image Results  US guided abdominal paracentesis  Narrative: Interpreted By:  Parrish Vargas,   STUDY:  US GUIDED ABDOMINAL PARACENTESIS; 10/14/2024 2:20 pm      PROCEDURE: Ultrasound-guided paracentesis      INDICATION:  Signs/Symptoms:parascentesis.      COMPARISON:  None.      ACCESSION NUMBER(S):  [id number]      ORDERING CLINICIAN:  JD PAEZ      (S): Parrish Vargas MD      The history and physical exam pertinent to the procedure were  reviewed and no updates were made.\"      MEDICATIONS: Local      COMPLICATIONS: " None immediate      FINDINGS:  The risks (including the risk of bleeding that may require blood  products) and benefits of the procedure were explained to the patient  and informed consent was obtained. The patient was identified at the  initial medical timeout. Patient's vitals were monitored throughout  the procedure by a radiology nurse.      Patient was placed supine on the procedure table. Screening  ultrasound was performed which demonstrates ascites. After marking  the skin under ultrasound guidance, the skin was prepped and draped  in usual sterile fashion. Local anesthesia of the skin and  subcutaneous tissues was obtained with 1% lidocaine. A 19-gauge 5  [employer] catheter needle was passed into the peritoneal cavity in  the  right lower quadrant. Approximately  2650 cc was collected which  was  yellow. The catheter was then removed.      The patient did not experience any immediate post-procedure  complications.      Impression: Successful ultrasound guided paracentesis as detailed above.      Signed by: Parrish Vargas 10/19/2024 11:16 PM  Dictation workstation:   [id number]        Physical Exam     Relevant Results                       Assessment/Plan                       Assessment & Plan  EUSEBIO (acute kidney injury) (CMS-[facility])     Acute kidney injury (CMS-[facility])        Kim Angulo  Coordinator  Internal Medicine     H&P      Incomplete     Date of Service: 10/18/2024  3:53 PM  Incomplete  Expand All Collapse All    History Of Present Illness  Rohini Beaver is a 79 y.o. female presenting with recently diagnosed ovarian cancer with peritoneal carcinomatosis s/p paracentesis x 4 (last one done 9/19/24, EUSEBIO .     Past Medical History  She has a past medical history of Bilateral primary osteoarthritis of knee, HLD (hyperlipidemia), Lumbosacral radiculopathy, Meralgia paraesthetica, Obesity, and Physical deconditioning.     Surgical History  She has a past surgical history that includes Total knee  arthroplasty (Left, 2019); Cholecystectomy; Corneal transplant; Lumbar fusion; Dilation and curettage of uterus; Shoulder arthroscopy (Left); Colonoscopy; Skin lesion excision; Trigger finger release (Right); Arthroplasty (Left); Breast biopsy (1999); and Total knee arthroplasty (Right, 2017).     Social History  She reports that she has never smoked. She has never used smokeless tobacco. She reports that she does not currently use alcohol. She reports that she does not use drugs.     Family History  Family History  Family History  Problem  Relation  Name  Age of Onset    Colon cancer  Mother          Lung cancer  Father          Other (Mesothelioma)  Brother          Brain cancer  Mother's Brother              Allergies  Patient has no known allergies.     Review of Systems     Physical Exam     Last Recorded Vitals  /51 (BP Location: Left arm, Patient Position: Lying)   Pulse 103   Temp 36.5 °C (97.7 °F) (Temporal)   Resp 16   Wt 104 kg (229 lb 4.5 oz)   SpO2 98%      Relevant Results                    Assessment/Plan     Assessment & Plan  EUSEBIO (acute kidney injury) (CMS-[facility])     Acute kidney injury (CMS-[facility])           Jd Rhodes MD    Patient fully evaluated 10/19  for Principal Problem:    EUSEBIO (acute kidney injury) (CMS-[facility])  Active Problems:    Acute kidney injury (CMS-[facility])  Patient seen resting in bed with head of bed elevated, no s/s or c/o acute difficulties at this time.  Vital signs for last 24 hours:  Temp:  [35.7 °C (96.3 °F)-36.7 °C (98.1 °F)] 36.6 °C (97.9 °F)  Heart Rate:  [] 104  Resp:  [14-16] 16  BP: (72-97)/(33-55) 72/33  FiO2 (%):  [28 %] 28 % No intake/output data recorded.  Patient still requiring frequent cardiac and SPO2 monitoring. Continue aggressive pulmonary hygiene and oral hygiene. Off loading as tolerated for skin integrity. Medications and labs reviewed-         Results for orders placed or performed during the hospital encounter of 10/17/24 (from the past 24  hours)CBC and Auto Differential   Result Value Ref Range     WBC 23.8 (H) 4.4 - 11.3 x10*3/uL     nRBC 0.0 0.0 - 0.0 /100 WBCs     RBC 3.18 (L) 4.00 - 5.20 x10*6/uL     Hemoglobin 8.4 (L) 12.0 - 16.0 g/dL     Hematocrit 27.4 (L) 36.0 - 46.0 %     MCV 86 80 - 100 fL     MCH 26.4 26.0 - 34.0 pg     MCHC 30.7 (L) 32.0 - 36.0 g/dL     RDW 18.5 (H) 11.5 - 14.5 %     Platelets 317 150 - 450 x10*3/uL     Neutrophils % 86.1 40.0 - 80.0 %     Immature Granulocytes %, Automated 1.1 (H) 0.0 - 0.9 %     Lymphocytes % 5.1 13.0 - 44.0 %     Monocytes % 3.5 2.0 - 10.0 %     Eosinophils % 3.9 0.0 - 6.0 %     Basophils % 0.3 0.0 - 2.0 %     Neutrophils Absolute 20.45 (H) 1.60 - 5.50 x10*3/uL     Immature Granulocytes Absolute, Automated 0.27 0.00 - 0.50 x10*3/uL     Lymphocytes Absolute 1.20 0.80 - 3.00 x10*3/uL     Monocytes Absolute 0.84 (H) 0.05 - 0.80 x10*3/uL     Eosinophils Absolute 0.92 (H) 0.00 - 0.40 x10*3/uL     Basophils Absolute 0.07 0.00 - 0.10 x10*3/uL   Comprehensive metabolic panel   Result Value Ref Range     Glucose 95 74 - 99 mg/dL     Sodium 128 (L) 136 - 145 mmol/L     Potassium 4.9 3.5 - 5.3 mmol/L     Chloride 92 (L) 98 - 107 mmol/L     Bicarbonate 22 21 - 32 mmol/L     Anion Gap 19 10 - 20 mmol/L     Urea Nitrogen 88 (H) 6 - 23 mg/dL     Creatinine 5.44 (H) 0.50 - 1.05 mg/dL     eGFR 8 (L) >60 mL/min/1.73m*2     Calcium 7.0 (L) 8.6 - 10.3 mg/dL     Albumin 1.9 (L) 3.4 - 5.0 g/dL     Alkaline Phosphatase 165 (H) 33 - 136 U/L     Total Protein 5.1 (L) 6.4 - 8.2 g/dL     AST 28 9 - 39 U/L     Bilirubin, Total 0.3 0.0 - 1.2 mg/dL     ALT 10 7 - 45 U/L      Patient recently received an antibiotic (last 12 hours)         Date/Time Action Medication Dose Rate     10/19/24 1428 New Bag    ampicillin-sulbactam (Unasyn) in sodium chloride 0.9 % 100 mL 3 g 3 g 200 mL/hr             Plan discussed with interdisciplinary team, continue current and repeat labs in the AM. Increased methadone to 10 mg from 5 mg for further  pain management, diligent wound care and repositioning, ointment as needed.      Discharge planning discussed with patient and care team. Therapy evaluations ordered. anticipate HHC/SNF at discharge. Patient aware and agreeable to current plan, continue plan as above.      I spent a total of 50 minutes on the date of the service which included preparing to see the patient, face-to-face patient care, completing clinical documentation, obtaining and/or reviewing separately obtained history, performing a medically appropriate examination, counseling and educating the patient/family/caregiver, ordering medications, tests, or procedures, communicating with other HCPs (not separately reported), independently interpreting results (not separately reported), communicating results to the patient/family/caregiver, and care coordination (not separately reported).      Patient fully evaluated 10/20  for Principal Problem:    EUSEBIO (acute kidney injury) (CMS-[facility])  Active Problems:    Acute kidney injury (CMS-HCC)  Patient seen resting in bed with head of bed elevated, no s/s or c/o acute difficulties at this time.  Vital signs for last 24 hours:  Temp:  [35.7 °C (96.3 °F)-36.7 °C (98.1 °F)] 36.6 °C (97.9 °F)  Heart Rate:  [] 104  Resp:  [14-16] 16  BP: (72-97)/(33-55) 72/33  FiO2 (%):  [28 %] 28 % No intake/output data recorded.  Patient still requiring frequent cardiac and SPO2 monitoring. Patient with significant urine burden noted on bladder scan, will place morin for comfort. Continue aggressive pulmonary hygiene and oral hygiene. Off loading as tolerated for skin integrity. Medications and labs reviewed-         Results for orders placed or performed during the hospital encounter of 10/17/24 (from the past 24 hours)   CBC and Auto Differential   Result Value Ref Range     WBC 23.8 (H) 4.4 - 11.3 x10*3/uL     nRBC 0.0 0.0 - 0.0 /100 WBCs     RBC 3.18 (L) 4.00 - 5.20 x10*6/uL     Hemoglobin 8.4 (L) 12.0 - 16.0 g/dL      Hematocrit 27.4 (L) 36.0 - 46.0 %     MCV 86 80 - 100 fL     MCH 26.4 26.0 - 34.0 pg     MCHC 30.7 (L) 32.0 - 36.0 g/dL     RDW 18.5 (H) 11.5 - 14.5 %     Platelets 317 150 - 450 x10*3/uL     Neutrophils % 86.1 40.0 - 80.0 %     Immature Granulocytes %, Automated 1.1 (H) 0.0 - 0.9 %     Lymphocytes % 5.1 13.0 - 44.0 %     Monocytes % 3.5 2.0 - 10.0 %     Eosinophils % 3.9 0.0 - 6.0 %     Basophils % 0.3 0.0 - 2.0 %     Neutrophils Absolute 20.45 (H) 1.60 - 5.50 x10*3/uL     Immature Granulocytes Absolute, Automated 0.27 0.00 - 0.50 x10*3/uL     Lymphocytes Absolute 1.20 0.80 - 3.00 x10*3/uL     Monocytes Absolute 0.84 (H) 0.05 - 0.80 x10*3/uL     Eosinophils Absolute 0.92 (H) 0.00 - 0.40 x10*3/uL     Basophils Absolute 0.07 0.00 - 0.10 x10*3/uL   Comprehensive metabolic panel   Result Value Ref Range     Glucose 95 74 - 99 mg/dL     Sodium 128 (L) 136 - 145 mmol/L     Potassium 4.9 3.5 - 5.3 mmol/L     Chloride 92 (L) 98 - 107 mmol/L     Bicarbonate 22 21 - 32 mmol/L     Anion Gap 19 10 - 20 mmol/L     Urea Nitrogen 88 (H) 6 - 23 mg/dL     Creatinine 5.44 (H) 0.50 - 1.05 mg/dL     eGFR 8 (L) >60 mL/min/1.73m*2     Calcium 7.0 (L) 8.6 - 10.3 mg/dL     Albumin 1.9 (L) 3.4 - 5.0 g/dL     Alkaline Phosphatase 165 (H) 33 - 136 U/L     Total Protein 5.1 (L) 6.4 - 8.2 g/dL     AST 28 9 - 39 U/L     Bilirubin, Total 0.3 0.0 - 1.2 mg/dL     ALT 10 7 - 45 U/L      Patient recently received an antibiotic (last 12 hours)         None             Plan discussed with interdisciplinary team, continue current and repeat labs in the AM.      Discharge planning discussed with patient and care team. Prognosis poor, long discussion with sister and patient at bedside, aware. Goals of care discussed and will continue current. SNF at discharge. Patient aware and agreeable to current plan, continue plan as above.      I spent a total of 50 minutes on the date of the service which included preparing to see the patient, face-to-face patient  care, completing clinical documentation, obtaining and/or reviewing separately obtained history, performing a medically appropriate examination, counseling and educating the patient/family/caregiver, ordering medications, tests, or procedures, communicating with other HCPs (not separately reported), independently interpreting results (not separately reported), communicating results to the patient/family/caregiver, and care coordination (not separately reported).   Kim Angulo                          Revision History         Pertinent Physical Exam At Time of Discharge  Physical Exam  Patient fully evaluated  10/21  for Principal Problem:    EUSEBIO (acute kidney injury) (CMS-[facility])  Active Problems:    Acute kidney injury (CMS-[facility])  Patient with no significant clinical improvement noted, poor prognosis,  patient medically cleared for discharge today. Patient seen resting in bed with head of bed elevated, no s/s or c/o acute difficulties at this time.    Keep morin in place for comfort measures.     Vital signs for last 24 hours:  Temp:  [36.1 °C (97 °F)-36.4 °C (97.5 °F)] 36.2 °C (97.2 °F)  Heart Rate:  [101-105] 101  Resp:  [16-19] 19  BP: (65-94)/(33-58) 72/58 Medications and labs reviewed-   Results for orders placed or performed during the hospital encounter of 10/17/24 (from the past 24 hours)   CBC and Auto Differential   Result Value Ref Range    WBC 28.2 (H) 4.4 - 11.3 x10*3/uL    nRBC 0.0 0.0 - 0.0 /100 WBCs    RBC 3.46 (L) 4.00 - 5.20 x10*6/uL    Hemoglobin 9.0 (L) 12.0 - 16.0 g/dL    Hematocrit 30.2 (L) 36.0 - 46.0 %    MCV 87 80 - 100 fL    MCH 26.0 26.0 - 34.0 pg    MCHC 29.8 (L) 32.0 - 36.0 g/dL    RDW 18.6 (H) 11.5 - 14.5 %    Platelets 365 150 - 450 x10*3/uL    Neutrophils % 87.6 40.0 - 80.0 %    Immature Granulocytes %, Automated 1.7 (H) 0.0 - 0.9 %    Lymphocytes % 4.3 13.0 - 44.0 %    Monocytes % 4.2 2.0 - 10.0 %    Eosinophils % 1.7 0.0 - 6.0 %    Basophils % 0.5 0.0 - 2.0 %    Neutrophils Absolute  24.67 (H) 1.60 - 5.50 x10*3/uL    Immature Granulocytes Absolute, Automated 0.48 0.00 - 0.50 x10*3/uL    Lymphocytes Absolute 1.22 0.80 - 3.00 x10*3/uL    Monocytes Absolute 1.19 (H) 0.05 - 0.80 x10*3/uL    Eosinophils Absolute 0.49 (H) 0.00 - 0.40 x10*3/uL    Basophils Absolute 0.15 (H) 0.00 - 0.10 x10*3/uL   Comprehensive metabolic panel   Result Value Ref Range    Glucose 97 74 - 99 mg/dL    Sodium 129 (L) 136 - 145 mmol/L    Potassium 5.2 3.5 - 5.3 mmol/L    Chloride 95 (L) 98 - 107 mmol/L    Bicarbonate 20 (L) 21 - 32 mmol/L    Anion Gap 19 10 - 20 mmol/L    Urea Nitrogen 100 (HH) 6 - 23 mg/dL    Creatinine 6.31 (H) 0.50 - 1.05 mg/dL    eGFR 6 (L) >60 mL/min/1.73m*2    Calcium 7.0 (L) 8.6 - 10.3 mg/dL    Albumin 2.0 (L) 3.4 - 5.0 g/dL    Alkaline Phosphatase 182 (H) 33 - 136 U/L    Total Protein 5.5 (L) 6.4 - 8.2 g/dL    AST 30 9 - 39 U/L    Bilirubin, Total 0.3 0.0 - 1.2 mg/dL    ALT 11 7 - 45 U/L      Patient recently received an antibiotic (last 12 hours)       None           No results found for the last 90 days.    Continue aggressive pulmonary hygiene and oral hygiene. Off loading as tolerated for skin integrity.  Plan discussed with interdisciplinary team, ok to discharge, will continue current and repeat labs in the AM if patient still hospitalized. Patient aware and agreeable to current plan, continue plan as above.     I spent 30 minutes on the date of the service which included preparing to see the patient, face-to-face patient care, completing clinical documentation, obtaining and/or reviewing separately obtained history, performing a medically appropriate examination, counseling and educating the patient/family/caregiver, ordering medications, tests, or procedures, communicating with other HCPs (not separately reported), independently interpreting results (not separately reported), communicating results to the patient/family/caregiver, and care coordination (not separately reported)  Patient fully  evaluated  10/22  for Principal Problem:    EUSEBIO (acute kidney injury) (CMS-[facility])  Active Problems:    Acute kidney injury (CMS-HCC)  Patient with no significant clinical improvement noted, poor prognosis,  patient medically cleared for discharge today to hospice [facility]IP with [facility] today. Patient seen resting in bed with head of bed elevated, no s/s or c/o acute difficulties at this time.    Keep morin in place for comfort measures.     Vital signs for last 24 hours:  Temp:  [35.8 °C (96.4 °F)-36.6 °C (97.9 °F)] 36.3 °C (97.3 °F)  Heart Rate:  [] 101  Resp:  [18] 18  BP: ()/(37-55) 80/52 Medications and labs reviewed-   Results for orders placed or performed during the hospital encounter of 10/17/24 (from the past 24 hours)   Comprehensive metabolic panel   Result Value Ref Range    Glucose 115 (H) 74 - 99 mg/dL    Sodium 130 (L) 136 - 145 mmol/L    Potassium 5.4 (H) 3.5 - 5.3 mmol/L    Chloride 94 (L) 98 - 107 mmol/L    Bicarbonate 22 21 - 32 mmol/L    Anion Gap 19 10 - 20 mmol/L    Urea Nitrogen 93 (HH) 6 - 23 mg/dL    Creatinine 5.91 (H) 0.50 - 1.05 mg/dL    eGFR 7 (L) >60 mL/min/1.73m*2    Calcium 6.9 (L) 8.6 - 10.3 mg/dL    Albumin 1.9 (L) 3.4 - 5.0 g/dL    Alkaline Phosphatase 180 (H) 33 - 136 U/L    Total Protein 5.0 (L) 6.4 - 8.2 g/dL    AST 29 9 - 39 U/L    Bilirubin, Total 0.3 0.0 - 1.2 mg/dL    ALT 12 7 - 45 U/L   CBC and Auto Differential   Result Value Ref Range    WBC 28.6 (H) 4.4 - 11.3 x10*3/uL    nRBC 0.0 0.0 - 0.0 /100 WBCs    RBC 3.24 (L) 4.00 - 5.20 x10*6/uL    Hemoglobin 8.4 (L) 12.0 - 16.0 g/dL    Hematocrit 28.2 (L) 36.0 - 46.0 %    MCV 87 80 - 100 fL    MCH 25.9 (L) 26.0 - 34.0 pg    MCHC 29.8 (L) 32.0 - 36.0 g/dL    RDW 18.6 (H) 11.5 - 14.5 %    Platelets 341 150 - 450 x10*3/uL    Neutrophils % 87.3 40.0 - 80.0 %    Immature Granulocytes %, Automated 1.4 (H) 0.0 - 0.9 %    Lymphocytes % 4.5 13.0 - 44.0 %    Monocytes % 4.0 2.0 - 10.0 %    Eosinophils % 2.3 0.0 - 6.0 %     Basophils % 0.5 0.0 - 2.0 %    Neutrophils Absolute 24.99 (H) 1.60 - 5.50 x10*3/uL    Immature Granulocytes Absolute, Automated 0.39 0.00 - 0.50 x10*3/uL    Lymphocytes Absolute 1.30 0.80 - 3.00 x10*3/uL    Monocytes Absolute 1.15 (H) 0.05 - 0.80 x10*3/uL    Eosinophils Absolute 0.66 (H) 0.00 - 0.40 x10*3/uL    Basophils Absolute 0.15 (H) 0.00 - 0.10 x10*3/uL      Patient recently received an antibiotic (last 12 hours)       None           No results found for the last 90 days.    Continue aggressive pulmonary hygiene and oral hygiene. Off loading as tolerated for skin integrity.  Plan discussed with interdisciplinary team, ok to discharge, will continue current and repeat labs in the AM if patient still hospitalized. Patient aware and agreeable to current plan, continue plan as above.     I spent 30 minutes on the date of the service which included preparing to see the patient, face-to-face patient care, completing clinical documentation, obtaining and/or reviewing separately obtained history, performing a medically appropriate examination, counseling and educating the patient/family/caregiver, ordering medications, tests, or procedures, communicating with other HCPs (not separately reported), independently interpreting results (not separately reported), communicating results to the patient/family/caregiver, and care coordination (not separately reported.)    Outpatient Follow-Up  No future appointments.      Kim Angulo

## 2024-10-22 NOTE — PROGRESS NOTES
10/22/24 1111   Discharge Planning   Assistance Needed Discussed case with [facility]. Pt was GIP here with [facility]. SW sent message to [facility] asking for update on dispo plan.     ADDED [id number]: Received message from [facility] that they are going to assess for GIP hospice.

## 2024-10-23 VITALS
BODY MASS INDEX: 39.14 KG/M2 | SYSTOLIC BLOOD PRESSURE: 50 MMHG | WEIGHT: 229.28 LBS | OXYGEN SATURATION: 96 % | RESPIRATION RATE: 12 BRPM | HEART RATE: 103 BPM | HEIGHT: 64 IN | TEMPERATURE: 96.8 F | DIASTOLIC BLOOD PRESSURE: 38 MMHG

## 2024-10-23 LAB
ALBUMIN SERPL BCP-MCNC: 1.8 G/DL (ref 3.4–5)
ALP SERPL-CCNC: 209 U/L (ref 33–136)
ALT SERPL W P-5'-P-CCNC: 12 U/L (ref 7–45)
ANION GAP SERPL CALC-SCNC: 21 MMOL/L (ref 10–20)
AST SERPL W P-5'-P-CCNC: 26 U/L (ref 9–39)
BASOPHILS # BLD AUTO: 0.12 X10*3/UL (ref 0–0.1)
BASOPHILS NFR BLD AUTO: 0.4 %
BILIRUB SERPL-MCNC: 0.3 MG/DL (ref 0–1.2)
BUN SERPL-MCNC: 105 MG/DL (ref 6–23)
CALCIUM SERPL-MCNC: 7.1 MG/DL (ref 8.6–10.3)
CHLORIDE SERPL-SCNC: 96 MMOL/L (ref 98–107)
CO2 SERPL-SCNC: 21 MMOL/L (ref 21–32)
CREAT SERPL-MCNC: 6.86 MG/DL (ref 0.5–1.05)
EGFRCR SERPLBLD CKD-EPI 2021: 6 ML/MIN/1.73M*2
EOSINOPHIL # BLD AUTO: 0.29 X10*3/UL (ref 0–0.4)
EOSINOPHIL NFR BLD AUTO: 1.1 %
ERYTHROCYTE [DISTWIDTH] IN BLOOD BY AUTOMATED COUNT: 18.9 % (ref 11.5–14.5)
GLUCOSE SERPL-MCNC: 85 MG/DL (ref 74–99)
HCT VFR BLD AUTO: 31.4 % (ref 36–46)
HGB BLD-MCNC: 9.1 G/DL (ref 12–16)
IMM GRANULOCYTES # BLD AUTO: 0.47 X10*3/UL (ref 0–0.5)
IMM GRANULOCYTES NFR BLD AUTO: 1.7 % (ref 0–0.9)
LYMPHOCYTES # BLD AUTO: 1.23 X10*3/UL (ref 0.8–3)
LYMPHOCYTES NFR BLD AUTO: 4.5 %
MCH RBC QN AUTO: 25.2 PG (ref 26–34)
MCHC RBC AUTO-ENTMCNC: 29 G/DL (ref 32–36)
MCV RBC AUTO: 87 FL (ref 80–100)
MONOCYTES # BLD AUTO: 1.06 X10*3/UL (ref 0.05–0.8)
MONOCYTES NFR BLD AUTO: 3.9 %
NEUTROPHILS # BLD AUTO: 23.94 X10*3/UL (ref 1.6–5.5)
NEUTROPHILS NFR BLD AUTO: 88.4 %
NRBC BLD-RTO: 0 /100 WBCS (ref 0–0)
PLATELET # BLD AUTO: 366 X10*3/UL (ref 150–450)
POTASSIUM SERPL-SCNC: 6 MMOL/L (ref 3.5–5.3)
PROT SERPL-MCNC: 5 G/DL (ref 6.4–8.2)
RBC # BLD AUTO: 3.61 X10*6/UL (ref 4–5.2)
SODIUM SERPL-SCNC: 132 MMOL/L (ref 136–145)
WBC # BLD AUTO: 27.1 X10*3/UL (ref 4.4–11.3)

## 2024-10-23 PROCEDURE — 2500000005 HC RX 250 GENERAL PHARMACY W/O HCPCS: Performed by: PHYSICIAN ASSISTANT

## 2024-10-23 PROCEDURE — 36415 COLL VENOUS BLD VENIPUNCTURE: CPT | Performed by: INTERNAL MEDICINE

## 2024-10-23 PROCEDURE — 80053 COMPREHEN METABOLIC PANEL: CPT | Performed by: INTERNAL MEDICINE

## 2024-10-23 PROCEDURE — 85025 COMPLETE CBC W/AUTO DIFF WBC: CPT | Performed by: INTERNAL MEDICINE

## 2024-10-23 PROCEDURE — 2500000004 HC RX 250 GENERAL PHARMACY W/ HCPCS (ALT 636 FOR OP/ED): Performed by: PHYSICIAN ASSISTANT

## 2024-10-23 PROCEDURE — 2500000001 HC RX 250 WO HCPCS SELF ADMINISTERED DRUGS (ALT 637 FOR MEDICARE OP): Performed by: INTERNAL MEDICINE

## 2024-10-23 RX ORDER — MORPHINE SULFATE 10 MG/.5ML
5 SOLUTION ORAL
Status: DISCONTINUED | OUTPATIENT
Start: 2024-10-23 | End: 2024-10-23 | Stop reason: HOSPADM

## 2024-10-23 RX ORDER — MORPHINE SULFATE 10 MG/.5ML
5 SOLUTION ORAL
Start: 2024-10-23

## 2024-10-23 ASSESSMENT — PAIN SCALES - PAIN ASSESSMENT IN ADVANCED DEMENTIA (PAINAD)
BREATHING: NORMAL
TOTALSCORE: 6
BODYLANGUAGE: RELAXED
BODYLANGUAGE: TENSE, DISTRESSED PACING, FIDGETING
BREATHING: OCCASIONAL LABORED BREATHING, SHORT PERIOD OF HYPERVENTILATION
FACIALEXPRESSION: FACIAL GRIMACING
FACIALEXPRESSION: SAD, FRIGHTENED, FROWN
NEGVOCALIZATION: OCCASIONAL MOAN/GROAN, LOW SPEECH, NEGATIVE/DISAPPROVING QUALITY
TOTALSCORE: 0
FACIALEXPRESSION: SMILING OR INEXPRESSIVE
FACIALEXPRESSION: SAD, FRIGHTENED, FROWN
BODYLANGUAGE: TENSE, DISTRESSED PACING, FIDGETING
BREATHING: OCCASIONAL LABORED BREATHING, SHORT PERIOD OF HYPERVENTILATION
TOTALSCORE: 6
CONSOLABILITY: NO NEED TO CONSOLE
TOTALSCORE: 8
BREATHING: OCCASIONAL LABORED BREATHING, SHORT PERIOD OF HYPERVENTILATION
TOTALSCORE: 2
NEGVOCALIZATION: REPEATED TROUBLED CALLING OUT, LOUD MOANING/GROANING, CRYING
NEGVOCALIZATION: OCCASIONAL MOAN/GROAN, LOW SPEECH, NEGATIVE/DISAPPROVING QUALITY
NEGVOCALIZATION: OCCASIONAL MOAN/GROAN, LOW SPEECH, NEGATIVE/DISAPPROVING QUALITY
BREATHING: OCCASIONAL LABORED BREATHING, SHORT PERIOD OF HYPERVENTILATION
CONSOLABILITY: UNABLE TO CONSOLE, DISTRACT OR REASSURE
BODYLANGUAGE: RELAXED
BODYLANGUAGE: TENSE, DISTRESSED PACING, FIDGETING
TOTALSCORE: 4
BODYLANGUAGE: TENSE, DISTRESSED PACING, FIDGETING
FACIALEXPRESSION: SAD, FRIGHTENED, FROWN
FACIALEXPRESSION: SMILING OR INEXPRESSIVE
CONSOLABILITY: UNABLE TO CONSOLE, DISTRACT OR REASSURE
CONSOLABILITY: UNABLE TO CONSOLE, DISTRACT OR REASSURE
CONSOLABILITY: DISTRACTED OR REASSURED BY VOICE/TOUCH
CONSOLABILITY: UNABLE TO CONSOLE, DISTRACT OR REASSURE
BREATHING: NORMAL
BODYLANGUAGE: TENSE, DISTRESSED PACING, FIDGETING
NEGVOCALIZATION: OCCASIONAL MOAN/GROAN, LOW SPEECH, NEGATIVE/DISAPPROVING QUALITY
CONSOLABILITY: DISTRACTED OR REASSURED BY VOICE/TOUCH
BREATHING: NORMAL
TOTALSCORE: 6
FACIALEXPRESSION: SAD, FRIGHTENED, FROWN
NEGVOCALIZATION: OCCASIONAL MOAN/GROAN, LOW SPEECH, NEGATIVE/DISAPPROVING QUALITY

## 2024-10-23 NOTE — DISCHARGE SUMMARY
Discharge Diagnosis  EUSEBIO (acute kidney injury) (CMS-HCC)    Issues Requiring Follow-Up  Patient fully evaluated  10/23  for Principal Problem:    EUSEBIO (acute kidney injury) (CMS-HCC)  Active Problems:    Acute kidney injury (CMS-HCC)  Patient with no significant clinical improvement noted, poor prognosis,  patient medically cleared for discharge today to hospice GIP with [facility] today. Patient seen resting in bed with head of bed elevated, no s/s or c/o acute difficulties at this time.    Keep morin in place for comfort measures.     Vital signs for last 24 hours:  Temp:  [35.8 °C (96.4 °F)-36.6 °C (97.9 °F)] 36.3 °C (97.3 °F)  Heart Rate:  [] 101  Resp:  [18] 18  BP: ()/(37-55) 80/52 Medications and labs reviewed-   Results for orders placed or performed during the hospital encounter of 10/17/24 (from the past 24 hours)  Comprehensive metabolic panel  Result  Value  Ref Range  Glucose  115 (H)  74 - 99 mg/dL  Sodium  130 (L)  136 - 145 mmol/L  Potassium  5.4 (H)  3.5 - 5.3 mmol/L  Chloride  94 (L)  98 - 107 mmol/L  Bicarbonate  22  21 - 32 mmol/L  Anion Gap  19  10 - 20 mmol/L  Urea Nitrogen  93 (HH)  6 - 23 mg/dL  Creatinine  5.91 (H)  0.50 - 1.05 mg/dL  eGFR  7 (L)  >60 mL/min/1.73m*2  Calcium  6.9 (L)  8.6 - 10.3 mg/dL  Albumin  1.9 (L)  3.4 - 5.0 g/dL  Alkaline Phosphatase  180 (H)  33 - 136 U/L  Total Protein  5.0 (L)  6.4 - 8.2 g/dL  AST  29  9 - 39 U/L  Bilirubin, Total  0.3  0.0 - 1.2 mg/dL  ALT  12  7 - 45 U/L  CBC and Auto Differential  Result  Value  Ref Range  WBC  28.6 (H)  4.4 - 11.3 x10*3/uL  nRBC  0.0  0.0 - 0.0 /100 WBCs  RBC  3.24 (L)  4.00 - 5.20 x10*6/uL  Hemoglobin  8.4 (L)  12.0 - 16.0 g/dL  Hematocrit  28.2 (L)  36.0 - 46.0 %  MCV  87  80 - 100 fL  MCH  25.9 (L)  26.0 - 34.0 pg  MCHC  29.8 (L)  32.0 - 36.0 g/dL  RDW  18.6 (H)  11.5 - 14.5 %  Platelets  341  150 - 450 x10*3/uL  Neutrophils %  87.3  40.0 - 80.0 %  Immature Granulocytes %, Automated  1.4 (H)  0.0 - 0.9  %  Lymphocytes %  4.5  13.0 - 44.0 %  Monocytes %  4.0  2.0 - 10.0 %  Eosinophils %  2.3  0.0 - 6.0 %  Basophils %  0.5  0.0 - 2.0 %  Neutrophils Absolute  24.99 (H)  1.60 - 5.50 x10*3/uL  Immature Granulocytes Absolute, Automated  0.39  0.00 - 0.50 x10*3/uL  Lymphocytes Absolute  1.30  0.80 - 3.00 x10*3/uL  Monocytes Absolute  1.15 (H)  0.05 - 0.80 x10*3/uL  Eosinophils Absolute  0.66 (H)  0.00 - 0.40 x10*3/uL  Basophils Absolute  0.15 (H)  0.00 - 0.10 x10*3/uL     Patient recently received an antibiotic (last 12 hours)     None    No results found for the last 90 days.    Continue aggressive pulmonary hygiene and oral hygiene. Off loading as tolerated for skin integrity.  Plan discussed with interdisciplinary team, ok to discharge to [facility] today, will continue current comfort care plan. Per TCC - PRACHI met with pt sister Erica at [facility] to discuss discharge plan.  agrees that pt is not appropriate for SNF and would like pt to go to [facility] under respite stay private pay. PRACHI and sister Erica called [facility] on cell speakerphone who stated that they can accept pt today and tomorrow will refer pt to [facility] who they are contracted with.  states that she does want [facility] and no longer wants to work with [facility] because they didn't find her to be GIP appropriate yesterday. TCC and bedside RN updated. TCC to set up transport and send AVS.  Patient aware and agreeable to current plan, continue plan as above.   ADDED 1358: PRACHI called and made pt sister Erica aware of  time of 3:30 today.     I spent 30 minutes on the date of the service which included preparing to see the patient, face-to-face patient care, completing clinical documentation, obtaining and/or reviewing separately obtained history, performing a medically appropriate examination, counseling and educating the patient/family/caregiver, ordering medications, tests, or procedures, communicating  with other HCPs (not separately reported), independently interpreting results (not separately reported), communicating results to the patient/family/caregiver, and care coordination (not separately reported.)    Discharge Meds     Medication List      START taking these medications     ampicillin-sulbactam 3 gram/100 mL IV; Commonly known as: Unasyn; Infuse   100 mL (3 g) at 200 mL/hr over 30 minutes into a venous catheter once   every 24 hours.   balsam peru-castor oiL ointment; Commonly known as: Venelex; Apply 1   Application topically 2 times a day.   clonazePAM 0.5 mg tablet; Commonly known as: KlonoPIN; Take 1 tablet   (0.5 mg) by mouth once daily at bedtime.   heparin (porcine) 5,000 unit/mL injection; Inject 1 mL (5,000 Units)   under the skin every 8 hours.   methadone 10 mg tablet; Commonly known as: Dolophine; Take 1 tablet (10   mg) by mouth every 12 hours.   * morphine 10 mg/0.5 mL concentrated oral solution; Take 0.3 mL (6 mg)   by mouth every 4 hours if needed for severe pain (7 - 10).   * morphine 10 mg/0.5 mL concentrated oral solution; Take 0.3 mL (6 mg)   by mouth every 2 hours if needed for discomfort.   * morphine 10 mg/0.5 mL concentrated oral solution; Take 0.3 mL (6 mg)   by mouth every 1 hour if needed for discomfort.  * This list has 3 medication(s) that are the same as other medications   prescribed for you. Read the directions carefully, and ask your doctor or   other care provider to review them with you.     CHANGE how you take these medications     * acetaminophen 325 mg tablet; Commonly known as: Tylenol; Take 2   tablets (650 mg) by mouth every 6 hours if needed for mild pain (1 - 3).;   What changed: Another medication with the same name was added. Make sure   you understand how and when to take each.   * acetaminophen 325 mg tablet; Commonly known as: Tylenol; Take 2   tablets (650 mg) by mouth every 4 hours if needed for mild pain (1 - 3).;   What changed: You were already taking  a medication with the same name, and   this prescription was added. Make sure you understand how and when to take   each.  * This list has 2 medication(s) that are the same as other medications   prescribed for you. Read the directions carefully, and ask your doctor or   other care provider to review them with you.     CONTINUE taking these medications     bisacodyl 10 mg suppository; Commonly known as: Dulcolax; Insert 1   suppository (10 mg) into the rectum once daily as needed for constipation.   cetirizine 10 mg tablet; Commonly known as: ZyrTEC; Take 1 tablet (10   mg) by mouth once daily.   cyclobenzaprine 5 mg tablet; Commonly known as: Flexeril; Take 1 tablet   (5 mg) by mouth 3 times a day.   docusate sodium 100 mg capsule; Commonly known as: Colace   ipratropium-albuteroL 0.5-2.5 mg/3 mL nebulizer solution; Commonly known   as: Duo-Neb; Take 3 mL by nebulization every 2 hours if needed for   wheezing or shortness of breath.   ondansetron 8 mg tablet; Commonly known as: Zofran   oxygen gas therapy; Commonly known as: O2; Inhale 2 L/min every 12   hours.   polyethylene glycol 17 gram packet; Commonly known as: Glycolax, Miralax   prednisoLONE acetate 1 % ophthalmic suspension; Commonly known as:   Pred-Forte   temazepam 7.5 mg capsule; Commonly known as: Restoril; Take 1 capsule   (7.5 mg) by mouth as needed at bedtime for sleep.   torsemide 10 mg tablet; Commonly known as: Demadex; Take 5 tablets (50   mg) by mouth once daily.       Test Results Pending At Discharge  Pending Labs       No current pending labs.            Hospital Course         Jd Rhodes MD   Physician  Internal Medicine     Progress Notes      Signed     Date of Service: 10/20/2024  1:20 PM  Signed  Expand All Collapse All    Rohini Beaver is a 79 y.o. female on day 3 of admission presenting with EUSEBIO (acute kidney injury) (CMS-[facility]).           Subjective  Rohini Beaver is a 79 y.o. female presenting with recently diagnosed  "ovarian cancer with peritoneal carcinomatosis s/p paracentesis x 4 (last one done 9/19/24, EUSEBIO .                 Objective  Last Recorded Vitals  BP (!) 72/33   Pulse 104   Temp 36.6 °C (97.9 °F)   Resp 16   Wt 104 kg (229 lb 4.5 oz)   SpO2 95%   Intake/Output last 3 Shifts:     Intake/Output Summary (Last 24 hours) at 10/20/2024 1320  Last data filed at 10/20/2024 0420  Gross per 24 hour  Intake  650 ml  Output  --  Net  650 ml        Admission Weight  Weight: 104 kg (229 lb 4.5 oz) (10/17/24 0842)     Daily Weight  10/17/24 : 104 kg (229 lb 4.5 oz)     Image Results  US guided abdominal paracentesis  Narrative: Interpreted By:  Parrish Vargas,   STUDY:  US GUIDED ABDOMINAL PARACENTESIS; 10/14/2024 2:20 pm      PROCEDURE: Ultrasound-guided paracentesis      INDICATION:  Signs/Symptoms:parascentesis.      COMPARISON:  None.      ACCESSION NUMBER(S):  [id number]      ORDERING CLINICIAN:  SHAKIRA PAEZ      (S): Parrish Vargas MD      The history and physical exam pertinent to the procedure were  reviewed and no updates were made.\"      MEDICATIONS: Local      COMPLICATIONS: None immediate      FINDINGS:  The risks (including the risk of bleeding that may require blood  products) and benefits of the procedure were explained to the patient  and informed consent was obtained. The patient was identified at the  initial medical timeout. Patient's vitals were monitored throughout  the procedure by a radiology nurse.      Patient was placed supine on the procedure table. Screening  ultrasound was performed which demonstrates ascites. After marking  the skin under ultrasound guidance, the skin was prepped and draped  in usual sterile fashion. Local anesthesia of the skin and  subcutaneous tissues was obtained with 1% lidocaine. A 19-gauge 5  [place] [employer] catheter needle was passed into the peritoneal cavity in  the  right lower quadrant. Approximately  2650 cc was collected which  was  yellow. The catheter was " then removed.      The patient did not experience any immediate post-procedure  complications.      Impression: Successful ultrasound guided paracentesis as detailed above.      Signed by: Parrish Vargas 10/19/2024 11:16 PM  Dictation workstation:   [id number]        Physical Exam     Relevant Results                       Assessment/Plan                       Assessment & Plan  EUSEBIO (acute kidney injury) (CMS-HCC)     Acute kidney injury (CMS-HCC)        History Of Present Illness  Rohini Beaver is a 79 y.o. female presenting with recently diagnosed ovarian cancer with peritoneal carcinomatosis s/p paracentesis x 4 (last one done 9/19/24, EUSEBIO .     Past Medical History  She has a past medical history of Bilateral primary osteoarthritis of knee, HLD (hyperlipidemia), Lumbosacral radiculopathy, Meralgia paraesthetica, Obesity, and Physical deconditioning.     Surgical History  She has a past surgical history that includes Total knee arthroplasty (Left, 2019); Cholecystectomy; Corneal transplant; Lumbar fusion; Dilation and curettage of uterus; Shoulder arthroscopy (Left); Colonoscopy; Skin lesion excision; Trigger finger release (Right); Arthroplasty (Left); Breast biopsy (1999); and Total knee arthroplasty (Right, 2017).     Social History  She reports that she has never smoked. She has never used smokeless tobacco. She reports that she does not currently use alcohol. She reports that she does not use drugs.     Family History  Family History  Family History  Problem  Relation  Name  Age of Onset    Colon cancer  Mother          Lung cancer  Father          Other (Mesothelioma)  Brother          Brain cancer  Mother's Brother              Allergies  Patient has no known allergies.     Review of Systems     Physical Exam     Last Recorded Vitals  /51 (BP Location: Left arm, Patient Position: Lying)   Pulse 103   Temp 36.5 °C (97.7 °F) (Temporal)   Resp 16   Wt 104 kg (229 lb 4.5 oz)   SpO2 98%       Relevant Results                    Assessment/Plan     Assessment & Plan  EUSEBIO (acute kidney injury) (CMS-HCC)     Acute kidney injury (CMS-HCC)           Jd Rhodes MD    Patient fully evaluated 10/19  for Principal Problem:    EUSEBIO (acute kidney injury) (CMS-HCC)  Active Problems:    Acute kidney injury (CMS-HCC)  Patient seen resting in bed with head of bed elevated, no s/s or c/o acute difficulties at this time.  Vital signs for last 24 hours:  Temp:  [35.7 °C (96.3 °F)-36.7 °C (98.1 °F)] 36.6 °C (97.9 °F)  Heart Rate:  [] 104  Resp:  [14-16] 16  BP: (72-97)/(33-55) 72/33  FiO2 (%):  [28 %] 28 % No intake/output data recorded.  Patient still requiring frequent cardiac and SPO2 monitoring. Continue aggressive pulmonary hygiene and oral hygiene. Off loading as tolerated for skin integrity. Medications and labs reviewed-   Results for orders placed or performed during the hospital encounter of 10/17/24 (from the past 24 hours)CBC and Auto DifferentialResult  Value  Ref Range     WBC  23.8 (H)  4.4 - 11.3 x10*3/uL     nRBC  0.0  0.0 - 0.0 /100 WBCs     RBC  3.18 (L)  4.00 - 5.20 x10*6/uL     Hemoglobin  8.4 (L)  12.0 - 16.0 g/dL     Hematocrit  27.4 (L)  36.0 - 46.0 %     MCV  86  80 - 100 fL     MCH  26.4  26.0 - 34.0 pg     MCHC  30.7 (L)  32.0 - 36.0 g/dL     RDW  18.5 (H)  11.5 - 14.5 %     Platelets  317  150 - 450 x10*3/uL     Neutrophils %  86.1  40.0 - 80.0 %     Immature Granulocytes %, Automated  1.1 (H)  0.0 - 0.9 %     Lymphocytes %  5.1  13.0 - 44.0 %     Monocytes %  3.5  2.0 - 10.0 %     Eosinophils %  3.9  0.0 - 6.0 %     Basophils %  0.3  0.0 - 2.0 %     Neutrophils Absolute  20.45 (H)  1.60 - 5.50 x10*3/uL     Immature Granulocytes Absolute, Automated  0.27  0.00 - 0.50 x10*3/uL     Lymphocytes Absolute  1.20  0.80 - 3.00 x10*3/uL     Monocytes Absolute  0.84 (H)  0.05 - 0.80 x10*3/uL     Eosinophils Absolute  0.92 (H)  0.00 - 0.40 x10*3/uL     Basophils Absolute  0.07  0.00 - 0.10  x10*3/uL  Comprehensive metabolic panel  Result  Value  Ref Range     Glucose  95  74 - 99 mg/dL     Sodium  128 (L)  136 - 145 mmol/L     Potassium  4.9  3.5 - 5.3 mmol/L     Chloride  92 (L)  98 - 107 mmol/L     Bicarbonate  22  21 - 32 mmol/L     Anion Gap  19  10 - 20 mmol/L     Urea Nitrogen  88 (H)  6 - 23 mg/dL     Creatinine  5.44 (H)  0.50 - 1.05 mg/dL     eGFR  8 (L)  >60 mL/min/1.73m*2     Calcium  7.0 (L)  8.6 - 10.3 mg/dL     Albumin  1.9 (L)  3.4 - 5.0 g/dL     Alkaline Phosphatase  165 (H)  33 - 136 U/L     Total Protein  5.1 (L)  6.4 - 8.2 g/dL     AST  28  9 - 39 U/L     Bilirubin, Total  0.3  0.0 - 1.2 mg/dL     ALT  10  7 - 45 U/L     Patient recently received an antibiotic (last 12 hours)         Date/Time  Action  Medication  Dose  Rate     10/19/24 1428  New Bag  ampicillin-sulbactam (Unasyn) in sodium chloride 0.9 % 100 mL 3 g  3 g  200 mL/hr           Plan discussed with interdisciplinary team, continue current and repeat labs in the AM. Increased methadone to 10 mg from 5 mg for further pain management, diligent wound care and repositioning, ointment as needed.      Discharge planning discussed with patient and care team. Therapy evaluations ordered. anticipate HHC/SNF at discharge. Patient aware and agreeable to current plan, continue plan as above.      I spent a total of 50 minutes on the date of the service which included preparing to see the patient, face-to-face patient care, completing clinical documentation, obtaining and/or reviewing separately obtained history, performing a medically appropriate examination, counseling and educating the patient/family/caregiver, ordering medications, tests, or procedures, communicating with other HCPs (not separately reported), independently interpreting results (not separately reported), communicating results to the patient/family/caregiver, and care coordination (not separately reported).      Patient fully evaluated 10/20  for Principal  Problem:    EUSEBIO (acute kidney injury) (CMS-HCC)  Active Problems:    Acute kidney injury (CMS-HCC)  Patient seen resting in bed with head of bed elevated, no s/s or c/o acute difficulties at this time.  Vital signs for last 24 hours:  Temp:  [35.7 °C (96.3 °F)-36.7 °C (98.1 °F)] 36.6 °C (97.9 °F)  Heart Rate:  [] 104  Resp:  [14-16] 16  BP: (72-97)/(33-55) 72/33  FiO2 (%):  [28 %] 28 % No intake/output data recorded.  Patient still requiring frequent cardiac and SPO2 monitoring. Patient with significant urine burden noted on bladder scan, will place morin for comfort. Continue aggressive pulmonary hygiene and oral hygiene. Off loading as tolerated for skin integrity. Medications and labs reviewed-   Results for orders placed or performed during the hospital encounter of 10/17/24 (from the past 24 hours)  CBC and Auto Differential  Result  Value  Ref Range     WBC  23.8 (H)  4.4 - 11.3 x10*3/uL     nRBC  0.0  0.0 - 0.0 /100 WBCs     RBC  3.18 (L)  4.00 - 5.20 x10*6/uL     Hemoglobin  8.4 (L)  12.0 - 16.0 g/dL     Hematocrit  27.4 (L)  36.0 - 46.0 %     MCV  86  80 - 100 fL     MCH  26.4  26.0 - 34.0 pg     MCHC  30.7 (L)  32.0 - 36.0 g/dL     RDW  18.5 (H)  11.5 - 14.5 %     Platelets  317  150 - 450 x10*3/uL     Neutrophils %  86.1  40.0 - 80.0 %     Immature Granulocytes %, Automated  1.1 (H)  0.0 - 0.9 %     Lymphocytes %  5.1  13.0 - 44.0 %     Monocytes %  3.5  2.0 - 10.0 %     Eosinophils %  3.9  0.0 - 6.0 %     Basophils %  0.3  0.0 - 2.0 %     Neutrophils Absolute  20.45 (H)  1.60 - 5.50 x10*3/uL     Immature Granulocytes Absolute, Automated  0.27  0.00 - 0.50 x10*3/uL     Lymphocytes Absolute  1.20  0.80 - 3.00 x10*3/uL     Monocytes Absolute  0.84 (H)  0.05 - 0.80 x10*3/uL     Eosinophils Absolute  0.92 (H)  0.00 - 0.40 x10*3/uL     Basophils Absolute  0.07  0.00 - 0.10 x10*3/uL  Comprehensive metabolic panel  Result  Value  Ref Range     Glucose  95  74 - 99 mg/dL     Sodium  128 (L)  136 - 145  mmol/L     Potassium  4.9  3.5 - 5.3 mmol/L     Chloride  92 (L)  98 - 107 mmol/L     Bicarbonate  22  21 - 32 mmol/L     Anion Gap  19  10 - 20 mmol/L     Urea Nitrogen  88 (H)  6 - 23 mg/dL     Creatinine  5.44 (H)  0.50 - 1.05 mg/dL     eGFR  8 (L)  >60 mL/min/1.73m*2     Calcium  7.0 (L)  8.6 - 10.3 mg/dL     Albumin  1.9 (L)  3.4 - 5.0 g/dL     Alkaline Phosphatase  165 (H)  33 - 136 U/L     Total Protein  5.1 (L)  6.4 - 8.2 g/dL     AST  28  9 - 39 U/L     Bilirubin, Total  0.3  0.0 - 1.2 mg/dL     ALT  10  7 - 45 U/L     Patient recently received an antibiotic (last 12 hours)         None           Plan discussed with interdisciplinary team, continue current and repeat labs in the AM.      Discharge planning discussed with patient and care team. Prognosis poor, long discussion with sister and patient at bedside, aware. Goals of care discussed and will continue current. SNF at discharge. Patient aware and agreeable to current plan, continue plan as above.      I spent a total of 50 minutes on the date of the service which included preparing to see the patient, face-to-face patient care, completing clinical documentation, obtaining and/or reviewing separately obtained history, performing a medically appropriate examination, counseling and educating the patient/family/caregiver, ordering medications, tests, or procedures, communicating with other HCPs (not separately reported), independently interpreting results (not separately reported), communicating results to the patient/family/caregiver, and care coordination (not separately reported).                   Revision History         Pertinent Physical Exam At Time of Discharge  Physical Exam  Patient fully evaluated  10/21  for Principal Problem:    EUSEBIO (acute kidney injury) (CMS-[facility])  Active Problems:    Acute kidney injury (CMS-[facility])  Patient with no significant clinical improvement noted, poor prognosis,  patient medically cleared for discharge today. Patient  seen resting in bed with head of bed elevated, no s/s or c/o acute difficulties at this time.    Keep morin in place for comfort measures.     Vital signs for last 24 hours:  Temp:  [35.6 °C (96.1 °F)-36.8 °C (98.2 °F)] 36 °C (96.8 °F)  Heart Rate:  [101-110] 103  Resp:  [12] 12  BP: (70-84)/(38-48) 83/39 Medications and labs reviewed-   Results for orders placed or performed during the hospital encounter of 10/17/24 (from the past 24 hours)   CBC and Auto Differential   Result Value Ref Range    WBC 27.1 (H) 4.4 - 11.3 x10*3/uL    nRBC 0.0 0.0 - 0.0 /100 WBCs    RBC 3.61 (L) 4.00 - 5.20 x10*6/uL    Hemoglobin 9.1 (L) 12.0 - 16.0 g/dL    Hematocrit 31.4 (L) 36.0 - 46.0 %    MCV 87 80 - 100 fL    MCH 25.2 (L) 26.0 - 34.0 pg    MCHC 29.0 (L) 32.0 - 36.0 g/dL    RDW 18.9 (H) 11.5 - 14.5 %    Platelets 366 150 - 450 x10*3/uL    Neutrophils % 88.4 40.0 - 80.0 %    Immature Granulocytes %, Automated 1.7 (H) 0.0 - 0.9 %    Lymphocytes % 4.5 13.0 - 44.0 %    Monocytes % 3.9 2.0 - 10.0 %    Eosinophils % 1.1 0.0 - 6.0 %    Basophils % 0.4 0.0 - 2.0 %    Neutrophils Absolute 23.94 (H) 1.60 - 5.50 x10*3/uL    Immature Granulocytes Absolute, Automated 0.47 0.00 - 0.50 x10*3/uL    Lymphocytes Absolute 1.23 0.80 - 3.00 x10*3/uL    Monocytes Absolute 1.06 (H) 0.05 - 0.80 x10*3/uL    Eosinophils Absolute 0.29 0.00 - 0.40 x10*3/uL    Basophils Absolute 0.12 (H) 0.00 - 0.10 x10*3/uL   Comprehensive metabolic panel   Result Value Ref Range    Glucose 85 74 - 99 mg/dL    Sodium 132 (L) 136 - 145 mmol/L    Potassium 6.0 (H) 3.5 - 5.3 mmol/L    Chloride 96 (L) 98 - 107 mmol/L    Bicarbonate 21 21 - 32 mmol/L    Anion Gap 21 (H) 10 - 20 mmol/L    Urea Nitrogen 105 (HH) 6 - 23 mg/dL    Creatinine 6.86 (H) 0.50 - 1.05 mg/dL    eGFR 6 (L) >60 mL/min/1.73m*2    Calcium 7.1 (L) 8.6 - 10.3 mg/dL    Albumin 1.8 (L) 3.4 - 5.0 g/dL    Alkaline Phosphatase 209 (H) 33 - 136 U/L    Total Protein 5.0 (L) 6.4 - 8.2 g/dL    AST 26 9 - 39 U/L     Bilirubin, Total 0.3 0.0 - 1.2 mg/dL    ALT 12 7 - 45 U/L      Patient recently received an antibiotic (last 12 hours)     None       No results found for the last 90 days.    Continue aggressive pulmonary hygiene and oral hygiene. Off loading as tolerated for skin integrity.  Plan discussed with interdisciplinary team, ok to discharge, will continue current and repeat labs in the AM if patient still hospitalized. Patient aware and agreeable to current plan, continue plan as above.     I spent 30 minutes on the date of the service which included preparing to see the patient, face-to-face patient care, completing clinical documentation, obtaining and/or reviewing separately obtained history, performing a medically appropriate examination, counseling and educating the patient/family/caregiver, ordering medications, tests, or procedures, communicating with other HCPs (not separately reported), independently interpreting results (not separately reported), communicating results to the patient/family/caregiver, and care coordination (not separately reported)    Patient fully evaluated  10/23  for Principal Problem:    EUSEBIO (acute kidney injury) (CMS-[facility])  Active Problems:    Acute kidney injury (CMS-[facility])  Patient with no significant clinical improvement noted, poor prognosis,  patient medically cleared for discharge today to hospice [facility] with [facility] today. Patient seen resting in bed with head of bed elevated, no s/s or c/o acute difficulties at this time.    Keep morin in place for comfort measures.     Vital signs for last 24 hours:  Temp:  [35.8 °C (96.4 °F)-36.6 °C (97.9 °F)] 36.3 °C (97.3 °F)  Heart Rate:  [] 101  Resp:  [18] 18  BP: ()/(37-55) 80/52 Medications and labs reviewed-   Results for orders placed or performed during the hospital encounter of 10/17/24 (from the past 24 hours)  Comprehensive metabolic panel  Result  Value  Ref Range  Glucose  115 (H)  74 - 99 mg/dL  Sodium  130 (L)  136  - 145 mmol/L  Potassium  5.4 (H)  3.5 - 5.3 mmol/L  Chloride  94 (L)  98 - 107 mmol/L  Bicarbonate  22  21 - 32 mmol/L  Anion Gap  19  10 - 20 mmol/L  Urea Nitrogen  93 (HH)  6 - 23 mg/dL  Creatinine  5.91 (H)  0.50 - 1.05 mg/dL  eGFR  7 (L)  >60 mL/min/1.73m*2  Calcium  6.9 (L)  8.6 - 10.3 mg/dL  Albumin  1.9 (L)  3.4 - 5.0 g/dL  Alkaline Phosphatase  180 (H)  33 - 136 U/L  Total Protein  5.0 (L)  6.4 - 8.2 g/dL  AST  29  9 - 39 U/L  Bilirubin, Total  0.3  0.0 - 1.2 mg/dL  ALT  12  7 - 45 U/L  CBC and Auto Differential  Result  Value  Ref Range  WBC  28.6 (H)  4.4 - 11.3 x10*3/uL  nRBC  0.0  0.0 - 0.0 /100 WBCs  RBC  3.24 (L)  4.00 - 5.20 x10*6/uL  Hemoglobin  8.4 (L)  12.0 - 16.0 g/dL  Hematocrit  28.2 (L)  36.0 - 46.0 %  MCV  87  80 - 100 fL  MCH  25.9 (L)  26.0 - 34.0 pg  MCHC  29.8 (L)  32.0 - 36.0 g/dL  RDW  18.6 (H)  11.5 - 14.5 %  Platelets  341  150 - 450 x10*3/uL  Neutrophils %  87.3  40.0 - 80.0 %  Immature Granulocytes %, Automated  1.4 (H)  0.0 - 0.9 %  Lymphocytes %  4.5  13.0 - 44.0 %  Monocytes %  4.0  2.0 - 10.0 %  Eosinophils %  2.3  0.0 - 6.0 %  Basophils %  0.5  0.0 - 2.0 %  Neutrophils Absolute  24.99 (H)  1.60 - 5.50 x10*3/uL  Immature Granulocytes Absolute, Automated  0.39  0.00 - 0.50 x10*3/uL  Lymphocytes Absolute  1.30  0.80 - 3.00 x10*3/uL  Monocytes Absolute  1.15 (H)  0.05 - 0.80 x10*3/uL  Eosinophils Absolute  0.66 (H)  0.00 - 0.40 x10*3/uL  Basophils Absolute  0.15 (H)  0.00 - 0.10 x10*3/uL     Patient recently received an antibiotic (last 12 hours)     None       No results found for the last 90 days.    Continue aggressive pulmonary hygiene and oral hygiene. Off loading as tolerated for skin integrity.  Plan discussed with interdisciplinary team, ok to discharge, will continue current and repeat labs in the AM if patient still hospitalized. Patient aware and agreeable to current plan, continue plan as above.     I spent 30 minutes on the date of the service which included preparing  to see the patient, face-to-face patient care, completing clinical documentation, obtaining and/or reviewing separately obtained history, performing a medically appropriate examination, counseling and educating the patient/family/caregiver, ordering medications, tests, or procedures, communicating with other HCPs (not separately reported), independently interpreting results (not separately reported), communicating results to the patient/family/caregiver, and care coordination (not separately reported.)    [facility]  [facility] Principal Problem:    EUSEBIO (acute kidney injury) (CMS-Formerly [facility])  Active Problems:    Acute kidney injury (CMS-Formerly [facility])  Patient with no significant clinical improvement noted, poor prognosis,  patient medically cleared for discharge today to hospice G[facility] with [facility] today. Patient seen resting in bed with head of bed elevated, no s/s or c/o acute difficulties at this time.    Keep morin in place for comfort measures.     Vital signs for last 24 hours:  Temp:  [35.8 °C (96.4 °F)-36.6 °C (97.9 °F)] 36.3 °C (97.3 °F)  Heart Rate:  [] 101  Resp:  [18] 18  BP: ()/(37-55) 80/52 Medications and labs reviewed-   Results for orders placed or performed during the hospital encounter of 10/17/24 (from the past 24 hours)  Comprehensive metabolic panel  Result  Value  Ref Range  Glucose  115 (H)  74 - 99 mg/dL  Sodium  130 (L)  136 - 145 mmol/L  Potassium  5.4 (H)  3.5 - 5.3 mmol/L  Chloride  94 (L)  98 - 107 mmol/L  Bicarbonate  22  21 - 32 mmol/L  Anion Gap  19  10 - 20 mmol/L  Urea Nitrogen  93 (HH)  6 - 23 mg/dL  Creatinine  5.91 (H)  0.50 - 1.05 mg/dL  eGFR  7 (L)  >60 mL/min/1.73m*2  Calcium  6.9 (L)  8.6 - 10.3 mg/dL  Albumin  1.9 (L)  3.4 - 5.0 g/dL  Alkaline Phosphatase  180 (H)  33 - 136 U/L  Total Protein  5.0 (L)  6.4 - 8.2 g/dL  AST  29  9 - 39 U/L  Bilirubin, Total  0.3  0.0 - 1.2 mg/dL  ALT  12  7 - 45 U/L  CBC and Auto Differential  Result  Value  Ref  Range  WBC  28.6 (H)  4.4 - 11.3 x10*3/uL  nRBC  0.0  0.0 - 0.0 /100 WBCs  RBC  3.24 (L)  4.00 - 5.20 x10*6/uL  Hemoglobin  8.4 (L)  12.0 - 16.0 g/dL  Hematocrit  28.2 (L)  36.0 - 46.0 %  MCV  87  80 - 100 fL  MCH  25.9 (L)  26.0 - 34.0 pg  MCHC  29.8 (L)  32.0 - 36.0 g/dL  RDW  18.6 (H)  11.5 - 14.5 %  Platelets  341  150 - 450 x10*3/uL  Neutrophils %  87.3  40.0 - 80.0 %  Immature Granulocytes %, Automated  1.4 (H)  0.0 - 0.9 %  Lymphocytes %  4.5  13.0 - 44.0 %  Monocytes %  4.0  2.0 - 10.0 %  Eosinophils %  2.3  0.0 - 6.0 %  Basophils %  0.5  0.0 - 2.0 %  Neutrophils Absolute  24.99 (H)  1.60 - 5.50 x10*3/uL  Immature Granulocytes Absolute, Automated  0.39  0.00 - 0.50 x10*3/uL  Lymphocytes Absolute  1.30  0.80 - 3.00 x10*3/uL  Monocytes Absolute  1.15 (H)  0.05 - 0.80 x10*3/uL  Eosinophils Absolute  0.66 (H)  0.00 - 0.40 x10*3/uL  Basophils Absolute  0.15 (H)  0.00 - 0.10 x10*3/uL     Patient recently received an antibiotic (last 12 hours)     None       No results found for the last 90 days.    Continue aggressive pulmonary hygiene and oral hygiene. Off loading as tolerated for skin integrity.  Plan discussed with interdisciplinary team, ok to discharge, will continue current and repeat labs in the AM if patient still hospitalized. Patient aware and agreeable to current plan, continue plan as above.     I spent 30 minutes on the date of the service which included preparing to see the patient, face-to-face patient care, completing clinical documentation, obtaining and/or reviewing separately obtained history, performing a medically appropriate examination, counseling and educating the patient/family/caregiver, ordering medications, tests, or procedures, communicating with other HCPs (not separately reported), independently interpreting results (not separately reported), communicating results to the patient/family/caregiver, and care coordination (not separately reported.)  Outpatient Follow-Up  No future  appointments.      Kim Angulo

## 2024-10-23 NOTE — PROGRESS NOTES
10/23/24 1223   Discharge Planning   Living Arrangements Family members   Support Systems Family members   Assistance Needed PRACHI met with pt sister Erica at [facility] to discuss discharge plan. Sister agrees that pt is not appropriate for SNF and would like pt to go to [facility] under respite stay private pay. PRACHI and sister Erica called [facility] on cell speakerphone who stated that they can accept pt today and tomorrow will refer pt to [facility] who they are contracted with.  states that she does want [facility] and no longer wants to work with [facility] because they didn't find her to be GIP appropriate yesterday. TCC and bedside RN updated. TCC to set up transport and send AVS.   Type of Residence Nursing home/residential care   Do you have animals or pets at home? No   Who is requesting discharge planning? Provider   Home or Post Acute Services Community services   Type of Post Acute Facility Services Other (Comment)  (Respite at [facility])   Expected Discharge Disposition [place]  (Respite at [facility] with Hospice)   Does the patient need discharge transport arranged? Yes   RoundTrip coordination needed? Yes   Has discharge transport been arranged? No     ADDED 0747: PRACHI called and made pt sister Erica aware of  time of 3:30 today.

## 2024-10-23 NOTE — NURSING NOTE
Attempted to call report to [facility], after speaking with  at facility and being transferred, I remained on the line for 5 minutes with no one answering.

## 2024-10-30 LAB
ACID FAST STN SPEC: NORMAL
MYCOBACTERIUM SPEC CULT: NORMAL

## 2024-11-06 LAB
ACID FAST STN SPEC: NORMAL
MYCOBACTERIUM SPEC CULT: NORMAL

## 2024-11-13 LAB
ACID FAST STN SPEC: NORMAL
MYCOBACTERIUM SPEC CULT: NORMAL

## 2024-11-20 LAB
ACID FAST STN SPEC: NORMAL
MYCOBACTERIUM SPEC CULT: NORMAL